# Patient Record
Sex: MALE | Race: WHITE
[De-identification: names, ages, dates, MRNs, and addresses within clinical notes are randomized per-mention and may not be internally consistent; named-entity substitution may affect disease eponyms.]

---

## 2020-05-15 ENCOUNTER — HOSPITAL ENCOUNTER (INPATIENT)
Dept: HOSPITAL 47 - 2SICU | Age: 80
LOS: 8 days | Discharge: HOME HEALTH | DRG: 242 | End: 2020-05-23
Attending: HOSPITALIST | Admitting: HOSPITALIST
Payer: MEDICARE

## 2020-05-15 VITALS — BODY MASS INDEX: 42.3 KG/M2

## 2020-05-15 DIAGNOSIS — Z88.0: ICD-10-CM

## 2020-05-15 DIAGNOSIS — I95.9: ICD-10-CM

## 2020-05-15 DIAGNOSIS — Z71.3: ICD-10-CM

## 2020-05-15 DIAGNOSIS — J18.9: ICD-10-CM

## 2020-05-15 DIAGNOSIS — N17.0: ICD-10-CM

## 2020-05-15 DIAGNOSIS — Z83.3: ICD-10-CM

## 2020-05-15 DIAGNOSIS — I27.20: ICD-10-CM

## 2020-05-15 DIAGNOSIS — E11.65: ICD-10-CM

## 2020-05-15 DIAGNOSIS — N18.3: ICD-10-CM

## 2020-05-15 DIAGNOSIS — Z80.49: ICD-10-CM

## 2020-05-15 DIAGNOSIS — I25.10: ICD-10-CM

## 2020-05-15 DIAGNOSIS — I49.5: ICD-10-CM

## 2020-05-15 DIAGNOSIS — E66.9: ICD-10-CM

## 2020-05-15 DIAGNOSIS — I13.0: ICD-10-CM

## 2020-05-15 DIAGNOSIS — E78.5: ICD-10-CM

## 2020-05-15 DIAGNOSIS — N14.1: ICD-10-CM

## 2020-05-15 DIAGNOSIS — I21.19: Primary | ICD-10-CM

## 2020-05-15 DIAGNOSIS — Z98.890: ICD-10-CM

## 2020-05-15 DIAGNOSIS — I44.2: ICD-10-CM

## 2020-05-15 DIAGNOSIS — Z87.891: ICD-10-CM

## 2020-05-15 DIAGNOSIS — Z91.018: ICD-10-CM

## 2020-05-15 DIAGNOSIS — Z79.84: ICD-10-CM

## 2020-05-15 DIAGNOSIS — Z87.19: ICD-10-CM

## 2020-05-15 DIAGNOSIS — Z79.82: ICD-10-CM

## 2020-05-15 DIAGNOSIS — G47.30: ICD-10-CM

## 2020-05-15 DIAGNOSIS — Z90.49: ICD-10-CM
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
## 2020-05-15 DIAGNOSIS — E11.22: ICD-10-CM

## 2020-05-15 DIAGNOSIS — T50.8X5A: ICD-10-CM

## 2020-05-15 DIAGNOSIS — Z79.899: ICD-10-CM

## 2020-05-15 DIAGNOSIS — M19.90: ICD-10-CM

## 2020-05-15 DIAGNOSIS — I50.22: ICD-10-CM

## 2020-05-15 DIAGNOSIS — I25.5: ICD-10-CM

## 2020-05-15 LAB
ANION GAP SERPL CALC-SCNC: 8 MMOL/L
BASOPHILS # BLD AUTO: 0.1 K/UL (ref 0–0.2)
BASOPHILS NFR BLD AUTO: 1 %
BUN SERPL-SCNC: 33 MG/DL (ref 9–20)
CALCIUM SPEC-MCNC: 10 MG/DL (ref 8.4–10.2)
CHLORIDE SERPL-SCNC: 102 MMOL/L (ref 98–107)
CO2 SERPL-SCNC: 28 MMOL/L (ref 22–30)
EOSINOPHIL # BLD AUTO: 0.1 K/UL (ref 0–0.7)
EOSINOPHIL NFR BLD AUTO: 1 %
ERYTHROCYTE [DISTWIDTH] IN BLOOD BY AUTOMATED COUNT: 4.43 M/UL (ref 4.3–5.9)
ERYTHROCYTE [DISTWIDTH] IN BLOOD: 14.5 % (ref 11.5–15.5)
GLUCOSE BLD-MCNC: 215 MG/DL (ref 75–99)
GLUCOSE BLD-MCNC: 237 MG/DL (ref 75–99)
GLUCOSE BLD-MCNC: 303 MG/DL (ref 75–99)
GLUCOSE SERPL-MCNC: 219 MG/DL (ref 74–99)
HCT VFR BLD AUTO: 42.2 % (ref 39–53)
HGB BLD-MCNC: 13.1 GM/DL (ref 13–17.5)
LYMPHOCYTES # SPEC AUTO: 2.1 K/UL (ref 1–4.8)
LYMPHOCYTES NFR SPEC AUTO: 19 %
MCH RBC QN AUTO: 29.6 PG (ref 25–35)
MCHC RBC AUTO-ENTMCNC: 31.1 G/DL (ref 31–37)
MCV RBC AUTO: 95.2 FL (ref 80–100)
MONOCYTES # BLD AUTO: 0.8 K/UL (ref 0–1)
MONOCYTES NFR BLD AUTO: 7 %
NEUTROPHILS # BLD AUTO: 7.8 K/UL (ref 1.3–7.7)
NEUTROPHILS NFR BLD AUTO: 70 %
PLATELET # BLD AUTO: 205 K/UL (ref 150–450)
POTASSIUM SERPL-SCNC: 4.8 MMOL/L (ref 3.5–5.1)
SODIUM SERPL-SCNC: 138 MMOL/L (ref 137–145)
WBC # BLD AUTO: 11.2 K/UL (ref 3.8–10.6)

## 2020-05-15 PROCEDURE — 71046 X-RAY EXAM CHEST 2 VIEWS: CPT

## 2020-05-15 PROCEDURE — 81001 URINALYSIS AUTO W/SCOPE: CPT

## 2020-05-15 PROCEDURE — 87324 CLOSTRIDIUM AG IA: CPT

## 2020-05-15 PROCEDURE — 87205 SMEAR GRAM STAIN: CPT

## 2020-05-15 PROCEDURE — 83036 HEMOGLOBIN GLYCOSYLATED A1C: CPT

## 2020-05-15 PROCEDURE — 84484 ASSAY OF TROPONIN QUANT: CPT

## 2020-05-15 PROCEDURE — 85027 COMPLETE CBC AUTOMATED: CPT

## 2020-05-15 PROCEDURE — 87635 SARS-COV-2 COVID-19 AMP PRB: CPT

## 2020-05-15 PROCEDURE — 33210 INSERT ELECTRD/PM CATH SNGL: CPT

## 2020-05-15 PROCEDURE — 84443 ASSAY THYROID STIM HORMONE: CPT

## 2020-05-15 PROCEDURE — 93306 TTE W/DOPPLER COMPLETE: CPT

## 2020-05-15 PROCEDURE — 85025 COMPLETE CBC W/AUTO DIFF WBC: CPT

## 2020-05-15 PROCEDURE — 71045 X-RAY EXAM CHEST 1 VIEW: CPT

## 2020-05-15 PROCEDURE — 33208 INSRT HEART PM ATRIAL & VENT: CPT

## 2020-05-15 PROCEDURE — 027036Z DILATION OF CORONARY ARTERY, ONE ARTERY WITH THREE DRUG-ELUTING INTRALUMINAL DEVICES, PERCUTANEOUS APPROACH: ICD-10-PCS

## 2020-05-15 PROCEDURE — 87070 CULTURE OTHR SPECIMN AEROBIC: CPT

## 2020-05-15 PROCEDURE — 93458 L HRT ARTERY/VENTRICLE ANGIO: CPT

## 2020-05-15 PROCEDURE — 3E033XZ INTRODUCTION OF VASOPRESSOR INTO PERIPHERAL VEIN, PERCUTANEOUS APPROACH: ICD-10-PCS

## 2020-05-15 PROCEDURE — 80048 BASIC METABOLIC PNL TOTAL CA: CPT

## 2020-05-15 RX ADMIN — NICARDIPINE HYDROCHLORIDE ONE MCG: 2.5 INJECTION INTRAVENOUS at 13:35

## 2020-05-15 RX ADMIN — NICARDIPINE HYDROCHLORIDE ONE MCG: 2.5 INJECTION INTRAVENOUS at 14:02

## 2020-05-15 RX ADMIN — INSULIN ASPART SCH UNIT: 100 INJECTION, SOLUTION INTRAVENOUS; SUBCUTANEOUS at 21:19

## 2020-05-15 RX ADMIN — DOPAMINE HYDROCHLORIDE IN DEXTROSE SCH MLS/HR: 3.2 INJECTION, SOLUTION INTRAVENOUS at 15:27

## 2020-05-15 RX ADMIN — CEFAZOLIN SCH MLS/HR: 330 INJECTION, POWDER, FOR SOLUTION INTRAMUSCULAR; INTRAVENOUS at 15:29

## 2020-05-15 NOTE — CONS
CONSULTATION



CHIEF COMPLAINT:

Chest pain.



Shaun is an 79-year-old gentleman with a history of non-insulin-dependent diabetes and

hypertension who presented to [facility] with chest pain.  He had some chest

discomfort yesterday and worsening chest pain this morning.  He describes it as a

pressure that radiated to his back.  His initial EKG showed acute ST-segment elevation

in the inferior leads.  He was given sublingual nitroglycerin, with which the ST-

segment elevation has improved and the chest pain has resolved.  At the time of my

evaluation, he is pain-free, hemodynamically stable and in no apparent distress.



PAST MEDICAL HISTORY:

Significant for hypertension and diabetes.



MEDICATIONS:

Medications at home include aspirin, Lasix, Actos, glipizide, losartan, metformin and

Ventolin.



ALLERGIES:

Allergies are as charted.



FAMILY HISTORY:

Negative for premature coronary artery disease.



SOCIAL HISTORY:

Negative for smoking, EtOH abuse or drug abuse.



REVIEW OF SYSTEMS:

HEENT is unremarkable.

CARDIAC: As described above.

RESPIRATORY: As described above.

GI: Negative.

GENITOURINARY: Negative.

ALLERGY: Negative.

IMMUNOLOGY: Negative.

SKIN: Negative.

MUSCULOSKELETAL: Negative.

ENDOCRINE: Negative.

DERMATOLOGY: Negative.

CONSTITUTIONAL: Negative.

ONCOLOGICAL: Negative.

CNS: Negative.



PHYSICAL EXAMINATION:

Comfortable at rest.  Vital signs are stable.  There is no jugular venous distention.

Chest exam reveals good air entry bilaterally.  Heart exam reveals first and second

heart sounds.  No gallop.  No murmur.  Abdomen is soft, nontender.  Examination of

extremities did not reveal any edema.  Peripheral pulses are palpable.



ASSESSMENT:

1. Acute anterior wall myocardial infarction.

2. Hypertension.

3. _____emia.



PLAN:

The patient will undergo emergency cardiac catheterization with a view to performing

angioplasty.





MMODL / IJN: [id number] / Job#: [id number]

## 2020-05-15 NOTE — PTCA
PERCUTANEOUSTRANS CORORONARY ANGIOGRAPHY



DATE OF SERVICE:

05/15/2020



PROCEDURE:

Percutaneous transluminal coronary angioplasty and stenting of super dominant 
right

coronary artery in the setting of an acute inferior myocardial infarction. Three
drug-

eluting stents were used.



PERFORMED BY:

Dr. BOYD Licea.



Moderate conscious sedation time was 46 minutes.  Patient was administered 
Versed.

Oxygen saturation, hemodynamics and EKG were monitored closely.



CLINICAL INFORMATION:

Mr. Shaun Cronin is an 79-year-old gentleman with history of type 2 diabetes,

hypertension, hyperlipidemia, obesity and probable sleep apnea syndrome who came
in

with acute inferior MI from [facility]. He was seen and evaluated by 
Dr. Parmar, who performed a cardiac cath which revealed 80% proximal RCA stenosis 
with a

heavy thrombus burden, and also major diagonal had significant disease.  LAD and

circumflex had noncritical disease.  He was advised intervention that was 
performed

expeditiously.



PROCEDURE NOTE:

The existing 6-[place] introducer in the right femoral artery was used to perform
the

procedure.  I used an  all-right 3.5 guide catheter to cannulate the right 
coronary

artery.  Patient was administered Angiomax bolus and drip as per protocol.  He 
also

received 180 mg of Brilinta orally.  I used a run-through wire to cross the 
lesion.

Wire was kept distally.  Without predilatation, an 18 mm long 4.0 caliber Xience
stent

was deployed, and soon after deployment there was some thrombus that went 
distally, and

the entire vessel was occluded.  The patient became transiently hypotensive with
ST

elevation.  I gave him some nicardipine and he also received 3 mcg of dopamine 
very

transiently.  Blood pressure came back and the vessel opened up.  I then noted 
that

there was another lesion proximal to where I had deployed the stent, and this 
was

addressed with another 8 mm Xience stent, and this telescoped into the 
previously

placed stent.  There was an acute marginal branch that came off at the junction 
of

proximal and middle one third, and just above it there was a question of napkin 
ring

lesion.  In multiple views I was not convinced, but since patient had vessel 
closure

with thrombus that started in that region and went distally, I decided to deploy

another stent.  I had difficulty getting past the existing stents.  I used 
another

neelam wire with a Whisper wire, and over this Whisper wire I advanced an 8 mm 
4.0

caliber Xience stent and deployed this at the acute marginal branch.  Excellent

angiographic result was achieved.  Patient was hemodynamically stable.  The 
sheath was

taken out and Angio-Seal device used to secure hemostasis.  He was sent to the 
ICU in a

stable condition.  Findings were discussed with the patient and I also 
communicated

with his friend at home by telephone.  He was sent to the ICU in a stable 
condition.





MMGUADALUPE / YOSIN: [id number] / Job#: [id number]

AILYN

## 2020-05-16 LAB
ANION GAP SERPL CALC-SCNC: 9 MMOL/L
BASOPHILS # BLD AUTO: 0.1 K/UL (ref 0–0.2)
BASOPHILS NFR BLD AUTO: 1 %
BUN SERPL-SCNC: 31 MG/DL (ref 9–20)
CALCIUM SPEC-MCNC: 9.4 MG/DL (ref 8.4–10.2)
CHLORIDE SERPL-SCNC: 105 MMOL/L (ref 98–107)
CO2 SERPL-SCNC: 25 MMOL/L (ref 22–30)
EOSINOPHIL # BLD AUTO: 0.1 K/UL (ref 0–0.7)
EOSINOPHIL NFR BLD AUTO: 1 %
ERYTHROCYTE [DISTWIDTH] IN BLOOD BY AUTOMATED COUNT: 4.3 M/UL (ref 4.3–5.9)
ERYTHROCYTE [DISTWIDTH] IN BLOOD: 14.7 % (ref 11.5–15.5)
GLUCOSE BLD-MCNC: 206 MG/DL (ref 75–99)
GLUCOSE BLD-MCNC: 207 MG/DL (ref 75–99)
GLUCOSE BLD-MCNC: 227 MG/DL (ref 75–99)
GLUCOSE BLD-MCNC: 261 MG/DL (ref 75–99)
GLUCOSE SERPL-MCNC: 230 MG/DL (ref 74–99)
GLUCOSE UR QL: (no result)
HBA1C MFR BLD: 8.9 % (ref 4–6)
HCT VFR BLD AUTO: 41 % (ref 39–53)
HGB BLD-MCNC: 12.7 GM/DL (ref 13–17.5)
LYMPHOCYTES # SPEC AUTO: 2.2 K/UL (ref 1–4.8)
LYMPHOCYTES NFR SPEC AUTO: 18 %
MCH RBC QN AUTO: 29.6 PG (ref 25–35)
MCHC RBC AUTO-ENTMCNC: 31.1 G/DL (ref 31–37)
MCV RBC AUTO: 95.5 FL (ref 80–100)
MONOCYTES # BLD AUTO: 1.1 K/UL (ref 0–1)
MONOCYTES NFR BLD AUTO: 9 %
NEUTROPHILS # BLD AUTO: 8.4 K/UL (ref 1.3–7.7)
NEUTROPHILS NFR BLD AUTO: 69 %
PH UR: 5.5 [PH] (ref 5–8)
PLATELET # BLD AUTO: 201 K/UL (ref 150–450)
POTASSIUM SERPL-SCNC: 4.5 MMOL/L (ref 3.5–5.1)
PROT UR QL: (no result)
RBC UR QL: <1 /HPF (ref 0–5)
SODIUM SERPL-SCNC: 139 MMOL/L (ref 137–145)
SP GR UR: 1.03 (ref 1–1.03)
SQUAMOUS UR QL AUTO: <1 /HPF (ref 0–4)
UROBILINOGEN UR QL STRIP: <2 MG/DL (ref ?–2)
WBC # BLD AUTO: 12.2 K/UL (ref 3.8–10.6)
WBC # UR AUTO: 2 /HPF (ref 0–5)

## 2020-05-16 RX ADMIN — INSULIN ASPART SCH UNIT: 100 INJECTION, SOLUTION INTRAVENOUS; SUBCUTANEOUS at 11:57

## 2020-05-16 RX ADMIN — LOSARTAN POTASSIUM SCH: 50 TABLET, FILM COATED ORAL at 11:58

## 2020-05-16 RX ADMIN — ASPIRIN 81 MG CHEWABLE TABLET SCH MG: 81 TABLET CHEWABLE at 08:30

## 2020-05-16 RX ADMIN — TICAGRELOR SCH MG: 90 TABLET ORAL at 08:30

## 2020-05-16 RX ADMIN — CEFAZOLIN SCH MLS/HR: 330 INJECTION, POWDER, FOR SOLUTION INTRAMUSCULAR; INTRAVENOUS at 05:37

## 2020-05-16 RX ADMIN — LOSARTAN POTASSIUM SCH MG: 50 TABLET, FILM COATED ORAL at 12:17

## 2020-05-16 RX ADMIN — ATORVASTATIN CALCIUM SCH MG: 80 TABLET, FILM COATED ORAL at 08:30

## 2020-05-16 RX ADMIN — TICAGRELOR SCH MG: 90 TABLET ORAL at 20:26

## 2020-05-16 RX ADMIN — INSULIN ASPART SCH UNIT: 100 INJECTION, SOLUTION INTRAVENOUS; SUBCUTANEOUS at 17:08

## 2020-05-16 RX ADMIN — FAMOTIDINE SCH MG: 20 TABLET, FILM COATED ORAL at 20:27

## 2020-05-16 RX ADMIN — DOPAMINE HYDROCHLORIDE IN DEXTROSE SCH MLS/HR: 3.2 INJECTION, SOLUTION INTRAVENOUS at 20:27

## 2020-05-16 RX ADMIN — INSULIN ASPART SCH UNIT: 100 INJECTION, SOLUTION INTRAVENOUS; SUBCUTANEOUS at 20:27

## 2020-05-16 RX ADMIN — INSULIN ASPART SCH UNIT: 100 INJECTION, SOLUTION INTRAVENOUS; SUBCUTANEOUS at 06:50

## 2020-05-16 NOTE — CONS
CONSULTATION



REASON FOR CONSULT:

Renal failure.



HISTORY OF PRESENT ILLNESS:

Patient is an 79-year-old male who was admitted to the hospital with complaints of

chest pain.  The pain was radiating to the back.  The patient initially presented to

[facility] and was transferred to [facility].  His EKG showed ST-segment

elevation in the inferior leads.  He was taken for cardiac catheterization yesterday

and had stent 3 coronary stents placed.  The patient denies any prior history of kidney

diseases.  His chest pain has resolved.  The patient's creatinine was 1.23 mg/dL today.

Yesterday it was 1.42 and review of previous labs shows a creatinine of about 1.3-1.2

in 2015.



Patient does have a history of hypertension and type 2 diabetes.  His blood pressure

had been on the lower side yesterday and early morning today, but it is currently much

better controlled.  The patient denies use of any nonsteroidal anti-inflammatory

agents.  He had been on angiotensin receptor blockers at home prior to admission.



Patient's heart rate has also been low.  His heart rate was as low as 25 this morning.

Currently patient is maintained on dopamine.  He is being followed by Cardiology.



PAST MEDICAL HISTORY:

Diabetes hypertension, dyslipidemia, osteoarthritis.



MEDICATIONS:

Medications prior to admission included aspirin, Lasix, Actos, glipizide, losartan,

metformin, Ventolin.



ALLERGIES:

PENICILLIN AND MUSHROOMS.



SOCIAL HISTORY:

Previous history of smoking.  No history of alcohol abuse or drug abuse.



EXAMINATION:

Patient is awake, comfortable, not in any acute distress.  Blood pressure was 127/66,

heart rate 51 per minute.  He is afebrile.  Examination of the heart S1, S2.

Examination of lungs decreased breath sounds at bases.

ABDOMEN:  Soft, nontender.

Examination of lower extremities shows chronic skin changes.  Chronic edema is noted as

well.  CNS exam grossly intact.



LAB:

Show sodium of 139, potassium 4.5, chloride 105, BUN 31, serum creatinine 1.23.

Troponin 33.  Hemoglobin 12.7.  UA is not available.



ASSESSMENT:

1. Acute kidney injury, mostly associated with hypotension hypoperfusion.  The patient

    is status post cardiac catheterization yesterday.  Continue to monitor his renal

    function.

2. Chronic kidney disease NKF stage III.  Previous creatinine about 1.3-1.2 mg/dL in

    2015 as well.  Most likely secondary to nephrosclerosis.  Check urinalysis.  Rule

    out proteinuria.

3. Type 2 diabetes, maintained on oral hypoglycemic agents including metformin.

4. Hypertension blood pressure had been low yesterday and early morning today.  We can

    resume angiotensin receptor blockers.  Consider decreasing dose if blood pressure

    remains low.

5. Bradycardia, maintained on dopamine, being followed by Cardiology.  Not on any beta

    blockers.

6. Acute ST elevation myocardial infarction status post cardiac catheterization and

    coronary stent placement to RCA.



PLAN:

Continue normal saline at 50 mL an hour.  May continue with angiotensin receptor

blockers if blood pressure drops again.  Decrease Cozaar to 25 mg per day.  Avoid any

other nephrotoxic agents.  Check urinalysis.





MMODL / IJN: [id number] / Job#: [id number]

## 2020-05-16 NOTE — PN
PROGRESS NOTE



Mr. Cronin is an 79-year-old gentleman who is a non-insulin-dependent diabetic and

hypertensive, who was admitted to the hospital with ST elevation myocardial infarction.

The patient had a cardiac catheterization by Dr. Parmar and subsequently had

percutaneous intervention by Dr. BOYD Licea.  The patient had a stent placement to the

RCA.  Xience stent was deployed.  The patient has remained relatively stable, but had

developed a source of bradycardia with heart rates dipping into 30s and having

junctional rhythm.  Most seemed to be in sinus rhythm.  He is on metoprolol, which is

being held.  He will continue with the rest of the medications.  His activity to be

increased as tolerated.  He is also on dopamine.



His blood pressure is running about 130/70, heart rate is varying between 38-85.  O2

saturation 96%.  Respirations are 12.  His urine output is fair.



LAB VALUES:

Showed [place] 247[address].  Electrolytes are within normal limits.  Creatinine is

1.23, BUN is 31.  Sugars are running between 250-300.  His troponin went up maximal 33.



Clinically lungs appear to be clear.  Heart is regular, but slow.  No JVD or peripheral

edema.  His groin is soft.



FINAL IMPRESSION:

1. Status post inferior wall myocardial infarction and stent placement.

2. Bradycardia.



PLAN:

Will discontinue beta blocker.  Continue dopamine for now.  Continue rest of the

medication.  Further recommendation will depend upon the course.  He will stay in the

ICU for 24 hours.





MMGUADALUPE / YOSIN: [id number] / Job#: [id number]

## 2020-05-16 NOTE — XR
EXAMINATION TYPE: XR chest 1V portable

 

DATE OF EXAM: 5/16/2020

 

COMPARISON: 11/25/2015

 

HISTORY: Short of breath

 

TECHNIQUE: Single view

 

FINDINGS: Heart is enlarged. There is mild pulmonary congestion. There are chest leads. There is no d
efinite pleural effusion. Bony thorax appears intact.

 

IMPRESSION: Cardiomegaly and mild pulmonary congestion that is increased compared to last exam. No ob
vious heart failure.

## 2020-05-17 LAB
ANION GAP SERPL CALC-SCNC: 7 MMOL/L
BASOPHILS # BLD AUTO: 0 K/UL (ref 0–0.2)
BASOPHILS NFR BLD AUTO: 0 %
BUN SERPL-SCNC: 31 MG/DL (ref 9–20)
CALCIUM SPEC-MCNC: 9.1 MG/DL (ref 8.4–10.2)
CHLORIDE SERPL-SCNC: 102 MMOL/L (ref 98–107)
CO2 SERPL-SCNC: 29 MMOL/L (ref 22–30)
EOSINOPHIL # BLD AUTO: 0 K/UL (ref 0–0.7)
EOSINOPHIL NFR BLD AUTO: 0 %
ERYTHROCYTE [DISTWIDTH] IN BLOOD BY AUTOMATED COUNT: 3.89 M/UL (ref 4.3–5.9)
ERYTHROCYTE [DISTWIDTH] IN BLOOD: 14.7 % (ref 11.5–15.5)
GLUCOSE BLD-MCNC: 166 MG/DL (ref 75–99)
GLUCOSE BLD-MCNC: 179 MG/DL (ref 75–99)
GLUCOSE BLD-MCNC: 230 MG/DL (ref 75–99)
GLUCOSE BLD-MCNC: 231 MG/DL (ref 75–99)
GLUCOSE SERPL-MCNC: 228 MG/DL (ref 74–99)
HCT VFR BLD AUTO: 37.4 % (ref 39–53)
HGB BLD-MCNC: 12.1 GM/DL (ref 13–17.5)
LYMPHOCYTES # SPEC AUTO: 1.5 K/UL (ref 1–4.8)
LYMPHOCYTES NFR SPEC AUTO: 11 %
MCH RBC QN AUTO: 31.1 PG (ref 25–35)
MCHC RBC AUTO-ENTMCNC: 32.4 G/DL (ref 31–37)
MCV RBC AUTO: 96 FL (ref 80–100)
MONOCYTES # BLD AUTO: 1.4 K/UL (ref 0–1)
MONOCYTES NFR BLD AUTO: 10 %
NEUTROPHILS # BLD AUTO: 10.8 K/UL (ref 1.3–7.7)
NEUTROPHILS NFR BLD AUTO: 77 %
PLATELET # BLD AUTO: 184 K/UL (ref 150–450)
POTASSIUM SERPL-SCNC: 4.5 MMOL/L (ref 3.5–5.1)
SODIUM SERPL-SCNC: 138 MMOL/L (ref 137–145)
WBC # BLD AUTO: 14 K/UL (ref 3.8–10.6)

## 2020-05-17 PROCEDURE — 5A1223Z PERFORMANCE OF CARDIAC PACING, CONTINUOUS: ICD-10-PCS

## 2020-05-17 RX ADMIN — INSULIN ASPART SCH UNIT: 100 INJECTION, SOLUTION INTRAVENOUS; SUBCUTANEOUS at 06:43

## 2020-05-17 RX ADMIN — MIDODRINE HYDROCHLORIDE SCH MG: 5 TABLET ORAL at 17:06

## 2020-05-17 RX ADMIN — INSULIN ASPART SCH UNIT: 100 INJECTION, SOLUTION INTRAVENOUS; SUBCUTANEOUS at 11:48

## 2020-05-17 RX ADMIN — LOSARTAN POTASSIUM SCH: 50 TABLET, FILM COATED ORAL at 11:45

## 2020-05-17 RX ADMIN — ATORVASTATIN CALCIUM SCH MG: 80 TABLET, FILM COATED ORAL at 08:31

## 2020-05-17 RX ADMIN — INSULIN ASPART SCH UNIT: 100 INJECTION, SOLUTION INTRAVENOUS; SUBCUTANEOUS at 20:44

## 2020-05-17 RX ADMIN — MIDODRINE HYDROCHLORIDE SCH MG: 5 TABLET ORAL at 06:42

## 2020-05-17 RX ADMIN — INSULIN ASPART SCH UNIT: 100 INJECTION, SOLUTION INTRAVENOUS; SUBCUTANEOUS at 17:06

## 2020-05-17 RX ADMIN — ASPIRIN 81 MG CHEWABLE TABLET SCH MG: 81 TABLET CHEWABLE at 08:31

## 2020-05-17 RX ADMIN — FAMOTIDINE SCH MG: 20 TABLET, FILM COATED ORAL at 08:31

## 2020-05-17 RX ADMIN — TICAGRELOR SCH MG: 90 TABLET ORAL at 08:31

## 2020-05-17 RX ADMIN — TICAGRELOR SCH MG: 90 TABLET ORAL at 20:44

## 2020-05-17 RX ADMIN — CEFAZOLIN SCH MLS/HR: 330 INJECTION, POWDER, FOR SOLUTION INTRAMUSCULAR; INTRAVENOUS at 06:46

## 2020-05-17 NOTE — XR
EXAMINATION TYPE: XR chest 1V portable

 

DATE OF EXAM: 5/17/2020

 

HISTORY: shortness of breath.

 

REFERENCE: Previous study dated 5/16/2020.

 

FINDINGS: The heart is enlarged. There is improved vascular congestion. There is no maya edema. No d
efinite pleural fluid is seen. There is some left basilar airspace disease either representing atelec
tasis or pneumonia.

 

IMPRESSION: 

 

OVERALL THERE HAS BEEN IMPROVEMENT IN THE APPEARANCE OF THE CHEST WITH DECREASING VASCULAR CONGESTION
.

## 2020-05-17 NOTE — PN
PROGRESS NOTE



This is an 79-year-old gentleman with history of hypertension, non-insulin-dependent

diabetes mellitus, who was admitted with inferior wall MI and underwent stent placement

of the RCA.  The patient has been hypotensive and bradycardic.  He did develop

significant pauses up to 10-16 seconds last night associated with dizziness.  The

patient had a temporary pacemaker implantation and has been stable since then.  Still

his blood pressure is running about  systolic.  He is still on small dose of

dopamine.  His urine output for the last 24 hours has been about 1800.  A chest x-ray

has showed improvement in congestion.



LAB WORK:

Today showed a [place] of [address], hemoglobin is normal.  His creatinine is 1.34.

Blood sugars are running about 228.



His vital signs showed normal temperature, heart rate is in the 50s, respirations are

about 20, saturations are 99%.  Clinically, patient appears to be in no acute distress.

Does complain of discomfort related to staying in bed and inability to move.  Lungs

appear to be clear.  Heart is regular.  His echocardiogram showed mild hypokinesia of

the inferior wall  areas with ejection fraction 45 to 50 percent.



IMPRESSION:

1. Status post acute inferior wall MI and stent placement.

2. Ischemic cardiomyopathy with hypokinesis, inferior wall with ejection fraction of

    45-50 percent.

3. Sick sinus syndrome with long pauses, status post temporary pacemaker implantation.

4. Hypertension history, but current presenting hypotension.



PLAN:

We will continue his current medical therapy.  Hold his Cozaar and beta blockers.

Continue to monitor his rhythm.  If the patient continues to have these long pauses

without recovery, patient may need a permanent pacemaker implantation.  Further

recommendations depend upon the clinical course.





MMODL / IJN: [id number] / Job#: [id number]

## 2020-05-17 NOTE — P.PN
Subjective








This is a pleasant 79 years old female with past medical history of diabetes 

mellitus, hypertension.  She was transferred from [facility] chest 

pain.  Patient presents with central chest pain, of daily duration of the left 

side radiating to the neck about 4/10 in severity, associated with little 

dyspnea, nausea and coughing.  No vomiting.  No change in urine or bowel habits


EKG changes showing ST elevation in inferior leads.  Patient has been evaluated 

by cardiologist and underwent emergent cardiac angiogram, and found to have 80% 

blockage of the right coronary artery, patient underwent difficult PCI with 3 

stent placed in the right coronary artery, during the procedure patient became 

transiently hypotensive and needed dopamine for short period.  Postprocedure 

patient stabilized and transferred to the ICU for further monitoring and 

treatment


Currently vitals are stable.  Labs showed leukocytosis of 11.2 K, creatinine is 

elevated 1.4, glucose high at 215-303


Currently patient is on normal saline 75 mL/h, glipizide 5 mg twice a day, 

aspirin and Brilinta, metoprolol 12.5 mg





05/17/2020


Patient remains in the ICU, his heart rate dropped to 20s overnight with low 

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 

cardiologist placed temporal pacemaker, he is awake and oriented in the ICU.  No

chest pain or dyspnea.  Nephrologist following the case closely, losartan.  And 

medial drain was admitted, currently blood pressure 127/53 with heart rate is 

50s, WBC 14.4 K, creatinine 1.3 and stable, glucose controlled less than 200 


possible patient will need permanent pacemaker and down the root








Review of systems


CONSTITUTIONAL: No fever, no malaise, no fatigue. 


HEENT: No recent visual problems or hearing problems. Denied any sore throat. 


CARDIOVASCULAR: No  orthopnea, PND, no palpitations, no syncope. 


PULMONARY: No shortness of breath, no cough, no hemoptysis. 


GASTROINTESTINAL: No diarrhea, no nausea, no vomiting, no abdominal pain. 

Normoactive bowel sounds. 


NEUROLOGICAL: No headaches, no weakness, no numbness. 


HEMATOLOGICAL: Denies any bleeding or petechiae. 


GENITOURINARY: Denies any burning micturition, frequency, or urgency. 


MUSCULOSKELETAL/RHEUMATOLOGICAL: Denies any joint pain, swelling, or any muscle 

pain. 


ENDOCRINE: Denies any polyuria or polydipsia.


                               Active Medications











Generic Name Dose Route Start Last Admin





  Trade Name Freq  PRN Reason Stop Dose Admin


 


Aspirin  81 mg  05/16/20 09:00  05/17/20 08:31





  Aspirin  PO   81 mg





  DAILY VICENTE   Administration


 


Atorvastatin Calcium  80 mg  05/16/20 09:00  05/17/20 08:31





  Lipitor  PO   80 mg





  DAILY VICENTE   Administration


 


Famotidine  20 mg  05/18/20 09:00 





  Pepcid  PO  





  DAILY VICENTE  


 


Glipizide  5 mg  05/15/20 17:30  05/17/20 17:06





  Glucotrol  PO   5 mg





  AC-BID VICENTE   Administration


 


Dopamine HCl/Dextrose 800 mg/  250 mls @ 8.114 mls/hr  05/15/20 15:00  05/16/20 

20:27





  IV Solution  IV   3 mcg/kg/min





  .Q24H VICENTE   8.114 mls/hr





    Administration





  Protocol  





  3 MCG/KG/MIN  


 


Sodium Chloride  1,000 mls @ 20 mls/hr  05/17/20 03:30  05/17/20 06:46





  Saline 0.9%  IV   20 mls/hr





  .Q24H VICENTE   Administration


 


Insulin Aspart  0 unit  05/15/20 21:00  05/17/20 17:06





  Novolog  SQ   2 unit





  ACHS VICENTE   Administration





  Protocol  


 


Losartan Potassium  50 mg  05/16/20 09:00  05/17/20 11:45





  Cozaar  PO   Not Given





  DAILY [facility]  


 


Metformin HCl  500 mg  05/16/20 19:15  05/17/20 17:06





  Glucophage  PO   500 mg





  BID-W/MEALS VICENTE   Administration


 


Midodrine  10 mg  05/17/20 07:30  05/17/20 17:06





  Proamatine  PO   10 mg





  AC-BID VICENTE   Administration


 


Ticagrelor  90 mg  05/16/20 09:00  05/17/20 08:31





  Brilinta  PO   90 mg





  BID VICENTE   Administration














Objective





- Vital Signs


Vital signs: 


                                   Vital Signs











Temp  98.8 F   05/17/20 16:00


 


Pulse  58 L  05/17/20 19:00


 


Resp  22   05/17/20 19:00


 


BP  119/51   05/17/20 19:00


 


Pulse Ox  96   05/17/20 19:00








                                 Intake & Output











 05/17/20 05/17/20 05/18/20





 06:59 18:59 06:59


 


Intake Total 860.306 380 30


 


Output Total 570 214 30


 


Balance 290.306 166 0


 


Weight 143.2 kg  


 


Intake:   


 


   280 30


 


    Sodium Chloride 0.9% 1, 575 160 20





    000 ml @ 50 mls/hr IV .   





    Q20H VICENTE Rx#:[id number]   


 


    TVP 10 mL/hour .9  120 10


 


  Intake, IV Titration 235.306 100 





  Amount   


 


    DOPamine DRIP 800 mg In 235.306  





    Dextrose/Water 1 250ml.   





    bag @ 3 MCG/KG/MIN 8.114   





    mls/hr IV .Q24H VICENTE Rx#:   





    [id number]   


 


    Sodium Chloride 0.9% 1,  100 





    000 ml @ 20 mls/hr IV .   





    Q24H VICENTE Rx#:[id number]   


 


Output:   


 


  Urine 570 214 30


 


Other:   


 


  Voiding Method Indwelling Catheter Indwelling Catheter 














- Exam





GENERAL: The patient is alert and oriented x3, not in any acute distress. Well 

developed, well nourished. 


HEENT: Pupils are round and equally reacting to light. EOMI. No scleral icterus.

No conjunctival pallor. Normocephalic, atraumatic. No pharyngeal erythema. No 

thyromegaly. 


CARDIOVASCULAR: S1 and S2 present. No murmurs, rubs, or gallops. 


PULMONARY: Chest is clear to auscultation, no wheezing or crackles. 


ABDOMEN: Soft, nontender, nondistended, normoactive bowel sounds. No palpable 

organomegaly. 


MUSCULOSKELETAL: No joint swelling or deformity. 


EXTREMITIES: No cyanosis, clubbing, or pedal edema. 


NEUROLOGICAL: Gross neurological examination did not reveal any focal deficits. 


SKIN: No rashes. no petechiae.





- Labs


CBC & Chem 7: 


                                 05/17/20 04:12





                                 05/17/20 04:12


Labs: 


                  Abnormal Lab Results - Last 24 Hours (Table)











  05/16/20 05/16/20 05/17/20 Range/Units





  20:22 22:20 04:12 


 


WBC    14.0 H  (3.8-10.6)  k/uL


 


RBC    3.89 L  (4.30-5.90)  m/uL


 


Hgb    12.1 L  (13.0-17.5)  gm/dL


 


Hct    37.4 L  (39.0-53.0)  %


 


Neutrophils #    10.8 H  (1.3-7.7)  k/uL


 


Monocytes #    1.4 H  (0-1.0)  k/uL


 


BUN     (9-20)  mg/dL


 


Creatinine     (0.66-1.25)  mg/dL


 


Glucose     (74-99)  mg/dL


 


POC Glucose (mg/dL)  206 H    (75-99)  mg/dL


 


Urine Protein   1+ H   (Negative)  


 


Urine Glucose (UA)   3+ H   (Negative)  


 


Urine Ketones   Trace H   (Negative)  


 


Urine Bacteria   Rare H   (None)  /hpf


 


Urine Mucus   Rare H   (None)  /hpf














  05/17/20 05/17/20 05/17/20 Range/Units





  04:12 05:56 11:39 


 


WBC     (3.8-10.6)  k/uL


 


RBC     (4.30-5.90)  m/uL


 


Hgb     (13.0-17.5)  gm/dL


 


Hct     (39.0-53.0)  %


 


Neutrophils #     (1.3-7.7)  k/uL


 


Monocytes #     (0-1.0)  k/uL


 


BUN  31 H    (9-20)  mg/dL


 


Creatinine  1.34 H    (0.66-1.25)  mg/dL


 


Glucose  228 H    (74-99)  mg/dL


 


POC Glucose (mg/dL)   231 H  179 H  (75-99)  mg/dL


 


Urine Protein     (Negative)  


 


Urine Glucose (UA)     (Negative)  


 


Urine Ketones     (Negative)  


 


Urine Bacteria     (None)  /hpf


 


Urine Mucus     (None)  /hpf














  05/17/20 Range/Units





  16:51 


 


WBC   (3.8-10.6)  k/uL


 


RBC   (4.30-5.90)  m/uL


 


Hgb   (13.0-17.5)  gm/dL


 


Hct   (39.0-53.0)  %


 


Neutrophils #   (1.3-7.7)  k/uL


 


Monocytes #   (0-1.0)  k/uL


 


BUN   (9-20)  mg/dL


 


Creatinine   (0.66-1.25)  mg/dL


 


Glucose   (74-99)  mg/dL


 


POC Glucose (mg/dL)  166 H  (75-99)  mg/dL


 


Urine Protein   (Negative)  


 


Urine Glucose (UA)   (Negative)  


 


Urine Ketones   (Negative)  


 


Urine Bacteria   (None)  /hpf


 


Urine Mucus   (None)  /hpf














Assessment and Plan


Assessment: 











Acute inferior STEMI, status post emergent cardiac angiogram with PCI of the RCA


Ischemic cardiomyopathy with ejection fraction 45-50%


Sick sinus syndrome status post temporal pacemaker placement


Mild leukocytosis, mostly reactive, no signs symptoms of infection


Acute kidney injury versus chronic kidney disease


Diabetes mellitus, with hyperglycemia


Hypertension























Plan: 





This is a pleasant 79 years old female who presents with acute STEMI.  Status 

post cardiac cath and stent placement.  Cardiology are following the case 

closely.  Continue with dual antiplatelet therapy.  Check hemoglobin A1c.  

Follow-up WBC.  Patient with kidney injury and on the top of that she got 

angiogram and was transected hypotensive, we'll call nephrology consult, 

continue with IV fluids


Labs and medication were reviewed..  Continue same treatment.  Continue with 

symptomatic treatment.  Resume home medication.  Monitor lytes and vitals.  DVT 

and GI prophylaxis.  Further recommendations of the clinical course of the 

patient


DVT prophylaxis: Dual antiplatelet therapy


GI Prophylaxis: Pepcid

## 2020-05-17 NOTE — PN
PROGRESS NOTE



Patient is seen for followup for acute kidney injury.  The patient continues to have

long pauses and his blood pressure was low.  Urine output had dropped to about 0

mL/hour overnight.  This morning, patient had a temporary pacemaker placed.  His heart

rate is better.  Urine output seems to have picked up to about 15 mL/hour.  The patient

is comfortable awake he is not in any acute distress.



PHYSICAL EXAMINATION:

On examination, blood pressure was 94/52, heart rate 51 per minute.  He is afebrile.

Examination shows edema 1+ bilaterally.  Abdomen is soft, nontender, obese.

Examination of lungs decreased breath sounds at bases. CNS exam grossly intact.



LABS:

Show sodium 138, potassium 4.5, chloride 102, BUN 31, creatinine 1.34, hemoglobin 12.1

g/dL.



ASSESSMENT:

1. Acute kidney injury associated with hypotension, hypoperfusion.  Serum creatinine

    is higher again as patient's blood pressure was quite low overnight.  Urine output

    seems to have picked up now after pacemaker placement.  Continue to monitor for

    now.  I will discontinue the losartan as blood pressure remains low.  I will also

    add midodrine.

2. Status post acute ST-elevation myocardial infarction status post cardiac

    catheterization with RCA stenting.

3. Bradycardia, associated with inferior wall myocardial infarction.

4. Hypotension associated with bradycardia, myocardial infarction, maintained on

    dopamine currently at 2 mics.

5. Hypertension.  Blood pressure is currently low.  Hold off on angiotensin receptor

    blockers.



PLAN:

Hold losartan for now.  Add midodrine.  Continue to avoid nephrotoxic agents.  Repeat

labs in a.m.





MMTADEOL / IJN: [id number] / Job#: [id number]

## 2020-05-17 NOTE — P.PCN
Date of Procedure: 05/17/20


Preoperative Diagnosis: 


Tachybradycardia syndrome with long pauses


Postoperative Diagnosis: 


The same


Procedure(s) Performed: 


Temporary pacemaker insertion


Description of Procedure: 


Patient was brought to the lab in a fasting state.  He was prepped and draped in

the usual fashion.  The right groin is infiltrated with lidocaine and right 

femoral vein was entered using Seldinger technique and a 6-Lao sheath was 

left in place.  A balloon-tip temporary pacemaker wire was advanced under 

fluoroscopy near the floor of the right ventricle.  Satisfactory thresholds were

obtained.  The pacemaker is set at a rate of 50 and an output of 3.  She 

tolerated the procedure well.  The duration of the procedure 13 minutes.





Final impression: #1.  Successful implantation of temporary pacemaker from the 

right groin, under fluoroscopy.

## 2020-05-18 LAB
ANION GAP SERPL CALC-SCNC: 6 MMOL/L
BASOPHILS # BLD AUTO: 0.1 K/UL (ref 0–0.2)
BASOPHILS NFR BLD AUTO: 0 %
BUN SERPL-SCNC: 44 MG/DL (ref 9–20)
CALCIUM SPEC-MCNC: 8.5 MG/DL (ref 8.4–10.2)
CHLORIDE SERPL-SCNC: 104 MMOL/L (ref 98–107)
CO2 SERPL-SCNC: 27 MMOL/L (ref 22–30)
EOSINOPHIL # BLD AUTO: 0.2 K/UL (ref 0–0.7)
EOSINOPHIL NFR BLD AUTO: 1 %
ERYTHROCYTE [DISTWIDTH] IN BLOOD BY AUTOMATED COUNT: 3.8 M/UL (ref 4.3–5.9)
ERYTHROCYTE [DISTWIDTH] IN BLOOD: 14.9 % (ref 11.5–15.5)
GLUCOSE BLD-MCNC: 159 MG/DL (ref 75–99)
GLUCOSE BLD-MCNC: 173 MG/DL (ref 75–99)
GLUCOSE BLD-MCNC: 183 MG/DL (ref 75–99)
GLUCOSE BLD-MCNC: 188 MG/DL (ref 75–99)
GLUCOSE BLD-MCNC: 200 MG/DL (ref 75–99)
GLUCOSE SERPL-MCNC: 173 MG/DL (ref 74–99)
HCT VFR BLD AUTO: 37 % (ref 39–53)
HGB BLD-MCNC: 11.7 GM/DL (ref 13–17.5)
LYMPHOCYTES # SPEC AUTO: 2.4 K/UL (ref 1–4.8)
LYMPHOCYTES NFR SPEC AUTO: 17 %
MCH RBC QN AUTO: 30.8 PG (ref 25–35)
MCHC RBC AUTO-ENTMCNC: 31.6 G/DL (ref 31–37)
MCV RBC AUTO: 97.4 FL (ref 80–100)
MONOCYTES # BLD AUTO: 1.6 K/UL (ref 0–1)
MONOCYTES NFR BLD AUTO: 12 %
NEUTROPHILS # BLD AUTO: 9.4 K/UL (ref 1.3–7.7)
NEUTROPHILS NFR BLD AUTO: 67 %
PLATELET # BLD AUTO: 185 K/UL (ref 150–450)
POTASSIUM SERPL-SCNC: 4.5 MMOL/L (ref 3.5–5.1)
SODIUM SERPL-SCNC: 137 MMOL/L (ref 137–145)
WBC # BLD AUTO: 14 K/UL (ref 3.8–10.6)

## 2020-05-18 RX ADMIN — INSULIN ASPART SCH UNIT: 100 INJECTION, SOLUTION INTRAVENOUS; SUBCUTANEOUS at 20:22

## 2020-05-18 RX ADMIN — DOPAMINE HYDROCHLORIDE IN DEXTROSE SCH MLS/HR: 3.2 INJECTION, SOLUTION INTRAVENOUS at 05:32

## 2020-05-18 RX ADMIN — ATORVASTATIN CALCIUM SCH MG: 80 TABLET, FILM COATED ORAL at 08:29

## 2020-05-18 RX ADMIN — ASPIRIN 81 MG CHEWABLE TABLET SCH MG: 81 TABLET CHEWABLE at 08:28

## 2020-05-18 RX ADMIN — INSULIN ASPART SCH UNIT: 100 INJECTION, SOLUTION INTRAVENOUS; SUBCUTANEOUS at 16:48

## 2020-05-18 RX ADMIN — MIDODRINE HYDROCHLORIDE SCH MG: 5 TABLET ORAL at 07:05

## 2020-05-18 RX ADMIN — INSULIN ASPART SCH UNIT: 100 INJECTION, SOLUTION INTRAVENOUS; SUBCUTANEOUS at 07:04

## 2020-05-18 RX ADMIN — CEFAZOLIN SCH MLS/HR: 330 INJECTION, POWDER, FOR SOLUTION INTRAMUSCULAR; INTRAVENOUS at 05:33

## 2020-05-18 RX ADMIN — DOPAMINE HYDROCHLORIDE IN DEXTROSE SCH: 3.2 INJECTION, SOLUTION INTRAVENOUS at 16:33

## 2020-05-18 RX ADMIN — TICAGRELOR SCH MG: 90 TABLET ORAL at 20:22

## 2020-05-18 RX ADMIN — TICAGRELOR SCH MG: 90 TABLET ORAL at 08:28

## 2020-05-18 RX ADMIN — FAMOTIDINE SCH MG: 20 TABLET, FILM COATED ORAL at 08:28

## 2020-05-18 RX ADMIN — LOSARTAN POTASSIUM SCH: 50 TABLET, FILM COATED ORAL at 08:29

## 2020-05-18 RX ADMIN — MIDODRINE HYDROCHLORIDE SCH: 5 TABLET ORAL at 16:47

## 2020-05-18 RX ADMIN — INSULIN DETEMIR SCH UNIT: 100 INJECTION, SOLUTION SUBCUTANEOUS at 21:45

## 2020-05-18 RX ADMIN — INSULIN ASPART SCH UNIT: 100 INJECTION, SOLUTION INTRAVENOUS; SUBCUTANEOUS at 13:28

## 2020-05-18 NOTE — PN
PROGRESS NOTE



Patient is seen for followup for acute kidney injury.  His blood pressure and heart

rate have stabilized now.  He is currently off of dopamine.  Urine output has picked up

to about 50 to 40 mL/hour.  Overall patient is stable.  He is being considered for

possible permanent pacemaker placement.



On examination this morning, blood pressure was 108/44, heart rate 50 per minute.  He

is afebrile.

EXAMINATION OF THE HEART: S1 and S2.

EXAMINATION OF LUNGS: Decreased breath sounds at bases.

ABDOMEN:  Soft, non-tender.

Examination of lower extremities shows trace edema bilaterally.

CNS exam is grossly intact.





Labs show sodium 137, potassium 4.5, chloride 104, BUN 44, creatinine 1.56, hemoglobin

11.7 g/dL.



ASSESSMENT:

1. Acute kidney injury secondary to hypotension, hypoperfusion as well as a component

    of contrast nephropathy.  Serum creatinine is higher, although urine output has

    picked up. It should stabilize over the next few days once patient is more

    hemodynamically stable.  He is currently off of dopamine, and we will continue to

    monitor blood pressure and heart rate.  Continue with the Midodrine. Angiotensin

    receptor blockers are on hold, given the significant hypotension.

2. Status post inferior wall myocardial infarction, status post right coronary artery

    stenting.

3. Bradycardia associated with inferior myocardial infarction, status post temporary

    venous pacemaker.

4. Type 2 diabetes, maintained on Glucotrol.

5. History of hypertension.  Blood pressures had been low and angiotensin receptor

    blockers currently on hold.



PLAN:

Maintain off of dopamine.  Avoid any other nephrotoxic agents.  Continue with the

midodrine.  Continue to hold off on angiotensin receptor blockers, given the low blood

pressure.  Repeat labs in a.m.





MMODL / IJN: [id number] / Job#: [id number]

## 2020-05-18 NOTE — PN
PROGRESS NOTE



Shaun is an 79-year-old gentleman who is admitted to hospital with acute inferior wall

myocardial infarction, I did an emergent heart catheterization on patient and

angioplasty of right coronary artery.  Over the weekend, he developed positive sinus

pauses and went on to have a temporary pacemaker.  This morning, he is intermittently

using the pacemaker otherwise free of cardiac symptoms.



Heart rate is 50 beats per minute.  Blood pressure is 117/58, respiratory rate is 18,

O2 saturation is 97%.  There is no jugular venous distention.  Chest exam reveals

diminished air entry at the bases.  Heart exam reveals first and second heart sounds.

No gallop.  No murmur.  Abdomen is soft.  Exam of extremities did not reveal any edema.

Peripheral pulses are felt.



LABS:

Show a hemoglobin of 11.7, potassium is 4.5, creatinine is 1.5.



ASSESSMENT:

1. Acute inferior wall myocardial infarction, status post catheterization and

    angioplasty of right coronary artery.

2. Sinus pause, status post temporary pacemaker.

3. Chronic renal insufficiency.



PLAN:

If patient continues to use the temporary pacemaker tomorrow, we will consider

permanent pacemaker implantation.  I will also obtain a 2D echo on him to evaluate his

LV function.





MMODL / IJN: [id number] / Job#: [id number]

## 2020-05-18 NOTE — P.PN
Subjective








This is a pleasant 79 years old female with past medical history of diabetes 

mellitus, hypertension.  She was transferred from [facility] chest 

pain.  Patient presents with central chest pain, of daily duration of the left 

side radiating to the neck about 4/10 in severity, associated with little 

dyspnea, nausea and coughing.  No vomiting.  No change in urine or bowel habits


EKG changes showing ST elevation in inferior leads.  Patient has been evaluated 

by cardiologist and underwent emergent cardiac angiogram, and found to have 80% 

blockage of the right coronary artery, patient underwent difficult PCI with 3 

stent placed in the right coronary artery, during the procedure patient became 

transiently hypotensive and needed dopamine for short period.  Postprocedure 

patient stabilized and transferred to the ICU for further monitoring and 

treatment


Currently vitals are stable.  Labs showed leukocytosis of 11.2 K, creatinine is 

elevated 1.4, glucose high at 215-303


Currently patient is on normal saline 75 mL/h, glipizide 5 mg twice a day, 

aspirin and Brilinta, metoprolol 12.5 mg





05/17/2020


Patient remains in the ICU, his heart rate dropped to 20s overnight with low 

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 

cardiologist placed temporal pacemaker, he is awake and oriented in the ICU.  No

chest pain or dyspnea.  Nephrologist following the case closely, losartan.  And 

medial drain was admitted, currently blood pressure 127/53 with heart rate is 

50s, WBC 14.4 K, creatinine 1.3 and stable, glucose controlled less than 200 


possible patient will need permanent pacemaker and down the root





05/18/2020


Patient remains to the ICU for close monitoring of his heart rate monitoring.  

He is fully awake and oriented, no chest pain or dyspnea.  He feels generally 

weak.


Vital signs stable, heart rate is 49-52, blood pressure 117/58, temporal 

pacemaker is in place.  Labs reviewed showed creatinine 1.5, WBC 14 K, glucose 

166-230.  Hemoglobin A1c is 8.9, he wasn't glyburide 5 mg twice a day, metformin

500 mg was added, he going to increase the dose to 850 twice a day


Cardiology team R following the patient closely and he might need permanent 

pacemaker depending on his progress and stability


We will check TSH














Review of systems


CONSTITUTIONAL: No fever, no malaise, no fatigue. 


HEENT: No recent visual problems or hearing problems. Denied any sore throat. 


CARDIOVASCULAR: No  orthopnea, PND, no palpitations, no syncope. 


PULMONARY: No shortness of breath, no cough, no hemoptysis. 


GASTROINTESTINAL: No diarrhea, no nausea, no vomiting, no abdominal pain. 

Normoactive bowel sounds. 


NEUROLOGICAL: No headaches, no weakness, no numbness. 


HEMATOLOGICAL: Denies any bleeding or petechiae. 


GENITOURINARY: Denies any burning micturition, frequency, or urgency. 


MUSCULOSKELETAL/RHEUMATOLOGICAL: Denies any joint pain, swelling, or any muscle 

pain. 


ENDOCRINE: Denies any polyuria or polydipsia.


                               Active Medications











Generic Name Dose Route Start Last Admin





  Trade Name Freq  PRN Reason Stop Dose Admin


 


Aspirin  81 mg  05/16/20 09:00  05/18/20 08:28





  Aspirin  PO   81 mg





  DAILY VICENTE   Administration


 


Atorvastatin Calcium  80 mg  05/16/20 09:00  05/18/20 08:29





  Lipitor  PO   80 mg





  DAILY VICENTE   Administration


 


Famotidine  20 mg  05/18/20 09:00  05/18/20 08:28





  Pepcid  PO   20 mg





  DAILY VICENTE   Administration


 


Glipizide  5 mg  05/15/20 17:30  05/18/20 07:05





  Glucotrol  PO   5 mg





  AC-BID VICENTE   Administration


 


Dopamine HCl/Dextrose 800 mg/  250 mls @ 8.114 mls/hr  05/15/20 15:00  05/18/20 

10:05





  IV Solution  IV   0 mcg/kg/min





  .Q24H VICENTE   0 mls/hr





    Titration





  Protocol  





  3 MCG/KG/MIN  


 


Sodium Chloride  1,000 mls @ 20 mls/hr  05/17/20 03:30  05/18/20 05:33





  Saline 0.9%  IV   20 mls/hr





  .Q24H VICENTE   Administration


 


Insulin Aspart  0 unit  05/15/20 21:00  05/18/20 07:04





  Novolog  SQ   3 unit





  ACHS VICENTE   Administration





  Protocol  


 


Losartan Potassium  50 mg  05/16/20 09:00  05/18/20 08:29





  Cozaar  PO   Not Given





  DAILY VICENTE  


 


Metformin HCl  500 mg  05/16/20 19:15  05/18/20 07:05





  Glucophage  PO   500 mg





  BID-W/MEALS VICENTE   Administration


 


Midodrine  10 mg  05/17/20 07:30  05/18/20 07:05





  Proamatine  PO   10 mg





  AC-BID VICENTE   Administration


 


Ticagrelor  90 mg  05/16/20 09:00  05/18/20 08:28





  Brilinta  PO   90 mg





  BID VICENTE   Administration














Objective





- Vital Signs


Vital signs: 


                                   Vital Signs











Temp  98.5 F   05/18/20 04:00


 


Pulse  51 L  05/18/20 10:00


 


Resp  16   05/18/20 10:00


 


BP  117/58   05/18/20 10:00


 


Pulse Ox  97   05/18/20 10:00








                                 Intake & Output











 05/17/20 05/18/20 05/18/20





 18:59 06:59 18:59


 


Intake Total 380 571.298 54.611


 


Output Total 214 295 30


 


Balance 166 276.298 24.611


 


Weight  144 kg 


 


Intake:   


 


   330 30


 


    Sodium Chloride 0.9% 1, 160 220 20





    000 ml @ 50 mls/hr IV .   





    Q20H VICENTE Rx#:[id number]   


 


    TVP 10 mL/hour .9 120 110 10


 


  Intake, IV Titration 100 241.298 24.611





  Amount   


 


    DOPamine DRIP 800 mg In  241.298 24.611





    Dextrose/Water 1 250ml.   





    bag @ 3 MCG/KG/MIN 8.114   





    mls/hr IV .Q24H VICENTE Rx#:   





    [id number]   


 


    Sodium Chloride 0.9% 1, 100  





    000 ml @ 20 mls/hr IV .   





    Q24H VICENTE Rx#:[id number]   


 


Output:   


 


  Urine 214 295 30


 


Other:   


 


  Voiding Method Indwelling Catheter Indwelling Catheter 














- Exam





GENERAL: The patient is alert and oriented x3, not in any acute distress. Well 

developed, well nourished. 


HEENT: Pupils are round and equally reacting to light. EOMI. No scleral icterus.

No conjunctival pallor. Normocephalic, atraumatic. No pharyngeal erythema. No 

thyromegaly. 


CARDIOVASCULAR: S1 and S2 present. No murmurs, rubs, or gallops. 


PULMONARY: Chest is clear to auscultation, no wheezing or crackles. 


ABDOMEN: Soft, nontender, nondistended, normoactive bowel sounds. No palpable 

organomegaly. 


MUSCULOSKELETAL: No joint swelling or deformity. 


EXTREMITIES: No cyanosis, clubbing, or pedal edema. 


NEUROLOGICAL: Gross neurological examination did not reveal any focal deficits. 


SKIN: No rashes. no petechiae.





- Labs


CBC & Chem 7: 


                                 05/18/20 04:00





                                 05/18/20 04:00


Labs: 


                  Abnormal Lab Results - Last 24 Hours (Table)











  05/17/20 05/17/20 05/17/20 Range/Units





  11:39 16:51 20:14 


 


WBC     (3.8-10.6)  k/uL


 


RBC     (4.30-5.90)  m/uL


 


Hgb     (13.0-17.5)  gm/dL


 


Hct     (39.0-53.0)  %


 


Neutrophils #     (1.3-7.7)  k/uL


 


Monocytes #     (0-1.0)  k/uL


 


BUN     (9-20)  mg/dL


 


Creatinine     (0.66-1.25)  mg/dL


 


Glucose     (74-99)  mg/dL


 


POC Glucose (mg/dL)  179 H  166 H  230 H  (75-99)  mg/dL














  05/18/20 05/18/20 05/18/20 Range/Units





  04:00 04:00 06:51 


 


WBC  14.0 H    (3.8-10.6)  k/uL


 


RBC  3.80 L    (4.30-5.90)  m/uL


 


Hgb  11.7 L    (13.0-17.5)  gm/dL


 


Hct  37.0 L    (39.0-53.0)  %


 


Neutrophils #  9.4 H    (1.3-7.7)  k/uL


 


Monocytes #  1.6 H    (0-1.0)  k/uL


 


BUN   44 H   (9-20)  mg/dL


 


Creatinine   1.56 H   (0.66-1.25)  mg/dL


 


Glucose   173 H   (74-99)  mg/dL


 


POC Glucose (mg/dL)    200 H  (75-99)  mg/dL














Assessment and Plan


Assessment: 











Acute inferior STEMI, status post emergent cardiac angiogram with PCI of the RCA


Ischemic cardiomyopathy with ejection fraction 45-50%


Sick sinus syndrome status post temporal pacemaker placement


Mild leukocytosis, mostly reactive, no signs symptoms of infection


Acute kidney injury versus chronic kidney disease


Diabetes mellitus, with hyperglycemia


Hypertension























Plan: 





This is a pleasant 79 years old female who presents with acute STEMI.  Status 

post cardiac cath and stent placement.  Cardiology are following the case close

ly.  Continue with dual antiplatelet therapy.  Check hemoglobin A1c.  Follow-up 

WBC.  Patient with kidney injury and on the top of that she got angiogram and 

was transected hypotensive, we'll call nephrology consult, continue with IV 

fluids


Labs and medication were reviewed..  Continue same treatment.  Continue with 

symptomatic treatment.  Resume home medication.  Monitor lytes and vitals.  DVT 

and GI prophylaxis.  Further recommendations of the clinical course of the 

patient


DVT prophylaxis: Dual antiplatelet therapy


GI Prophylaxis: Pepcid

## 2020-05-19 LAB
ANION GAP SERPL CALC-SCNC: 6 MMOL/L
BASOPHILS # BLD AUTO: 0.1 K/UL (ref 0–0.2)
BASOPHILS NFR BLD AUTO: 1 %
BUN SERPL-SCNC: 44 MG/DL (ref 9–20)
CALCIUM SPEC-MCNC: 8.4 MG/DL (ref 8.4–10.2)
CHLORIDE SERPL-SCNC: 107 MMOL/L (ref 98–107)
CO2 SERPL-SCNC: 26 MMOL/L (ref 22–30)
EOSINOPHIL # BLD AUTO: 0.3 K/UL (ref 0–0.7)
EOSINOPHIL NFR BLD AUTO: 3 %
ERYTHROCYTE [DISTWIDTH] IN BLOOD BY AUTOMATED COUNT: 3.71 M/UL (ref 4.3–5.9)
ERYTHROCYTE [DISTWIDTH] IN BLOOD: 14.7 % (ref 11.5–15.5)
GLUCOSE BLD-MCNC: 156 MG/DL (ref 75–99)
GLUCOSE BLD-MCNC: 165 MG/DL (ref 75–99)
GLUCOSE BLD-MCNC: 221 MG/DL (ref 75–99)
GLUCOSE BLD-MCNC: 248 MG/DL (ref 75–99)
GLUCOSE SERPL-MCNC: 157 MG/DL (ref 74–99)
HCT VFR BLD AUTO: 35.6 % (ref 39–53)
HGB BLD-MCNC: 10.9 GM/DL (ref 13–17.5)
LYMPHOCYTES # SPEC AUTO: 1.7 K/UL (ref 1–4.8)
LYMPHOCYTES NFR SPEC AUTO: 16 %
MCH RBC QN AUTO: 29.4 PG (ref 25–35)
MCHC RBC AUTO-ENTMCNC: 30.6 G/DL (ref 31–37)
MCV RBC AUTO: 96.1 FL (ref 80–100)
MONOCYTES # BLD AUTO: 1.2 K/UL (ref 0–1)
MONOCYTES NFR BLD AUTO: 12 %
NEUTROPHILS # BLD AUTO: 7.1 K/UL (ref 1.3–7.7)
NEUTROPHILS NFR BLD AUTO: 67 %
PLATELET # BLD AUTO: 173 K/UL (ref 150–450)
POTASSIUM SERPL-SCNC: 3.9 MMOL/L (ref 3.5–5.1)
SODIUM SERPL-SCNC: 139 MMOL/L (ref 137–145)
WBC # BLD AUTO: 10.6 K/UL (ref 3.8–10.6)

## 2020-05-19 RX ADMIN — MIDODRINE HYDROCHLORIDE SCH: 5 TABLET ORAL at 16:54

## 2020-05-19 RX ADMIN — ASPIRIN 81 MG CHEWABLE TABLET SCH MG: 81 TABLET CHEWABLE at 12:13

## 2020-05-19 RX ADMIN — INSULIN ASPART SCH UNIT: 100 INJECTION, SOLUTION INTRAVENOUS; SUBCUTANEOUS at 11:50

## 2020-05-19 RX ADMIN — DOPAMINE HYDROCHLORIDE IN DEXTROSE SCH: 3.2 INJECTION, SOLUTION INTRAVENOUS at 10:48

## 2020-05-19 RX ADMIN — FAMOTIDINE SCH MG: 20 TABLET, FILM COATED ORAL at 12:13

## 2020-05-19 RX ADMIN — INSULIN ASPART SCH UNIT: 100 INJECTION, SOLUTION INTRAVENOUS; SUBCUTANEOUS at 06:49

## 2020-05-19 RX ADMIN — INSULIN ASPART SCH UNIT: 100 INJECTION, SOLUTION INTRAVENOUS; SUBCUTANEOUS at 16:57

## 2020-05-19 RX ADMIN — ATORVASTATIN CALCIUM SCH MG: 80 TABLET, FILM COATED ORAL at 12:13

## 2020-05-19 RX ADMIN — INSULIN ASPART SCH UNIT: 100 INJECTION, SOLUTION INTRAVENOUS; SUBCUTANEOUS at 21:02

## 2020-05-19 RX ADMIN — INSULIN DETEMIR SCH UNIT: 100 INJECTION, SOLUTION SUBCUTANEOUS at 21:01

## 2020-05-19 RX ADMIN — TICAGRELOR SCH MG: 90 TABLET ORAL at 12:13

## 2020-05-19 RX ADMIN — TICAGRELOR SCH MG: 90 TABLET ORAL at 21:02

## 2020-05-19 RX ADMIN — LOSARTAN POTASSIUM SCH MG: 50 TABLET, FILM COATED ORAL at 12:13

## 2020-05-19 RX ADMIN — CEFAZOLIN SCH MLS/HR: 330 INJECTION, POWDER, FOR SOLUTION INTRAMUSCULAR; INTRAVENOUS at 06:32

## 2020-05-19 RX ADMIN — MIDODRINE HYDROCHLORIDE SCH: 5 TABLET ORAL at 06:34

## 2020-05-19 NOTE — P.PN
Subjective








This is a pleasant 79 years old female with past medical history of diabetes 

mellitus, hypertension.  She was transferred from [facility] chest 

pain.  Patient presents with central chest pain, of daily duration of the left 

side radiating to the neck about 4/10 in severity, associated with little 

dyspnea, nausea and coughing.  No vomiting.  No change in urine or bowel habits


EKG changes showing ST elevation in inferior leads.  Patient has been evaluated 

by cardiologist and underwent emergent cardiac angiogram, and found to have 80% 

blockage of the right coronary artery, patient underwent difficult PCI with 3 

stent placed in the right coronary artery, during the procedure patient became 

transiently hypotensive and needed dopamine for short period.  Postprocedure 

patient stabilized and transferred to the ICU for further monitoring and 

treatment


Currently vitals are stable.  Labs showed leukocytosis of 11.2 K, creatinine is 

elevated 1.4, glucose high at 215-303


Currently patient is on normal saline 75 mL/h, glipizide 5 mg twice a day, 

aspirin and Brilinta, metoprolol 12.5 mg





05/17/2020


Patient remains in the ICU, his heart rate dropped to 20s overnight with low 

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 

cardiologist placed temporal pacemaker, he is awake and oriented in the ICU.  No

chest pain or dyspnea.  Nephrologist following the case closely, losartan.  And 

medial drain was admitted, currently blood pressure 127/53 with heart rate is 

50s, WBC 14.4 K, creatinine 1.3 and stable, glucose controlled less than 200 


possible patient will need permanent pacemaker and down the root





05/18/2020


Patient remains to the ICU for close monitoring of his heart rate monitoring.  

He is fully awake and oriented, no chest pain or dyspnea.  He feels generally 

weak.


Vital signs stable, heart rate is 49-52, blood pressure 117/58, temporal 

pacemaker is in place.  Labs reviewed showed creatinine 1.5, WBC 14 K, glucose 

166-230.  Hemoglobin A1c is 8.9, he wasn't glyburide 5 mg twice a day, metformin

500 mg was added, he going to increase the dose to 850 twice a day


Cardiology team R following the patient closely and he might need permanent 

pacemaker depending on his progress and stability


We will check TSH





05/19/2020


Patient lying in bed, no symptoms reported other than generalized weakness.  No 

chest pain.  No dyspnea.  No dizziness


Cardiologist team appointment for permanent pacemaker placement tomorrow


Losartan was started.  Patient is on aspirin and Brillinta, 


Hemoglobin A1c 8.9, patient is on glyburide, metformin is on hold.  Place the 

patient on Levemir 5 units at bedtime














Review of systems


CONSTITUTIONAL: No fever, no malaise, no fatigue. 


HEENT: No recent visual problems or hearing problems. Denied any sore throat. 


CARDIOVASCULAR: No  orthopnea, PND, no palpitations, no syncope. 


PULMONARY: No shortness of breath, no cough, no hemoptysis. 


GASTROINTESTINAL: No diarrhea, no nausea, no vomiting, no abdominal pain. 

Normoactive bowel sounds. 


NEUROLOGICAL: No headaches, no weakness, no numbness. 


HEMATOLOGICAL: Denies any bleeding or petechiae. 


GENITOURINARY: Denies any burning micturition, frequency, or urgency. 


MUSCULOSKELETAL/RHEUMATOLOGICAL: Denies any joint pain, swelling, or any muscle 

pain. 


ENDOCRINE: Denies any polyuria or polydipsia.


                                        


                               Active Medications











Generic Name Dose Route Start Last Admin





  Trade Name Freq  PRN Reason Stop Dose Admin


 


Aspirin  81 mg  05/16/20 09:00  05/19/20 12:13





  Aspirin  PO   81 mg





  DAILY VICENTE   Administration


 


Atorvastatin Calcium  80 mg  05/16/20 09:00  05/19/20 12:13





  Lipitor  PO   80 mg





  DAILY VICENTE   Administration


 


Famotidine  20 mg  05/18/20 09:00  05/19/20 12:13





  Pepcid  PO   20 mg





  DAILY VICENTE   Administration


 


Glipizide  5 mg  05/15/20 17:30  05/19/20 16:57





  Glucotrol  PO   5 mg





  AC-BID VICENTE   Administration


 


Dopamine HCl/Dextrose 800 mg/  250 mls @ 8.114 mls/hr  05/15/20 15:00  05/19/20 

10:48





  IV Solution  IV   Not Given





  .Q24H VICENTE  





  Protocol  





  3 MCG/KG/MIN  


 


Sodium Chloride  1,000 mls @ 20 mls/hr  05/17/20 03:30  05/19/20 06:32





  Saline 0.9%  IV   20 mls/hr





  .Q24H VICENTE   Administration


 


Insulin Aspart  0 unit  05/15/20 21:00  05/19/20 16:57





  Novolog  SQ   5 unit





  ACHS VICENTE   Administration





  Protocol  


 


Insulin Detemir  5 unit  05/18/20 21:15  05/18/20 21:45





  Levemir  SQ   5 unit





  HS VICENTE   Administration


 


Losartan Potassium  50 mg  05/16/20 09:00  05/19/20 12:13





  Cozaar  PO   50 mg





  DAILY VICENTE   Administration


 


Midodrine  10 mg  05/17/20 07:30  05/19/20 16:54





  Proamatine  PO   Not Given





  AC-BID VICENTE  


 


Ticagrelor  90 mg  05/16/20 09:00  05/19/20 12:13





  Brilinta  PO   90 mg





  BID VICENTE   Administration














Objective





- Vital Signs


Vital signs: 


                                   Vital Signs











Temp  98.9 F   05/19/20 12:00


 


Pulse  58 L  05/19/20 13:00


 


Resp  22   05/19/20 13:00


 


BP  123/49   05/19/20 13:00


 


Pulse Ox  98   05/19/20 13:00








                                 Intake & Output











 05/18/20 05/19/20 05/19/20





 18:59 06:59 18:59


 


Intake Total 414.611 330 180


 


Output Total 645 565 385


 


Balance -230.389 -235 -205


 


Weight  142.2 kg 


 


Intake:   


 


   330 180


 


    Sodium Chloride 0.9% 1, 260 220 120





    000 ml @ 50 mls/hr IV .   





    Q20H VICENTE Rx#:[id number]   


 


    TVP 10 mL/hour .9 130 110 60


 


  Intake, IV Titration 24.611  





  Amount   


 


    DOPamine DRIP 800 mg In 24.611  





    Dextrose/Water 1 250ml.   





    bag @ 3 MCG/KG/MIN 8.114   





    mls/hr IV .Q24H VICENTE Rx#:   





    [id number]   


 


Output:   


 


  Urine 645 565 385


 


Other:   


 


  Voiding Method Indwelling Catheter Indwelling Catheter Indwelling Catheter


 


  # Bowel Movements  1 














- Exam





GENERAL: The patient is alert and oriented x3, not in any acute distress. Well 

developed, well nourished. 


HEENT: Pupils are round and equally reacting to light. EOMI. No scleral icterus.

No conjunctival pallor. Normocephalic, atraumatic. No pharyngeal erythema. No 

thyromegaly. 


CARDIOVASCULAR: S1 and S2 present. No murmurs, rubs, or gallops. 


PULMONARY: Chest is clear to auscultation, no wheezing or crackles. 


ABDOMEN: Soft, nontender, nondistended, normoactive bowel sounds. No palpable 

organomegaly. 


MUSCULOSKELETAL: No joint swelling or deformity. 


EXTREMITIES: No cyanosis, clubbing, or pedal edema. 


NEUROLOGICAL: Gross neurological examination did not reveal any focal deficits. 


SKIN: No rashes. no petechiae.





- Labs


CBC & Chem 7: 


                                 05/19/20 04:56





                                 05/19/20 04:56


Labs: 


                  Abnormal Lab Results - Last 24 Hours (Table)











  05/18/20 05/18/20 05/18/20 Range/Units





  13:27 16:30 20:15 


 


RBC     (4.30-5.90)  m/uL


 


Hgb     (13.0-17.5)  gm/dL


 


Hct     (39.0-53.0)  %


 


MCHC     (31.0-37.0)  g/dL


 


Monocytes #     (0-1.0)  k/uL


 


BUN     (9-20)  mg/dL


 


Creatinine     (0.66-1.25)  mg/dL


 


Glucose     (74-99)  mg/dL


 


POC Glucose (mg/dL)  188 H  159 H  183 H  (75-99)  mg/dL














  05/19/20 05/19/20 05/19/20 Range/Units





  04:56 04:56 06:44 


 


RBC  3.71 L    (4.30-5.90)  m/uL


 


Hgb  10.9 L    (13.0-17.5)  gm/dL


 


Hct  35.6 L    (39.0-53.0)  %


 


MCHC  30.6 L    (31.0-37.0)  g/dL


 


Monocytes #  1.2 H    (0-1.0)  k/uL


 


BUN   44 H   (9-20)  mg/dL


 


Creatinine   1.36 H   (0.66-1.25)  mg/dL


 


Glucose   157 H   (74-99)  mg/dL


 


POC Glucose (mg/dL)    156 H  (75-99)  mg/dL














  05/19/20 Range/Units





  11:45 


 


RBC   (4.30-5.90)  m/uL


 


Hgb   (13.0-17.5)  gm/dL


 


Hct   (39.0-53.0)  %


 


MCHC   (31.0-37.0)  g/dL


 


Monocytes #   (0-1.0)  k/uL


 


BUN   (9-20)  mg/dL


 


Creatinine   (0.66-1.25)  mg/dL


 


Glucose   (74-99)  mg/dL


 


POC Glucose (mg/dL)  165 H  (75-99)  mg/dL














Assessment and Plan


Assessment: 











Acute inferior STEMI, status post emergent cardiac angiogram with PCI of the RCA


Ischemic cardiomyopathy with ejection fraction 45-50%


Sick sinus syndrome status post temporal pacemaker placement


Mild leukocytosis, mostly reactive, no signs symptoms of infection


Acute kidney injury versus chronic kidney disease


Diabetes mellitus, with hyperglycemia


Hypertension























Plan: 





This is a pleasant 79 years old female who presents with acute STEMI.  Status 

post cardiac cath and stent placement.  Cardiology are following the case meeta carroll.  Continue with dual antiplatelet therapy.  


Nepnhrology consult cardiology are following The case.Continue with insulin.  

Pacemaker per cardiology team.


Labs and medication were reviewed..  Continue same treatment.  Continue with 

symptomatic treatment.  Resume home medication.  Monitor lytes and vitals.  DVT 

and GI prophylaxis.  Further recommendations of the clinical course of the 

patient


DVT prophylaxis: Dual antiplatelet therapy


GI Prophylaxis: Pepcid

## 2020-05-19 NOTE — CONS
JERRY Dunn is an 79-year-old gentleman who was admitted to hospital with acute inferior wall

myocardial infarction, underwent cardiac catheterization and angioplasty of right

coronary artery.  His ejection fraction is 40%-45%.  He developed intermittent episodes

of sinus pauses and underwent temporary pacemaker on Sunday.  He remains in sinus

rhythm but is frequently using his pacemaker and will need a permanent pacemaker at

this time.  I am going to have Dr. Carmona do this tomorrow.



PHYSICAL EXAM:

Hear rate is 50 beats per minute.  Blood pressure is 132/70, respiratory rate is 18, O2

saturation is 98%.

There is no jugular venous distention.  Carotid upstroke is normal.  There is no bruit.

Chest exam reveals diminished air entry at the bases, heart exam reveals first and

second heart sounds.  No gallop.  No murmur.  Abdomen is soft. Exam of extremities did

not reveal any edema.  Peripheral pulses are felt.



LABS:

Show that the hemoglobin is 10.9, potassium is 3.9, creatinine is 1.3.  The patient is

currently on aspirin, Lipitor, insulin, Cozaar and Brilinta.



ASSESSMENT:

1. Acute inferior wall myocardial infarction, status post catheterization and

    angioplasty.

2. Hypertension.

3. Symptomatic bradycardia.



PLAN:

Patient will undergo permanent pacemaker tomorrow.





MMODL / IJN: [id number] / Job#: [id number]

## 2020-05-19 NOTE — PN
PROGRESS NOTE



Patient is seen for followup for acute kidney injury associated with hypotension,

bradycardia, inferior MI.  Renal function has improved now that patient is

hemodynamically stable.  He had a temporary venous pacemaker placed.  Heart rate is

been staying high 40s to 50s.  Patient is being considered for permanent pacemaker

placement, awaiting Cardiology decision.  His urine output has picked up and renal

function has improved with creatinine down to 1.36 today from 1.56 yesterday.



PHYSICAL EXAMINATION:

On examination, blood pressure was 124/59, heart rate 51 per minute, patient is

afebrile.

Examination of the heart S1, S2.  Examination of the lungs, decreased breath sounds at

bases. Abdomen is soft, nontender.  Examination of the lower extremities shows chronic

skin changes, edema 1+ bilaterally,. CNS exam grossly intact.



LABS:

Show hemoglobin 10.9, sodium 139, potassium 3.9, BUN 44, creatinine 1.36, calcium 8.4.



ASSESSMENT:

1. Acute kidney injury associated with hypotension, hypoperfusion, an element of acute

    tubular necrosis from contrast nephropathy, currently improved significantly.  Now

    that the blood pressure has improved, we can hold the  midodrine and use if blood

    pressure systolic less than 100.

2. Hypotension associated with inferior MI, now improved.  Once blood pressure is

    better, patient can resume his angiotensin receptor blockers.

3. Status post inferior wall MI, status post right coronary artery stents.

4. Chronic kidney disease stage iii previous creatinine 1.3-1.2 mg/dL in 2015.

    Etiology is nephrosclerosis.



PLAN:

Hold midodrine can resume angiotensin receptor blockers if blood pressure remains

consistently above 120.  Repeat labs in a.m. avoid nephrotoxic agents.





MMODL / IJN: [id number] / Job#: [id number]

## 2020-05-20 LAB
ANION GAP SERPL CALC-SCNC: 5 MMOL/L
BUN SERPL-SCNC: 37 MG/DL (ref 9–20)
CALCIUM SPEC-MCNC: 8.7 MG/DL (ref 8.4–10.2)
CHLORIDE SERPL-SCNC: 110 MMOL/L (ref 98–107)
CO2 SERPL-SCNC: 27 MMOL/L (ref 22–30)
ERYTHROCYTE [DISTWIDTH] IN BLOOD BY AUTOMATED COUNT: 3.97 M/UL (ref 4.3–5.9)
ERYTHROCYTE [DISTWIDTH] IN BLOOD: 14.6 % (ref 11.5–15.5)
GLUCOSE BLD-MCNC: 172 MG/DL (ref 75–99)
GLUCOSE BLD-MCNC: 219 MG/DL (ref 75–99)
GLUCOSE BLD-MCNC: 226 MG/DL (ref 75–99)
GLUCOSE BLD-MCNC: 248 MG/DL (ref 75–99)
GLUCOSE SERPL-MCNC: 168 MG/DL (ref 74–99)
HCT VFR BLD AUTO: 38.4 % (ref 39–53)
HGB BLD-MCNC: 11.8 GM/DL (ref 13–17.5)
MCH RBC QN AUTO: 29.7 PG (ref 25–35)
MCHC RBC AUTO-ENTMCNC: 30.7 G/DL (ref 31–37)
MCV RBC AUTO: 96.7 FL (ref 80–100)
PLATELET # BLD AUTO: 220 K/UL (ref 150–450)
POTASSIUM SERPL-SCNC: 4.1 MMOL/L (ref 3.5–5.1)
SODIUM SERPL-SCNC: 142 MMOL/L (ref 137–145)
WBC # BLD AUTO: 9.3 K/UL (ref 3.8–10.6)

## 2020-05-20 PROCEDURE — 02H63JZ INSERTION OF PACEMAKER LEAD INTO RIGHT ATRIUM, PERCUTANEOUS APPROACH: ICD-10-PCS

## 2020-05-20 PROCEDURE — 0JH606Z INSERTION OF PACEMAKER, DUAL CHAMBER INTO CHEST SUBCUTANEOUS TISSUE AND FASCIA, OPEN APPROACH: ICD-10-PCS

## 2020-05-20 PROCEDURE — 02HK3JZ INSERTION OF PACEMAKER LEAD INTO RIGHT VENTRICLE, PERCUTANEOUS APPROACH: ICD-10-PCS

## 2020-05-20 RX ADMIN — ATORVASTATIN CALCIUM SCH MG: 80 TABLET, FILM COATED ORAL at 10:11

## 2020-05-20 RX ADMIN — TICAGRELOR SCH MG: 90 TABLET ORAL at 10:11

## 2020-05-20 RX ADMIN — INSULIN ASPART SCH UNIT: 100 INJECTION, SOLUTION INTRAVENOUS; SUBCUTANEOUS at 11:52

## 2020-05-20 RX ADMIN — CEFAZOLIN SCH MLS/HR: 330 INJECTION, POWDER, FOR SOLUTION INTRAMUSCULAR; INTRAVENOUS at 06:16

## 2020-05-20 RX ADMIN — INSULIN ASPART SCH UNIT: 100 INJECTION, SOLUTION INTRAVENOUS; SUBCUTANEOUS at 16:56

## 2020-05-20 RX ADMIN — ASPIRIN 81 MG CHEWABLE TABLET SCH MG: 81 TABLET CHEWABLE at 10:12

## 2020-05-20 RX ADMIN — INSULIN ASPART SCH: 100 INJECTION, SOLUTION INTRAVENOUS; SUBCUTANEOUS at 07:28

## 2020-05-20 RX ADMIN — CALCIUM CARBONATE (ANTACID) CHEW TAB 500 MG PRN MG: 500 CHEW TAB at 00:27

## 2020-05-20 RX ADMIN — CEFAZOLIN SCH MLS/HR: 330 INJECTION, POWDER, FOR SOLUTION INTRAMUSCULAR; INTRAVENOUS at 07:02

## 2020-05-20 RX ADMIN — SODIUM CHLORIDE, PRESERVATIVE FREE SCH ML: 5 INJECTION INTRAVENOUS at 20:22

## 2020-05-20 RX ADMIN — DOPAMINE HYDROCHLORIDE IN DEXTROSE SCH: 3.2 INJECTION, SOLUTION INTRAVENOUS at 16:01

## 2020-05-20 RX ADMIN — DEXTROSE SCH MLS/HR: 50 INJECTION, SOLUTION INTRAVENOUS at 13:08

## 2020-05-20 RX ADMIN — INSULIN ASPART SCH UNIT: 100 INJECTION, SOLUTION INTRAVENOUS; SUBCUTANEOUS at 20:28

## 2020-05-20 RX ADMIN — MIDODRINE HYDROCHLORIDE SCH: 5 TABLET ORAL at 17:46

## 2020-05-20 RX ADMIN — FAMOTIDINE SCH MG: 20 TABLET, FILM COATED ORAL at 20:22

## 2020-05-20 RX ADMIN — MIDODRINE HYDROCHLORIDE SCH: 5 TABLET ORAL at 06:17

## 2020-05-20 RX ADMIN — FAMOTIDINE SCH MG: 20 TABLET, FILM COATED ORAL at 10:11

## 2020-05-20 RX ADMIN — TICAGRELOR SCH MG: 90 TABLET ORAL at 20:22

## 2020-05-20 RX ADMIN — LOSARTAN POTASSIUM SCH MG: 50 TABLET, FILM COATED ORAL at 10:12

## 2020-05-20 RX ADMIN — DEXTROSE SCH MLS/HR: 50 INJECTION, SOLUTION INTRAVENOUS at 20:21

## 2020-05-20 NOTE — P.PN
Subjective








This is a pleasant 79 years old female with past medical history of diabetes 

mellitus, hypertension.  She was transferred from [facility] chest 

pain.  Patient presents with central chest pain, of daily duration of the left 

side radiating to the neck about 4/10 in severity, associated with little 

dyspnea, nausea and coughing.  No vomiting.  No change in urine or bowel habits


EKG changes showing ST elevation in inferior leads.  Patient has been evaluated 

by cardiologist and underwent emergent cardiac angiogram, and found to have 80% 

blockage of the right coronary artery, patient underwent difficult PCI with 3 

stent placed in the right coronary artery, during the procedure patient became 

transiently hypotensive and needed dopamine for short period.  Postprocedure 

patient stabilized and transferred to the ICU for further monitoring and 

treatment


Currently vitals are stable.  Labs showed leukocytosis of 11.2 K, creatinine is 

elevated 1.4, glucose high at 215-303


Currently patient is on normal saline 75 mL/h, glipizide 5 mg twice a day, 

aspirin and Brilinta, metoprolol 12.5 mg





05/17/2020


Patient remains in the ICU, his heart rate dropped to 20s overnight with low 

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 

cardiologist placed temporal pacemaker, he is awake and oriented in the ICU.  No

chest pain or dyspnea.  Nephrologist following the case closely, losartan.  And 

medial drain was admitted, currently blood pressure 127/53 with heart rate is 

50s, WBC 14.4 K, creatinine 1.3 and stable, glucose controlled less than 200 


possible patient will need permanent pacemaker and down the root





05/18/2020


Patient remains to the ICU for close monitoring of his heart rate monitoring.  

He is fully awake and oriented, no chest pain or dyspnea.  He feels generally 

weak.


Vital signs stable, heart rate is 49-52, blood pressure 117/58, temporal 

pacemaker is in place.  Labs reviewed showed creatinine 1.5, WBC 14 K, glucose 

166-230.  Hemoglobin A1c is 8.9, he wasn't glyburide 5 mg twice a day, metformin

500 mg was added, he going to increase the dose to 850 twice a day


Cardiology team R following the patient closely and he might need permanent 

pacemaker depending on his progress and stability


We will check TSH





05/19/2020


Patient lying in bed, no symptoms reported other than generalized weakness.  No 

chest pain.  No dyspnea.  No dizziness


Cardiologist team appointment for permanent pacemaker placement tomorrow


Losartan was started.  Patient is on aspirin and Brillinta, 


Hemoglobin A1c 8.9, patient is on glyburide, metformin is on hold.  Place the 

patient on Levemir 5 units at bedtime





05/20/2020


Patient lives in bed sleepy after the procedure today, his status post permanent

pacemaker today.  Vitas looks stable.  Patient could not provide information now


Heart rate 60, rest of vitals are stable.


Sugar slightly elevated above 200, he is already on Levemir 5 units will 

increase it to 7 units and we'll keep monitoring.


Labs a stable.


He remains on aspirin and Brilinta.





Review of systems n/a, patient could not provide information


                                        


                                        





Objective





- Vital Signs


Vital signs: 


                                   Vital Signs











Temp  98.2 F   05/20/20 12:00


 


Pulse  60   05/20/20 12:00


 


Resp  15   05/20/20 12:00


 


BP  140/60   05/20/20 12:00


 


Pulse Ox  93 L  05/20/20 12:00








                                 Intake & Output











 05/19/20 05/20/20 05/20/20





 18:59 06:59 18:59


 


Intake Total 720 360 190


 


Output Total 665 685 305


 


Balance 55 -325 -115


 


Weight  142.8 kg 


 


Intake:   


 


   360 190


 


    0.9   10


 


    Sodium Chloride 0.9% 1, 240 240 20





    000 ml @ 50 mls/hr IV .   





    Q20H [facility] Rx#:[id number]   


 


    TVP 10 mL/hour .9 120 120 10


 


  Oral 360  


 


Output:   


 


  Urine 665 685 305


 


Other:   


 


  Voiding Method Indwelling Catheter Indwelling Catheter 


 


  # Bowel Movements 1  














- Exam





GENERAL: The patient is alert and oriented x3, not in any acute distress. Well 

developed, well nourished. 


HEENT: Pupils are round and equally reacting to light. EOMI. No scleral icterus.

No conjunctival pallor. Normocephalic, atraumatic. No pharyngeal erythema. No 

thyromegaly. 


CARDIOVASCULAR: S1 and S2 present. No murmurs, rubs, or gallops. 


PULMONARY: Chest is clear to auscultation, no wheezing or crackles. 


ABDOMEN: Soft, nontender, nondistended, normoactive bowel sounds. No palpable 

organomegaly. 


MUSCULOSKELETAL: No joint swelling or deformity. 


EXTREMITIES: No cyanosis, clubbing, or pedal edema. 


NEUROLOGICAL: Gross neurological examination did not reveal any focal deficits. 


SKIN: No rashes. no petechiae.





- Labs


CBC & Chem 7: 


                                 05/20/20 04:49





                                 05/20/20 04:49


Labs: 


                  Abnormal Lab Results - Last 24 Hours (Table)











  05/19/20 05/19/20 05/20/20 Range/Units





  16:45 20:50 04:49 


 


RBC     (4.30-5.90)  m/uL


 


Hgb     (13.0-17.5)  gm/dL


 


Hct     (39.0-53.0)  %


 


MCHC     (31.0-37.0)  g/dL


 


Chloride    110 H  ()  mmol/L


 


BUN    37 H  (9-20)  mg/dL


 


Glucose    168 H  (74-99)  mg/dL


 


POC Glucose (mg/dL)  248 H  221 H   (75-99)  mg/dL














  05/20/20 05/20/20 05/20/20 Range/Units





  04:49 06:13 11:47 


 


RBC  3.97 L    (4.30-5.90)  m/uL


 


Hgb  11.8 L    (13.0-17.5)  gm/dL


 


Hct  38.4 L    (39.0-53.0)  %


 


MCHC  30.7 L    (31.0-37.0)  g/dL


 


Chloride     ()  mmol/L


 


BUN     (9-20)  mg/dL


 


Glucose     (74-99)  mg/dL


 


POC Glucose (mg/dL)   172 H  219 H  (75-99)  mg/dL














Assessment and Plan


Assessment: 











Acute inferior STEMI, status post emergent cardiac angiogram with PCI of the RCA


Ischemic cardiomyopathy with ejection fraction 45-50%


Sick sinus syndrome status post permanent pacemaker placement


Mild leukocytosis, mostly reactive, no signs symptoms of infection.  Thresholds


Acute kidney injury versus chronic kidney disease, resolved


Diabetes mellitus, with hyperglycemia


Hypertension























Plan: 





This is a pleasant 79 years old female who presents with acute STEMI.  Status 

post cardiac cath and stent placement.  Cardiology are following the case 

closely.  Continue with dual antiplatelet therapy.  


Nepnhrology consult cardiology are following The case.Continue with insulin.  

Pacemaker per cardiology team.


Labs and medication were reviewed..  Continue same treatment.  Continue with 

symptomatic treatment.  Resume home medication.  Monitor lytes and vitals.  DVT 

and GI prophylaxis.  Further recommendations of the clinical course of the 

patient


DVT prophylaxis: Dual antiplatelet therapy


GI Prophylaxis: Pepcid

## 2020-05-20 NOTE — PN
PROGRESS NOTE



Patient is seen for followup for acute kidney injury.  He is currently doing much

better.  Patient is status post pacemaker placement, this morning.  He is resting

comfortably.  No chest pains.  No other complaints.  Urine output is maintained at 60-

80 mL/hour.  Serum creatinine is down to 1.2 mg/dL.



PHYSICAL EXAMINATION:

Today, patient is comfortable, awake, alert, oriented x3, not in any acute distress.

Blood pressure was 119/55, heart rate of 60 per minute, he is afebrile.  Examination of

the heart S1, S2.  Examination of the lungs, bilateral breath sounds are heard.

Abdomen is soft, nontender.  Examination of the lower extremities shows chronic skin

changes. Trace edema noted.  CNS exam grossly intact.



LABS:

Show sodium 142, potassium 4.1, chloride 110, CO2 is 27, BUN 37, creatinine 1.21,

hemoglobin 11.8 g/dL.



ASSESSMENT:

1. Acute kidney injury, associated with hypotension, hypoperfusion, an element of

    acute tubular necrosis, currently significantly improved.

2. Bradycardia, associated with inferior wall myocardial infarction, status post

    pacemaker placement.

3. Hypotension associated with inferior wall myocardial infarction, currently

    resolved.

4. Status post ST elevation myocardial infarction, inferior wall, status post RCA

    stenting.

5. Chronic kidney disease stage III.  Baseline creatinine 1.3-1.2 mg/dL in 2015,

    etiology nephrosclerosis.

6. Hypertension, blood pressure had been low.  Therefore, antihypertensive medications

    including Cozaar was on hold.  Currently blood pressure is much improved.  The

    patient is back on Cozaar which is appropriate.



PLAN:

Continue current medications.  Avoid hypotension.  Repeat labs in a.m.





MMODL / IJN: [id number] / Job#: [id number]

## 2020-05-20 NOTE — P.PCN
Date of Procedure: 05/20/20


Preoperative Diagnosis: 


Sick sinus syndrome with pauses up to 16 seconds and severe bradycardia


Postoperative Diagnosis: 


The same


Procedure(s) Performed: 


Axillary venography, dual-chamber pacemaker insertion


Description of Procedure: 


HISTORY: This is a 79-year-old gentleman who was admitted to the hospital with 

inferior wall myocardial infarction and had stent placement of the RCA.  Patient

developed significant bradycardia.  His of asystole lasting about 16 seconds.  

Patient was treated with dopamine and had a temporary pacemaker for 3 days 

without improvement.  Patient is advised to have permanent pacemaker insertion.








CONSENT:I have discussed the risks, benefits and alternative therapies for the 

above-mentioned procedure and for both sedation/analgesia as well as necessary 

blood product administration, if indicated, as they pertain to this patient.  

The patient has indicated understanding and acceptance of the risks and 

procedures discussed.








PROCEDURE: Patient was brought to the lab in a fasting state.  Patient was 

prepped and draped in the usual fashion.  Patient was given IV sedation with 

fentanyl and Versed.  The skin below the left clavicle was infiltrated with 

lidocaine.  An incision was made parallel to deltopectoral groove was deepened 

until the pectoral fascia was exposed.  A pocket was created by blunt dissection

and cautery.  Axillary venography was performed to delineate the course of the a

xillary vein.  2 sticks were performed into extrathoracic portion of the 

axillary vein and 2 sheaths were advanced over the guidewires and left in 

subclavian vein. 








Conscious Sedation: Versed 1mg


Fentanyl 50 g


Duration 61minutes    


LEADS: 


                       ATRIAL: This is manufactured by [employer].  Model number 

is 5076-52.  Serial number is [id number]


                       VENTRICULAR: This is manufactured by [employer].  Model 

number is 5076-58 .  Serial number is [id number].


The device: This is manufactured by [employer].  Model number is [id number] and the 

serial number is [id number]


               


                         The ventricular lead is maneuvered l with help of a 

straight and curved stylets into the left ventricle apical region.  Satisfactory

position was obtained and threshold measurements were made.  The atrial lead was

then maneuvered into the right atrial appendage.  And thresholds were obtained.


              THRESHOLDS: 


                        ATRIUM: The minimum patient threshold was about 1.6 at 

pulse width of 0.5 with impedance of 711.  P-wave: 2.9


                        VENTRICLE:.  The minimum patient threshold is 0.8 at a 

pulse width of 0.5 with impedance of 949 R-wave: 14.2


                The leads and pulse generator remained in the pocket after it 

was washed with antibiotics.  Pocket was closed in the usual fashion.  The 

fascia was closed with 2-0 Prolene ,the subcutaneous tissue was closed with 3-0 

Prolene and the skin was closed with 4-0 Prolene.





                  PROGRAMMING:                            MODE: AAIR with mode 

switch to DDDR RATE: 60 to 130 OUTPUT:  Atrium: 3.5 Ventricle: 3.5








FINAL IMPRESSION: Successful implantation of dual-chamber pacemaker.  Axillary 

venography COMPLICATIONS: None


PLAN: Continue prophylactic antibiotics.  Chest x-ray in the morning.  Increase 

activity from tomorrow

## 2020-05-20 NOTE — ECHOF
Referral Reason:Chest pain and cardiomyopathy



MEASUREMENTS

--------

HEIGHT: 182.9 cm

WEIGHT: 142.9 kg

BP: 94/76

RVIDd:   3.3 cm     (< 3.3)

IVSd:   1.6 cm     (0.6 - 1.1)

LVIDd:   5.5 cm     (3.9 - 5.3)

LVPWd:   1.6 cm     (0.6 - 1.1)

IVSs:   1.9 cm

LVIDs:   5.2 cm

LVPWs:   2.2 cm

LA Diam:   3.9 cm     (2.7 - 3.8)

LAESV Index (A-L):   28.76 ml/m

Ao Diam:   3.8 cm     (2.0 - 3.7)

AV Cusp:   2.1 cm     (1.5 - 2.6)

MV EXCURSION:   10.542 mm     (> 18.000)

MV EF SLOPE:   36 mm/s     (70 - 150)

EPSS:   1.2 cm

RAP:   5.00 mmHg

RVSP:   55.58 mmHg







FINDINGS

--------

Resting bradycardia (HR<60bpm).

This was a technically difficult study with suboptimal views.

The left ventricular size is normal.   There is moderate concentric left ventricular hypertrophy.   O
verall left ventricular systolic function is mildly impaired with, an EF between 45 - 50 %.   Basal p
osterior LV wall motion is hypokinetic.    Basal inferior LV wall motion is hypokinetic.    Basal inf
eroseptal LV wall motion is hypokinetic.    Mid posterior LV wall motion is hypokinetic.    Mid infer
ior LV wall motion is hypokinetic.

The right ventricle is mildly enlarged.

LA is midly dilated 29-33ml/m2.

The right atrium is normal in size.

5.0mg of Lumason was utilized for enhancement of images

Interatrial and interventricular septum intact.

There is mild aortic valve sclerosis.

There is trace to mild mitral regurgitation.

Mild tricuspid regurgitation present.   There is moderate to severe pulmonary hypertension.   The rig
ht ventricular systolic pressure, as measured by Doppler, is 55.58mmHg.

Trace/mild (physiologic)  pulmonic regurgitation.

The aortic root is dilated measuring 3.8cm.

Normal inferior vena cava with normal inspiratory collapse consistent with estimated right atrial pre
ssure of  5 mmHg.   The inferior vena cava is mildly dilated.

There is no pericardial effusion.



CONCLUSIONS

--------

1. Resting bradycardia (HR<60bpm).

2. This was a technically difficult study with suboptimal views.

3. The left ventricular size is normal.

4. There is moderate concentric left ventricular hypertrophy.

5. Overall left ventricular systolic function is mildly impaired with, an EF between 45 - 50 %.

6. Basal posterior LV wall motion is hypokinetic.

7. Basal inferior LV wall motion is hypokinetic.

8. Basal inferoseptal LV wall motion is hypokinetic.

9. Mid posterior LV wall motion is hypokinetic.

10. Mid inferior LV wall motion is hypokinetic.

11. The right ventricle is mildly enlarged.

12. LA is midly dilated 29-33ml/m2.

13. The right atrium is normal in size.

14. 5.0mg of Lumason was utilized for enhancement of images

15. Interatrial and interventricular septum intact.

16. There is mild aortic valve sclerosis.

17. There is trace to mild mitral regurgitation.

18. Mild tricuspid regurgitation present.

19. There is moderate to severe pulmonary hypertension.

20. The right ventricular systolic pressure, as measured by Doppler, is 55.58mmHg.

21. Trace/mild (physiologic)  pulmonic regurgitation.

22. The aortic root is dilated measuring 3.8cm.

23. Normal inferior vena cava with normal inspiratory collapse consistent with estimated right atrial
 pressure of  5 mmHg.

24. The inferior vena cava is mildly dilated.

25. There is no pericardial effusion.





SONOGRAPHER: Pura Dunn RDCS

## 2020-05-21 LAB
ANION GAP SERPL CALC-SCNC: 6 MMOL/L
BUN SERPL-SCNC: 30 MG/DL (ref 9–20)
CALCIUM SPEC-MCNC: 8.5 MG/DL (ref 8.4–10.2)
CHLORIDE SERPL-SCNC: 111 MMOL/L (ref 98–107)
CO2 SERPL-SCNC: 25 MMOL/L (ref 22–30)
GLUCOSE BLD-MCNC: 171 MG/DL (ref 75–99)
GLUCOSE BLD-MCNC: 206 MG/DL (ref 75–99)
GLUCOSE BLD-MCNC: 228 MG/DL (ref 75–99)
GLUCOSE BLD-MCNC: 267 MG/DL (ref 75–99)
GLUCOSE SERPL-MCNC: 159 MG/DL (ref 74–99)
POTASSIUM SERPL-SCNC: 4.4 MMOL/L (ref 3.5–5.1)
SODIUM SERPL-SCNC: 142 MMOL/L (ref 137–145)

## 2020-05-21 RX ADMIN — ATORVASTATIN CALCIUM SCH MG: 80 TABLET, FILM COATED ORAL at 09:31

## 2020-05-21 RX ADMIN — MIDODRINE HYDROCHLORIDE SCH: 5 TABLET ORAL at 07:10

## 2020-05-21 RX ADMIN — DEXTROSE SCH MLS/HR: 50 INJECTION, SOLUTION INTRAVENOUS at 16:41

## 2020-05-21 RX ADMIN — DEXTROSE SCH MLS/HR: 50 INJECTION, SOLUTION INTRAVENOUS at 09:29

## 2020-05-21 RX ADMIN — ASPIRIN 81 MG CHEWABLE TABLET SCH MG: 81 TABLET CHEWABLE at 09:31

## 2020-05-21 RX ADMIN — INSULIN ASPART SCH UNIT: 100 INJECTION, SOLUTION INTRAVENOUS; SUBCUTANEOUS at 07:13

## 2020-05-21 RX ADMIN — FAMOTIDINE SCH MG: 20 TABLET, FILM COATED ORAL at 20:49

## 2020-05-21 RX ADMIN — INSULIN ASPART SCH UNIT: 100 INJECTION, SOLUTION INTRAVENOUS; SUBCUTANEOUS at 16:41

## 2020-05-21 RX ADMIN — LOSARTAN POTASSIUM SCH MG: 50 TABLET, FILM COATED ORAL at 09:31

## 2020-05-21 RX ADMIN — CEFAZOLIN SCH MLS/HR: 330 INJECTION, POWDER, FOR SOLUTION INTRAMUSCULAR; INTRAVENOUS at 02:27

## 2020-05-21 RX ADMIN — MIDODRINE HYDROCHLORIDE SCH: 5 TABLET ORAL at 16:36

## 2020-05-21 RX ADMIN — TICAGRELOR SCH MG: 90 TABLET ORAL at 20:49

## 2020-05-21 RX ADMIN — SODIUM CHLORIDE, PRESERVATIVE FREE SCH ML: 5 INJECTION INTRAVENOUS at 20:49

## 2020-05-21 RX ADMIN — INSULIN ASPART SCH UNIT: 100 INJECTION, SOLUTION INTRAVENOUS; SUBCUTANEOUS at 20:49

## 2020-05-21 RX ADMIN — SODIUM CHLORIDE, PRESERVATIVE FREE SCH ML: 5 INJECTION INTRAVENOUS at 09:31

## 2020-05-21 RX ADMIN — INSULIN ASPART SCH UNIT: 100 INJECTION, SOLUTION INTRAVENOUS; SUBCUTANEOUS at 11:38

## 2020-05-21 RX ADMIN — DEXTROSE SCH MLS/HR: 50 INJECTION, SOLUTION INTRAVENOUS at 02:25

## 2020-05-21 RX ADMIN — FUROSEMIDE SCH MG: 20 TABLET ORAL at 11:38

## 2020-05-21 RX ADMIN — TICAGRELOR SCH MG: 90 TABLET ORAL at 09:31

## 2020-05-21 RX ADMIN — FAMOTIDINE SCH MG: 20 TABLET, FILM COATED ORAL at 09:31

## 2020-05-21 RX ADMIN — DEXTROSE SCH MLS/HR: 50 INJECTION, SOLUTION INTRAVENOUS at 23:45

## 2020-05-21 NOTE — PN
PROGRESS NOTE



Shaun is an 79-year-old gentleman who is admitted to hospital with acute inferior wall

myocardial infarction underwent cardiac catheterization that revealed an 80% proximal

right coronary artery stenosis and had significant disease in the diagonal branch.  He

underwent angioplasty of the same with stent placement.  In his post MI recovery phase,

he developed sinus pauses, went on to have a permanent pacemaker. An echocardiogram

showed an ejection fraction of 45% with hypokinesis involving inferior wall.  The

patient underwent permanent pacemaker yesterday.  He is doing well.  The chest x-ray

was unremarkable.  Pacemaker is functioning normally.



EXAM:

Rate is 60 beats per minute blood pressure is 138/58, respiratory rate 18.  Chest exam

reveals diminished air entry at the bases.  Heart exam reveals first and second heart

sounds and a systolic murmur at the left lower sternal border.

ABDOMEN:  Soft.

Exam of extremities did not reveal any edema.  Peripheral pulses are felt.



LABS:

Show a potassium of 4.4, creatinine is 1.1, hemoglobin is 11.8, platelet count is 220.



CURRENT MEDICATIONS:

Include aspirin, Lipitor, Lasix, Glucotrol, Brilinta, Cozaar, Cozaar, and I am adding

Toprol today.



ASSESSMENT:

1. Acute inferior wall myocardial infarction status post catheterization and

    angioplasty of right coronary artery.

2. High-grade AV block status post permanent pacemaker.



PLAN:

Patient is doing well.  He will be discharged home tomorrow.





MMODL / IJN: [id number] / Job#: [id number]

## 2020-05-21 NOTE — XR
EXAMINATION TYPE: XR chest 2V

 

DATE OF EXAM: 5/21/2020

 

COMPARISON: 5/17/2020

 

INDICATION: Lead placement check

 

TECHNIQUE:  Frontal and lateral views of the chest are obtained.

 

FINDINGS:  

The heart size is moderately prominent.  

The pulmonary vasculature is normal.

There is some mild left lower lobe retrocardiac infiltrate. Atelectasis and pneumonia should be consi
dered. Pacemaker overlies left chest.

 

IMPRESSION:  

1. Mild developing left lower lobe infiltrate

## 2020-05-21 NOTE — PN
PROGRESS NOTE



Patient is seen for followup for acute kidney injury.  Renal function has improved

significantly.  Patient is status post pacemaker placement for bradycardia following

inferior wall MI.  He has good urine output, currently with an indwelling Lozano

catheter. Patient denies any chest pains or shortness of breath.



PHYSICAL EXAMINATION:

On examination today, blood pressure was 129/45, heart rate 60 per minute, he is

afebrile.

Examination of the heart. S1, S2.  Examination of lungs decreased breath sounds at

bases.  Abdomen is soft, nontender.

Examination of the lower extremities shows chronic skin changes, edema. 1+ bilaterally.

CNS exam is grossly intact.



LAB:

1. Shows sodium 142, potassium 4.4, chloride 111, CO2 is 25, BUN 30, creatinine 1.08.



ASSESSMENT:

1. Acute kidney injury associated with hypotension hypoperfusion and an element of

    contrast nephropathy, currently improved.

2. Hypotension. now resolved.

3. Hypertension, back on the angiotensin receptor blockers at a lower dose, tolerating

    well.

4. Status post ST elevation MI, which was inferior wall MI status post cardiac cath

    and right coronary artery stenting.

5. Bradycardia status post pacemaker placement.

6. Chronic kidney disease NKF stage III previous creatinine 1.2 in 2015.  Etiology is

    nephrosclerosis and element of diabetic kidney disease as patient does have 1+

    proteinuria.



PLAN:

Discontinue IV fluids, encourage increased oral intake and we may need to resume low-

dose loop diuretics upon discharge.  Patient was on 20 mg of Lasix p.o. daily at home.

We can start that from tomorrow.





MMODL / IJN: [id number] / Job#: [id number]

## 2020-05-22 VITALS — TEMPERATURE: 98.5 F

## 2020-05-22 VITALS — RESPIRATION RATE: 16 BRPM

## 2020-05-22 LAB
BASOPHILS # BLD AUTO: 0.1 K/UL (ref 0–0.2)
BASOPHILS NFR BLD AUTO: 1 %
EOSINOPHIL # BLD AUTO: 0.3 K/UL (ref 0–0.7)
EOSINOPHIL NFR BLD AUTO: 4 %
ERYTHROCYTE [DISTWIDTH] IN BLOOD BY AUTOMATED COUNT: 3.76 M/UL (ref 4.3–5.9)
ERYTHROCYTE [DISTWIDTH] IN BLOOD: 14.4 % (ref 11.5–15.5)
GLUCOSE BLD-MCNC: 193 MG/DL (ref 75–99)
GLUCOSE BLD-MCNC: 205 MG/DL (ref 75–99)
GLUCOSE BLD-MCNC: 211 MG/DL (ref 75–99)
GLUCOSE BLD-MCNC: 265 MG/DL (ref 75–99)
HCT VFR BLD AUTO: 36.6 % (ref 39–53)
HGB BLD-MCNC: 11.4 GM/DL (ref 13–17.5)
LYMPHOCYTES # SPEC AUTO: 1.7 K/UL (ref 1–4.8)
LYMPHOCYTES NFR SPEC AUTO: 20 %
MCH RBC QN AUTO: 30.4 PG (ref 25–35)
MCHC RBC AUTO-ENTMCNC: 31.3 G/DL (ref 31–37)
MCV RBC AUTO: 97.2 FL (ref 80–100)
MONOCYTES # BLD AUTO: 0.9 K/UL (ref 0–1)
MONOCYTES NFR BLD AUTO: 11 %
NEUTROPHILS # BLD AUTO: 5.2 K/UL (ref 1.3–7.7)
NEUTROPHILS NFR BLD AUTO: 62 %
PLATELET # BLD AUTO: 251 K/UL (ref 150–450)
WBC # BLD AUTO: 8.3 K/UL (ref 3.8–10.6)

## 2020-05-22 RX ADMIN — FUROSEMIDE SCH MG: 20 TABLET ORAL at 09:38

## 2020-05-22 RX ADMIN — MIDODRINE HYDROCHLORIDE SCH MG: 5 TABLET ORAL at 06:23

## 2020-05-22 RX ADMIN — FAMOTIDINE SCH MG: 20 TABLET, FILM COATED ORAL at 09:38

## 2020-05-22 RX ADMIN — METOPROLOL SUCCINATE SCH MG: 25 TABLET, EXTENDED RELEASE ORAL at 09:38

## 2020-05-22 RX ADMIN — CLINDAMYCIN HYDROCHLORIDE SCH MG: 150 CAPSULE ORAL at 22:46

## 2020-05-22 RX ADMIN — SODIUM CHLORIDE, PRESERVATIVE FREE SCH ML: 5 INJECTION INTRAVENOUS at 20:54

## 2020-05-22 RX ADMIN — ASPIRIN 81 MG CHEWABLE TABLET SCH MG: 81 TABLET CHEWABLE at 09:38

## 2020-05-22 RX ADMIN — ATORVASTATIN CALCIUM SCH MG: 80 TABLET, FILM COATED ORAL at 09:38

## 2020-05-22 RX ADMIN — INSULIN ASPART SCH UNIT: 100 INJECTION, SOLUTION INTRAVENOUS; SUBCUTANEOUS at 17:32

## 2020-05-22 RX ADMIN — SODIUM CHLORIDE, PRESERVATIVE FREE SCH ML: 5 INJECTION INTRAVENOUS at 09:39

## 2020-05-22 RX ADMIN — CLINDAMYCIN HYDROCHLORIDE SCH MG: 150 CAPSULE ORAL at 17:32

## 2020-05-22 RX ADMIN — CALCIUM CARBONATE (ANTACID) CHEW TAB 500 MG PRN MG: 500 CHEW TAB at 00:02

## 2020-05-22 RX ADMIN — TICAGRELOR SCH MG: 90 TABLET ORAL at 20:54

## 2020-05-22 RX ADMIN — INSULIN ASPART SCH UNIT: 100 INJECTION, SOLUTION INTRAVENOUS; SUBCUTANEOUS at 12:19

## 2020-05-22 RX ADMIN — INSULIN ASPART SCH UNIT: 100 INJECTION, SOLUTION INTRAVENOUS; SUBCUTANEOUS at 06:24

## 2020-05-22 RX ADMIN — TICAGRELOR SCH MG: 90 TABLET ORAL at 09:39

## 2020-05-22 RX ADMIN — DEXTROSE SCH MLS/HR: 50 INJECTION, SOLUTION INTRAVENOUS at 09:54

## 2020-05-22 RX ADMIN — INSULIN ASPART SCH UNIT: 100 INJECTION, SOLUTION INTRAVENOUS; SUBCUTANEOUS at 20:54

## 2020-05-22 RX ADMIN — LOSARTAN POTASSIUM SCH MG: 50 TABLET, FILM COATED ORAL at 09:38

## 2020-05-22 RX ADMIN — MIDODRINE HYDROCHLORIDE SCH: 5 TABLET ORAL at 17:22

## 2020-05-22 RX ADMIN — FAMOTIDINE SCH MG: 20 TABLET, FILM COATED ORAL at 20:54

## 2020-05-22 NOTE — XR
EXAMINATION TYPE: XR chest 1V

 

DATE OF EXAM: 5/22/2020

 

COMPARISON: 5/21/2020

 

HISTORY: Lead placement check, follow-up exam

 

TECHNIQUE: Single frontal view of the chest is obtained.

 

FINDINGS:  Dual lead left-sided cardiac device is seen with enlarged cardiac mediastinal silhouette. 
There is improved visualization of the right medial lower lobe with prominent epicardial fat pad. Min
imal pulmonary vascular congestion. Lung apices are partially obstructed by the patient's chin. Moder
ate degenerative change of the spine. No acute osseous process.

 

IMPRESSION:  Minimal pulmonary vascular congestion and improving right basilar opacity.

## 2020-05-22 NOTE — PN
PROGRESS NOTE



CARDIOLOGY FOLLOW-UP NOTE:

This patient is an 79-year-old gentleman who was admitted to hospital with acute

inferior wall myocardial infarction. He underwent cardiac catheterization and

angioplasty of right coronary artery.  He developed significant pauses and underwent

temporary pacemaker and subsequently underwent permanent pacemaker.  This morning he is

doing well and is free of symptoms.  Currently on aspirin, Lipitor, Pepcid, Lasix,

Glucotrol, Cozaar, Toprol and Brilinta.



PHYSICAL EXAMINATION:

Comfortable at rest.  Vital signs are stable.  There is no jugular venous distention.

Carotid upstroke is normal.  There is no bruit. Chest exam reveals good air entry

bilaterally. Heart exam reveals first and second heart sounds.  No gallop.  No murmur.

Abdomen is soft, nontender.  Examination of extremities revealed mild edema.

Peripheral pulses are felt.



LABS:

No recent labs.



ASSESSMENT:

1. Acute inferior wall myocardial infarction, status post catheterization and

    angioplasty.

2. Symptomatic sinus pauses, status post permanent pacemaker.



PLAN:

Patient is doing well.  He is stable for discharge and will follow up with me in the

office in a week's time to check the pacemaker.





MMODL / IJN: [id number] / Job#: [id number]

## 2020-05-22 NOTE — P.DS
Providers


Date of admission: 


05/15/20 12:37





Attending physician: 


Surya Paz





Consults: 





                                        





05/15/20 14:55


Consult Physician Urgent 


   Consulting Provider: Carolyne Licea


   Consult Reason/Comments: stemi


   Do you want consulting provider notified?: Already Contacted





05/15/20 22:41


Consult Physician Routine 


   Consulting Provider: Naresh Tineo


   Consult Reason/Comments: Elevated creatinine


   Do you want consulting provider notified?: Yes











Primary care physician: 


Stated None





Hospital Course: 





Diagnoses:


Acute inferior STEMI, status post emergent cardiac angiogram with PCI of the RCA


Ischemic cardiomyopathy with ejection fraction 45-50%


Sick sinus syndrome status post permanent pacemaker placement


Left lower lobe pneumonia


Acute kidney injury versus chronic kidney disease, resolved


Diabetes mellitus, with hyperglycemia


Hypertension








Hospital course:





This is a pleasant 79 years old female with past medical history of diabetes 

mellitus, hypertension.  She was transferred from [facility] chest 

pain.  Patient presents with central chest pain, EKG changes showing ST 

elevation in inferior leads.  Patient has been evaluated by cardiologist and 

underwent emergent cardiac angiogram, and found to have 80% blockage of the 

right coronary artery, patient underwent difficult PCI with 3 stent placed in 

the right coronary artery, during the procedure patient became transiently 

hypotensive and needed dopamine for short period.  Postprocedure patient 

stabilized and transferred to the ICU for further monitoring and treatment.  

However heart rate was dropping down to 20s And patient was placed on temporal 

pacemaker, eventually patient need permanent pacemaker placed by cardiologist 

team.


Chest x-ray was showing mild developing left lower lobe infiltrate, patient was 

already on clindamycin.  No leukocytosis or fever


Elevated creatinine on admission came down with, [place] from 1.4 down to 1.08, 

and patient was restarted on his metformin as well as home dose of glyburide


Physical therapy evaluated patient and recommended home with home health care.


On the day of discharge patient denies chest pain or dyspnea, no abdominal pain,

no nausea vomiting, no fever.  No coughing.  Vitas looks stable.  Bedside 

swallow evaluation is fine.  He feels generally weak, his pacemaker site is 

clean and dry, and he is paced on the monitor, Repeat chest x-ray this morning 

showing improving right basilar opacity 


Patient was cleared for discharge by cardiology and nephrology teams.  Patient 

will be discharged on aspirin and Brilinta and short course of gentamicin with 

close outpatient follow-up


Problems and management plan were discussed with the patient and he verbalized 

understanding and acceptance


Patient was found stable and can be discharged home however he needs follow-up 

as an outpatient. Patient was instructed to follow up with PCP Dr. Juárez within 

one week and patient agrees.  Patient was instructed also to follow up with 

cardiologist darien Herbert in one week and he agrees. pt told me he can call

and make his own appointment , also he will call and make appointment with his 

new pcp  and that he has his contact information at home





Gen: patient is a AAOx3, no distress.  Generally


CVS: S1-S2, RRR, no murmur


Lungs: B/L CTA, no wheezing


Abdomen: soft, no distention, no tenderness, positive bowel sounds


Extremity: no leg edema or induration





Time spent more than 35 minutes





N.B pt states he will try to get a ride to leave tonight , and if not he will 

has to wait till tomorrow morning , as he lives about two hours driving from the

hospital 








Plan - Discharge Summary


Discharge Rx Participant: Yes


New Discharge Prescriptions: 


New


   Ticagrelor [Brilinta] 90 mg PO BID #30 tab


   Clindamycin [Cleocin] 150 mg PO Q8HR 5 Days #15 cap


   Losartan [Cozaar] 50 mg PO DAILY #30 tab


   Atorvastatin [Lipitor] 80 mg PO DAILY #30 tab


   Famotidine [Pepcid] 20 mg PO BID #60 tab


   Midodrine [ProAmatine] 10 mg PO AC-BID #120 tab


   Metoprolol Succinate (ER) [Toprol XL] 25 mg PO DAILY #30 tab.er.24h





Continue


   glipiZIDE [Glipizide] 10 mg PO BID


   metFORMIN HCL 1,000 mg PO BID


   Pioglitazone [Actos] 30 mg PO DAILY


   Albuterol Sulfate [Ventolin HFA] 2 puff INHALATION RT-Q6H PRN


     PRN Reason: Shortness Of Breath


   Aspirin [Adult Low Dose Aspirin EC] 81 mg PO DAILY #30 tab


   Furosemide [Lasix] 20 mg PO DAILY #30 tab





Discontinued


   Losartan [Cozaar] 25 mg PO DAILY


Discharge Medication List





glipiZIDE [Glipizide] 10 mg PO BID 11/25/15 [History]


metFORMIN HCL 1,000 mg PO BID 11/25/15 [History]


Albuterol Sulfate [Ventolin HFA] 2 puff INHALATION RT-Q6H PRN 05/15/20 [History]


Pioglitazone [Actos] 30 mg PO DAILY 05/15/20 [History]


Aspirin [Adult Low Dose Aspirin EC] 81 mg PO DAILY #30 tab 05/22/20 [Rx]


Atorvastatin [Lipitor] 80 mg PO DAILY #30 tab 05/22/20 [Rx]


Clindamycin [Cleocin] 150 mg PO Q8HR 5 Days #15 cap 05/22/20 [Rx]


Famotidine [Pepcid] 20 mg PO BID #60 tab 05/22/20 [Rx]


Furosemide [Lasix] 20 mg PO DAILY #30 tab 05/22/20 [Rx]


Losartan [Cozaar] 50 mg PO DAILY #30 tab 05/22/20 [Rx]


Metoprolol Succinate (ER) [Toprol XL] 25 mg PO DAILY #30 tab.er.24h 05/22/20 

[Rx]


Midodrine [ProAmatine] 10 mg PO AC-BID #120 tab 05/22/20 [Rx]


Ticagrelor [Brilinta] 90 mg PO BID #30 tab 05/22/20 [Rx]








Follow up Appointment(s)/Referral(s): 


Naresh Tineo DO [STAFF PHYSICIAN] - 2 Weeks


Altaf Self [NON-STAFF] -  (Supplied cane)


Darien Parmar MD [STAFF PHYSICIAN] - 1 Week


Activity/Diet/Wound Care/Special Instructions: 


cardiac diet 


activity is limited till you see your doctor


Discharge Disposition: HOME WITH HOME HEALTH SERVICES

## 2020-05-22 NOTE — PN
PROGRESS NOTE



Patient is seen for followup for acute kidney injury, associated with hypotension and

acute MI with an element of contrast nephropathy as well.  Renal function has improved

significantly.  No labs noted today.  Serum creatinine was 1.08 yesterday.  Patient is

currently comfortable, awake he is not in any acute distress.  Blood pressure was

139/100, heart rate 58 per minute. He is afebrile.  Examination shows chronic lower

extremity changes with 1+ edema which is stable.  Abdomen is soft, obese, nontender.

Patient's arm remains in a sling from his recent pacemaker placement.  CNS exam is

grossly intact.



LABS:

From 05/21/2020 shows sodium 142, potassium 4.4, BUN 30, serum creatinine 1.08.



ASSESSMENT:

1. Acute kidney injury, ischemic, ATN with a component of possible contrast

    nephropathy, currently significantly improved.  Patient is maintained on low-dose

    angiotensin receptor blockers and tolerating it well.

2. Chronic kidney disease stage III previous creatinine 1.2 in 2015, secondary to

    nephrosclerosis and diabetic kidney disease. 1+ proteinuria noted on UA.

3. Bradycardia from inferior wall myocardial infarction, status post pacemaker

    placement.

4. Status post ST-elevation MI, which was inferior wall MI, status post cardiac cath

    and right coronary artery stenting.



PLAN:

Continue with the Cozaar.  Okay to use Glucophage and continue with oral Lasix at 20 mg

daily.





MMODL / IJN: [id number] / Job#: [id number]

## 2020-05-23 VITALS — DIASTOLIC BLOOD PRESSURE: 74 MMHG | SYSTOLIC BLOOD PRESSURE: 164 MMHG

## 2020-05-23 VITALS — HEART RATE: 60 BPM

## 2020-05-23 LAB
GLUCOSE BLD-MCNC: 162 MG/DL (ref 75–99)
GLUCOSE BLD-MCNC: 258 MG/DL (ref 75–99)

## 2020-05-23 RX ADMIN — MIDODRINE HYDROCHLORIDE SCH: 5 TABLET ORAL at 06:06

## 2020-05-23 RX ADMIN — SODIUM CHLORIDE, PRESERVATIVE FREE SCH ML: 5 INJECTION INTRAVENOUS at 09:52

## 2020-05-23 RX ADMIN — FAMOTIDINE SCH MG: 20 TABLET, FILM COATED ORAL at 09:52

## 2020-05-23 RX ADMIN — TICAGRELOR SCH MG: 90 TABLET ORAL at 09:52

## 2020-05-23 RX ADMIN — LOSARTAN POTASSIUM SCH MG: 50 TABLET, FILM COATED ORAL at 09:52

## 2020-05-23 RX ADMIN — INSULIN ASPART SCH: 100 INJECTION, SOLUTION INTRAVENOUS; SUBCUTANEOUS at 06:19

## 2020-05-23 RX ADMIN — FUROSEMIDE SCH MG: 20 TABLET ORAL at 09:52

## 2020-05-23 RX ADMIN — INSULIN ASPART SCH UNIT: 100 INJECTION, SOLUTION INTRAVENOUS; SUBCUTANEOUS at 12:41

## 2020-05-23 RX ADMIN — ASPIRIN 81 MG CHEWABLE TABLET SCH MG: 81 TABLET CHEWABLE at 09:52

## 2020-05-23 RX ADMIN — CLINDAMYCIN HYDROCHLORIDE SCH MG: 150 CAPSULE ORAL at 09:52

## 2020-05-23 RX ADMIN — ATORVASTATIN CALCIUM SCH MG: 80 TABLET, FILM COATED ORAL at 09:52

## 2020-05-23 RX ADMIN — METOPROLOL SUCCINATE SCH MG: 25 TABLET, EXTENDED RELEASE ORAL at 09:52

## 2020-05-23 NOTE — PN
PROGRESS NOTE



DATE OF SERVICE:

05/23/2020



This 79-year-old gentleman admitted with acute inferior myocardial infarction had

emergent cardiac cath/PCI of the RCA.  The patient was seen by Cardiology with ejection

fraction 45 to 50%.  The patient also had sick sinus syndrome, pacemaker implant.

Patient being closely monitored at this time.



PAST MEDICAL HISTORY:

Reviewed.



REVIEW OF SYSTEMS:

Cardiovascular system: S1, S2.

Respiratory: As mentioned earlier.

GI as mentioned earlier.

CENTRAL NERVOUS SYSTEM: No numbness or weakness.



CURRENT MEDICATIONS:

Reviewed and include:

1. Tylenol p.r.n.

2. Norco.

3. Aspirin.

4. Lipitor.

5. Tums.

6. Pepcid.

7. Lasix.

8. Glucotrol.

9. NovoLog.

10.Cozaar.

11.Glucophage.

12.Toprol-XL.

13.ProAmatine.

14.Brilinta.



PHYSICAL EXAMINATION:

Alert and oriented times three.  Pulse 60.  Blood pressure 136/61, respirations 16,

temperature 98.2, pulse ox 94% on room air.  HEENT: Conjunctivae normal. Oral mucosa

moist.

NECK is no jugular venous distention.  No carotid bruit. No lymph node enlargement.

CARDIOVASCULAR: S1, S2 muffled.

RESPIRATORY: Breath sounds diminished in the bases.  Scattered rhonchi.

ABDOMEN:  Soft, nontender.

LEGS no edema.  No swelling.

CENTRAL NERVOUS SYSTEM: No focal deficits.



LABS:

At this time shows Accu-Cheks 162, 258.



ASSESSMENT:

1. Acute inferior wall [id number]-hunlvcrsu myocardial infarction status post cardiac

    catheterization, PCI to RCA.

2. Ischemic cardiomyopathy, ejection fraction 45-50 percent.

3. Sick sinus syndrome status post permanent pacemaker implantation.

4. Left lower pneumonia.

5. Acute kidney injury with possible chronic kidney stage III baseline.

6. Diabetes mellitus type 2 with hyperglycemia.

7. Hypertension.

8. History of cholecystectomy.

9. Remote history of nicotine dependence.

10.Obesity with body mass of 42.

11.FULL CODE.



RECOMMENDATIONS AND DISCUSSION:

This 79-year-old gentleman presented with multiple complex medical issues, we will

monitor the patient closely, continue the current medications, management and

symptomatic treatment. I recommend the patient to be discharged home and follow up with

Dr. Franck Lange in the outpatient setting as well as follow up with Nephrology and as

well as Cardiology and the new medications are Brilinta, short course of Cleocin,

Lipitor, Pepcid, and metoprolol and as well as Cozaar.



Please refer to the medication reconciliation sheet for a list of discharge

medications.





MMODL / IJN: [id number] / Job#: [id number]

## 2020-05-23 NOTE — P.PN
Subjective





Patient is seen in follow-up for acute kidney injury.  Creatinine 1.08 as of May

21.  Denies chest pain or shortness of breath.  Good urine output.





Vital signs are stable.


General: The patient appeared well nourished and normally developed. 


HEENT: Head exam is unremarkable. Neck is without jugular venous distension.


LUNGS: Lungs are clear to auscultation and percussion. Breath sounds decreased.


HEART: Rate and Rhythm are regular. First and second heart sounds normal. No 

murmurs, rubs or gallops. 


ABDOMEN: Abdominal exam reveals normal bowel sounds. Non-tender and non-

distended. 


EXTREMITITES: No clubbing, cyanosis, or edema.





Objective





- Vital Signs


Vital signs: 


                                   Vital Signs











Temp  98.5 F   05/23/20 09:45


 


Pulse  60   05/23/20 09:45


 


Resp  16   05/23/20 09:45


 


BP  137/61   05/23/20 09:45


 


Pulse Ox  94 L  05/23/20 09:45








                                 Intake & Output











 05/22/20 05/23/20 05/23/20





 18:59 06:59 18:59


 


Intake Total 480 100 


 


Balance 480 100 


 


Weight  140.6 kg 


 


Intake:   


 


  Oral 480 100 


 


Other:   


 


  # Voids 1 1 


 


  # Bowel Movements 1 1 














- Labs


CBC & Chem 7: 


                                 05/22/20 10:38





                                 05/21/20 04:39


Labs: 


                  Abnormal Lab Results - Last 24 Hours (Table)











  05/22/20 05/22/20 05/22/20 Range/Units





  10:38 11:46 16:39 


 


RBC  3.76 L    (4.30-5.90)  m/uL


 


Hgb  11.4 L    (13.0-17.5)  gm/dL


 


Hct  36.6 L    (39.0-53.0)  %


 


POC Glucose (mg/dL)   205 H  193 H  (75-99)  mg/dL














  05/22/20 05/23/20 Range/Units





  20:29 06:04 


 


RBC    (4.30-5.90)  m/uL


 


Hgb    (13.0-17.5)  gm/dL


 


Hct    (39.0-53.0)  %


 


POC Glucose (mg/dL)  265 H  162 H  (75-99)  mg/dL








                      Microbiology - Last 24 Hours (Table)











 05/21/20 14:30 Gram Stain - Final





 Sputum Sputum Culture - Final














Assessment and Plan


Plan: 





Assessment:


1.  Acute kidney injury secondary to ATN secondary to acute MI with component of

contrast-induced acute kidney injury.  Improved.


2.  Coronary artery disease status post stenting to the RCA and pacemaker 

placement.


3.  Diabetes mellitus. 


4.  Chronic systolic CHF with ejection fraction of 40-45% with moderate to 

severe pulmonary hypertension.





Plan:


Maintain Lasix 20 mg orally once daily.


Avoid nephrotoxins.


Anticipate discharge soon.  Follow up outpatient in 2 weeks.

## 2020-05-23 NOTE — P.PN
Subjective


Principal diagnosis: 





79-year-old male patient with an inferior wall MI


Symptomatic sinus pauses status post permanent pacemaker implantation


Left ventricular ejection fraction mildly impaired 450% with inferior wall 

hypokinesis


Elevated RVSP


He is on Midrin 10 mg twice daily along with losartan and metoprolol


He is on aspirin and Brilinta





Labs reviewed hemoglobin 11.4 sodium 142 potassium 4.4 creatinine 1.08


C. diff negative


TSH 4.1





On examination blood pressure 142/73, pulse rate in the 60s afebrile 98.5F 

respirations nonlabored


Breath sounds are reduced bilaterally


No cardiac murmurs audible


Mild bilateral lower extremity edema


Patient looks comfortable





Impression acute inferior wall MI status post stenting line sinus pauses status 

post permanent pacemaker implantation


1 blood pressure reading that was low on the 17th.  Subsequently the blood 

pressure readings have been fine


He has ischemic cardio myopathy


I would hold off giving Midrin and observe him without his trunk


If needed losartan dose can be decreased





I will discontinue midodrine


Watch blood pressure


Ambulate





Objective





- Vital Signs


Vital signs: 


                                   Vital Signs











Temp  98.5 F   05/22/20 20:00


 


Pulse  63   05/23/20 04:00


 


Resp  16   05/23/20 04:00


 


BP  148/65   05/23/20 04:00


 


Pulse Ox  95   05/23/20 04:00








                                 Intake & Output











 05/22/20 05/23/20 05/23/20





 18:59 06:59 18:59


 


Intake Total 480 100 


 


Balance 480 100 


 


Weight  140.6 kg 


 


Intake:   


 


  Oral 480 100 


 


Other:   


 


  # Voids 1 1 


 


  # Bowel Movements 1 1 














- Labs


CBC & Chem 7: 


                                 05/22/20 10:38





                                 05/21/20 04:39


Labs: 


                  Abnormal Lab Results - Last 24 Hours (Table)











  05/22/20 05/22/20 05/22/20 Range/Units





  10:38 11:46 16:39 


 


RBC  3.76 L    (4.30-5.90)  m/uL


 


Hgb  11.4 L    (13.0-17.5)  gm/dL


 


Hct  36.6 L    (39.0-53.0)  %


 


POC Glucose (mg/dL)   205 H  193 H  (75-99)  mg/dL














  05/22/20 05/23/20 Range/Units





  20:29 06:04 


 


RBC    (4.30-5.90)  m/uL


 


Hgb    (13.0-17.5)  gm/dL


 


Hct    (39.0-53.0)  %


 


POC Glucose (mg/dL)  265 H  162 H  (75-99)  mg/dL








                      Microbiology - Last 24 Hours (Table)











 05/21/20 14:30 Gram Stain - Preliminary





 Sputum Sputum Culture - Preliminary

## 2021-03-16 ENCOUNTER — HOSPITAL ENCOUNTER (INPATIENT)
Dept: HOSPITAL 47 - EC | Age: 81
LOS: 23 days | Discharge: NURSING, REHAB OR LONG-TERM CARE | DRG: 177 | End: 2021-04-08
Attending: INTERNAL MEDICINE | Admitting: INTERNAL MEDICINE
Payer: MEDICARE

## 2021-03-16 VITALS — BODY MASS INDEX: 39.5 KG/M2

## 2021-03-16 DIAGNOSIS — Z71.3: ICD-10-CM

## 2021-03-16 DIAGNOSIS — E87.5: ICD-10-CM

## 2021-03-16 DIAGNOSIS — Z79.899: ICD-10-CM

## 2021-03-16 DIAGNOSIS — Z83.3: ICD-10-CM

## 2021-03-16 DIAGNOSIS — Z98.890: ICD-10-CM

## 2021-03-16 DIAGNOSIS — K64.9: ICD-10-CM

## 2021-03-16 DIAGNOSIS — N17.9: ICD-10-CM

## 2021-03-16 DIAGNOSIS — Z79.02: ICD-10-CM

## 2021-03-16 DIAGNOSIS — E66.9: ICD-10-CM

## 2021-03-16 DIAGNOSIS — Z87.09: ICD-10-CM

## 2021-03-16 DIAGNOSIS — Z87.19: ICD-10-CM

## 2021-03-16 DIAGNOSIS — E86.1: ICD-10-CM

## 2021-03-16 DIAGNOSIS — I10: ICD-10-CM

## 2021-03-16 DIAGNOSIS — Z88.0: ICD-10-CM

## 2021-03-16 DIAGNOSIS — Z79.82: ICD-10-CM

## 2021-03-16 DIAGNOSIS — Z80.49: ICD-10-CM

## 2021-03-16 DIAGNOSIS — E87.1: ICD-10-CM

## 2021-03-16 DIAGNOSIS — J12.82: ICD-10-CM

## 2021-03-16 DIAGNOSIS — E11.65: ICD-10-CM

## 2021-03-16 DIAGNOSIS — Z90.49: ICD-10-CM

## 2021-03-16 DIAGNOSIS — R19.7: ICD-10-CM

## 2021-03-16 DIAGNOSIS — U07.1: Primary | ICD-10-CM

## 2021-03-16 DIAGNOSIS — Z95.0: ICD-10-CM

## 2021-03-16 DIAGNOSIS — E78.5: ICD-10-CM

## 2021-03-16 DIAGNOSIS — Z91.018: ICD-10-CM

## 2021-03-16 DIAGNOSIS — K92.1: ICD-10-CM

## 2021-03-16 DIAGNOSIS — T38.0X5A: ICD-10-CM

## 2021-03-16 DIAGNOSIS — J96.01: ICD-10-CM

## 2021-03-16 DIAGNOSIS — Z87.891: ICD-10-CM

## 2021-03-16 DIAGNOSIS — D62: ICD-10-CM

## 2021-03-16 DIAGNOSIS — K57.90: ICD-10-CM

## 2021-03-16 DIAGNOSIS — Z79.84: ICD-10-CM

## 2021-03-16 LAB
ALBUMIN SERPL-MCNC: 3.4 G/DL (ref 3.5–5)
ALP SERPL-CCNC: 53 U/L (ref 38–126)
ALT SERPL-CCNC: 22 U/L (ref 4–49)
ANION GAP SERPL CALC-SCNC: 8 MMOL/L
APTT BLD: 24.7 SEC (ref 22–30)
AST SERPL-CCNC: 44 U/L (ref 17–59)
BASOPHILS # BLD AUTO: 0.1 K/UL (ref 0–0.2)
BASOPHILS NFR BLD AUTO: 1 %
BUN SERPL-SCNC: 47 MG/DL (ref 9–20)
CALCIUM SPEC-MCNC: 8.3 MG/DL (ref 8.4–10.2)
CHLORIDE SERPL-SCNC: 102 MMOL/L (ref 98–107)
CO2 SERPL-SCNC: 26 MMOL/L (ref 22–30)
D DIMER PPP FEU-MCNC: 0.77 MG/L FEU (ref ?–0.6)
EOSINOPHIL # BLD AUTO: 0 K/UL (ref 0–0.7)
EOSINOPHIL NFR BLD AUTO: 1 %
ERYTHROCYTE [DISTWIDTH] IN BLOOD BY AUTOMATED COUNT: 3.92 M/UL (ref 4.3–5.9)
ERYTHROCYTE [DISTWIDTH] IN BLOOD: 13.2 % (ref 11.5–15.5)
GLUCOSE SERPL-MCNC: 203 MG/DL (ref 74–99)
HCT VFR BLD AUTO: 37.1 % (ref 39–53)
HGB BLD-MCNC: 12.5 GM/DL (ref 13–17.5)
INR PPP: 0.9 (ref ?–1.2)
LDH SPEC-CCNC: 818 U/L (ref 313–618)
LYMPHOCYTES # SPEC AUTO: 1.5 K/UL (ref 1–4.8)
LYMPHOCYTES NFR SPEC AUTO: 32 %
MAGNESIUM SPEC-SCNC: 1.8 MG/DL (ref 1.6–2.3)
MCH RBC QN AUTO: 31.8 PG (ref 25–35)
MCHC RBC AUTO-ENTMCNC: 33.6 G/DL (ref 31–37)
MCV RBC AUTO: 94.7 FL (ref 80–100)
MONOCYTES # BLD AUTO: 0.5 K/UL (ref 0–1)
MONOCYTES NFR BLD AUTO: 11 %
NEUTROPHILS # BLD AUTO: 2.4 K/UL (ref 1.3–7.7)
NEUTROPHILS NFR BLD AUTO: 53 %
PLATELET # BLD AUTO: 153 K/UL (ref 150–450)
POTASSIUM SERPL-SCNC: 4.5 MMOL/L (ref 3.5–5.1)
PROT SERPL-MCNC: 6.7 G/DL (ref 6.3–8.2)
PT BLD: 10.1 SEC (ref 9–12)
SODIUM SERPL-SCNC: 136 MMOL/L (ref 137–145)
WBC # BLD AUTO: 4.6 K/UL (ref 3.8–10.6)

## 2021-03-16 PROCEDURE — 80048 BASIC METABOLIC PNL TOTAL CA: CPT

## 2021-03-16 PROCEDURE — 36415 COLL VENOUS BLD VENIPUNCTURE: CPT

## 2021-03-16 PROCEDURE — 87040 BLOOD CULTURE FOR BACTERIA: CPT

## 2021-03-16 PROCEDURE — 85025 COMPLETE CBC W/AUTO DIFF WBC: CPT

## 2021-03-16 PROCEDURE — 84145 PROCALCITONIN (PCT): CPT

## 2021-03-16 PROCEDURE — 87635 SARS-COV-2 COVID-19 AMP PRB: CPT

## 2021-03-16 PROCEDURE — 82728 ASSAY OF FERRITIN: CPT

## 2021-03-16 PROCEDURE — 83605 ASSAY OF LACTIC ACID: CPT

## 2021-03-16 PROCEDURE — 83735 ASSAY OF MAGNESIUM: CPT

## 2021-03-16 PROCEDURE — 85379 FIBRIN DEGRADATION QUANT: CPT

## 2021-03-16 PROCEDURE — 93005 ELECTROCARDIOGRAM TRACING: CPT

## 2021-03-16 PROCEDURE — 83540 ASSAY OF IRON: CPT

## 2021-03-16 PROCEDURE — 99285 EMERGENCY DEPT VISIT HI MDM: CPT

## 2021-03-16 PROCEDURE — 85730 THROMBOPLASTIN TIME PARTIAL: CPT

## 2021-03-16 PROCEDURE — 86140 C-REACTIVE PROTEIN: CPT

## 2021-03-16 PROCEDURE — 82746 ASSAY OF FOLIC ACID SERUM: CPT

## 2021-03-16 PROCEDURE — 85027 COMPLETE CBC AUTOMATED: CPT

## 2021-03-16 PROCEDURE — 85610 PROTHROMBIN TIME: CPT

## 2021-03-16 PROCEDURE — 94760 N-INVAS EAR/PLS OXIMETRY 1: CPT

## 2021-03-16 PROCEDURE — 71045 X-RAY EXAM CHEST 1 VIEW: CPT

## 2021-03-16 PROCEDURE — 83550 IRON BINDING TEST: CPT

## 2021-03-16 PROCEDURE — 80053 COMPREHEN METABOLIC PANEL: CPT

## 2021-03-16 PROCEDURE — 82607 VITAMIN B-12: CPT

## 2021-03-16 PROCEDURE — 83615 LACTATE (LD) (LDH) ENZYME: CPT

## 2021-03-16 RX ADMIN — ENOXAPARIN SODIUM SCH MG: 30 INJECTION SUBCUTANEOUS at 23:45

## 2021-03-16 NOTE — ED
General Adult HPI





- General


Chief complaint: Shortness of Breath


Stated complaint: ANTONETTE


Time Seen by Provider: 03/16/21 18:40


Source: patient, EMS, RN notes reviewed, old records reviewed


Mode of arrival: EMS


Limitations: no limitations





- History of Present Illness


Initial comments: 





This is an 80-year-old male who presents emergency Department stating that he 

has had shortness of breath and feeling fatigued for at least 2 weeks.  Patient 

states he has had exposure to COVID .  Patient states he has had a fever.  

Patient states she's also states and smile.  Patient also states she's had 

diarrhea.  Patient denies any chest pain or palpitations.  Patient denies any 

abdominal pain patient denies nausea vomiting diarrhea.  According to EMS with 

the patient was coming in he was satting in the low 80s.  Patient is currently 

on a few liters of oxygen and sat in mid 90s.  Patient is a diabetic and has 

high blood pressure.  Patient also has a pacemaker.





- Related Data


                                Home Medications











 Medication  Instructions  Recorded  Confirmed


 


glipiZIDE [Glipizide] 20 mg PO BID 11/25/15 03/16/21


 


metFORMIN HCL 1,000 mg PO BID 11/25/15 03/16/21


 


Pioglitazone [Actos] 30 mg PO DAILY 05/15/20 03/16/21


 


Clopidogrel Bisulfate [Plavix] 75 mg PO DAILY 03/16/21 03/16/21


 


Losartan [Cozaar] 50 mg PO DAILY 03/16/21 03/16/21


 


Metoprolol Succinate (ER) [Toprol 50 mg PO DAILY 03/16/21 03/16/21





Xl]   








                                  Previous Rx's











 Medication  Instructions  Recorded


 


Aspirin [Adult Low Dose Aspirin EC] 81 mg PO DAILY #30 tab 05/22/20


 


Atorvastatin [Lipitor] 80 mg PO DAILY #30 tab 05/22/20


 


Furosemide [Lasix] 20 mg PO DAILY #30 tab 05/22/20











                                    Allergies











Allergy/AdvReac Type Severity Reaction Status Date / Time


 


Mushroom Allergy  Vomiting Verified 03/16/21 20:46


 


Penicillins Allergy  Unknown Verified 03/16/21 20:46














Review of Systems


ROS Statement: 


Those systems with pertinent positive or pertinent negative responses have been 

documented in the HPI.





ROS Other: All systems not noted in ROS Statement are negative.





Past Medical History


Past Medical History: Diabetes Mellitus, Hypertension


Additional Past Medical History / Comment(s): sinusitis, diverticulits


History of Any Multi-Drug Resistant Organisms: None Reported


Past Surgical History: Bowel Resection, Cholecystectomy


Additional Past Surgical History / Comment(s): bowel resection d/t 

diverticulitis, colonoscopy


Past Anesthesia/Blood Transfusion Reactions: No Reported Reaction


Past Psychological History: No Psychological Hx Reported


Smoking Status: Never smoker


Past Alcohol Use History: Occasional


Past Drug Use History: None Reported





- Past Family History


  ** Mother


Additional Family Medical History / Comment(s): reproductive cancer





  ** Father


Family Medical History: Diabetes Mellitus





General Exam





- General Exam Comments


Initial Comments: 





GENERAL:


Patient is well-developed and well-nourished.  Patient is nontoxic and well-hydr

ated and is in moderate distress.





ENT:


Neck is soft and supple.  No significant lymphadenopathy is noted.  Oropharynx 

is clear.  Moist mucous membranes.  Neck has full range of motion without 

eliciting any pain.





EYES:


The sclera were anicteric and conjunctiva were pink and moist.  Extraocular move

ments were intact and pupils were equal round and reactive to light.  Eyelids 

were unremarkable.





PULMONARY:


Unlabored respirations.  Good breath sounds bilaterally.  Crackles bilateral 

bases.





CARDIOVASCULAR:


There is a regular rate and rhythm without any murmurs gallops or rubs.  





ABDOMEN:


Soft and nontender with normal bowel sounds. 





SKIN:


Skin is clear with no lesions or rashes and otherwise unremarkable.





NEUROLOGIC:


Patient is alert and oriented x3.  Cranial nerves II through XII are grossly 

intact.  Motor and sensory are also intact.  Normal speech, volume and content. 

 Symmetrical smile. 





MUSCULOSKELETAL:


Normal extremities with adequate strength and full range of motion.  No lower 

extremity swelling or edema.  No calf tenderness.





LYMPHATICS:


No significant lymphadenopathy is noted





PSYCHIATRIC:


Normal psychiatric evaluation.  


Limitations: no limitations





Course


                                   Vital Signs











  03/16/21 03/16/21





  18:41 18:45


 


Temperature 101.5 F H 


 


Pulse Rate 62 67


 


Respiratory 26 H 





Rate  


 


Blood Pressure 119/62 


 


O2 Sat by Pulse 84 L 97





Oximetry  














Medical Decision Making





- Medical Decision Making





EKG shows a paced rhythm at 65 bpm NE interval is on a 72 QRS is 186 QT interval

 46 QTC is 505.





Chest x-ray shows pulmonary infiltrates consistent with COVID.





Patient was 85% on room air.  I placed the patient on oxygen and he was satting 

95-96%.  Patient was much more comfortable.  I started the patient on Decadron 

and ordered Decadron daily.





I spoke with Dr. Diaz agreed to admit the patient admitted the patient wrote 

admitting orders I consult pulmonary.





- Lab Data


Result diagrams: 


                                 03/16/21 19:34





                                 03/16/21 19:34


                                   Lab Results











  03/16/21 03/16/21 03/16/21 Range/Units





  19:34 19:34 19:34 


 


WBC   4.6   (3.8-10.6)  k/uL


 


RBC   3.92 L   (4.30-5.90)  m/uL


 


Hgb   12.5 L   (13.0-17.5)  gm/dL


 


Hct   37.1 L   (39.0-53.0)  %


 


MCV   94.7   (80.0-100.0)  fL


 


MCH   31.8   (25.0-35.0)  pg


 


MCHC   33.6   (31.0-37.0)  g/dL


 


RDW   13.2   (11.5-15.5)  %


 


Plt Count   153   (150-450)  k/uL


 


MPV   9.2   


 


Neutrophils %   53   %


 


Lymphocytes %   32   %


 


Monocytes %   11   %


 


Eosinophils %   1   %


 


Basophils %   1   %


 


Neutrophils #   2.4   (1.3-7.7)  k/uL


 


Lymphocytes #   1.5   (1.0-4.8)  k/uL


 


Monocytes #   0.5   (0-1.0)  k/uL


 


Eosinophils #   0.0   (0-0.7)  k/uL


 


Basophils #   0.1   (0-0.2)  k/uL


 


PT    10.1  (9.0-12.0)  sec


 


INR    0.9  (<1.2)  


 


APTT    24.7  (22.0-30.0)  sec


 


D-Dimer    0.77 H  (<0.60)  mg/L FEU


 


Sodium     (137-145)  mmol/L


 


Potassium     (3.5-5.1)  mmol/L


 


Chloride     ()  mmol/L


 


Carbon Dioxide     (22-30)  mmol/L


 


Anion Gap     mmol/L


 


BUN     (9-20)  mg/dL


 


Creatinine     (0.66-1.25)  mg/dL


 


Est GFR (CKD-EPI)AfAm     (>60 ml/min/1.73 sqM)  


 


Est GFR (CKD-EPI)NonAf     (>60 ml/min/1.73 sqM)  


 


Glucose     (74-99)  mg/dL


 


Plasma Lactic Acid Fox     (0.7-2.0)  mmol/L


 


Calcium     (8.4-10.2)  mg/dL


 


Magnesium     (1.6-2.3)  mg/dL


 


Total Bilirubin     (0.2-1.3)  mg/dL


 


AST     (17-59)  U/L


 


ALT     (4-49)  U/L


 


Alkaline Phosphatase     ()  U/L


 


Lactate Dehydrogenase     (313-618)  U/L


 


C-Reactive Protein     (<10.0)  mg/L


 


Total Protein     (6.3-8.2)  g/dL


 


Albumin     (3.5-5.0)  g/dL


 


Coronavirus (PCR)  Detected A    (Not Detectd)  














  03/16/21 03/16/21 Range/Units





  19:34 19:34 


 


WBC    (3.8-10.6)  k/uL


 


RBC    (4.30-5.90)  m/uL


 


Hgb    (13.0-17.5)  gm/dL


 


Hct    (39.0-53.0)  %


 


MCV    (80.0-100.0)  fL


 


MCH    (25.0-35.0)  pg


 


MCHC    (31.0-37.0)  g/dL


 


RDW    (11.5-15.5)  %


 


Plt Count    (150-450)  k/uL


 


MPV    


 


Neutrophils %    %


 


Lymphocytes %    %


 


Monocytes %    %


 


Eosinophils %    %


 


Basophils %    %


 


Neutrophils #    (1.3-7.7)  k/uL


 


Lymphocytes #    (1.0-4.8)  k/uL


 


Monocytes #    (0-1.0)  k/uL


 


Eosinophils #    (0-0.7)  k/uL


 


Basophils #    (0-0.2)  k/uL


 


PT    (9.0-12.0)  sec


 


INR    (<1.2)  


 


APTT    (22.0-30.0)  sec


 


D-Dimer    (<0.60)  mg/L FEU


 


Sodium  136 L   (137-145)  mmol/L


 


Potassium  4.5   (3.5-5.1)  mmol/L


 


Chloride  102   ()  mmol/L


 


Carbon Dioxide  26   (22-30)  mmol/L


 


Anion Gap  8   mmol/L


 


BUN  47 H   (9-20)  mg/dL


 


Creatinine  1.63 H   (0.66-1.25)  mg/dL


 


Est GFR (CKD-EPI)AfAm  45   (>60 ml/min/1.73 sqM)  


 


Est GFR (CKD-EPI)NonAf  39   (>60 ml/min/1.73 sqM)  


 


Glucose  203 H   (74-99)  mg/dL


 


Plasma Lactic Acid Fox   1.6  (0.7-2.0)  mmol/L


 


Calcium  8.3 L   (8.4-10.2)  mg/dL


 


Magnesium  1.8   (1.6-2.3)  mg/dL


 


Total Bilirubin  0.5   (0.2-1.3)  mg/dL


 


AST  44   (17-59)  U/L


 


ALT  22   (4-49)  U/L


 


Alkaline Phosphatase  53   ()  U/L


 


Lactate Dehydrogenase  818 H   (313-618)  U/L


 


C-Reactive Protein  57.7 H   (<10.0)  mg/L


 


Total Protein  6.7   (6.3-8.2)  g/dL


 


Albumin  3.4 L   (3.5-5.0)  g/dL


 


Coronavirus (PCR)    (Not Detectd)  














Disposition


Clinical Impression: 


 Pneumonia due to COVID-19 virus





Disposition: ADMITTED AS IP TO THIS [facility]


Referrals: 


Nonstaff,Physician [Primary Care Provider] - 1-2 days


Time of Disposition: 21:06

## 2021-03-16 NOTE — XR
EXAMINATION TYPE: XR chest 1V portable

 

DATE OF EXAM: 3/16/2021

 

COMPARISON: 5/22/2020

 

HISTORY: Pneumonia. Short of breath.

 

TECHNIQUE: Single view

 

FINDINGS: There is some coarse interstitial density in the mid and lower lung fields. Heart is slight
ly enlarged. There is no obvious heart failure. There is left axillary pacemaker.

 

IMPRESSION: Mild interstitial pulmonary infiltrates improved compared to old exam. No obvious heart f
ailure. Stable cardiomegaly.

## 2021-03-17 LAB
FERRITIN SERPL-MCNC: 984.6 NG/ML (ref 22–322)
GLUCOSE BLD-MCNC: 328 MG/DL (ref 75–99)
GLUCOSE BLD-MCNC: 395 MG/DL (ref 75–99)
GLUCOSE BLD-MCNC: 437 MG/DL (ref 75–99)
GLUCOSE BLD-MCNC: 450 MG/DL (ref 75–99)

## 2021-03-17 RX ADMIN — PIOGLITAZONE SCH MG: 30 TABLET ORAL at 07:25

## 2021-03-17 RX ADMIN — ASPIRIN 81 MG CHEWABLE TABLET SCH MG: 81 TABLET CHEWABLE at 07:25

## 2021-03-17 RX ADMIN — INSULIN ASPART SCH UNIT: 100 INJECTION, SOLUTION INTRAVENOUS; SUBCUTANEOUS at 17:42

## 2021-03-17 RX ADMIN — OXYCODONE HYDROCHLORIDE AND ACETAMINOPHEN SCH MG: 500 TABLET ORAL at 07:26

## 2021-03-17 RX ADMIN — ATORVASTATIN CALCIUM SCH MG: 80 TABLET, FILM COATED ORAL at 07:27

## 2021-03-17 RX ADMIN — Medication SCH MG: at 07:27

## 2021-03-17 RX ADMIN — INSULIN ASPART SCH UNIT: 100 INJECTION, SOLUTION INTRAVENOUS; SUBCUTANEOUS at 21:00

## 2021-03-17 RX ADMIN — CLOPIDOGREL BISULFATE SCH MG: 75 TABLET ORAL at 07:26

## 2021-03-17 RX ADMIN — INSULIN ASPART SCH UNIT: 100 INJECTION, SOLUTION INTRAVENOUS; SUBCUTANEOUS at 07:24

## 2021-03-17 RX ADMIN — INSULIN ASPART SCH UNIT: 100 INJECTION, SOLUTION INTRAVENOUS; SUBCUTANEOUS at 12:28

## 2021-03-17 RX ADMIN — ENOXAPARIN SODIUM SCH MG: 30 INJECTION SUBCUTANEOUS at 07:24

## 2021-03-17 RX ADMIN — METOPROLOL SUCCINATE SCH MG: 50 TABLET, EXTENDED RELEASE ORAL at 07:26

## 2021-03-17 NOTE — P.HPIM
History of Present Illness





80-year-old male who presents emergency Department stating that he has had 

shortness of breath and feeling fatigued for at least 2 weeks.  Patient states 

he has had exposure to COVID .  Patient states he has had a fever.  Patient st

ates she's also states and smile.  Patient also states she's had diarrhea.  

Patient denies any chest pain or palpitations.  Patient denies any abdominal 

pain patient denies nausea vomiting diarrhea.  According to EMS with the patient

was coming in he was satting in the low 80s.  Patient is currently on a few 

liters of oxygen and sat in mid 90s.  Patient is a diabetic and has high blood 

pressure.  Patient also has a pacemaker.  Patient is presently on 2 L of oxygen 

was requiring 4 L yesterday.  Patient was started on IV fluids patient 

creatinine went up to 1.6 baseline is around 1.  Patient's blood sugars are 

highly elevated.  Patient tests positive for Covid.





Review of Systems








REVIEW OF SYSTEMS: 


CONSTITUTIONAL: As mentioned in HPI


HEENT: No recent visual problems or hearing problems. Denied any sore throat. 


CARDIOVASCULAR: No chest pain, orthopnea, PND, no palpitations, no syncope. 


PULMONARY: no hemoptysis. 


GASTROINTESTINAL: No diarrhea, no nausea, no vomiting, no abdominal pain. 


NEUROLOGICAL: No headaches, no weakness, no numbness. 


HEMATOLOGICAL: Denies any bleeding or petechiae. 


GENITOURINARY: Denies any burning micturition, frequency, or urgency. 


MUSCULOSKELETAL/RHEUMATOLOGICAL: Denies any joint pain, swelling, or any muscle 

pain. 


ENDOCRINE: Denies any polyuria or polydipsia. 





The rest of the 14-point review of systems is negative.











Past Medical History


Past Medical History: Diabetes Mellitus, Hypertension


Additional Past Medical History / Comment(s): sinusitis, diverticulits


History of Any Multi-Drug Resistant Organisms: None Reported


Past Surgical History: Bowel Resection, Cholecystectomy


Additional Past Surgical History / Comment(s): bowel resection d/t 

diverticulitis, colonoscopy


Past Anesthesia/Blood Transfusion Reactions: No Reported Reaction


Past Psychological History: No Psychological Hx Reported


Additional Psychological History / Comment(s): pt lives with his girlfriend 

antonio, is independant, no assistive devices.no outside services. worked in 

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


Smoking Status: Former smoker


Past Alcohol Use History: Occasional


Past Drug Use History: None Reported





- Past Family History


  ** Mother


Additional Family Medical History / Comment(s): reproductive cancer





  ** Father


Family Medical History: Diabetes Mellitus





Medications and Allergies


                                Home Medications











 Medication  Instructions  Recorded  Confirmed  Type


 


glipiZIDE [Glipizide] 20 mg PO BID 11/25/15 03/16/21 History


 


metFORMIN HCL 1,000 mg PO BID 11/25/15 03/16/21 History


 


Pioglitazone [Actos] 30 mg PO DAILY 05/15/20 03/16/21 History


 


Aspirin [Adult Low Dose Aspirin EC] 81 mg PO DAILY #30 tab 05/22/20 03/16/21 Rx


 


Atorvastatin [Lipitor] 80 mg PO DAILY #30 tab 05/22/20 03/16/21 Rx


 


Furosemide [Lasix] 20 mg PO DAILY #30 tab 05/22/20 03/16/21 Rx


 


Clopidogrel Bisulfate [Plavix] 75 mg PO DAILY 03/16/21 03/16/21 History


 


Losartan [Cozaar] 50 mg PO DAILY 03/16/21 03/16/21 History


 


Metoprolol Succinate (ER) [Toprol 50 mg PO DAILY 03/16/21 03/16/21 History





Xl]    








                                    Allergies











Allergy/AdvReac Type Severity Reaction Status Date / Time


 


Mushroom Allergy  Vomiting Verified 03/16/21 20:46


 


Penicillins Allergy  Unknown Verified 03/16/21 20:46














Physical Exam


Vitals: 


                                   Vital Signs











  Temp Pulse Pulse Resp BP BP Pulse Ox


 


 03/17/21 10:36  97.8 F   65  16   104/53  95


 


 03/17/21 08:58        98


 


 03/17/21 05:28  97.6 F   64    124/62  98


 


 03/17/21 00:10  98.3 F   63  19   104/60  93 L


 


 03/16/21 23:30  98.9 F      


 


 03/16/21 18:45   67      97


 


 03/16/21 18:41  101.5 F H  62   26 H  119/62   84 L








                                Intake and Output











 03/17/21 03/17/21 03/17/21





 06:59 14:59 22:59


 


Other:   


 


  Voiding Method Toilet Toilet 


 


  # Voids 1  


 


  # Bowel Movements 0  


 


  Weight 136.078 kg  

















PHYSICAL EXAMINATION: 





GENERAL: The patient is alert and oriented x3, not in any acute distress.  Obese


HEENT: Pupils are round and equally reacting to light. EOMI. No scleral icterus.

No conjunctival pallor. Normocephalic, atraumatic. No pharyngeal erythema. No 

thyromegaly. 


CARDIOVASCULAR: S1 and S2 present. No murmurs, rubs, or gallops. 


PULMONARY: Chest is clear to auscultation, no wheezing or crackles. 


ABDOMEN: Soft, nontender, nondistended, normoactive bowel sounds. No palpable 

organomegaly. 


MUSCULOSKELETAL: No joint swelling or deformity.


EXTREMITIES: No cyanosis, clubbing, or pedal edema. 


NEUROLOGICAL: Gross neurological examination did not reveal any focal deficits. 


SKIN: No rashes. 





Note: Because of COVID 19 isolation, some of the history and physical exam 

findings are indirect and obtained from nursing staff, and other physician 

examinations to avoid unnecessary contact with the patient.











Results


CBC & Chem 7: 


                                 03/16/21 19:34





                                 03/16/21 19:34


Labs: 


                  Abnormal Lab Results - Last 24 Hours (Table)











  03/16/21 03/16/21 03/16/21 Range/Units





  19:34 19:34 19:34 


 


RBC   3.92 L   (4.30-5.90)  m/uL


 


Hgb   12.5 L   (13.0-17.5)  gm/dL


 


Hct   37.1 L   (39.0-53.0)  %


 


D-Dimer    0.77 H  (<0.60)  mg/L FEU


 


Sodium     (137-145)  mmol/L


 


BUN     (9-20)  mg/dL


 


Creatinine     (0.66-1.25)  mg/dL


 


Glucose     (74-99)  mg/dL


 


POC Glucose (mg/dL)     (75-99)  mg/dL


 


Calcium     (8.4-10.2)  mg/dL


 


Ferritin     (22.0-322.0)  ng/mL


 


Lactate Dehydrogenase     (313-618)  U/L


 


C-Reactive Protein     (<10.0)  mg/L


 


Albumin     (3.5-5.0)  g/dL


 


Procalcitonin     (0.02-0.09)  ng/mL


 


Coronavirus (PCR)  Detected A    (Not Detectd)  














  03/16/21 03/16/21 03/17/21 Range/Units





  19:34 19:34 06:58 


 


RBC     (4.30-5.90)  m/uL


 


Hgb     (13.0-17.5)  gm/dL


 


Hct     (39.0-53.0)  %


 


D-Dimer     (<0.60)  mg/L FEU


 


Sodium  136 L    (137-145)  mmol/L


 


BUN  47 H    (9-20)  mg/dL


 


Creatinine  1.63 H    (0.66-1.25)  mg/dL


 


Glucose  203 H    (74-99)  mg/dL


 


POC Glucose (mg/dL)    328 H  (75-99)  mg/dL


 


Calcium  8.3 L    (8.4-10.2)  mg/dL


 


Ferritin  984.6 H    (22.0-322.0)  ng/mL


 


Lactate Dehydrogenase  818 H    (313-618)  U/L


 


C-Reactive Protein  57.7 H    (<10.0)  mg/L


 


Albumin  3.4 L    (3.5-5.0)  g/dL


 


Procalcitonin   0.12 H   (0.02-0.09)  ng/mL


 


Coronavirus (PCR)     (Not Detectd)  














  03/17/21 Range/Units





  12:21 


 


RBC   (4.30-5.90)  m/uL


 


Hgb   (13.0-17.5)  gm/dL


 


Hct   (39.0-53.0)  %


 


D-Dimer   (<0.60)  mg/L FEU


 


Sodium   (137-145)  mmol/L


 


BUN   (9-20)  mg/dL


 


Creatinine   (0.66-1.25)  mg/dL


 


Glucose   (74-99)  mg/dL


 


POC Glucose (mg/dL)  395 H  (75-99)  mg/dL


 


Calcium   (8.4-10.2)  mg/dL


 


Ferritin   (22.0-322.0)  ng/mL


 


Lactate Dehydrogenase   (313-618)  U/L


 


C-Reactive Protein   (<10.0)  mg/L


 


Albumin   (3.5-5.0)  g/dL


 


Procalcitonin   (0.02-0.09)  ng/mL


 


Coronavirus (PCR)   (Not Detectd)  














Thrombosis Risk Factor Assmnt





- Choose All That Apply


Any of the Below Risk Factors Present?: Yes


Each Factor Represents 1 point: Obesity (BMI >25)


Other Risk Factors: Yes


Each Risk Factor Represents 3 Points: Age 75 years or older


Other congenital or acquired thrombophilia - If yes, enter type in comment: No


Thrombosis Risk Factor Assessment Total Risk Factor Score: 4


Thrombosis Risk Factor Assessment Level: Moderate Risk





Assessment and Plan


Plan: 


-acute hypoxic respiratory failure: Secondary to covid 19 pneumonia patient will

be can you done Decadron.  Will be can you done Covid vitamins.  Monitor 

inflammatory markers.


-Type 2 diabetes mellitus uncontrolled elevated blood sugars secondary to 

systemic steroids metformin is held because of his the elevated creatinine.  

Patient was started on insulin.  We'll also await hemoglobin A1c most probably 

will require insulin even if he is not on systemic steroids as per the patient 

his blood sugars are well controlled at home


-Hypotonic hyponatremia continue with IV fluids


-Acute renal failure secondary to Covid 19: Patient will be started on IV fluids


-Hypertension 


-DVT prophylaxis-Lovenox

## 2021-03-18 LAB
ANION GAP SERPL CALC-SCNC: 7.7 MMOL/L (ref 4–12)
BUN SERPL-SCNC: 65 MG/DL (ref 9–27)
BUN/CREAT SERPL: 36.11 RATIO (ref 12–20)
CALCIUM SPEC-MCNC: 8.8 MG/DL (ref 8.7–10.3)
CHLORIDE SERPL-SCNC: 106 MMOL/L (ref 96–109)
CO2 SERPL-SCNC: 24.3 MMOL/L (ref 21.6–31.8)
GLUCOSE BLD-MCNC: 266 MG/DL (ref 75–99)
GLUCOSE BLD-MCNC: 300 MG/DL (ref 75–99)
GLUCOSE BLD-MCNC: 321 MG/DL (ref 75–99)
GLUCOSE BLD-MCNC: 353 MG/DL (ref 75–99)
GLUCOSE BLD-MCNC: 368 MG/DL (ref 75–99)
GLUCOSE BLD-MCNC: >600 MG/DL (ref 75–99)
GLUCOSE SERPL-MCNC: 320 MG/DL (ref 70–110)
POTASSIUM SERPL-SCNC: 5.1 MMOL/L (ref 3.5–5.5)
SODIUM SERPL-SCNC: 138 MMOL/L (ref 135–145)

## 2021-03-18 RX ADMIN — ATORVASTATIN CALCIUM SCH MG: 80 TABLET, FILM COATED ORAL at 07:40

## 2021-03-18 RX ADMIN — OXYCODONE HYDROCHLORIDE AND ACETAMINOPHEN SCH MG: 500 TABLET ORAL at 07:40

## 2021-03-18 RX ADMIN — CLOPIDOGREL BISULFATE SCH MG: 75 TABLET ORAL at 07:40

## 2021-03-18 RX ADMIN — Medication SCH MG: at 07:40

## 2021-03-18 RX ADMIN — FAMOTIDINE SCH MG: 20 TABLET, FILM COATED ORAL at 07:40

## 2021-03-18 RX ADMIN — INSULIN ASPART SCH UNIT: 100 INJECTION, SOLUTION INTRAVENOUS; SUBCUTANEOUS at 07:41

## 2021-03-18 RX ADMIN — ASPIRIN 81 MG CHEWABLE TABLET SCH MG: 81 TABLET CHEWABLE at 07:39

## 2021-03-18 RX ADMIN — INSULIN DETEMIR SCH UNIT: 100 INJECTION, SOLUTION SUBCUTANEOUS at 21:38

## 2021-03-18 RX ADMIN — CEFAZOLIN SCH: 330 INJECTION, POWDER, FOR SOLUTION INTRAMUSCULAR; INTRAVENOUS at 23:32

## 2021-03-18 RX ADMIN — PIOGLITAZONE SCH MG: 30 TABLET ORAL at 07:39

## 2021-03-18 RX ADMIN — CEFAZOLIN SCH MLS/HR: 330 INJECTION, POWDER, FOR SOLUTION INTRAMUSCULAR; INTRAVENOUS at 17:48

## 2021-03-18 RX ADMIN — METOPROLOL SUCCINATE SCH MG: 50 TABLET, EXTENDED RELEASE ORAL at 07:40

## 2021-03-18 RX ADMIN — HEPARIN SODIUM SCH UNIT: 5000 INJECTION, SOLUTION INTRAVENOUS; SUBCUTANEOUS at 17:48

## 2021-03-18 RX ADMIN — INSULIN ASPART SCH UNIT: 100 INJECTION, SOLUTION INTRAVENOUS; SUBCUTANEOUS at 20:21

## 2021-03-18 RX ADMIN — INSULIN ASPART SCH UNIT: 100 INJECTION, SOLUTION INTRAVENOUS; SUBCUTANEOUS at 12:35

## 2021-03-18 RX ADMIN — INSULIN ASPART SCH UNIT: 100 INJECTION, SOLUTION INTRAVENOUS; SUBCUTANEOUS at 17:48

## 2021-03-18 NOTE — P.PN
Subjective


Progress Note Date: 03/18/21


Principal diagnosis: 


 COVID 19





80-year-old male, who was brought to the emergency department by EMS.  The 

patient apparently has had 2 weeks' worth of increasing shortness of breath, and

significant fatigue.  The patient also had chest congestion.  He had a fever.  

The patient states that he is just not been feeling well and getting 

progressively worse.  He also apparently had an episode or 2 of diarrhea.  The 

patient denied any chest pain or chest pressure or palpitations.  The patient 

also denied nausea, vomiting, and abdominal pain.  Apparently when EMS arrived, 

the patient's saturations were in the low 80s on room air.  For that reason, he 

was transported in, evaluated, and admitted with a diagnosis of COVID 19 19 

pneumonia.  The patient does have a history of diabetes, hypertension, and a 

previous pacemaker insertion.  White count was 4.6, hemoglobin 12.5, hematocrit 

37.1, and platelet count 153,000.  PT, INR, and PTT were all normal.  D-dimer 

was 0.77.  Sodium 136, potassium 4.5, chlorides 102, CO2 26, anion gap 8, BUN 

47, and creatinine 1.63.  Ferritin was 985, , C-reactive protein 58, and 

pro-calcitonin level was 0.12.  Chest x-ray did show some interstitial 

infiltrates.





On 03/18/2021 patient seen in follow-up on medical floor.  he is on 2 L of 

oxygen his pulse ox is 90-94%, breathing comfortably, no fever or chills, blood 

pressure has been stable.  Denies any worsening dyspnea or hypoxemia, he has a 

mild cough, no chest discomfort.  He continues on oral Decadron 6 mg daily, IV 

hydration, vitamins, d-dimer was low at 0.77, patient is on subcu heparin for 

DVT prophylaxis, pro-calcitonin level was low, inflammatory markers were not 

significantly elevated on admission.  Clinically symptoms have not progressed, 

and patient will be considered for discharge in next 24 hours.








Objective





- Vital Signs


Vital signs: 


                                   Vital Signs











Temp  97.8 F   03/18/21 15:38


 


Pulse  64   03/18/21 15:38


 


Resp  16   03/18/21 15:38


 


BP  135/71   03/18/21 15:38


 


Pulse Ox  92 L  03/18/21 15:38








                                 Intake & Output











 03/17/21 03/18/21 03/18/21





 18:59 06:59 18:59


 


Other:   


 


  Voiding Method Toilet Toilet Toilet


 


  # Voids  1 


 


  # Bowel Movements  1 1














- Exam


 GENERAL EXAM: Alert, pleasant, 80-year-old white male is of oxygen with a pulse

ox of 90-94% comfortable in no apparent distress.


HEAD: Normocephalic/atraumatic.


EYES: Normal reaction of pupils, equal size.  Conjunctiva pink, sclera white.


NOSE: Clear with pink turbinates.


THROAT: No erythema or exudates.


NECK: No masses, no JVD, no thyroid enlargement, no adenopathy.


CHEST: No chest wall deformity.  Symmetrical expansion. 


LUNGS: Equal air entry with no crackles, wheeze, rhonchi or dullness.


CVS: Regular rate and rhythm, normal S1 and S2, no gallops, no murmurs, no rubs


ABDOMEN: Soft, nontender.  No hepatosplenomegaly, normal bowel sounds, no 

guarding or rigidity.


EXTREMITIES: No clubbing, no edema, no cyanosis, 2+ pulses and upper and lower 

extremities.


MUSCULOSKELETAL: Muscle strength and tone normal.


SPINE: No scoliosis or deformity


SKIN: No rashes


CENTRAL NERVOUS SYSTEM: Alert and oriented -3.  No focal deficits, tone is 

normal in all 4 extremities.


PSYCHIATRIC: Alert and oriented -3.  Appropriate affect.  Intact judgment and 

insight.











- Labs


CBC & Chem 7: 


                                 03/16/21 19:34





                                 03/18/21 06:26


Labs: 


                  Abnormal Lab Results - Last 24 Hours (Table)











  03/17/21 03/17/21 03/18/21 Range/Units





  17:17 20:31 06:26 


 


BUN    65.0 H  (9.0-27.0)  mg/dL


 


Creatinine    1.8 H  (0.6-1.5)  mg/dL


 


Est GFR (CKD-EPI)AfAm    40.0 L  (60.0-200.0)   


 


Est GFR (CKD-EPI)NonAf    34.5 L  (60.0-200.0)   


 


BUN/Creatinine Ratio    36.11 H  (12.00-20.00)  Ratio


 


Glucose    320 H  ()  mg/dL


 


POC Glucose (mg/dL)  450 H  437 H   (75-99)  mg/dL














  03/18/21 03/18/21 03/18/21 Range/Units





  07:40 07:42 07:49 


 


BUN     (9.0-27.0)  mg/dL


 


Creatinine     (0.6-1.5)  mg/dL


 


Est GFR (CKD-EPI)AfAm     (60.0-200.0)   


 


Est GFR (CKD-EPI)NonAf     (60.0-200.0)   


 


BUN/Creatinine Ratio     (12.00-20.00)  Ratio


 


Glucose     ()  mg/dL


 


POC Glucose (mg/dL)  >600 H  321 H  300 H  (75-99)  mg/dL














  03/18/21 Range/Units





  12:06 


 


BUN   (9.0-27.0)  mg/dL


 


Creatinine   (0.6-1.5)  mg/dL


 


Est GFR (CKD-EPI)AfAm   (60.0-200.0)   


 


Est GFR (CKD-EPI)NonAf   (60.0-200.0)   


 


BUN/Creatinine Ratio   (12.00-20.00)  Ratio


 


Glucose   ()  mg/dL


 


POC Glucose (mg/dL)  266 H  (75-99)  mg/dL








                      Microbiology - Last 24 Hours (Table)











 03/16/21 19:15 Blood Culture - Preliminary





 Blood    No Growth after 24 hours


 


 03/16/21 19:34 Blood Culture - Preliminary





 Blood    No Growth after 24 hours














Assessment and Plan


Plan: 


 Assessment:





#1.  Acute hypoxic respiratory failure secondary to quit drinking pneumonia, she

was not a candidate for Remdesivir, Tocilizumab or convalescent plasma





#2.  History of diabetes mellitus type 2





#3.  History of hypertension





#4.  History of chronic sinus disease





#5.  History of diverticulitis, status post bowel resection





Plan:





Continue current medical treatment, continue vitamin cocktail, continue Decadron

IV hydration, if there is no worsening dyspnea or hypoxia, patient may be 

considered for discharge home in the next 24 hours.  We'll obtain follow-up 

inflammatory markers in follow-up d-dimer. 





I performed a history & physical examination of the patient and discussed their 

management with my nurse practitioner, Teresa Naik.  I reviewed the nurse 

practitioner's note and agree with the documented findings and plan of care.  

Lung sounds are positive for dim breath sounds throughout the lung fields.  The 

findings and the impression was discussed with the patient.  I attest to the d

ocumentation by the nurse practitioner. 





Time with Patient: Less than 30

## 2021-03-18 NOTE — P.PN
Subjective








80-year-old male who presents emergency Department stating that he has had 

shortness of breath and feeling fatigued for at least 2 weeks.  Patient states 

he has had exposure to COVID .  Patient states he has had a fever.  Patient 

states she's also states and smile.  Patient also states she's had diarrhea.  

Patient denies any chest pain or palpitations.  Patient denies any abdominal 

pain patient denies nausea vomiting diarrhea.  According to EMS with the patient

was coming in he was satting in the low 80s.  Patient is currently on a few 

liters of oxygen and sat in mid 90s.  Patient is a diabetic and has high blood 

pressure.  Patient also has a pacemaker.  Patient is presently on 2 L of oxygen 

was requiring 4 L yesterday.  Patient was started on IV fluids patient 

creatinine went up to 1.6 baseline is around 1.  Patient's blood sugars are 

highly elevated.  Patient tests positive for Covid.





03/18/2021


Patient is presently on 2 L of oxygen appears to be doing better possibility of 

discharge tomorrow.  Creatinine went up a bit to 1.6 baseline is around the 1.  

Patient was started on IV fluids will recheck the basic metabolic profile t

omorrow.





Constitutional: Denied any fatigue denied any fever.


Cardio vascular: denied any chest pain, palpitations


Gastrointestinal denied any nausea vomiting


Pulmonary: Denied any shortness of breath cough


Neurologic denied any new focal deficits





All inpatient medications were reviewed and appropriate changes in these 

medications as dictated in the interval history and assessment and plan.





Objective





- Vital Signs


Vital signs: 


                                   Vital Signs











Temp  98.2 F   03/18/21 11:38


 


Pulse  65   03/18/21 11:38


 


Resp  16   03/18/21 11:38


 


BP  131/63   03/18/21 11:38


 


Pulse Ox  90 L  03/18/21 11:38








                                 Intake & Output











 03/17/21 03/18/21 03/18/21





 18:59 06:59 18:59


 


Other:   


 


  Voiding Method Toilet Toilet Toilet


 


  # Voids  1 


 


  # Bowel Movements  1 1














- Exam








PHYSICAL EXAMINATION: 





GENERAL: The patient is alert and oriented x3, not in any acute distress.  Obese


HEENT: Pupils are round and equally reacting to light. EOMI. No scleral icterus.

No conjunctival pallor. Normocephalic, atraumatic. No pharyngeal erythema. No 

thyromegaly. 


CARDIOVASCULAR: S1 and S2 present. No murmurs, rubs, or gallops. 


PULMONARY: Chest is clear to auscultation, no wheezing or crackles. 


ABDOMEN: Soft, nontender, nondistended, normoactive bowel sounds. No palpable 

organomegaly. 


MUSCULOSKELETAL: No joint swelling or deformity.


EXTREMITIES: No cyanosis, clubbing, or pedal edema. 


NEUROLOGICAL: Gross neurological examination did not reveal any focal deficits. 


SKIN: No rashes. 





Note: Because of COVID 19 isolation, some of the history and physical exam 

findings are indirect and obtained from nursing staff, and other physician 

examinations to avoid unnecessary contact with the patient.














- Labs


CBC & Chem 7: 


                                 03/16/21 19:34





                                 03/18/21 06:26


Labs: 


                  Abnormal Lab Results - Last 24 Hours (Table)











  03/17/21 03/17/21 03/18/21 Range/Units





  17:17 20:31 06:26 


 


BUN    65.0 H  (9.0-27.0)  mg/dL


 


Creatinine    1.8 H  (0.6-1.5)  mg/dL


 


Est GFR (CKD-EPI)AfAm    40.0 L  (60.0-200.0)   


 


Est GFR (CKD-EPI)NonAf    34.5 L  (60.0-200.0)   


 


BUN/Creatinine Ratio    36.11 H  (12.00-20.00)  Ratio


 


Glucose    320 H  ()  mg/dL


 


POC Glucose (mg/dL)  450 H  437 H   (75-99)  mg/dL














  03/18/21 03/18/21 03/18/21 Range/Units





  07:40 07:42 07:49 


 


BUN     (9.0-27.0)  mg/dL


 


Creatinine     (0.6-1.5)  mg/dL


 


Est GFR (CKD-EPI)AfAm     (60.0-200.0)   


 


Est GFR (CKD-EPI)NonAf     (60.0-200.0)   


 


BUN/Creatinine Ratio     (12.00-20.00)  Ratio


 


Glucose     ()  mg/dL


 


POC Glucose (mg/dL)  >600 H  321 H  300 H  (75-99)  mg/dL














  03/18/21 Range/Units





  12:06 


 


BUN   (9.0-27.0)  mg/dL


 


Creatinine   (0.6-1.5)  mg/dL


 


Est GFR (CKD-EPI)AfAm   (60.0-200.0)   


 


Est GFR (CKD-EPI)NonAf   (60.0-200.0)   


 


BUN/Creatinine Ratio   (12.00-20.00)  Ratio


 


Glucose   ()  mg/dL


 


POC Glucose (mg/dL)  266 H  (75-99)  mg/dL








                      Microbiology - Last 24 Hours (Table)











 03/16/21 19:15 Blood Culture - Preliminary





 Blood    No Growth after 24 hours


 


 03/16/21 19:34 Blood Culture - Preliminary





 Blood    No Growth after 24 hours














Assessment and Plan


Plan: 


-acute hypoxic respiratory failure: Secondary to covid 19 pneumonia patient will

be can you done Decadron.  Will be can you done Covid vitamins.  Monitor 

inflammatory markers.  It appears to have improvement competitors to possibility

of discharge tomorrow


-Type 2 diabetes mellitus uncontrolled elevated blood sugars secondary to 

systemic steroids metformin is held because of his the elevated creatinine.  

Patient was started on insulin.  We'll also await hemoglobin A1c most probably 

will require insulin even if he is not on systemic steroids as per the patient 

his blood sugars are well controlled at home


-Hypotonic hyponatremia continue with IV fluids


-Acute renal failure secondary to Covid 19: Patient will be continued on IV 

fluids


-Hypertension 


-DVT subcutaneous heparin

## 2021-03-19 LAB
ANION GAP SERPL CALC-SCNC: 4 MMOL/L
BUN SERPL-SCNC: 47 MG/DL (ref 9–20)
CALCIUM SPEC-MCNC: 9 MG/DL (ref 8.4–10.2)
CHLORIDE SERPL-SCNC: 110 MMOL/L (ref 98–107)
CO2 SERPL-SCNC: 29 MMOL/L (ref 22–30)
GLUCOSE BLD-MCNC: 202 MG/DL (ref 75–99)
GLUCOSE BLD-MCNC: 255 MG/DL (ref 75–99)
GLUCOSE BLD-MCNC: 309 MG/DL (ref 75–99)
GLUCOSE BLD-MCNC: 361 MG/DL (ref 75–99)
GLUCOSE SERPL-MCNC: 225 MG/DL (ref 74–99)
POTASSIUM SERPL-SCNC: 5.5 MMOL/L (ref 3.5–5.1)
SODIUM SERPL-SCNC: 143 MMOL/L (ref 137–145)

## 2021-03-19 RX ADMIN — OXYCODONE HYDROCHLORIDE AND ACETAMINOPHEN SCH MG: 500 TABLET ORAL at 07:39

## 2021-03-19 RX ADMIN — PIOGLITAZONE SCH MG: 30 TABLET ORAL at 07:40

## 2021-03-19 RX ADMIN — FAMOTIDINE SCH MG: 20 TABLET, FILM COATED ORAL at 07:38

## 2021-03-19 RX ADMIN — METOPROLOL SUCCINATE SCH MG: 50 TABLET, EXTENDED RELEASE ORAL at 07:39

## 2021-03-19 RX ADMIN — HEPARIN SODIUM SCH UNIT: 5000 INJECTION, SOLUTION INTRAVENOUS; SUBCUTANEOUS at 07:38

## 2021-03-19 RX ADMIN — ASPIRIN 81 MG CHEWABLE TABLET SCH MG: 81 TABLET CHEWABLE at 07:38

## 2021-03-19 RX ADMIN — Medication SCH MG: at 07:38

## 2021-03-19 RX ADMIN — INSULIN ASPART SCH UNIT: 100 INJECTION, SOLUTION INTRAVENOUS; SUBCUTANEOUS at 12:15

## 2021-03-19 RX ADMIN — HEPARIN SODIUM SCH UNIT: 5000 INJECTION, SOLUTION INTRAVENOUS; SUBCUTANEOUS at 00:14

## 2021-03-19 RX ADMIN — CLOPIDOGREL BISULFATE SCH MG: 75 TABLET ORAL at 07:38

## 2021-03-19 RX ADMIN — HEPARIN SODIUM SCH UNIT: 5000 INJECTION, SOLUTION INTRAVENOUS; SUBCUTANEOUS at 17:15

## 2021-03-19 RX ADMIN — INSULIN DETEMIR SCH UNIT: 100 INJECTION, SOLUTION SUBCUTANEOUS at 20:38

## 2021-03-19 RX ADMIN — INSULIN ASPART SCH UNIT: 100 INJECTION, SOLUTION INTRAVENOUS; SUBCUTANEOUS at 17:16

## 2021-03-19 RX ADMIN — INSULIN ASPART SCH UNIT: 100 INJECTION, SOLUTION INTRAVENOUS; SUBCUTANEOUS at 07:39

## 2021-03-19 RX ADMIN — ATORVASTATIN CALCIUM SCH MG: 80 TABLET, FILM COATED ORAL at 07:39

## 2021-03-19 NOTE — P.PN
Subjective


Progress Note Date: 03/19/21





80-year-old male who presents emergency Department stating that he has had 

shortness of breath and feeling fatigued for at least 2 weeks.  Patient states 

he has had exposure to COVID .  Patient states he has had a fever.  Patient 

states she's also states and smile.  Patient also states she's had diarrhea.  

Patient denies any chest pain or palpitations.  Patient denies any abdominal 

pain patient denies nausea vomiting diarrhea.  According to EMS with the patient

was coming in he was satting in the low 80s.  Patient is currently on a few 

liters of oxygen and sat in mid 90s.  Patient is a diabetic and has high blood 

pressure.  Patient also has a pacemaker.  Patient is presently on 2 L of oxygen 

was requiring 4 L yesterday.  Patient was started on IV fluids patient 

creatinine went up to 1.6 baseline is around 1.  Patient's blood sugars are 

highly elevated.  Patient tests positive for Covid.





03/18/2021


Patient is presently on 2 L of oxygen appears to be doing better possibility of 

discharge tomorrow.  Creatinine went up a bit to 1.6 baseline is around the 1.  

Patient was started on IV fluids will recheck the basic metabolic profile 

tomorrow.





03/19/2021


Patient seen and evaluated in follow-up continues to be on 2 L of oxygen and 

becomes dyspneic with exertion.  Patient states he does not know wear oxygen at 

home.  Pulmonary is following.  Patient's blood sugars are elevated and will 

continue sliding scale at this time as patient is on steroids.  Potassium was 

found to be slightly elevated at 5.5 and was given a dose of Kayexalate.  Will 

repeat a.m. labs.  Instructed and encouraged the patient to get up and increase 

activity as tolerated.





Constitutional: Denied any fatigue denied any fever.


Cardio vascular: denied any chest pain, palpitations


Gastrointestinal denied any nausea vomiting


Pulmonary: Denied any shortness of breath cough


Neurologic denied any new focal deficits





All inpatient medications were reviewed and appropriate changes in these 

medications as dictated in the interval history and assessment and plan.











Objective





- Vital Signs


Vital signs: 


                                   Vital Signs











Temp  98.2 F   03/19/21 10:00


 


Pulse  74   03/19/21 10:00


 


Resp  18   03/19/21 10:00


 


BP  143/70   03/19/21 10:00


 


Pulse Ox  92 L  03/19/21 10:00








                                 Intake & Output











 03/18/21 03/19/21 03/19/21





 18:59 06:59 18:59


 


Output Total  375 


 


Balance  -375 


 


Output:   


 


  Urine  375 


 


Other:   


 


  Voiding Method Toilet Toilet Toilet


 


  # Voids 3 3 


 


  # Bowel Movements 1 1 














- Exam





GENERAL: The patient is alert and oriented x3, not in any acute distress.  Obese


HEENT: Pupils are round and equally reacting to light. EOMI. No scleral icterus.

No conjunctival pallor. Normocephalic, atraumatic. No pharyngeal erythema. No 

thyromegaly. 


CARDIOVASCULAR: S1 and S2 present. No murmurs, rubs, or gallops. 


PULMONARY: Chest is clear to auscultation, no wheezing or crackles. 


ABDOMEN: Soft, nontender, nondistended, normoactive bowel sounds. No palpable 

organomegaly. 


MUSCULOSKELETAL: No joint swelling or deformity.


EXTREMITIES: No cyanosis, clubbing, or pedal edema. 


NEUROLOGICAL: Gross neurological examination did not reveal any focal deficits. 


SKIN: No rashes. 








- Labs


CBC & Chem 7: 


                                 03/16/21 19:34





                                 03/19/21 06:05


Labs: 


                  Abnormal Lab Results - Last 24 Hours (Table)











  03/18/21 03/18/21 03/19/21 Range/Units





  17:24 19:53 06:05 


 


Potassium    5.5 H  (3.5-5.1)  mmol/L


 


Chloride    110 H  ()  mmol/L


 


BUN    47 H  (9-20)  mg/dL


 


Glucose    225 H  (74-99)  mg/dL


 


POC Glucose (mg/dL)  353 H  368 H   (75-99)  mg/dL














  03/19/21 03/19/21 Range/Units





  06:51 11:40 


 


Potassium    (3.5-5.1)  mmol/L


 


Chloride    ()  mmol/L


 


BUN    (9-20)  mg/dL


 


Glucose    (74-99)  mg/dL


 


POC Glucose (mg/dL)  202 H  255 H  (75-99)  mg/dL








                      Microbiology - Last 24 Hours (Table)











 03/16/21 19:34 Blood Culture - Preliminary





 Blood    No Growth after 48 hours


 


 03/16/21 19:15 Blood Culture - Preliminary





 Blood    No Growth after 48 hours














Assessment and Plan


Assessment: 





-acute hypoxic respiratory failure: Secondary to covid 19 pneumonia patient 

maintained on Decadron and vitamin and zinc supplements. Monitor inflammatory 

markers.  Patient continues on 2 L of oxygen and discussed with nursing staff 

prior to his tolerated.


-Type 2 diabetes mellitus uncontrolled elevated blood sugars secondary to 

systemic steroids metformin is held because of his the elevated creatinine.  

Patient was started on insulin.  We'll also await hemoglobin A1c most probably 

will require insulin even if he is not on systemic steroids as per the patient 

his blood sugars are not well controlled at home


-Hyperkalemia, potassium was 5.5 and given a dose of Kayexalate and will repeat 

a.m. labs


-Hypotonic hyponatremia, improved


-Acute renal failure secondary to Covid 19: Improved, current creatinine is 1.19


-Hypertension 


-DVT subcutaneous heparin





Plan:


Continue with current medications.  IV fluids have been discontinued.  Will 

continue with dexamethasone along with vitamin and zinc supplements.  Repeat 

inflammatory markers in the morning along with d-dimer.  Patient continues to 

require 2 L of oxygen and discussed with nursing staff about weaning FiO2 as 

tolerated.  Continue with sliding scale as blood sugars have been elevated and 

patient is on steroids.  Will repeat a.m. labs as well is potassium was elevated

today and given a dose of Kayexalate.  Will add incentive spirometer and 

instructed the patient to increase activity as tolerated.  Possible discharge in

24-48 hours.

## 2021-03-19 NOTE — P.PN
Subjective


Progress Note Date: 03/19/21


Principal diagnosis: 





CoVID 19 pneumonia





80-year-old male, who was brought to the emergency department by EMS.  The 

patient apparently has had 2 weeks' worth of increasing shortness of breath, and

significant fatigue.  The patient also had chest congestion.  He had a fever.  

The patient states that he is just not been feeling well and getting 

progressively worse.  He also apparently had an episode or 2 of diarrhea.  The 

patient denied any chest pain or chest pressure or palpitations.  The patient 

also denied nausea, vomiting, and abdominal pain.  Apparently when EMS arrived, 

the patient's saturations were in the low 80s on room air.  For that reason, he 

was transported in, evaluated, and admitted with a diagnosis of COVID 19 19 

pneumonia.  The patient does have a history of diabetes, hypertension, and a 

previous pacemaker insertion.  White count was 4.6, hemoglobin 12.5, hematocrit 

37.1, and platelet count 153,000.  PT, INR, and PTT were all normal.  D-dimer 

was 0.77.  Sodium 136, potassium 4.5, chlorides 102, CO2 26, anion gap 8, BUN 

47, and creatinine 1.63.  Ferritin was 985, , C-reactive protein 58, and 

pro-calcitonin level was 0.12.  Chest x-ray did show some interstitial 

infiltrates.





On 03/18/2021 patient seen in follow-up on medical floor.  he is on 2 L of 

oxygen his pulse ox is 90-94%, breathing comfortably, no fever or chills, blood 

pressure has been stable.  Denies any worsening dyspnea or hypoxemia, he has a 

mild cough, no chest discomfort.  He continues on oral Decadron 6 mg daily, IV 

hydration, vitamins, d-dimer was low at 0.77, patient is on subcu heparin for 

DVT prophylaxis, pro-calcitonin level was low, inflammatory markers were not 

significantly elevated on admission.  Clinically symptoms have not progressed, 

and patient will be considered for discharge in next 24 hours.





The patient is seen today 03/19/2021 in follow-up on the regular medical floor. 

He is currently sitting up in chair at the bedside.  Awake and alert in no acute

distress.  Continue O2 saturations in the low 90s on 2 L/m per nasal cannula.  

Afebrile.  Hemodynamically stable.  Blood cultures reveal no growth.  Blood 

glucose 202.  Sodium 143.  Potassium 5.5.  Creatinine 1.19.  He remains on 

dexamethasone, so continues heparin, vitamin supplements.  Chest x-ray continues

to show bilateral multifocal opacities consistent with CoVID infection.  No maria

ge.








Objective





- Vital Signs


Vital signs: 


                                   Vital Signs











Temp  98.0 F   03/19/21 14:00


 


Pulse  63   03/19/21 14:00


 


Resp  18   03/19/21 14:00


 


BP  127/63   03/19/21 14:00


 


Pulse Ox  90 L  03/19/21 14:00








                                 Intake & Output











 03/18/21 03/19/21 03/19/21





 18:59 06:59 18:59


 


Output Total  375 


 


Balance  -375 


 


Output:   


 


  Urine  375 


 


Other:   


 


  Voiding Method Toilet Toilet Toilet


 


  # Voids 3 3 


 


  # Bowel Movements 1 1 














- Exam





GENERAL EXAM: Alert, pleasant, 80-year-old male patient, on 2 L of oxygen is of 

oxygen with a pulse ox of 90% comfortable in no apparent distress.


HEAD: Normocephalic/atraumatic.


EYES: Normal reaction of pupils, equal size.  Conjunctiva pink, sclera white.


NOSE: Clear with pink turbinates.


THROAT: No erythema or exudates.


NECK: No masses, no JVD, no thyroid enlargement, no adenopathy.


CHEST: No chest wall deformity.  Symmetrical expansion. 


LUNGS: Equal air entry with bibasilar coarse crackles


CVS: Regular rate and rhythm, normal S1 and S2, no gallops, no murmurs, no rubs


ABDOMEN: Soft, nontender.  No hepatosplenomegaly, normal bowel sounds, no gu

arding or rigidity.


EXTREMITIES: No clubbing, no edema, no cyanosis, 2+ pulses and upper and lower e

xtremities.


MUSCULOSKELETAL: Muscle strength and tone normal.


SPINE: No scoliosis or deformity


SKIN: No rashes


CENTRAL NERVOUS SYSTEM: No focal deficits, tone is normal in all 4 extremities.


PSYCHIATRIC: Alert and oriented -3.  Appropriate affect.  Intact judgment and 

insight.





- Labs


CBC & Chem 7: 


                                 03/16/21 19:34





                                 03/19/21 06:05


Labs: 


                  Abnormal Lab Results - Last 24 Hours (Table)











  03/18/21 03/18/21 03/19/21 Range/Units





  17:24 19:53 06:05 


 


Potassium    5.5 H  (3.5-5.1)  mmol/L


 


Chloride    110 H  ()  mmol/L


 


BUN    47 H  (9-20)  mg/dL


 


Glucose    225 H  (74-99)  mg/dL


 


POC Glucose (mg/dL)  353 H  368 H   (75-99)  mg/dL














  03/19/21 03/19/21 03/19/21 Range/Units





  06:51 11:40 16:42 


 


Potassium     (3.5-5.1)  mmol/L


 


Chloride     ()  mmol/L


 


BUN     (9-20)  mg/dL


 


Glucose     (74-99)  mg/dL


 


POC Glucose (mg/dL)  202 H  255 H  309 H  (75-99)  mg/dL








                      Microbiology - Last 24 Hours (Table)











 03/16/21 19:34 Blood Culture - Preliminary





 Blood    No Growth after 48 hours


 


 03/16/21 19:15 Blood Culture - Preliminary





 Blood    No Growth after 48 hours














Assessment and Plan


Assessment: 





1  Acute hypoxic respiratory failure secondary to quit drinking pneumonia, she 

was not a candidate for Remdesivir, Tocilizumab or convalescent plasma





2  History of diabetes mellitus type 2





3  History of hypertension





4  History of chronic sinus disease





5  History of diverticulitis, status post bowel resection





Plan:





The patient was seen and evaluated by Dr. Alfred


Chest x-ray and labs reviewed


Continue the current treatment plan


Increase his activity as tolerated


We'll continue to follow





I, the cosigning physician, performed a history & physical examination of the 

patient. Lungs sounds crackles in the bilateral posterior bases.  Maintaining 

good O2 saturations in the 90s on 2 L/m per nasal cannula.  I discussed the 

assessment and plan of care with my nurse practitioner, Abigail Thomas. I attest to 

the above note as dictated by her.

## 2021-03-19 NOTE — XR
EXAMINATION TYPE: XR chest 1V

 

DATE OF EXAM: 3/19/2021

 

CLINICAL HISTORY: Difficulty breathing and  covid pneumonia progress study.  

 

TECHNIQUE: Single AP portable upright view of the chest is obtained.

 

COMPARISON: Chest x-ray from 3 days earlier

 

FINDINGS: Chronic parenchymal change with multifocal opacities is redemonstrated. Opacities greatest 
in the lower lungs lung periphery left mid to lower lung and right upper lungs. Stable mild cardiomeg
jess with dual lead pacemaker and atherosclerotic change thoracic aorta. Multilevel spurring thoracic 
spine.

 

IMPRESSION: Bilateral multifocal opacities consistent with known covid infection, no significant maria
ge from most recent x-ray.

## 2021-03-20 LAB
ANION GAP SERPL CALC-SCNC: 5.9 MMOL/L (ref 4–12)
BUN SERPL-SCNC: 42 MG/DL (ref 9–27)
BUN/CREAT SERPL: 32.31 RATIO (ref 12–20)
CALCIUM SPEC-MCNC: 9.1 MG/DL (ref 8.7–10.3)
CHLORIDE SERPL-SCNC: 110 MMOL/L (ref 96–109)
CO2 SERPL-SCNC: 29.1 MMOL/L (ref 21.6–31.8)
GLUCOSE BLD-MCNC: 202 MG/DL (ref 75–99)
GLUCOSE BLD-MCNC: 227 MG/DL (ref 75–99)
GLUCOSE BLD-MCNC: 326 MG/DL (ref 75–99)
GLUCOSE BLD-MCNC: 409 MG/DL (ref 75–99)
GLUCOSE SERPL-MCNC: 221 MG/DL (ref 70–110)
LDH SPEC-CCNC: 485 U/L (ref 120–246)
POTASSIUM SERPL-SCNC: 5.1 MMOL/L (ref 3.5–5.5)
SODIUM SERPL-SCNC: 145 MMOL/L (ref 135–145)

## 2021-03-20 RX ADMIN — HEPARIN SODIUM SCH UNIT: 5000 INJECTION, SOLUTION INTRAVENOUS; SUBCUTANEOUS at 17:10

## 2021-03-20 RX ADMIN — HEPARIN SODIUM SCH UNIT: 5000 INJECTION, SOLUTION INTRAVENOUS; SUBCUTANEOUS at 00:36

## 2021-03-20 RX ADMIN — INSULIN ASPART SCH UNIT: 100 INJECTION, SOLUTION INTRAVENOUS; SUBCUTANEOUS at 17:10

## 2021-03-20 RX ADMIN — FAMOTIDINE SCH MG: 20 TABLET, FILM COATED ORAL at 07:51

## 2021-03-20 RX ADMIN — HEPARIN SODIUM SCH UNIT: 5000 INJECTION, SOLUTION INTRAVENOUS; SUBCUTANEOUS at 07:52

## 2021-03-20 RX ADMIN — CLOPIDOGREL BISULFATE SCH MG: 75 TABLET ORAL at 07:51

## 2021-03-20 RX ADMIN — Medication SCH MG: at 07:52

## 2021-03-20 RX ADMIN — ASPIRIN 81 MG CHEWABLE TABLET SCH MG: 81 TABLET CHEWABLE at 07:51

## 2021-03-20 RX ADMIN — ATORVASTATIN CALCIUM SCH MG: 80 TABLET, FILM COATED ORAL at 07:52

## 2021-03-20 RX ADMIN — INSULIN ASPART SCH UNIT: 100 INJECTION, SOLUTION INTRAVENOUS; SUBCUTANEOUS at 07:52

## 2021-03-20 RX ADMIN — INSULIN ASPART SCH: 100 INJECTION, SOLUTION INTRAVENOUS; SUBCUTANEOUS at 00:11

## 2021-03-20 RX ADMIN — METOPROLOL SUCCINATE SCH MG: 50 TABLET, EXTENDED RELEASE ORAL at 07:52

## 2021-03-20 RX ADMIN — INSULIN ASPART SCH UNIT: 100 INJECTION, SOLUTION INTRAVENOUS; SUBCUTANEOUS at 12:00

## 2021-03-20 RX ADMIN — OXYCODONE HYDROCHLORIDE AND ACETAMINOPHEN SCH MG: 500 TABLET ORAL at 07:51

## 2021-03-20 RX ADMIN — INSULIN ASPART SCH UNIT: 100 INJECTION, SOLUTION INTRAVENOUS; SUBCUTANEOUS at 20:52

## 2021-03-20 RX ADMIN — INSULIN DETEMIR SCH UNIT: 100 INJECTION, SOLUTION SUBCUTANEOUS at 20:52

## 2021-03-20 RX ADMIN — PIOGLITAZONE SCH MG: 30 TABLET ORAL at 07:52

## 2021-03-20 NOTE — P.PN
Subjective


Progress Note Date: 03/20/21


Principal diagnosis: 





CoVID 19 pneumonia





80-year-old male, who was brought to the emergency department by EMS.  The 

patient apparently has had 2 weeks' worth of increasing shortness of breath, and

significant fatigue.  The patient also had chest congestion.  He had a fever.  

The patient states that he is just not been feeling well and getting 

progressively worse.  He also apparently had an episode or 2 of diarrhea.  The 

patient denied any chest pain or chest pressure or palpitations.  The patient 

also denied nausea, vomiting, and abdominal pain.  Apparently when EMS arrived, 

the patient's saturations were in the low 80s on room air.  For that reason, he 

was transported in, evaluated, and admitted with a diagnosis of COVID 19 19 

pneumonia.  The patient does have a history of diabetes, hypertension, and a 

previous pacemaker insertion.  White count was 4.6, hemoglobin 12.5, hematocrit 

37.1, and platelet count 153,000.  PT, INR, and PTT were all normal.  D-dimer 

was 0.77.  Sodium 136, potassium 4.5, chlorides 102, CO2 26, anion gap 8, BUN 

47, and creatinine 1.63.  Ferritin was 985, , C-reactive protein 58, and 

pro-calcitonin level was 0.12.  Chest x-ray did show some interstitial 

infiltrates.





On 03/18/2021 patient seen in follow-up on medical floor.  he is on 2 L of 

oxygen his pulse ox is 90-94%, breathing comfortably, no fever or chills, blood 

pressure has been stable.  Denies any worsening dyspnea or hypoxemia, he has a 

mild cough, no chest discomfort.  He continues on oral Decadron 6 mg daily, IV 

hydration, vitamins, d-dimer was low at 0.77, patient is on subcu heparin for 

DVT prophylaxis, pro-calcitonin level was low, inflammatory markers were not 

significantly elevated on admission.  Clinically symptoms have not progressed, 

and patient will be considered for discharge in next 24 hours.





The patient is seen today 03/19/2021 in follow-up on the regular medical floor. 

He is currently sitting up in chair at the bedside.  Awake and alert in no acute

distress.  Continue O2 saturations in the low 90s on 2 L/m per nasal cannula.  

Afebrile.  Hemodynamically stable.  Blood cultures reveal no growth.  Blood 

glucose 202.  Sodium 143.  Potassium 5.5.  Creatinine 1.19.  He remains on 

dexamethasone, so continues heparin, vitamin supplements.  Chest x-ray continues

to show bilateral multifocal opacities consistent with CoVID infection.  No maria

ge.





The patient is seen today 03/20/2021 in follow-up on the regular medical floor. 

He is currently resting comfortably in bed.  Awake and alert in no acute 

distress.  He is maintaining O2 saturations in the 90s on 2 L/m per nasal 

cannula.  He's been on dexamethasone, vitamin supplements, subcutaneous heparin.

 No worsening shortness of breath, cough or congestion.  D-dimer 1.02.  Sodium 

145.  Potassium 5.1.  Creatinine 1.3.  .  C-reactive protein 4.4.





Objective





- Vital Signs


Vital signs: 


                                   Vital Signs











Temp  97.6 F   03/20/21 09:32


 


Pulse  59 L  03/20/21 09:32


 


Resp  18   03/20/21 09:32


 


BP  143/58   03/20/21 09:32


 


Pulse Ox  91 L  03/20/21 11:46








                                 Intake & Output











 03/19/21 03/20/21 03/20/21





 18:59 06:59 18:59


 


Output Total  300 


 


Balance  -300 


 


Output:   


 


  Urine  300 


 


Other:   


 


  Voiding Method Toilet Toilet Toilet


 


  # Voids 3  


 


  # Bowel Movements 2  














- Exam





GENERAL EXAM: Alert, pleasant, 80-year-old male patient, on 2 L of oxygen is of 

oxygen with a pulse ox of 91% comfortable in no apparent distress.


HEAD: Normocephalic/atraumatic.


EYES: Normal reaction of pupils, equal size.  Conjunctiva pink, sclera white.


NOSE: Clear with pink turbinates.


THROAT: No erythema or exudates.


NECK: No masses, no JVD, no thyroid enlargement, no adenopathy.


CHEST: No chest wall deformity.  Symmetrical expansion. 


LUNGS: Equal air entry with bibasilar coarse crackles


CVS: Regular rate and rhythm, normal S1 and S2, no gallops, no murmurs, no rubs


ABDOMEN: Soft, nontender.  No hepatosplenomegaly, normal bowel sounds, no 

guarding or rigidity.


EXTREMITIES: No clubbing, no edema, no cyanosis, 2+ pulses and upper and lower 

extremities.


MUSCULOSKELETAL: Muscle strength and tone normal.


SPINE: No scoliosis or deformity


SKIN: No rashes


CENTRAL NERVOUS SYSTEM: No focal deficits, tone is normal in all 4 extremities.


PSYCHIATRIC: Alert and oriented -3.  Appropriate affect.  Intact judgment and 

insight.





- Labs


CBC & Chem 7: 


                                 03/16/21 19:34





                                 03/20/21 07:08


Labs: 


                  Abnormal Lab Results - Last 24 Hours (Table)











  03/19/21 03/19/21 03/20/21 Range/Units





  16:42 19:58 07:04 


 


D-Dimer     (<0.60)  mg/L FEU


 


Chloride     ()  mmol/L


 


BUN     (9.0-27.0)  mg/dL


 


Est GFR (CKD-EPI)AfAm     (60.0-200.0)   


 


Est GFR (CKD-EPI)NonAf     (60.0-200.0)   


 


BUN/Creatinine Ratio     (12.00-20.00)  Ratio


 


Glucose     ()  mg/dL


 


POC Glucose (mg/dL)  309 H  361 H  227 H  (75-99)  mg/dL


 


Lactate Dehydrogenase     (120-246)  U/L


 


C-Reactive Protein     (0.0-0.8)  mg/dL














  03/20/21 03/20/21 03/20/21 Range/Units





  07:08 07:08 11:39 


 


D-Dimer   1.02 H   (<0.60)  mg/L FEU


 


Chloride  110 H    ()  mmol/L


 


BUN  42.0 H    (9.0-27.0)  mg/dL


 


Est GFR (CKD-EPI)AfAm  59.3 L    (60.0-200.0)   


 


Est GFR (CKD-EPI)NonAf  51.2 L    (60.0-200.0)   


 


BUN/Creatinine Ratio  32.31 H    (12.00-20.00)  Ratio


 


Glucose  221 H    ()  mg/dL


 


POC Glucose (mg/dL)    202 H  (75-99)  mg/dL


 


Lactate Dehydrogenase  485 H    (120-246)  U/L


 


C-Reactive Protein  4.4 H    (0.0-0.8)  mg/dL








                      Microbiology - Last 24 Hours (Table)











 03/16/21 19:34 Blood Culture - Preliminary





 Blood    No Growth after 72 hours


 


 03/16/21 19:15 Blood Culture - Preliminary





 Blood    No Growth after 72 hours














Assessment and Plan


Assessment: 





1  Acute hypoxic respiratory failure secondary to quit drinking pneumonia, he 

was not a candidate for Remdesivir, Tocilizumab or convalescent plasma





2  History of diabetes mellitus type 2





3  History of hypertension





4  History of chronic sinus disease





5  History of diverticulitis, status post bowel resection





Plan:





The patient was seen and evaluated by Dr. Alfred


The patient could be discharged home today


Evaluated for possible home oxygen


We will see as needed





I, the cosigning physician, performed a history & physical examination of the 

patient. Lungs sounds crackles in the bilateral posterior bases.  Maintaining 

good O2 saturations in the 90s on 2 L/m per nasal cannula.  I discussed the 

assessment and plan of care with my nurse practitioner, Abigail Thomas. I attest to 

the above note as dictated by her.

## 2021-03-20 NOTE — P.PN
Subjective





80-year-old male who presents emergency Department stating that he has had 

shortness of breath and feeling fatigued for at least 2 weeks.  Patient states 

he has had exposure to COVID .  Patient states he has had a fever.  Patient 

states she's also states and smile.  Patient also states she's had diarrhea.  

Patient denies any chest pain or palpitations.  Patient denies any abdominal 

pain patient denies nausea vomiting diarrhea.  According to EMS with the patient

was coming in he was satting in the low 80s.  Patient is currently on a few 

liters of oxygen and sat in mid 90s.  Patient is a diabetic and has high blood 

pressure.  Patient also has a pacemaker.  Patient is presently on 2 L of oxygen 

was requiring 4 L yesterday.  Patient was started on IV fluids patient 

creatinine went up to 1.6 baseline is around 1.  Patient's blood sugars are 

highly elevated.  Patient tests positive for Covid.





03/18/2021


Patient is presently on 2 L of oxygen appears to be doing better possibility of 

discharge tomorrow.  Creatinine went up a bit to 1.6 baseline is around the 1.  

Patient was started on IV fluids will recheck the basic metabolic profile to

chantel.





03/19/2021


Patient seen and evaluated in follow-up continues to be on 2 L of oxygen and bec

omes dyspneic with exertion.  Patient states he does not know wear oxygen at 

home.  Pulmonary is following.  Patient's blood sugars are elevated and will 

continue sliding scale at this time as patient is on steroids.  Potassium was 

found to be slightly elevated at 5.5 and was given a dose of Kayexalate.  Will 

repeat a.m. labs.  Instructed and encouraged the patient to get up and increase 

activity as tolerated.





03/20/2021


Patient is presently in 2 does of hours and doing clinically well.  Patient is 

on dexamethasone, vitamin supplements, subcutaneous heparin.  No worsening 

shortness of breath, cough or congestion.  D-dimer 1.02.  Sodium 145.  Potassium

5.1.  Creatinine 1.3.  .  C-reactive protein 4.4.  His creatinine 

actually went up a little bit from 1.19-1.3








Constitutional: Denied any fatigue denied any fever.


Cardio vascular: denied any chest pain, palpitations


Gastrointestinal denied any nausea vomiting


Pulmonary: Denied any shortness of breath cough


Neurologic denied any new focal deficits





All inpatient medications were reviewed and appropriate changes in these medicat

ions as dictated in the interval history and assessment and plan.





Objective





- Vital Signs


Vital signs: 


                                   Vital Signs











Temp  97.6 F   03/20/21 09:32


 


Pulse  59 L  03/20/21 09:32


 


Resp  18   03/20/21 09:32


 


BP  143/58   03/20/21 09:32


 


Pulse Ox  91 L  03/20/21 11:46








                                 Intake & Output











 03/19/21 03/20/21 03/20/21





 18:59 06:59 18:59


 


Output Total  300 


 


Balance  -300 


 


Output:   


 


  Urine  300 


 


Other:   


 


  Voiding Method Toilet Toilet Toilet


 


  # Voids 3  


 


  # Bowel Movements 2  














- Exam








PHYSICAL EXAMINATION: 





GENERAL: The patient is alert and oriented x3, not in any acute distress.  Obese


HEENT: Pupils are round and equally reacting to light. EOMI. No scleral icterus.

No conjunctival pallor. Normocephalic, atraumatic. No pharyngeal erythema. No 

thyromegaly. 


CARDIOVASCULAR: S1 and S2 present. No murmurs, rubs, or gallops. 


PULMONARY: Chest is clear to auscultation, no wheezing or crackles. 


ABDOMEN: Soft, nontender, nondistended, normoactive bowel sounds. No palpable 

organomegaly. 


MUSCULOSKELETAL: No joint swelling or deformity.


EXTREMITIES: No cyanosis, clubbing, or pedal edema. 


NEUROLOGICAL: Gross neurological examination did not reveal any focal deficits. 


SKIN: No rashes. 





Note: Because of COVID 19 isolation, some of the history and physical exam 

findings are indirect and obtained from nursing staff, and other physician 

examinations to avoid unnecessary contact with the patient.














- Labs


CBC & Chem 7: 


                                 03/16/21 19:34





                                 03/20/21 07:08


Labs: 


                  Abnormal Lab Results - Last 24 Hours (Table)











  03/19/21 03/20/21 03/20/21 Range/Units





  19:58 07:04 07:08 


 


D-Dimer     (<0.60)  mg/L FEU


 


Chloride    110 H  ()  mmol/L


 


BUN    42.0 H  (9.0-27.0)  mg/dL


 


Est GFR (CKD-EPI)AfAm    59.3 L  (60.0-200.0)   


 


Est GFR (CKD-EPI)NonAf    51.2 L  (60.0-200.0)   


 


BUN/Creatinine Ratio    32.31 H  (12.00-20.00)  Ratio


 


Glucose    221 H  ()  mg/dL


 


POC Glucose (mg/dL)  361 H  227 H   (75-99)  mg/dL


 


Lactate Dehydrogenase    485 H  (120-246)  U/L


 


C-Reactive Protein    4.4 H  (0.0-0.8)  mg/dL














  03/20/21 03/20/21 03/20/21 Range/Units





  07:08 11:39 16:45 


 


D-Dimer  1.02 H    (<0.60)  mg/L FEU


 


Chloride     ()  mmol/L


 


BUN     (9.0-27.0)  mg/dL


 


Est GFR (CKD-EPI)AfAm     (60.0-200.0)   


 


Est GFR (CKD-EPI)NonAf     (60.0-200.0)   


 


BUN/Creatinine Ratio     (12.00-20.00)  Ratio


 


Glucose     ()  mg/dL


 


POC Glucose (mg/dL)   202 H  326 H  (75-99)  mg/dL


 


Lactate Dehydrogenase     (120-246)  U/L


 


C-Reactive Protein     (0.0-0.8)  mg/dL








                      Microbiology - Last 24 Hours (Table)











 03/16/21 19:34 Blood Culture - Preliminary





 Blood    No Growth after 72 hours


 


 03/16/21 19:15 Blood Culture - Preliminary





 Blood    No Growth after 72 hours














Assessment and Plan


Plan: 


-acute hypoxic respiratory failure: Secondary to covid 19 pneumonia patient 

maintained on Decadron and vitamin and zinc supplements. Monitor inflammatory 

markers.  Patient continues on 2 L of oxygen.


-Type 2 diabetes mellitus uncontrolled elevated blood sugars 


-Hyperkalemia, potassium was 5.5 and given a dose of Kayexalate and will repeat 

a.m. labs


-Hypovolemic hyponatremia, improved


-Acute renal failure secondary to Covid 19: Improved, current creatinine is 1.3


-Hypertension 


-DVT subcutaneous heparin

## 2021-03-21 LAB
ANION GAP SERPL CALC-SCNC: 5.1 MMOL/L (ref 4–12)
BUN SERPL-SCNC: 37 MG/DL (ref 9–27)
BUN/CREAT SERPL: 30.83 RATIO (ref 12–20)
CALCIUM SPEC-MCNC: 9 MG/DL (ref 8.7–10.3)
CHLORIDE SERPL-SCNC: 110 MMOL/L (ref 96–109)
CO2 SERPL-SCNC: 28.9 MMOL/L (ref 21.6–31.8)
GLUCOSE BLD-MCNC: 195 MG/DL (ref 75–99)
GLUCOSE BLD-MCNC: 200 MG/DL (ref 75–99)
GLUCOSE BLD-MCNC: 215 MG/DL (ref 75–99)
GLUCOSE BLD-MCNC: 329 MG/DL (ref 75–99)
GLUCOSE SERPL-MCNC: 173 MG/DL (ref 70–110)
POTASSIUM SERPL-SCNC: 4.7 MMOL/L (ref 3.5–5.5)
SODIUM SERPL-SCNC: 144 MMOL/L (ref 135–145)

## 2021-03-21 RX ADMIN — PIOGLITAZONE SCH MG: 30 TABLET ORAL at 08:15

## 2021-03-21 RX ADMIN — INSULIN DETEMIR SCH UNIT: 100 INJECTION, SOLUTION SUBCUTANEOUS at 20:49

## 2021-03-21 RX ADMIN — FAMOTIDINE SCH MG: 20 TABLET, FILM COATED ORAL at 08:15

## 2021-03-21 RX ADMIN — HEPARIN SODIUM SCH UNIT: 5000 INJECTION, SOLUTION INTRAVENOUS; SUBCUTANEOUS at 23:27

## 2021-03-21 RX ADMIN — ATORVASTATIN CALCIUM SCH MG: 80 TABLET, FILM COATED ORAL at 08:15

## 2021-03-21 RX ADMIN — ASPIRIN 81 MG CHEWABLE TABLET SCH MG: 81 TABLET CHEWABLE at 08:15

## 2021-03-21 RX ADMIN — HEPARIN SODIUM SCH UNIT: 5000 INJECTION, SOLUTION INTRAVENOUS; SUBCUTANEOUS at 00:11

## 2021-03-21 RX ADMIN — INSULIN ASPART SCH UNIT: 100 INJECTION, SOLUTION INTRAVENOUS; SUBCUTANEOUS at 08:15

## 2021-03-21 RX ADMIN — Medication SCH MG: at 08:16

## 2021-03-21 RX ADMIN — INSULIN ASPART SCH UNIT: 100 INJECTION, SOLUTION INTRAVENOUS; SUBCUTANEOUS at 12:19

## 2021-03-21 RX ADMIN — INSULIN ASPART SCH UNIT: 100 INJECTION, SOLUTION INTRAVENOUS; SUBCUTANEOUS at 17:20

## 2021-03-21 RX ADMIN — HEPARIN SODIUM SCH UNIT: 5000 INJECTION, SOLUTION INTRAVENOUS; SUBCUTANEOUS at 17:19

## 2021-03-21 RX ADMIN — METOPROLOL SUCCINATE SCH MG: 50 TABLET, EXTENDED RELEASE ORAL at 08:16

## 2021-03-21 RX ADMIN — HEPARIN SODIUM SCH UNIT: 5000 INJECTION, SOLUTION INTRAVENOUS; SUBCUTANEOUS at 08:14

## 2021-03-21 RX ADMIN — INSULIN ASPART SCH UNIT: 100 INJECTION, SOLUTION INTRAVENOUS; SUBCUTANEOUS at 17:19

## 2021-03-21 RX ADMIN — CLOPIDOGREL BISULFATE SCH MG: 75 TABLET ORAL at 08:15

## 2021-03-21 RX ADMIN — INSULIN ASPART SCH UNIT: 100 INJECTION, SOLUTION INTRAVENOUS; SUBCUTANEOUS at 20:49

## 2021-03-21 RX ADMIN — OXYCODONE HYDROCHLORIDE AND ACETAMINOPHEN SCH MG: 500 TABLET ORAL at 08:15

## 2021-03-21 NOTE — P.PN
Subjective


Progress Note Date: 03/21/21


Principal diagnosis: 





CoVID 19 pneumonia





80-year-old male, who was brought to the emergency department by EMS.  The 

patient apparently has had 2 weeks' worth of increasing shortness of breath, and

significant fatigue.  The patient also had chest congestion.  He had a fever.  

The patient states that he is just not been feeling well and getting 

progressively worse.  He also apparently had an episode or 2 of diarrhea.  The 

patient denied any chest pain or chest pressure or palpitations.  The patient 

also denied nausea, vomiting, and abdominal pain.  Apparently when EMS arrived, 

the patient's saturations were in the low 80s on room air.  For that reason, he 

was transported in, evaluated, and admitted with a diagnosis of COVID 19 19 

pneumonia.  The patient does have a history of diabetes, hypertension, and a 

previous pacemaker insertion.  White count was 4.6, hemoglobin 12.5, hematocrit 

37.1, and platelet count 153,000.  PT, INR, and PTT were all normal.  D-dimer 

was 0.77.  Sodium 136, potassium 4.5, chlorides 102, CO2 26, anion gap 8, BUN 

47, and creatinine 1.63.  Ferritin was 985, , C-reactive protein 58, and 

pro-calcitonin level was 0.12.  Chest x-ray did show some interstitial 

infiltrates.





On 03/18/2021 patient seen in follow-up on medical floor.  he is on 2 L of 

oxygen his pulse ox is 90-94%, breathing comfortably, no fever or chills, blood 

pressure has been stable.  Denies any worsening dyspnea or hypoxemia, he has a 

mild cough, no chest discomfort.  He continues on oral Decadron 6 mg daily, IV 

hydration, vitamins, d-dimer was low at 0.77, patient is on subcu heparin for 

DVT prophylaxis, pro-calcitonin level was low, inflammatory markers were not 

significantly elevated on admission.  Clinically symptoms have not progressed, 

and patient will be considered for discharge in next 24 hours.





The patient is seen today 03/19/2021 in follow-up on the regular medical floor. 

He is currently sitting up in chair at the bedside.  Awake and alert in no acute

distress.  Continue O2 saturations in the low 90s on 2 L/m per nasal cannula.  

Afebrile.  Hemodynamically stable.  Blood cultures reveal no growth.  Blood 

glucose 202.  Sodium 143.  Potassium 5.5.  Creatinine 1.19.  He remains on 

dexamethasone, so continues heparin, vitamin supplements.  Chest x-ray continues

to show bilateral multifocal opacities consistent with CoVID infection.  No maria

ge.





The patient is seen today 03/20/2021 in follow-up on the regular medical floor. 

He is currently resting comfortably in bed.  Awake and alert in no acute 

distress.  He is maintaining O2 saturations in the 90s on 2 L/m per nasal 

cannula.  He's been on dexamethasone, vitamin supplements, subcutaneous heparin.

 No worsening shortness of breath, cough or congestion.  D-dimer 1.02.  Sodium 

145.  Potassium 5.1.  Creatinine 1.3.  .  C-reactive protein 4.4.





The patient is seen today 03/21/2021 in follow-up on the regular medical floor. 

He is currently resting quite comfortably in bed.  Maintaining O2 saturations in

the low 90s on 2 L/m per nasal cannula.  Sodium 144.  Potassium 4.7.  Creatinine

1.2.  Glucose 173.  He remains on dexamethasone, heparin, vitamin supplements.





Objective





- Vital Signs


Vital signs: 


                                   Vital Signs











Temp  98.5 F   03/21/21 14:04


 


Pulse  64   03/21/21 14:04


 


Resp  18   03/21/21 14:04


 


BP  126/63   03/21/21 14:04


 


Pulse Ox  92 L  03/21/21 14:04








                                 Intake & Output











 03/20/21 03/21/21 03/21/21





 18:59 06:59 18:59


 


Intake Total 1020  


 


Balance 1020  


 


Intake:   


 


  Oral 1020  


 


Other:   


 


  Voiding Method Toilet Toilet Toilet


 


  # Voids  2 


 


  # Bowel Movements  0 














- Exam





GENERAL EXAM: Alert, pleasant, 80-year-old male patient, on 2 L of oxygen is of 

oxygen with a pulse ox of 90% comfortable in no apparent distress.


HEAD: Normocephalic/atraumatic.


EYES: Normal reaction of pupils, equal size.  Conjunctiva pink, sclera white.


NOSE: Clear with pink turbinates.


THROAT: No erythema or exudates.


NECK: No masses, no JVD, no thyroid enlargement, no adenopathy.


CHEST: No chest wall deformity.  Symmetrical expansion. 


LUNGS: Equal air entry with bibasilar coarse crackles


CVS: Regular rate and rhythm, normal S1 and S2, no gallops, no murmurs, no rubs


ABDOMEN: Soft, nontender.  No hepatosplenomegaly, normal bowel sounds, no 

guarding or rigidity.


EXTREMITIES: No clubbing, no edema, no cyanosis, 2+ pulses and upper and lower 

extremities.


MUSCULOSKELETAL: Muscle strength and tone normal.


SPINE: No scoliosis or deformity


SKIN: No rashes


CENTRAL NERVOUS SYSTEM: No focal deficits, tone is normal in all 4 extremities.


PSYCHIATRIC: Alert and oriented -3.  Appropriate affect.  Intact judgment and 

insight.





- Labs


CBC & Chem 7: 


                                 03/16/21 19:34





                                 03/21/21 06:25


Labs: 


                  Abnormal Lab Results - Last 24 Hours (Table)











  03/20/21 03/20/21 03/21/21 Range/Units





  16:45 20:50 06:25 


 


Chloride    110 H  ()  mmol/L


 


BUN    37.0 H  (9.0-27.0)  mg/dL


 


Est GFR (CKD-EPI)NonAf    56.4 L  (60.0-200.0)   


 


BUN/Creatinine Ratio    30.83 H  (12.00-20.00)  Ratio


 


Glucose    173 H  ()  mg/dL


 


POC Glucose (mg/dL)  326 H  409 H   (75-99)  mg/dL














  03/21/21 03/21/21 Range/Units





  07:10 11:41 


 


Chloride    ()  mmol/L


 


BUN    (9.0-27.0)  mg/dL


 


Est GFR (CKD-EPI)NonAf    (60.0-200.0)   


 


BUN/Creatinine Ratio    (12.00-20.00)  Ratio


 


Glucose    ()  mg/dL


 


POC Glucose (mg/dL)  200 H  195 H  (75-99)  mg/dL








                      Microbiology - Last 24 Hours (Table)











 03/16/21 19:15 Blood Culture - Preliminary





 Blood    No Growth after 96 hours


 


 03/16/21 19:34 Blood Culture - Preliminary





 Blood    No Growth after 96 hours














Assessment and Plan


Assessment: 





1  Acute hypoxic respiratory failure secondary to CoVID 19 pneumonia, he was not

a candidate for Remdesivir, Tocilizumab or convalescent plasma





2  History of diabetes mellitus type 2





3  History of hypertension





4  History of chronic sinus disease





5  History of diverticulitis, status post bowel resection





Plan:





The patient was seen and evaluated by Dr. Alfred


The patient may need subacute rehabilitation


Repeat chest x-ray for a markers in the a.m.


Titrate the FiO2 as tolerated


We will continue to follow





I, the cosigning physician, performed a history & physical examination of the 

patient. Lungs sounds crackles in the bilateral posterior bases.  Maintaining 

good O2 saturations in the 90s on 2 L/m per nasal cannula.  I discussed the 

assessment and plan of care with my nurse practitioner, Abigail Thomas. I attest to 

the above note as dictated by her.

## 2021-03-21 NOTE — P.PN
Subjective





80-year-old male who presents emergency Department stating that he has had 

shortness of breath and feeling fatigued for at least 2 weeks.  Patient states 

he has had exposure to COVID .  Patient states he has had a fever.  Patient 

states she's also states and smile.  Patient also states she's had diarrhea.  

Patient denies any chest pain or palpitations.  Patient denies any abdominal 

pain patient denies nausea vomiting diarrhea.  According to EMS with the patient

was coming in he was satting in the low 80s.  Patient is currently on a few 

liters of oxygen and sat in mid 90s.  Patient is a diabetic and has high blood 

pressure.  Patient also has a pacemaker.  Patient is presently on 2 L of oxygen 

was requiring 4 L yesterday.  Patient was started on IV fluids patient 

creatinine went up to 1.6 baseline is around 1.  Patient's blood sugars are 

highly elevated.  Patient tests positive for Covid.





03/18/2021


Patient is presently on 2 L of oxygen appears to be doing better possibility of 

discharge tomorrow.  Creatinine went up a bit to 1.6 baseline is around the 1.  

Patient was started on IV fluids will recheck the basic metabolic profile to

chantel.





03/19/2021


Patient seen and evaluated in follow-up continues to be on 2 L of oxygen and bec

omes dyspneic with exertion.  Patient states he does not know wear oxygen at 

home.  Pulmonary is following.  Patient's blood sugars are elevated and will 

continue sliding scale at this time as patient is on steroids.  Potassium was 

found to be slightly elevated at 5.5 and was given a dose of Kayexalate.  Will 

repeat a.m. labs.  Instructed and encouraged the patient to get up and increase 

activity as tolerated.





03/20/2021


Patient is presently in 2 does of hours and doing clinically well.  Patient is 

on dexamethasone, vitamin supplements, subcutaneous heparin.  No worsening 

shortness of breath, cough or congestion.  D-dimer 1.02.  Sodium 145.  Potassium

5.1.  Creatinine 1.3.  .  C-reactive protein 4.4.  His creatinine 

actually went up a little bit from 1.19-1.3





03/21/2021


Patient is fairly stable no significant improvement or worsening.  Patient is 

still on 2 L of oxygen.  We'll monitor him 1 more night.  Continues to require 

oxygen patient probably can be discharged on 2 L tomorrow this can be tapered or

discontinued as an outpatient.  Patient d-dimer is bit worse compared to 

admission.  Although other inflammatory markers are better








Constitutional: Denied any fatigue denied any fever.


Cardio vascular: denied any chest pain, palpitations


Gastrointestinal denied any nausea vomiting


Pulmonary: Denied any shortness of breath cough


Neurologic denied any new focal deficits





All inpatient medications were reviewed and appropriate changes in these 

medications as dictated in the interval history and assessment and plan.





Objective





- Vital Signs


Vital signs: 


                                   Vital Signs











Temp  99.6 F   03/21/21 09:25


 


Pulse  63   03/21/21 09:25


 


Resp  18   03/21/21 09:25


 


BP  124/55   03/21/21 09:25


 


Pulse Ox  90 L  03/21/21 09:25








                                 Intake & Output











 03/20/21 03/21/21 03/21/21





 18:59 06:59 18:59


 


Intake Total 1020  


 


Balance 1020  


 


Intake:   


 


  Oral 1020  


 


Other:   


 


  Voiding Method Toilet Toilet 


 


  # Voids  2 


 


  # Bowel Movements  0 














- Exam








PHYSICAL EXAMINATION: 





GENERAL: The patient is alert and oriented x3, not in any acute distress.  Obese


HEENT: Pupils are round and equally reacting to light. EOMI. No scleral icterus.

No conjunctival pallor. Normocephalic, atraumatic. No pharyngeal erythema. No 

thyromegaly. 


CARDIOVASCULAR: S1 and S2 present. No murmurs, rubs, or gallops. 


PULMONARY: Chest is clear to auscultation, no wheezing or crackles. 


ABDOMEN: Soft, nontender, nondistended, normoactive bowel sounds. No palpable 

organomegaly. 


MUSCULOSKELETAL: No joint swelling or deformity.


EXTREMITIES: No cyanosis, clubbing, or pedal edema. 


NEUROLOGICAL: Gross neurological examination did not reveal any focal deficits. 


SKIN: No rashes. 





Note: Because of COVID 19 isolation, some of the history and physical exam 

findings are indirect and obtained from nursing staff, and other physician 

examinations to avoid unnecessary contact with the patient.














- Labs


CBC & Chem 7: 


                                 03/16/21 19:34





                                 03/21/21 06:25


Labs: 


                  Abnormal Lab Results - Last 24 Hours (Table)











  03/20/21 03/20/21 03/20/21 Range/Units





  07:08 11:39 16:45 


 


Chloride  110 H    ()  mmol/L


 


BUN  42.0 H    (9.0-27.0)  mg/dL


 


Est GFR (CKD-EPI)AfAm  59.3 L    (60.0-200.0)   


 


Est GFR (CKD-EPI)NonAf  51.2 L    (60.0-200.0)   


 


BUN/Creatinine Ratio  32.31 H    (12.00-20.00)  Ratio


 


Glucose  221 H    ()  mg/dL


 


POC Glucose (mg/dL)   202 H  326 H  (75-99)  mg/dL


 


Lactate Dehydrogenase  485 H    (120-246)  U/L


 


C-Reactive Protein  4.4 H    (0.0-0.8)  mg/dL














  03/20/21 03/21/21 03/21/21 Range/Units





  20:50 06:25 07:10 


 


Chloride   110 H   ()  mmol/L


 


BUN   37.0 H   (9.0-27.0)  mg/dL


 


Est GFR (CKD-EPI)AfAm     (60.0-200.0)   


 


Est GFR (CKD-EPI)NonAf   56.4 L   (60.0-200.0)   


 


BUN/Creatinine Ratio   30.83 H   (12.00-20.00)  Ratio


 


Glucose   173 H   ()  mg/dL


 


POC Glucose (mg/dL)  409 H   200 H  (75-99)  mg/dL


 


Lactate Dehydrogenase     (120-246)  U/L


 


C-Reactive Protein     (0.0-0.8)  mg/dL








                      Microbiology - Last 24 Hours (Table)











 03/16/21 19:15 Blood Culture - Preliminary





 Blood    No Growth after 96 hours


 


 03/16/21 19:34 Blood Culture - Preliminary





 Blood    No Growth after 96 hours














Assessment and Plan


Plan: 


-acute hypoxic respiratory failure: Secondary to covid 19 pneumonia patient 

maintained on Decadron and vitamin and zinc supplements. Monitor inflammatory 

markers.  Patient continues on 2 L of oxygen.


-Type 2 diabetes mellitus uncontrolled elevated blood sugars, bit better 

controlled now , will add pre-meal insulin


-Hyperkalemia, potassium was 5.5 and given a dose of Kayexalate and will repeat 

a.m. labs


-Hypovolemic hyponatremia, improved


-Acute renal failure secondary to Covid 19: Improved, current creatinine is 1.3


-Hypertension 


-DVT prophylaxis subcutaneous heparin

## 2021-03-22 LAB
GLUCOSE BLD-MCNC: 207 MG/DL (ref 75–99)
GLUCOSE BLD-MCNC: 285 MG/DL (ref 75–99)
GLUCOSE BLD-MCNC: 373 MG/DL (ref 75–99)
GLUCOSE BLD-MCNC: 385 MG/DL (ref 75–99)
LDH SPEC-CCNC: 491 U/L (ref 120–246)

## 2021-03-22 PROCEDURE — 5A0955A ASSISTANCE WITH RESPIRATORY VENTILATION, GREATER THAN 96 CONSECUTIVE HOURS, HIGH NASAL FLOW/VELOCITY: ICD-10-PCS

## 2021-03-22 PROCEDURE — XW033H5 INTRODUCTION OF TOCILIZUMAB INTO PERIPHERAL VEIN, PERCUTANEOUS APPROACH, NEW TECHNOLOGY GROUP 5: ICD-10-PCS

## 2021-03-22 RX ADMIN — ASPIRIN 81 MG CHEWABLE TABLET SCH MG: 81 TABLET CHEWABLE at 07:48

## 2021-03-22 RX ADMIN — INSULIN ASPART SCH UNIT: 100 INJECTION, SOLUTION INTRAVENOUS; SUBCUTANEOUS at 16:47

## 2021-03-22 RX ADMIN — CLOPIDOGREL BISULFATE SCH MG: 75 TABLET ORAL at 07:48

## 2021-03-22 RX ADMIN — INSULIN ASPART SCH UNIT: 100 INJECTION, SOLUTION INTRAVENOUS; SUBCUTANEOUS at 07:49

## 2021-03-22 RX ADMIN — INSULIN ASPART SCH UNIT: 100 INJECTION, SOLUTION INTRAVENOUS; SUBCUTANEOUS at 20:23

## 2021-03-22 RX ADMIN — ATORVASTATIN CALCIUM SCH MG: 80 TABLET, FILM COATED ORAL at 07:48

## 2021-03-22 RX ADMIN — FAMOTIDINE SCH MG: 20 TABLET, FILM COATED ORAL at 07:48

## 2021-03-22 RX ADMIN — METOPROLOL SUCCINATE SCH MG: 50 TABLET, EXTENDED RELEASE ORAL at 07:49

## 2021-03-22 RX ADMIN — PIOGLITAZONE SCH MG: 30 TABLET ORAL at 07:48

## 2021-03-22 RX ADMIN — OXYCODONE HYDROCHLORIDE AND ACETAMINOPHEN SCH MG: 500 TABLET ORAL at 07:48

## 2021-03-22 RX ADMIN — INSULIN ASPART SCH UNIT: 100 INJECTION, SOLUTION INTRAVENOUS; SUBCUTANEOUS at 12:27

## 2021-03-22 RX ADMIN — Medication SCH MG: at 07:48

## 2021-03-22 RX ADMIN — ENOXAPARIN SODIUM SCH MG: 40 INJECTION SUBCUTANEOUS at 12:27

## 2021-03-22 RX ADMIN — HEPARIN SODIUM SCH UNIT: 5000 INJECTION, SOLUTION INTRAVENOUS; SUBCUTANEOUS at 07:47

## 2021-03-22 RX ADMIN — INSULIN DETEMIR SCH UNIT: 100 INJECTION, SOLUTION SUBCUTANEOUS at 20:23

## 2021-03-22 NOTE — P.PN
Subjective


Progress Note Date: 03/22/21





80-year-old male who presents emergency Department stating that he has had 

shortness of breath and feeling fatigued for at least 2 weeks.  Patient states 

he has had exposure to COVID .  Patient states he has had a fever.  Patient 

states she's also states and smile.  Patient also states she's had diarrhea.  

Patient denies any chest pain or palpitations.  Patient denies any abdominal 

pain patient denies nausea vomiting diarrhea.  According to EMS with the patient

was coming in he was satting in the low 80s.  Patient is currently on a few 

liters of oxygen and sat in mid 90s.  Patient is a diabetic and has high blood 

pressure.  Patient also has a pacemaker.  Patient is presently on 2 L of oxygen 

was requiring 4 L yesterday.  Patient was started on IV fluids patient 

creatinine went up to 1.6 baseline is around 1.  Patient's blood sugars are 

highly elevated.  Patient tests positive for Covid.





03/18/2021


Patient is presently on 2 L of oxygen appears to be doing better possibility of 

discharge tomorrow.  Creatinine went up a bit to 1.6 baseline is around the 1.  

Patient was started on IV fluids will recheck the basic metabolic profile 

tomorrow.





03/19/2021


Patient seen and evaluated in follow-up continues to be on 2 L of oxygen and 

becomes dyspneic with exertion.  Patient states he does not know wear oxygen at 

home.  Pulmonary is following.  Patient's blood sugars are elevated and will 

continue sliding scale at this time as patient is on steroids.  Potassium was 

found to be slightly elevated at 5.5 and was given a dose of Kayexalate.  Will 

repeat a.m. labs.  Instructed and encouraged the patient to get up and increase 

activity as tolerated.





03/20/2021


Patient is presently in 2 does of hours and doing clinically well.  Patient is 

on dexamethasone, vitamin supplements, subcutaneous heparin.  No worsening 

shortness of breath, cough or congestion.  D-dimer 1.02.  Sodium 145.  Potassium

5.1.  Creatinine 1.3.  .  C-reactive protein 4.4.  His creatinine 

actually went up a little bit from 1.19-1.3





03/21/2021


Patient is fairly stable no significant improvement or worsening.  Patient is 

still on 2 L of oxygen.  We'll monitor him 1 more night.  Continues to require 

oxygen patient probably can be discharged on 2 L tomorrow this can be tapered or

discontinued as an outpatient.  Patient d-dimer is bit worse compared to 

admission.  Although other inflammatory markers are better








03/22/2021


Patient is seen this morning currently on nonrebreather and airvo as his oxygen 

was found to be 89% on 6 L of oxygen.  Pulmonary is following.  D-dimer has gone

up at 1.70 along with lactate dehydrogenase at 491 and CRP is 9.4.  Blood sugars

continue to be elevated and patient is maintained on sliding scale along with 

long-acting and pre-meal insulin and will continue at this time.  Patient 

continues on vitamin and zinc supplements along with Lovenox and dexamethasone 

daily.  Patient is scheduled to receive Tocilizumab today.  Will continue to 

monitor closely based on the clinical course of the patient.  Patient continues 

to be weak and was seen and evaluated by physical therapy recommending subacute 

rehab and patient is now agreeable and a social work consult was placed. 





Constitutional: Denied any fatigue denied any fever.


Cardio vascular: denied any chest pain, palpitations


Gastrointestinal denied any nausea vomiting


Pulmonary: Denied any shortness of breath cough


Neurologic denied any new focal deficits





All inpatient medications were reviewed and appropriate changes in these 

medications as dictated in the interval history and assessment and plan.











Objective





- Vital Signs


Vital signs: 


                                   Vital Signs











Temp  98.3 F   03/22/21 09:58


 


Pulse  67   03/22/21 09:58


 


Resp  18   03/22/21 09:58


 


BP  105/64   03/22/21 09:58


 


Pulse Ox  89 L  03/22/21 09:58








                                 Intake & Output











 03/21/21 03/22/21 03/22/21





 18:59 06:59 18:59


 


Intake Total 350  


 


Balance 350  


 


Intake:   


 


  Oral 350  


 


Other:   


 


  Voiding Method Toilet Toilet Toilet


 


  # Voids 3 2 














- Exam





GENERAL: The patient is alert and oriented x3, not in any acute distress.  

Obese, currently on a nonrebreather


HEENT: Pupils are round and equally reacting to light. EOMI. No scleral icterus.

No conjunctival pallor. Normocephalic, atraumatic. No pharyngeal erythema. No 

thyromegaly. 


CARDIOVASCULAR: S1 and S2 present. No murmurs, rubs, or gallops. 


PULMONARY: Chest is clear to auscultation, no wheezing or crackles. 


ABDOMEN: Soft, nontender, nondistended, normoactive bowel sounds. No palpable 

organomegaly. 


MUSCULOSKELETAL: No joint swelling or deformity.


EXTREMITIES: No cyanosis, clubbing, or pedal edema. 


NEUROLOGICAL: Gross neurological examination did not reveal any focal deficits. 


SKIN: No rashes. 








- Labs


CBC & Chem 7: 


                                 03/16/21 19:34





                                 03/21/21 06:25


Labs: 


                  Abnormal Lab Results - Last 24 Hours (Table)











  03/21/21 03/21/21 03/22/21 Range/Units





  16:45 20:33 05:25 


 


D-Dimer    1.70 H  (<0.60)  mg/L FEU


 


POC Glucose (mg/dL)  215 H  329 H   (75-99)  mg/dL














  03/22/21 03/22/21 Range/Units





  06:42 11:42 


 


D-Dimer    (<0.60)  mg/L FEU


 


POC Glucose (mg/dL)  207 H  285 H  (75-99)  mg/dL








                      Microbiology - Last 24 Hours (Table)











 03/16/21 19:34 Blood Culture - Preliminary





 Blood    No Growth after 120 hours


 


 03/16/21 19:15 Blood Culture - Preliminary





 Blood    No Growth after 120 hours














Assessment and Plan


Assessment: 





-acute hypoxic respiratory failure: Secondary to covid 19 pneumonia patient 

maintained on Decadron and vitamin and zinc supplements. Monitor inflammatory 

markers.  Patient continues on 2 L of oxygen and was found to be in the low 80s 

to 89% on 6 L and currently on a nonrebreather.  Pulmonary following


-Type 2 diabetes mellitus uncontrolled elevated blood sugars secondary to 

systemic steroids, continue with pre-meal, sliding scale, and long-acting


-Hyperkalemia, improved


-Hypotonic hyponatremia, improved


-Acute renal failure secondary to Covid 19: Improved, current creatinine is 1.2


-Hypertension 


-DVT subcutaneous Lovenox





Plan:


Continue with current medications.  IV fluids have been discontinued.  Will 

continue with dexamethasone along with vitamin and zinc supplements.  Lovenox 

has been added and patient is scheduled to receive Tocilizumab.  Pulmonary 

following . Repeat labs in the morning along with d-dimer.  Patient currently on

nonrebreather along with airvo requiring more oxygen.  PT/OT evaluating the 

patient and patient continues to be weak requiring subacute rehab and social 

work consult was placed to discuss possible ECF upon discharge once stabilized. 

Will continue to monitor closely and make further recommendations based on the 

clinical course of the patient.  Prognosis is guarded.

## 2021-03-22 NOTE — XR
EXAMINATION TYPE: XR chest 1V portable

 

DATE OF EXAM: 3/22/2021

 

HISTORY: Shortness of breath.

 

COMPARISON: 3/19/2021

 

TECHNIQUE: Single view of the chest is submitted.

 

FINDINGS:

Demonstrated are scattered senescent parenchymal change.  

 

Patchy perihilar and basilar infiltrates noted. No significant change appreciated.

 

The heart is stable.

 

Hilar and mediastinal structures are within normal limits.  

 

Degenerative changes are seen of the dorsal spine. 

 

 IMPRESSION: 

 

1.  Patchy perihilar and basilar infiltrates noted. No significant change appreciated.

## 2021-03-22 NOTE — P.PN
Subjective


Progress Note Date: 03/22/21











80-year-old male, who was brought to the emergency department by EMS.  The 

patient apparently has had 2 weeks' worth of increasing shortness of breath, and

significant fatigue.  The patient also had chest congestion.  He had a fever.  

The patient states that he is just not been feeling well and getting 

progressively worse.  He also apparently had an episode or 2 of diarrhea.  The 

patient denied any chest pain or chest pressure or palpitations.  The patient 

also denied nausea, vomiting, and abdominal pain.  Apparently when EMS arrived, 

the patient's saturations were in the low 80s on room air.  For that reason, he 

was transported in, evaluated, and admitted with a diagnosis of COVID 19 19 

pneumonia.  The patient does have a history of diabetes, hypertension, and a 

previous pacemaker insertion.  White count was 4.6, hemoglobin 12.5, hematocrit 

37.1, and platelet count 153,000.  PT, INR, and PTT were all normal.  D-dimer 

was 0.77.  Sodium 136, potassium 4.5, chlorides 102, CO2 26, anion gap 8, BUN 

47, and creatinine 1.63.  Ferritin was 985, , C-reactive protein 58, and 

pro-calcitonin level was 0.12.  Chest x-ray did show some interstitial 

infiltrates.





On 03/18/2021 patient seen in follow-up on medical floor.  he is on 2 L of 

oxygen his pulse ox is 90-94%, breathing comfortably, no fever or chills, blood 

pressure has been stable.  Denies any worsening dyspnea or hypoxemia, he has a 

mild cough, no chest discomfort.  He continues on oral Decadron 6 mg daily, IV 

hydration, vitamins, d-dimer was low at 0.77, patient is on subcu heparin for 

DVT prophylaxis, pro-calcitonin level was low, inflammatory markers were not 

significantly elevated on admission.  Clinically symptoms have not progressed, 

and patient will be considered for discharge in next 24 hours.





The patient is seen today 03/19/2021 in follow-up on the regular medical floor. 

He is currently sitting up in chair at the bedside.  Awake and alert in no acute

distress.  Continue O2 saturations in the low 90s on 2 L/m per nasal cannula.  

Afebrile.  Hemodynamically stable.  Blood cultures reveal no growth.  Blood g

lucose 202.  Sodium 143.  Potassium 5.5.  Creatinine 1.19.  He remains on 

dexamethasone, so continues heparin, vitamin supplements.  Chest x-ray continues

to show bilateral multifocal opacities consistent with CoVID infection.  No 

change.





The patient is seen today 03/20/2021 in follow-up on the regular medical floor. 

He is currently resting comfortably in bed.  Awake and alert in no acute distre

ss.  He is maintaining O2 saturations in the 90s on 2 L/m per nasal cannula.  

He's been on dexamethasone, vitamin supplements, subcutaneous heparin.  No 

worsening shortness of breath, cough or congestion.  D-dimer 1.02.  Sodium 145. 

Potassium 5.1.  Creatinine 1.3.  .  C-reactive protein 4.4.





The patient is seen today 03/21/2021 in follow-up on the regular medical floor. 

He is currently resting quite comfortably in bed.  Maintaining O2 saturations in

the low 90s on 2 L/m per nasal cannula.  Sodium 144.  Potassium 4.7.  Creatinine

1.2.  Glucose 173.  He remains on dexamethasone, heparin, vitamin supplements.








03/22/2021 on seeing the patient for a follow-up regarding the patient's acute 

hypoxic respiratory failure due to Covid 19 related pneumonia.  The patient was 

not a candidate for Remdesivir, Tocilizumab or convalescent plasma, and the 

patient is currently on Decadron 6 mg on a daily basis and addition to Levemir 

insulin 50 units daily at bedtime and NovoLog 6 units 3 times a day with meals 

and a sliding scale coverage.  The patient was on 2 L about 2 by nasal cannula 

with pulse ox of 93%.  Earlier this morning, the patient's oxidation 

progressively got worse in the patient is currently on 100% nonrebreather 

facemask maintaining a saturation above 90% at around 94%.  Overall, the patient

is doing well.  Creatinine is at 1.2.  The patient had a CRP level of 4.4 with 

an LDH level of 485 from 03/20/2021.  Inflammatory markers are being monitored. 

Last d-dimer from 03/20/2021 was 1.02.  Blood cultures were negative repeat 

chest x-ray was done today and the findings are essentially stable and the 

patient has patchy bilateral perihilar pulmonary infiltrates without any 

significant interval change.  D-dimer from today is at 1.7 and the patient 

remains on Lovenox.








 





Objective





- Vital Signs


Vital signs: 


                                   Vital Signs











Temp  98.3 F   03/22/21 09:58


 


Pulse  67   03/22/21 09:58


 


Resp  18   03/22/21 09:58


 


BP  105/64   03/22/21 09:58


 


Pulse Ox  89 L  03/22/21 09:58








                                 Intake & Output











 03/21/21 03/22/21 03/22/21





 18:59 06:59 18:59


 


Intake Total 350  


 


Balance 350  


 


Intake:   


 


  Oral 350  


 


Other:   


 


  Voiding Method Toilet Toilet Toilet


 


  # Voids 3 2 














- Exam











GENERAL EXAM: Alert, pleasant, 80-year-old male patient, on 100% nonrebreather 

oxygen mask , comfortable in no apparent distress.


HEAD: Normocephalic/atraumatic.


EYES: Normal reaction of pupils, equal size.  Conjunctiva pink, sclera white.


NOSE: Clear with pink turbinates.


THROAT: No erythema or exudates.


NECK: No masses, no JVD, no thyroid enlargement, no adenopathy.


CHEST: No chest wall deformity.  Symmetrical expansion. 


LUNGS: Equal air entry with bibasilar coarse crackles


CVS: Regular rate and rhythm, normal S1 and S2, no gallops, no murmurs, no rubs


ABDOMEN: Soft, nontender.  No hepatosplenomegaly, normal bowel so milligrams 

unds, no guarding or rigidity.


EXTREMITIES: No clubbing, no edema, no cyanosis, 2+ pulses and upper and lower 

extremities.


MUSCULOSKELETAL: Muscle strength and tone normal.


SPINE: No scoliosis or deformity


SKIN: No rashes


CENTRAL NERVOUS SYSTEM: No focal deficits, tone is normal in all 4 extremities.


PSYCHIATRIC: Alert and oriented -3.  Appropriate affect.  Intact judgment and 

insight.





- Labs


CBC & Chem 7: 


                                 03/16/21 19:34





                                 03/21/21 06:25


Labs: 


                  Abnormal Lab Results - Last 24 Hours (Table)











  03/21/21 03/21/21 03/22/21 Range/Units





  16:45 20:33 05:25 


 


D-Dimer    1.70 H  (<0.60)  mg/L FEU


 


POC Glucose (mg/dL)  215 H  329 H   (75-99)  mg/dL














  03/22/21 03/22/21 Range/Units





  06:42 11:42 


 


D-Dimer    (<0.60)  mg/L FEU


 


POC Glucose (mg/dL)  207 H  285 H  (75-99)  mg/dL








                      Microbiology - Last 24 Hours (Table)











 03/16/21 19:34 Blood Culture - Preliminary





 Blood    No Growth after 120 hours


 


 03/16/21 19:15 Blood Culture - Preliminary





 Blood    No Growth after 120 hours














Assessment and Plan


Plan: 








1  Acute hypoxic respiratory failure secondary to CoVID 19 pneumonia, he was not

a candidate for Remdesivir, Tocilizumab or convalescent plasma, currently on D

ecadron the patient is on 100% nonrebreather facemask.  The patient was infected

approximately 2 weeks ago when he came into the hospital with few liters of 

oxygen by nasal cannula, specifically at around 2 L and patient condition 

decompensated earlier this morning and is currently on 100% nonrebreather 

facemask.  Chest x-ray findings are stable.  D-dimer slightly elevated.





2  History of diabetes mellitus type 2, currently on Levemir insulin for blood 

sugar control





3  History of hypertension





4  History of chronic sinus disease





5  History of diverticulitis, status post bowel resection





Plan:


 


Lovenox 40 mg subcu for DVT prophylaxis


In view of worsening in oxygenation and decompensation of the respirator status,

the patient would be a candidate for Tocilizumab 


Repeat chest x-ray for a markers in the a.m.


The patient 100% nonrebreather facemask will consider switching this patient to 

high flow oxygen by nasal cannula which would also offered him some CPAP


Titrate the FiO2 as tolerated


We will continue to follow

## 2021-03-23 LAB
ALBUMIN SERPL-MCNC: 3.2 G/DL (ref 3.8–4.9)
ALBUMIN/GLOB SERPL: 1.19 G/DL (ref 1.6–3.17)
ALP SERPL-CCNC: 75 U/L (ref 41–126)
ALT SERPL-CCNC: 40 U/L (ref 10–49)
ANION GAP SERPL CALC-SCNC: 11.4 MMOL/L (ref 4–12)
AST SERPL-CCNC: 31 U/L (ref 14–35)
BASOPHILS # BLD AUTO: 0.01 X 10*3/UL (ref 0–0.1)
BASOPHILS NFR BLD AUTO: 0.1 %
BUN SERPL-SCNC: 42 MG/DL (ref 9–27)
BUN/CREAT SERPL: 35 RATIO (ref 12–20)
CALCIUM SPEC-MCNC: 8.8 MG/DL (ref 8.7–10.3)
CHLORIDE SERPL-SCNC: 104 MMOL/L (ref 96–109)
CO2 SERPL-SCNC: 24.6 MMOL/L (ref 21.6–31.8)
EOSINOPHIL # BLD AUTO: 0.01 X 10*3/UL (ref 0.04–0.35)
EOSINOPHIL NFR BLD AUTO: 0.1 %
ERYTHROCYTE [DISTWIDTH] IN BLOOD BY AUTOMATED COUNT: 4.12 X 10*6/UL (ref 4.4–5.6)
ERYTHROCYTE [DISTWIDTH] IN BLOOD: 13.6 % (ref 11.5–14.5)
GLOBULIN SER CALC-MCNC: 2.7 G/DL (ref 1.6–3.3)
GLUCOSE BLD-MCNC: 171 MG/DL (ref 75–99)
GLUCOSE BLD-MCNC: 189 MG/DL (ref 75–99)
GLUCOSE BLD-MCNC: 280 MG/DL (ref 75–99)
GLUCOSE BLD-MCNC: 328 MG/DL (ref 75–99)
GLUCOSE SERPL-MCNC: 195 MG/DL (ref 70–110)
HCT VFR BLD AUTO: 39.5 % (ref 39.6–50)
HGB BLD-MCNC: 12.6 G/DL (ref 13–17)
LYMPHOCYTES # SPEC AUTO: 1.36 X 10*3/UL (ref 0.9–5)
LYMPHOCYTES NFR SPEC AUTO: 12.6 %
MCH RBC QN AUTO: 30.6 PG (ref 27–32)
MCHC RBC AUTO-ENTMCNC: 31.9 G/DL (ref 32–37)
MCV RBC AUTO: 95.9 FL (ref 80–97)
MONOCYTES # BLD AUTO: 0.82 X 10*3/UL (ref 0.2–1)
MONOCYTES NFR BLD AUTO: 7.6 %
NEUTROPHILS # BLD AUTO: 8.4 X 10*3/UL (ref 1.8–7.7)
NEUTROPHILS NFR BLD AUTO: 77.7 %
PLATELET # BLD AUTO: 281 X 10*3/UL (ref 140–440)
POTASSIUM SERPL-SCNC: 5.2 MMOL/L (ref 3.5–5.5)
PROT SERPL-MCNC: 5.9 G/DL (ref 6.2–8.2)
SODIUM SERPL-SCNC: 140 MMOL/L (ref 135–145)
WBC # BLD AUTO: 10.8 X 10*3/UL (ref 4.5–10)

## 2021-03-23 RX ADMIN — INSULIN ASPART SCH UNIT: 100 INJECTION, SOLUTION INTRAVENOUS; SUBCUTANEOUS at 15:02

## 2021-03-23 RX ADMIN — ENOXAPARIN SODIUM SCH MG: 40 INJECTION SUBCUTANEOUS at 07:34

## 2021-03-23 RX ADMIN — INSULIN ASPART SCH UNIT: 100 INJECTION, SOLUTION INTRAVENOUS; SUBCUTANEOUS at 07:36

## 2021-03-23 RX ADMIN — FAMOTIDINE SCH MG: 20 TABLET, FILM COATED ORAL at 07:35

## 2021-03-23 RX ADMIN — METOPROLOL SUCCINATE SCH MG: 50 TABLET, EXTENDED RELEASE ORAL at 07:35

## 2021-03-23 RX ADMIN — INSULIN ASPART SCH UNIT: 100 INJECTION, SOLUTION INTRAVENOUS; SUBCUTANEOUS at 16:52

## 2021-03-23 RX ADMIN — ATORVASTATIN CALCIUM SCH MG: 80 TABLET, FILM COATED ORAL at 07:35

## 2021-03-23 RX ADMIN — PIOGLITAZONE SCH MG: 30 TABLET ORAL at 07:34

## 2021-03-23 RX ADMIN — OXYCODONE HYDROCHLORIDE AND ACETAMINOPHEN SCH MG: 500 TABLET ORAL at 07:35

## 2021-03-23 RX ADMIN — INSULIN ASPART SCH UNIT: 100 INJECTION, SOLUTION INTRAVENOUS; SUBCUTANEOUS at 12:30

## 2021-03-23 RX ADMIN — ASPIRIN 81 MG CHEWABLE TABLET SCH MG: 81 TABLET CHEWABLE at 07:35

## 2021-03-23 RX ADMIN — INSULIN DETEMIR SCH UNIT: 100 INJECTION, SOLUTION SUBCUTANEOUS at 20:40

## 2021-03-23 RX ADMIN — INSULIN ASPART SCH UNIT: 100 INJECTION, SOLUTION INTRAVENOUS; SUBCUTANEOUS at 20:40

## 2021-03-23 RX ADMIN — Medication SCH MG: at 07:34

## 2021-03-23 RX ADMIN — CLOPIDOGREL BISULFATE SCH MG: 75 TABLET ORAL at 07:35

## 2021-03-23 NOTE — P.PN
Subjective


Progress Note Date: 03/23/21











80-year-old male, who was brought to the emergency department by EMS.  The 

patient apparently has had 2 weeks' worth of increasing shortness of breath, and

significant fatigue.  The patient also had chest congestion.  He had a fever.  

The patient states that he is just not been feeling well and getting 

progressively worse.  He also apparently had an episode or 2 of diarrhea.  The 

patient denied any chest pain or chest pressure or palpitations.  The patient 

also denied nausea, vomiting, and abdominal pain.  Apparently when EMS arrived, 

the patient's saturations were in the low 80s on room air.  For that reason, he 

was transported in, evaluated, and admitted with a diagnosis of COVID 19 19 

pneumonia.  The patient does have a history of diabetes, hypertension, and a 

previous pacemaker insertion.  White count was 4.6, hemoglobin 12.5, hematocrit 

37.1, and platelet count 153,000.  PT, INR, and PTT were all normal.  D-dimer 

was 0.77.  Sodium 136, potassium 4.5, chlorides 102, CO2 26, anion gap 8, BUN 

47, and creatinine 1.63.  Ferritin was 985, , C-reactive protein 58, and 

pro-calcitonin level was 0.12.  Chest x-ray did show some interstitial 

infiltrates.





On 03/18/2021 patient seen in follow-up on medical floor.  he is on 2 L of 

oxygen his pulse ox is 90-94%, breathing comfortably, no fever or chills, blood 

pressure has been stable.  Denies any worsening dyspnea or hypoxemia, he has a 

mild cough, no chest discomfort.  He continues on oral Decadron 6 mg daily, IV 

hydration, vitamins, d-dimer was low at 0.77, patient is on subcu heparin for 

DVT prophylaxis, pro-calcitonin level was low, inflammatory markers were not 

significantly elevated on admission.  Clinically symptoms have not progressed, 

and patient will be considered for discharge in next 24 hours.





The patient is seen today 03/19/2021 in follow-up on the regular medical floor. 

He is currently sitting up in chair at the bedside.  Awake and alert in no acute

distress.  Continue O2 saturations in the low 90s on 2 L/m per nasal cannula.  

Afebrile.  Hemodynamically stable.  Blood cultures reveal no growth.  Blood g

lucose 202.  Sodium 143.  Potassium 5.5.  Creatinine 1.19.  He remains on 

dexamethasone, so continues heparin, vitamin supplements.  Chest x-ray continues

to show bilateral multifocal opacities consistent with CoVID infection.  No 

change.





The patient is seen today 03/20/2021 in follow-up on the regular medical floor. 

He is currently resting comfortably in bed.  Awake and alert in no acute distre

ss.  He is maintaining O2 saturations in the 90s on 2 L/m per nasal cannula.  

He's been on dexamethasone, vitamin supplements, subcutaneous heparin.  No 

worsening shortness of breath, cough or congestion.  D-dimer 1.02.  Sodium 145. 

Potassium 5.1.  Creatinine 1.3.  .  C-reactive protein 4.4.





The patient is seen today 03/21/2021 in follow-up on the regular medical floor. 

He is currently resting quite comfortably in bed.  Maintaining O2 saturations in

the low 90s on 2 L/m per nasal cannula.  Sodium 144.  Potassium 4.7.  Creatinine

1.2.  Glucose 173.  He remains on dexamethasone, heparin, vitamin supplements.








03/22/2021 on seeing the patient for a follow-up regarding the patient's acute 

hypoxic respiratory failure due to Covid 19 related pneumonia.  The patient was 

not a candidate for Remdesivir, Tocilizumab or convalescent plasma, and the 

patient is currently on Decadron 6 mg on a daily basis and addition to Levemir 

insulin 50 units daily at bedtime and NovoLog 6 units 3 times a day with meals 

and a sliding scale coverage.  The patient was on 2 L about 2 by nasal cannula 

with pulse ox of 93%.  Earlier this morning, the patient's oxidation 

progressively got worse in the patient is currently on 100% nonrebreather 

facemask maintaining a saturation above 90% at around 94%.  Overall, the patient

is doing well.  Creatinine is at 1.2.  The patient had a CRP level of 4.4 with 

an LDH level of 485 from 03/20/2021.  Inflammatory markers are being monitored. 

Last d-dimer from 03/20/2021 was 1.02.  Blood cultures were negative repeat 

chest x-ray was done today and the findings are essentially stable and the 

patient has patchy bilateral perihilar pulmonary infiltrates without any 

significant interval change.  D-dimer from today is at 1.7 and the patient 

remains on Lovenox.





03/23/2021 the patient is being seen for a follow-up.  The patient is currently 

on high flow oxygen at 6 L.  Note that the patient was on 100% nonrebreather 

facemask and was switched him to a high flow oxygen yesterday at 60 L.  As part 

of his treatment, the patient received steroids and currently is receiving 

dexamethasone 6 mg by mouth daily.  He is also Toci 2 yesterday and today.  He 

completed treatment without any major side effects.  Note that the patient was 

on low-flow oxygen and he progressively got worse requiring 100% nonrebreather 

and subsequent high flow oxygen.  On today's evaluation, his white cell count is

at 10.  His d-dimer is at 1.6.  Rest of the inflammatory markers have not been 

checked and this will be repeated tomorrow.  LDH from yesterday was 491 with a 

CRP of 9.4.  Otherwise, his renal function shows a stable creatinine of 1.2.  

The patient has been on FiO2 of 60% with a high flow oxygen of 60 L and the 

patient has been essentially stable since yesterday.  No other significant 

events.  Remains on Lovenox 40 mg subcu on a daily basis. 





 





Objective





- Vital Signs


Vital signs: 


                                   Vital Signs











Temp  97.5 F L  03/23/21 10:00


 


Pulse  57 L  03/23/21 10:00


 


Resp  21   03/23/21 10:00


 


BP  100/60   03/23/21 10:00


 


Pulse Ox  90 L  03/23/21 11:35








                                 Intake & Output











 03/22/21 03/23/21 03/23/21





 18:59 06:59 18:59


 


Intake Total 580  


 


Balance 580  


 


Weight 136.078 kg  


 


Intake:   


 


  Intake, IV Titration 100  





  Amount   


 


    Tocilizumab 400 mg In 100  





    Sodium Chloride 0.9% 80   





    ml @ 100 mls/hr IV ONCE   





    ONE Rx#:[id number]   


 


  Oral 480  


 


Other:   


 


  Voiding Method Toilet Toilet 


 


  # Voids 3 2 


 


  # Bowel Movements 1  














- Exam











GENERAL EXAM: Alert, pleasant, 80-year-old male patient, on 60l with an FiO2 of 

65


HEAD: Normocephalic/atraumatic.


EYES: Normal reaction of pupils, equal size.  Conjunctiva pink, sclera white.


NOSE: Clear with pink turbinates.


THROAT: No erythema or exudates.


NECK: No masses, no JVD, no thyroid enlargement, no adenopathy.


CHEST: No chest wall deformity.  Symmetrical expansion. 


LUNGS: Equal air entry with bibasilar coarse crackles


CVS: Regular rate and rhythm, normal S1 and S2, no gallops, no murmurs, no rubs


ABDOMEN: Soft, nontender.  No hepatosplenomegaly, normal bowel so milligrams 

unds, no guarding or rigidity.


EXTREMITIES: No clubbing, no edema, no cyanosis, 2+ pulses and upper and lower 

extremities.


MUSCULOSKELETAL: Muscle strength and tone normal.


SPINE: No scoliosis or deformity


SKIN: No rashes


CENTRAL NERVOUS SYSTEM: No focal deficits, tone is normal in all 4 extremities.


PSYCHIATRIC: Alert and oriented -3.  Appropriate affect.  Intact judgment and 

insight.





- Labs


CBC & Chem 7: 


                                 03/23/21 06:34





                                 03/21/21 06:25


Labs: 


                  Abnormal Lab Results - Last 24 Hours (Table)











  03/22/21 03/22/21 03/22/21 Range/Units





  05:25 16:37 19:59 


 


WBC     (4.50-10.00)  X 10*3/uL


 


RBC     (4.40-5.60)  X 10*6/uL


 


Hgb     (13.0-17.0)  g/dL


 


Hct     (39.6-50.0)  %


 


MCHC     (32.0-37.0)  g/dL


 


Immature Gran #     (0.00-0.04)  X 10*3/uL


 


Neutrophils #     (1.80-7.70)  X 10*3/uL


 


Eosinophils #     (0.04-0.35)  X 10*3/uL


 


D-Dimer     (<0.60)  mg/L FEU


 


POC Glucose (mg/dL)   385 H  373 H  (75-99)  mg/dL


 


Lactate Dehydrogenase  491 H    (120-246)  U/L


 


C-Reactive Protein  9.4 H    (0.0-0.8)  mg/dL














  03/23/21 03/23/21 03/23/21 Range/Units





  06:34 06:34 06:50 


 


WBC   10.80 H   (4.50-10.00)  X 10*3/uL


 


RBC   4.12 L   (4.40-5.60)  X 10*6/uL


 


Hgb   12.6 L   (13.0-17.0)  g/dL


 


Hct   39.5 L   (39.6-50.0)  %


 


MCHC   31.9 L   (32.0-37.0)  g/dL


 


Immature Gran #   0.20 H   (0.00-0.04)  X 10*3/uL


 


Neutrophils #   8.40 H   (1.80-7.70)  X 10*3/uL


 


Eosinophils #   0.01 L   (0.04-0.35)  X 10*3/uL


 


D-Dimer  1.60 H    (<0.60)  mg/L FEU


 


POC Glucose (mg/dL)    171 H  (75-99)  mg/dL


 


Lactate Dehydrogenase     (120-246)  U/L


 


C-Reactive Protein     (0.0-0.8)  mg/dL














  03/23/21 Range/Units





  11:37 


 


WBC   (4.50-10.00)  X 10*3/uL


 


RBC   (4.40-5.60)  X 10*6/uL


 


Hgb   (13.0-17.0)  g/dL


 


Hct   (39.6-50.0)  %


 


MCHC   (32.0-37.0)  g/dL


 


Immature Gran #   (0.00-0.04)  X 10*3/uL


 


Neutrophils #   (1.80-7.70)  X 10*3/uL


 


Eosinophils #   (0.04-0.35)  X 10*3/uL


 


D-Dimer   (<0.60)  mg/L FEU


 


POC Glucose (mg/dL)  189 H  (75-99)  mg/dL


 


Lactate Dehydrogenase   (120-246)  U/L


 


C-Reactive Protein   (0.0-0.8)  mg/dL








                      Microbiology - Last 24 Hours (Table)











 03/16/21 19:34 Blood Culture - Final





 Blood    No Growth after 144 hours


 


 03/16/21 19:15 Blood Culture - Final





 Blood    No Growth after 144 hours














Assessment and Plan


Plan: 








1  Acute hypoxic respiratory failure secondary to CoVID 19 pneumonia, currently 

on Decadron 6 mg and this received Tocilizumab 2 doses, the cardia the patient 

on high flow oxygen 6 L with an FiO2 of 65%..   





2  History of diabetes mellitus type 2, currently on Levemir insulin for blood 

sugar control





3  History of hypertension





4  History of chronic sinus disease





5  History of diverticulitis, status post bowel resection





Plan:


 


Lovenox 40 mg subcu for DVT prophylaxis


Completed Tocilizumab 2 and the patient is currently on Decadron


Repeat chest x-ray for a markers in the a.m.


Keep the high flow oxygen 60 L with an FiO2 of 65% 


Titrate the FiO2 as tolerated


We will continue to follow

## 2021-03-23 NOTE — P.PN
Subjective


Progress Note Date: 03/23/21





80-year-old male who presents emergency Department stating that he has had 

shortness of breath and feeling fatigued for at least 2 weeks.  Patient states 

he has had exposure to COVID .  Patient states he has had a fever.  Patient 

states she's also states and smile.  Patient also states she's had diarrhea.  

Patient denies any chest pain or palpitations.  Patient denies any abdominal 

pain patient denies nausea vomiting diarrhea.  According to EMS with the patient

was coming in he was satting in the low 80s.  Patient is currently on a few 

liters of oxygen and sat in mid 90s.  Patient is a diabetic and has high blood 

pressure.  Patient also has a pacemaker.  Patient is presently on 2 L of oxygen 

was requiring 4 L yesterday.  Patient was started on IV fluids patient 

creatinine went up to 1.6 baseline is around 1.  Patient's blood sugars are 

highly elevated.  Patient tests positive for Covid.





03/18/2021


Patient is presently on 2 L of oxygen appears to be doing better possibility of 

discharge tomorrow.  Creatinine went up a bit to 1.6 baseline is around the 1.  

Patient was started on IV fluids will recheck the basic metabolic profile 

tomorrow.





03/19/2021


Patient seen and evaluated in follow-up continues to be on 2 L of oxygen and 

becomes dyspneic with exertion.  Patient states he does not know wear oxygen at 

home.  Pulmonary is following.  Patient's blood sugars are elevated and will 

continue sliding scale at this time as patient is on steroids.  Potassium was 

found to be slightly elevated at 5.5 and was given a dose of Kayexalate.  Will 

repeat a.m. labs.  Instructed and encouraged the patient to get up and increase 

activity as tolerated.





03/20/2021


Patient is presently in 2 does of hours and doing clinically well.  Patient is 

on dexamethasone, vitamin supplements, subcutaneous heparin.  No worsening 

shortness of breath, cough or congestion.  D-dimer 1.02.  Sodium 145.  Potassium

5.1.  Creatinine 1.3.  .  C-reactive protein 4.4.  His creatinine 

actually went up a little bit from 1.19-1.3





03/21/2021


Patient is fairly stable no significant improvement or worsening.  Patient is 

still on 2 L of oxygen.  We'll monitor him 1 more night.  Continues to require 

oxygen patient probably can be discharged on 2 L tomorrow this can be tapered or

discontinued as an outpatient.  Patient d-dimer is bit worse compared to 

admission.  Although other inflammatory markers are better








03/22/2021


Patient is seen this morning currently on nonrebreather and airvo as his oxygen 

was found to be 89% on 6 L of oxygen.  Pulmonary is following.  D-dimer has gone

up at 1.70 along with lactate dehydrogenase at 491 and CRP is 9.4.  Blood sugars

continue to be elevated and patient is maintained on sliding scale along with 

long-acting and pre-meal insulin and will continue at this time.  Patient 

continues on vitamin and zinc supplements along with Lovenox and dexamethasone 

daily.  Patient is scheduled to receive Tocilizumab today.  Will continue to 

monitor closely based on the clinical course of the patient.  Patient continues 

to be weak and was seen and evaluated by physical therapy recommending subacute 

rehab and patient is now agreeable and a social work consult was placed. 





03/23/2021


Patient is seen and evaluated this morning with no improvement in respiratory 

status.  Patient remains on Airvo with supplemental nonrebreather.  Patient is 

day 2 of receiving Tocilizumab.  Will repeat a.m. chest x-ray along with 

inflammatory markers.  D-dimer today was 1.60.  White blood count has gone up in

is 10.8, hemoglobin is 12.6.  Patient is afebrile.  To continue with dexametha

sone, Lovenox, vitamin C and zinc supplements.  Pulmonary is following.  Had a 

discussion with the patient about CODE STATUS along with his son Mamadou and 

currently wish to remain a full code until talking with family member more 

extensively about the situation.  Will continue to monitor closely.  Prognosis 

remains guarded.





Constitutional: Reports fatigue, denied any fever.


Cardio vascular: denied any chest pain, palpitations


Gastrointestinal denied any nausea vomiting


Pulmonary: Reports worsening shortness of breath 


Neurologic reports generalized weakness 





All inpatient medications were reviewed and appropriate changes in these 

medications as dictated in the interval history and assessment and plan.











Objective





- Vital Signs


Vital signs: 


                                   Vital Signs











Temp  97.5 F L  03/23/21 10:00


 


Pulse  57 L  03/23/21 10:00


 


Resp  21   03/23/21 10:00


 


BP  100/60   03/23/21 10:00


 


Pulse Ox  90 L  03/23/21 11:35








                                 Intake & Output











 03/22/21 03/23/21 03/23/21





 18:59 06:59 18:59


 


Intake Total 580  


 


Balance 580  


 


Weight 136.078 kg  


 


Intake:   


 


  Intake, IV Titration 100  





  Amount   


 


    Tocilizumab 400 mg In 100  





    Sodium Chloride 0.9% 80   





    ml @ 100 mls/hr IV ONCE   





    ONE Rx#:[id number]   


 


  Oral 480  


 


Other:   


 


  Voiding Method Toilet Toilet 


 


  # Voids 3 2 


 


  # Bowel Movements 1  














- Exam





GENERAL: The patient is alert and oriented x3, not in any acute distress.  

Obese, currently on a nonrebreather and airvo at a flow rate of 60 with an FiO2 

of 65


HEENT: Pupils are round and equally reacting to light. EOMI. No scleral icterus.

No conjunctival pallor. Normocephalic, atraumatic. No pharyngeal erythema. No 

thyromegaly. 


CARDIOVASCULAR: S1 and S2 present. No murmurs, rubs, or gallops. 


PULMONARY: Diminished breath sounds with diffuse rhonchi noted


ABDOMEN: Soft, nontender, nondistended, normoactive bowel sounds. No palpable 

organomegaly. 


MUSCULOSKELETAL: No joint swelling or deformity.


EXTREMITIES: No cyanosis, clubbing, or pedal edema. 


NEUROLOGICAL: Gross neurological examination did not reveal any focal deficits. 


SKIN: No rashes. 








- Labs


CBC & Chem 7: 


                                 03/23/21 06:34





                                 03/21/21 06:25


Labs: 


                  Abnormal Lab Results - Last 24 Hours (Table)











  03/22/21 03/22/21 03/22/21 Range/Units





  05:25 16:37 19:59 


 


WBC     (4.50-10.00)  X 10*3/uL


 


RBC     (4.40-5.60)  X 10*6/uL


 


Hgb     (13.0-17.0)  g/dL


 


Hct     (39.6-50.0)  %


 


MCHC     (32.0-37.0)  g/dL


 


Immature Gran #     (0.00-0.04)  X 10*3/uL


 


Neutrophils #     (1.80-7.70)  X 10*3/uL


 


Eosinophils #     (0.04-0.35)  X 10*3/uL


 


D-Dimer     (<0.60)  mg/L FEU


 


POC Glucose (mg/dL)   385 H  373 H  (75-99)  mg/dL


 


Lactate Dehydrogenase  491 H    (120-246)  U/L


 


C-Reactive Protein  9.4 H    (0.0-0.8)  mg/dL














  03/23/21 03/23/21 03/23/21 Range/Units





  06:34 06:34 06:50 


 


WBC   10.80 H   (4.50-10.00)  X 10*3/uL


 


RBC   4.12 L   (4.40-5.60)  X 10*6/uL


 


Hgb   12.6 L   (13.0-17.0)  g/dL


 


Hct   39.5 L   (39.6-50.0)  %


 


MCHC   31.9 L   (32.0-37.0)  g/dL


 


Immature Gran #   0.20 H   (0.00-0.04)  X 10*3/uL


 


Neutrophils #   8.40 H   (1.80-7.70)  X 10*3/uL


 


Eosinophils #   0.01 L   (0.04-0.35)  X 10*3/uL


 


D-Dimer  1.60 H    (<0.60)  mg/L FEU


 


POC Glucose (mg/dL)    171 H  (75-99)  mg/dL


 


Lactate Dehydrogenase     (120-246)  U/L


 


C-Reactive Protein     (0.0-0.8)  mg/dL














  03/23/21 Range/Units





  11:37 


 


WBC   (4.50-10.00)  X 10*3/uL


 


RBC   (4.40-5.60)  X 10*6/uL


 


Hgb   (13.0-17.0)  g/dL


 


Hct   (39.6-50.0)  %


 


MCHC   (32.0-37.0)  g/dL


 


Immature Gran #   (0.00-0.04)  X 10*3/uL


 


Neutrophils #   (1.80-7.70)  X 10*3/uL


 


Eosinophils #   (0.04-0.35)  X 10*3/uL


 


D-Dimer   (<0.60)  mg/L FEU


 


POC Glucose (mg/dL)  189 H  (75-99)  mg/dL


 


Lactate Dehydrogenase   (120-246)  U/L


 


C-Reactive Protein   (0.0-0.8)  mg/dL








                      Microbiology - Last 24 Hours (Table)











 03/16/21 19:34 Blood Culture - Final





 Blood    No Growth after 144 hours


 


 03/16/21 19:15 Blood Culture - Final





 Blood    No Growth after 144 hours














Assessment and Plan


Assessment: 





-acute hypoxic respiratory failure: Secondary to covid 19 pneumonia patient ma

intained on Decadron and vitamin and zinc supplements.  Patient has received 2 

doses of Tocilizumab and is continued on Lovenox.  Monitor inflammatory markers.

 Patient currently on Airvo with intermittent nonrebreather.  Pulmonary 

following


-Type 2 diabetes mellitus uncontrolled elevated blood sugars secondary to 

systemic steroids, continue with pre-meal, sliding scale, and long-acting


-Hyperkalemia, improved


-Hypotonic hyponatremia, improved


-Acute renal failure secondary to Covid 19: Improved, current creatinine is 1.2


-Hypertension 


-DVT subcutaneous Lovenox


-Full code





Plan:


Continue with current medications.  Will continue with dexamethasone along with 

vitamin and zinc supplements.  Lovenox has been added and patient has received 

Tocilizumab x2.  Pulmonary following . Repeat labs in the morning along with 

inflammatory markers.  CMP ordered this morning currently pending.  Patient c

urrently on nonrebreather along with airvo requiring more oxygen.  PT/OT 

evaluating the patient and patient continues to be weak requiring subacute rehab

and social work consult was placed to discuss possible ECF upon discharge once 

stabilized.  Will continue to monitor closely and make further recommendations 

based on the clinical course of the patient.  Prognosis remains guarded.

## 2021-03-24 LAB
ALBUMIN SERPL-MCNC: 3.3 G/DL (ref 3.8–4.9)
ALBUMIN/GLOB SERPL: 1.18 G/DL (ref 1.6–3.17)
ALP SERPL-CCNC: 73 U/L (ref 41–126)
ALT SERPL-CCNC: 47 U/L (ref 10–49)
ANION GAP SERPL CALC-SCNC: 6.8 MMOL/L (ref 4–12)
AST SERPL-CCNC: 32 U/L (ref 14–35)
BUN SERPL-SCNC: 45 MG/DL (ref 9–27)
BUN/CREAT SERPL: 37.5 RATIO (ref 12–20)
CALCIUM SPEC-MCNC: 8.9 MG/DL (ref 8.7–10.3)
CHLORIDE SERPL-SCNC: 105 MMOL/L (ref 96–109)
CO2 SERPL-SCNC: 28.2 MMOL/L (ref 21.6–31.8)
GLOBULIN SER CALC-MCNC: 2.8 G/DL (ref 1.6–3.3)
GLUCOSE BLD-MCNC: 104 MG/DL (ref 75–99)
GLUCOSE BLD-MCNC: 121 MG/DL (ref 75–99)
GLUCOSE BLD-MCNC: 229 MG/DL (ref 75–99)
GLUCOSE BLD-MCNC: 301 MG/DL (ref 75–99)
GLUCOSE SERPL-MCNC: 117 MG/DL (ref 70–110)
LDH SPEC-CCNC: 414 U/L (ref 120–246)
POTASSIUM SERPL-SCNC: 5.1 MMOL/L (ref 3.5–5.5)
PROT SERPL-MCNC: 6.1 G/DL (ref 6.2–8.2)
SODIUM SERPL-SCNC: 140 MMOL/L (ref 135–145)

## 2021-03-24 RX ADMIN — INSULIN ASPART SCH UNIT: 100 INJECTION, SOLUTION INTRAVENOUS; SUBCUTANEOUS at 17:28

## 2021-03-24 RX ADMIN — INSULIN ASPART SCH UNIT: 100 INJECTION, SOLUTION INTRAVENOUS; SUBCUTANEOUS at 17:29

## 2021-03-24 RX ADMIN — INSULIN ASPART SCH UNIT: 100 INJECTION, SOLUTION INTRAVENOUS; SUBCUTANEOUS at 07:35

## 2021-03-24 RX ADMIN — ASPIRIN 81 MG CHEWABLE TABLET SCH MG: 81 TABLET CHEWABLE at 07:35

## 2021-03-24 RX ADMIN — INSULIN ASPART SCH UNIT: 100 INJECTION, SOLUTION INTRAVENOUS; SUBCUTANEOUS at 20:39

## 2021-03-24 RX ADMIN — OXYCODONE HYDROCHLORIDE AND ACETAMINOPHEN SCH MG: 500 TABLET ORAL at 07:33

## 2021-03-24 RX ADMIN — PIOGLITAZONE SCH MG: 30 TABLET ORAL at 07:36

## 2021-03-24 RX ADMIN — INSULIN DETEMIR SCH UNIT: 100 INJECTION, SOLUTION SUBCUTANEOUS at 20:39

## 2021-03-24 RX ADMIN — INSULIN ASPART SCH: 100 INJECTION, SOLUTION INTRAVENOUS; SUBCUTANEOUS at 12:05

## 2021-03-24 RX ADMIN — ENOXAPARIN SODIUM SCH MG: 40 INJECTION SUBCUTANEOUS at 07:36

## 2021-03-24 RX ADMIN — Medication SCH MG: at 07:33

## 2021-03-24 RX ADMIN — FAMOTIDINE SCH MG: 20 TABLET, FILM COATED ORAL at 07:34

## 2021-03-24 RX ADMIN — INSULIN ASPART SCH: 100 INJECTION, SOLUTION INTRAVENOUS; SUBCUTANEOUS at 07:22

## 2021-03-24 RX ADMIN — METOPROLOL SUCCINATE SCH MG: 50 TABLET, EXTENDED RELEASE ORAL at 07:33

## 2021-03-24 RX ADMIN — CLOPIDOGREL BISULFATE SCH MG: 75 TABLET ORAL at 07:35

## 2021-03-24 RX ADMIN — ATORVASTATIN CALCIUM SCH MG: 80 TABLET, FILM COATED ORAL at 07:33

## 2021-03-24 RX ADMIN — INSULIN ASPART SCH UNIT: 100 INJECTION, SOLUTION INTRAVENOUS; SUBCUTANEOUS at 12:04

## 2021-03-24 NOTE — P.PN
Subjective


Progress Note Date: 03/24/21











80-year-old male, who was brought to the emergency department by EMS.  The 

patient apparently has had 2 weeks' worth of increasing shortness of breath, and

significant fatigue.  The patient also had chest congestion.  He had a fever.  

The patient states that he is just not been feeling well and getting 

progressively worse.  He also apparently had an episode or 2 of diarrhea.  The 

patient denied any chest pain or chest pressure or palpitations.  The patient 

also denied nausea, vomiting, and abdominal pain.  Apparently when EMS arrived, 

the patient's saturations were in the low 80s on room air.  For that reason, he 

was transported in, evaluated, and admitted with a diagnosis of COVID 19 19 

pneumonia.  The patient does have a history of diabetes, hypertension, and a 

previous pacemaker insertion.  White count was 4.6, hemoglobin 12.5, hematocrit 

37.1, and platelet count 153,000.  PT, INR, and PTT were all normal.  D-dimer 

was 0.77.  Sodium 136, potassium 4.5, chlorides 102, CO2 26, anion gap 8, BUN 

47, and creatinine 1.63.  Ferritin was 985, , C-reactive protein 58, and 

pro-calcitonin level was 0.12.  Chest x-ray did show some interstitial 

infiltrates.





On 03/18/2021 patient seen in follow-up on medical floor.  he is on 2 L of 

oxygen his pulse ox is 90-94%, breathing comfortably, no fever or chills, blood 

pressure has been stable.  Denies any worsening dyspnea or hypoxemia, he has a 

mild cough, no chest discomfort.  He continues on oral Decadron 6 mg daily, IV 

hydration, vitamins, d-dimer was low at 0.77, patient is on subcu heparin for 

DVT prophylaxis, pro-calcitonin level was low, inflammatory markers were not 

significantly elevated on admission.  Clinically symptoms have not progressed, 

and patient will be considered for discharge in next 24 hours.





The patient is seen today 03/19/2021 in follow-up on the regular medical floor. 

He is currently sitting up in chair at the bedside.  Awake and alert in no acute

distress.  Continue O2 saturations in the low 90s on 2 L/m per nasal cannula.  

Afebrile.  Hemodynamically stable.  Blood cultures reveal no growth.  Blood g

lucose 202.  Sodium 143.  Potassium 5.5.  Creatinine 1.19.  He remains on 

dexamethasone, so continues heparin, vitamin supplements.  Chest x-ray continues

to show bilateral multifocal opacities consistent with CoVID infection.  No 

change.





The patient is seen today 03/20/2021 in follow-up on the regular medical floor. 

He is currently resting comfortably in bed.  Awake and alert in no acute distre

ss.  He is maintaining O2 saturations in the 90s on 2 L/m per nasal cannula.  

He's been on dexamethasone, vitamin supplements, subcutaneous heparin.  No 

worsening shortness of breath, cough or congestion.  D-dimer 1.02.  Sodium 145. 

Potassium 5.1.  Creatinine 1.3.  .  C-reactive protein 4.4.





The patient is seen today 03/21/2021 in follow-up on the regular medical floor. 

He is currently resting quite comfortably in bed.  Maintaining O2 saturations in

the low 90s on 2 L/m per nasal cannula.  Sodium 144.  Potassium 4.7.  Creatinine

1.2.  Glucose 173.  He remains on dexamethasone, heparin, vitamin supplements.








03/22/2021 on seeing the patient for a follow-up regarding the patient's acute 

hypoxic respiratory failure due to Covid 19 related pneumonia.  The patient was 

not a candidate for Remdesivir, Tocilizumab or convalescent plasma, and the 

patient is currently on Decadron 6 mg on a daily basis and addition to Levemir 

insulin 50 units daily at bedtime and NovoLog 6 units 3 times a day with meals 

and a sliding scale coverage.  The patient was on 2 L about 2 by nasal cannula 

with pulse ox of 93%.  Earlier this morning, the patient's oxidation 

progressively got worse in the patient is currently on 100% nonrebreather 

facemask maintaining a saturation above 90% at around 94%.  Overall, the patient

is doing well.  Creatinine is at 1.2.  The patient had a CRP level of 4.4 with 

an LDH level of 485 from 03/20/2021.  Inflammatory markers are being monitored. 

Last d-dimer from 03/20/2021 was 1.02.  Blood cultures were negative repeat 

chest x-ray was done today and the findings are essentially stable and the 

patient has patchy bilateral perihilar pulmonary infiltrates without any 

significant interval change.  D-dimer from today is at 1.7 and the patient 

remains on Lovenox.





03/23/2021 the patient is being seen for a follow-up.  The patient is currently 

on high flow oxygen at 6 L.  Note that the patient was on 100% nonrebreather 

facemask and was switched him to a high flow oxygen yesterday at 60 L.  As part 

of his treatment, the patient received steroids and currently is receiving 

dexamethasone 6 mg by mouth daily.  He is also Toci 2 yesterday and today.  He 

completed treatment without any major side effects.  Note that the patient was 

on low-flow oxygen and he progressively got worse requiring 100% nonrebreather 

and subsequent high flow oxygen.  On today's evaluation, his white cell count is

at 10.  His d-dimer is at 1.6.  Rest of the inflammatory markers have not been 

checked and this will be repeated tomorrow.  LDH from yesterday was 491 with a 

CRP of 9.4.  Otherwise, his renal function shows a stable creatinine of 1.2.  

The patient has been on FiO2 of 60% with a high flow oxygen of 60 L and the 

patient has been essentially stable since yesterday.  No other significant 

events.  Remains on Lovenox 40 mg subcu on a daily basis. 





03/24/2021, the patient is still on high flow oxygen and 60 L.  He is not 

utilizing the 100% nonrebreather facemask this morning.  Note that the patient 

has received steroids, and he remains on Decadron 6 mg by mouth daily. He also 

completed Tocilizumab.  On today's evaluation, chest x-ray findings are stable 

without any interval change in the bilateral pulmonary infiltrates.  The patient

is resting comfortably in bed.  He is also able to move on a recliner.  Labs 

today shows an LDH of 414 and a CRP of 2.9 and the patient is d-dimer is at 

2.56.  The patient remains on Lovenox 40 mg subcu on a daily basis.  He is on 

Levemir insulin 50 units daily along with NovoLog 6 units with meals plus a 

sliding coverage.  I will say his condition essentially the same as unchanged 

compared to yesterday.  He is not doing much in progress for now. 





 





Objective





- Vital Signs


Vital signs: 


                                   Vital Signs











Temp  97.8 F   03/24/21 10:00


 


Pulse  62   03/24/21 10:00


 


Resp  22   03/24/21 10:00


 


BP  111/63   03/24/21 10:00


 


Pulse Ox  91 L  03/24/21 11:43








                                 Intake & Output











 03/23/21 03/24/21 03/24/21





 18:59 06:59 18:59


 


Intake Total 600  


 


Output Total  375 


 


Balance 600 -375 


 


Intake:   


 


  Oral 600  


 


Output:   


 


  Urine  375 


 


Other:   


 


  Voiding Method  Toilet Toilet


 


  # Voids 4  














- Exam











GENERAL EXAM: Alert, pleasant, 80-year-old male patient, on 60l with an FiO2 of 

65


HEAD: Normocephalic/atraumatic.


EYES: Normal reaction of pupils, equal size.  Conjunctiva pink, sclera white.


NOSE: Clear with pink turbinates.


THROAT: No erythema or exudates.


NECK: No masses, no JVD, no thyroid enlargement, no adenopathy.


CHEST: No chest wall deformity.  Symmetrical expansion. 


LUNGS: Equal air entry with bibasilar coarse crackles


CVS: Regular rate and rhythm, normal S1 and S2, no gallops, no murmurs, no rubs


ABDOMEN: Soft, nontender.  No hepatosplenomegaly, normal bowel so milligrams 

unds, no guarding or rigidity.


EXTREMITIES: No clubbing, no edema, no cyanosis, 2+ pulses and upper and lower 

extremities.


MUSCULOSKELETAL: Muscle strength and tone normal.


SPINE: No scoliosis or deformity


SKIN: No rashes


CENTRAL NERVOUS SYSTEM: No focal deficits, tone is normal in all 4 extremities.


PSYCHIATRIC: Alert and oriented -3.  Appropriate affect.  Intact judgment and 

insight.





- Labs


CBC & Chem 7: 


                                 03/23/21 06:34





                                 03/24/21 06:25


Labs: 


                  Abnormal Lab Results - Last 24 Hours (Table)











  03/23/21 03/23/21 03/24/21 Range/Units





  16:31 20:18 06:25 


 


D-Dimer    2.56 H  (<0.60)  mg/L FEU


 


BUN     (9.0-27.0)  mg/dL


 


Est GFR (CKD-EPI)NonAf     (60.0-200.0)   


 


BUN/Creatinine Ratio     (12.00-20.00)  Ratio


 


Glucose     ()  mg/dL


 


POC Glucose (mg/dL)  328 H  280 H   (75-99)  mg/dL


 


Lactate Dehydrogenase     (120-246)  U/L


 


C-Reactive Protein     (0.0-0.8)  mg/dL


 


Total Protein     (6.2-8.2)  g/dL


 


Albumin     (3.80-4.90)  g/dL


 


Albumin/Globulin Ratio     (1.60-3.17)  g/dL














  03/24/21 03/24/21 03/24/21 Range/Units





  06:25 07:05 11:35 


 


D-Dimer     (<0.60)  mg/L FEU


 


BUN  45.0 H    (9.0-27.0)  mg/dL


 


Est GFR (CKD-EPI)NonAf  56.4 L    (60.0-200.0)   


 


BUN/Creatinine Ratio  37.50 H    (12.00-20.00)  Ratio


 


Glucose  117 H    ()  mg/dL


 


POC Glucose (mg/dL)   121 H  104 H  (75-99)  mg/dL


 


Lactate Dehydrogenase  414 H    (120-246)  U/L


 


C-Reactive Protein  2.9 H    (0.0-0.8)  mg/dL


 


Total Protein  6.1 L    (6.2-8.2)  g/dL


 


Albumin  3.30 L    (3.80-4.90)  g/dL


 


Albumin/Globulin Ratio  1.18 L    (1.60-3.17)  g/dL














Assessment and Plan


Plan: 








1  Acute hypoxic respiratory failure secondary to CoVID 19 pneumonia, currently 

on Decadron 6 mg and this received Tocilizumab 2 doses,   the patient on high 

flow oxygen 60 L with an FiO2 of 65%..   The chest x-ray findings are stable 

without any interval improvement or change.  Inflammatory markers are down with 

LDH is down to 414 and the CRP level is down to 2.9.  D-dimer is at 156 and the 

patient remains on Lovenox.





2  History of diabetes mellitus type 2, currently on Levemir insulin for blood 

sugar control





3  History of hypertension





4  History of chronic sinus disease





5  History of diverticulitis, status post bowel resection





Plan:


 


Continue Decadron


Continue Lovenox 40 mg subcu for DVT prophylaxis


Completed Tocilizumab 2  n


Repeat chest x-ray for a markers of this morning was noted and no major changes.

 Chest x-ray findings are stable.  The markers of lower.


Keep the high flow oxygen 60 L with an FiO2 of 65% 


Titrate the FiO2 as tolerated


We will continue to follow

## 2021-03-24 NOTE — XR
EXAMINATION TYPE: XR chest 1V portable

 

DATE OF EXAM: 3/24/2021

 

COMPARISON: 3/22/2021

 

INDICATION: Covid, short of breath

 

TECHNIQUE: Single frontal view of the chest is obtained.

 

FINDINGS:  

The heart size is normal.  

The pulmonary vasculature is upper limits for normal.  

Scattered subsegmental infiltrates are present through the lower lung fields bilaterally. Findings ar
e worsening over the interval.  

 

 

IMPRESSION:  

1. Bibasilar infiltrates which are nonspecific but worsening. Correlate for atypical pneumonia.

## 2021-03-24 NOTE — P.PN
Subjective


Progress Note Date: 03/24/21





80-year-old male who presents emergency Department stating that he has had 

shortness of breath and feeling fatigued for at least 2 weeks.  Patient states 

he has had exposure to COVID .  Patient states he has had a fever.  Patient 

states she's also states and smile.  Patient also states she's had diarrhea.  

Patient denies any chest pain or palpitations.  Patient denies any abdominal 

pain patient denies nausea vomiting diarrhea.  According to EMS with the patient

was coming in he was satting in the low 80s.  Patient is currently on a few 

liters of oxygen and sat in mid 90s.  Patient is a diabetic and has high blood 

pressure.  Patient also has a pacemaker.  Patient is presently on 2 L of oxygen 

was requiring 4 L yesterday.  Patient was started on IV fluids patient 

creatinine went up to 1.6 baseline is around 1.  Patient's blood sugars are 

highly elevated.  Patient tests positive for Covid.





03/18/2021


Patient is presently on 2 L of oxygen appears to be doing better possibility of 

discharge tomorrow.  Creatinine went up a bit to 1.6 baseline is around the 1.  

Patient was started on IV fluids will recheck the basic metabolic profile 

tomorrow.





03/19/2021


Patient seen and evaluated in follow-up continues to be on 2 L of oxygen and 

becomes dyspneic with exertion.  Patient states he does not know wear oxygen at 

home.  Pulmonary is following.  Patient's blood sugars are elevated and will 

continue sliding scale at this time as patient is on steroids.  Potassium was 

found to be slightly elevated at 5.5 and was given a dose of Kayexalate.  Will 

repeat a.m. labs.  Instructed and encouraged the patient to get up and increase 

activity as tolerated.





03/20/2021


Patient is presently in 2 does of hours and doing clinically well.  Patient is 

on dexamethasone, vitamin supplements, subcutaneous heparin.  No worsening 

shortness of breath, cough or congestion.  D-dimer 1.02.  Sodium 145.  Potassium

5.1.  Creatinine 1.3.  .  C-reactive protein 4.4.  His creatinine 

actually went up a little bit from 1.19-1.3





03/21/2021


Patient is fairly stable no significant improvement or worsening.  Patient is 

still on 2 L of oxygen.  We'll monitor him 1 more night.  Continues to require 

oxygen patient probably can be discharged on 2 L tomorrow this can be tapered or

discontinued as an outpatient.  Patient d-dimer is bit worse compared to 

admission.  Although other inflammatory markers are better








03/22/2021


Patient is seen this morning currently on nonrebreather and airvo as his oxygen 

was found to be 89% on 6 L of oxygen.  Pulmonary is following.  D-dimer has gone

up at 1.70 along with lactate dehydrogenase at 491 and CRP is 9.4.  Blood sugars

continue to be elevated and patient is maintained on sliding scale along with 

long-acting and pre-meal insulin and will continue at this time.  Patient 

continues on vitamin and zinc supplements along with Lovenox and dexamethasone 

daily.  Patient is scheduled to receive Tocilizumab today.  Will continue to 

monitor closely based on the clinical course of the patient.  Patient continues 

to be weak and was seen and evaluated by physical therapy recommending subacute 

rehab and patient is now agreeable and a social work consult was placed. 





03/23/2021


Patient is seen and evaluated this morning with no improvement in respiratory 

status.  Patient remains on Airvo with supplemental nonrebreather.  Patient is 

day 2 of receiving Tocilizumab.  Will repeat a.m. chest x-ray along with 

inflammatory markers.  D-dimer today was 1.60.  White blood count has gone up in

is 10.8, hemoglobin is 12.6.  Patient is afebrile.  To continue with dexametha

sone, Lovenox, vitamin C and zinc supplements.  Pulmonary is following.  Had a 

discussion with the patient about CODE STATUS along with his son Mamadou and 

currently wish to remain a full code until talking with family member more 

extensively about the situation.  Will continue to monitor closely.  Prognosis 

remains guarded.





03/24/2021


Patient is seen this morning status post getting up to the bathroom and 

continues to be extremely dyspneic with any exertion and was found to be low 80s

to 83% oxygenation.  Patient continues on the airvo with a flow rate of 60 and 

FiO2 of 64 and is currently 91%.  Patient denies any chest pain or palpitations.

 Patient is afebrile.  Patient reports to tolerating diet with no nausea or 

vomiting noted.  D-dimer continues to be elevated at 2.56, sodium today is 140 

with a potassium of 5.1 and creatinine is 1.2.  Blood sugars on the lower side 

this morning and will continue to monitor closely and treat accordingly with 

sliding scale.  Inflammatory markers trending down.  Pulmonary following 

closely.  Chest x-ray today shows continued bibasilar infiltrates which are 

nonspecific but worsening.





Constitutional: Reports fatigue, denied any fever.


Cardio vascular: denied any chest pain, palpitations


Gastrointestinal denied any nausea vomiting


Pulmonary: Reports worsening shortness of breath 


Neurologic reports generalized weakness 





All inpatient medications were reviewed and appropriate changes in these 

medications as dictated in the interval history and assessment and plan.











Objective





- Vital Signs


Vital signs: 


                                   Vital Signs











Temp  98.5 F   03/24/21 05:48


 


Pulse  67   03/24/21 05:48


 


Resp  19   03/24/21 05:48


 


BP  126/63   03/24/21 05:48


 


Pulse Ox  86 L  03/24/21 08:47








                                 Intake & Output











 03/23/21 03/24/21 03/24/21





 18:59 06:59 18:59


 


Intake Total 600  


 


Output Total  375 


 


Balance 600 -375 


 


Intake:   


 


  Oral 600  


 


Output:   


 


  Urine  375 


 


Other:   


 


  Voiding Method  Toilet 


 


  # Voids 4  














- Exam





GENERAL: The patient is alert and oriented x3, not in any acute distress.  

Obese, currently on a nonrebreather and airvo at a flow rate of 60 with an FiO2 

of 64


HEENT: Pupils are round and equally reacting to light. EOMI. No scleral icterus.

No conjunctival pallor. Normocephalic, atraumatic. No pharyngeal erythema. No 

thyromegaly. 


CARDIOVASCULAR: S1 and S2 present. No murmurs, rubs, or gallops. 


PULMONARY: Diminished breath sounds with diffuse rhonchi noted


ABDOMEN: Soft, nontender, nondistended, normoactive bowel sounds. No palpable 

organomegaly. 


MUSCULOSKELETAL: No joint swelling or deformity.


EXTREMITIES: No cyanosis, clubbing, or pedal edema. 


NEUROLOGICAL: Gross neurological examination did not reveal any focal deficits. 


SKIN: No rashes. 








- Labs


CBC & Chem 7: 


                                 03/23/21 06:34





                                 03/24/21 06:25


Labs: 


                  Abnormal Lab Results - Last 24 Hours (Table)











  03/23/21 03/23/21 03/23/21 Range/Units





  06:34 06:34 11:37 


 


WBC  10.80 H    (4.50-10.00)  X 10*3/uL


 


RBC  4.12 L    (4.40-5.60)  X 10*6/uL


 


Hgb  12.6 L    (13.0-17.0)  g/dL


 


Hct  39.5 L    (39.6-50.0)  %


 


MCHC  31.9 L    (32.0-37.0)  g/dL


 


Immature Gran #  0.20 H    (0.00-0.04)  X 10*3/uL


 


Neutrophils #  8.40 H    (1.80-7.70)  X 10*3/uL


 


Eosinophils #  0.01 L    (0.04-0.35)  X 10*3/uL


 


D-Dimer     (<0.60)  mg/L FEU


 


BUN   42.0 H   (9.0-27.0)  mg/dL


 


Est GFR (CKD-EPI)NonAf   56.4 L   (60.0-200.0)   


 


BUN/Creatinine Ratio   35.00 H   (12.00-20.00)  Ratio


 


Glucose   195 H   ()  mg/dL


 


POC Glucose (mg/dL)    189 H  (75-99)  mg/dL


 


Total Protein   5.9 L   (6.2-8.2)  g/dL


 


Albumin   3.20 L   (3.80-4.90)  g/dL


 


Albumin/Globulin Ratio   1.19 L   (1.60-3.17)  g/dL














  03/23/21 03/23/21 03/24/21 Range/Units





  16:31 20:18 06:25 


 


WBC     (4.50-10.00)  X 10*3/uL


 


RBC     (4.40-5.60)  X 10*6/uL


 


Hgb     (13.0-17.0)  g/dL


 


Hct     (39.6-50.0)  %


 


MCHC     (32.0-37.0)  g/dL


 


Immature Gran #     (0.00-0.04)  X 10*3/uL


 


Neutrophils #     (1.80-7.70)  X 10*3/uL


 


Eosinophils #     (0.04-0.35)  X 10*3/uL


 


D-Dimer    2.56 H  (<0.60)  mg/L FEU


 


BUN     (9.0-27.0)  mg/dL


 


Est GFR (CKD-EPI)NonAf     (60.0-200.0)   


 


BUN/Creatinine Ratio     (12.00-20.00)  Ratio


 


Glucose     ()  mg/dL


 


POC Glucose (mg/dL)  328 H  280 H   (75-99)  mg/dL


 


Total Protein     (6.2-8.2)  g/dL


 


Albumin     (3.80-4.90)  g/dL


 


Albumin/Globulin Ratio     (1.60-3.17)  g/dL














  03/24/21 Range/Units





  07:05 


 


WBC   (4.50-10.00)  X 10*3/uL


 


RBC   (4.40-5.60)  X 10*6/uL


 


Hgb   (13.0-17.0)  g/dL


 


Hct   (39.6-50.0)  %


 


MCHC   (32.0-37.0)  g/dL


 


Immature Gran #   (0.00-0.04)  X 10*3/uL


 


Neutrophils #   (1.80-7.70)  X 10*3/uL


 


Eosinophils #   (0.04-0.35)  X 10*3/uL


 


D-Dimer   (<0.60)  mg/L FEU


 


BUN   (9.0-27.0)  mg/dL


 


Est GFR (CKD-EPI)NonAf   (60.0-200.0)   


 


BUN/Creatinine Ratio   (12.00-20.00)  Ratio


 


Glucose   ()  mg/dL


 


POC Glucose (mg/dL)  121 H  (75-99)  mg/dL


 


Total Protein   (6.2-8.2)  g/dL


 


Albumin   (3.80-4.90)  g/dL


 


Albumin/Globulin Ratio   (1.60-3.17)  g/dL














Assessment and Plan


Assessment: 





-acute hypoxic respiratory failure: Secondary to covid 19 pneumonia patient 

maintained on Decadron and vitamin and zinc supplements.  Patient has received 2

doses of Tocilizumab and is continued on Lovenox. inflammatory markers trending 

down.  Patient currently on Airvo with intermittent nonrebreather.  Pulmonary 

following


-Type 2 diabetes mellitus uncontrolled elevated blood sugars secondary to 

systemic steroids, continue with pre-meal, sliding scale, and long-acting


-Hyperkalemia, improved


-Hypotonic hyponatremia, improved


-Acute renal failure secondary to Covid 19: Improved, current creatinine is 1.2


-Hypertension 


-DVT subcutaneous Lovenox


-Full code





Plan:


Continue with current medications.  Will continue with dexamethasone along with 

vitamin and zinc supplements.  Lovenox has been added and patient has received 

Tocilizumab x2.  Pulmonary following .  Patient currently on nonrebreather along

with airvo requiring more oxygen.  PT/OT evaluating the patient and patient co

ntinues to be weak requiring subacute rehab and social work consult was placed 

to discuss possible ECF upon discharge once stabilized.  Will continue to 

monitor closely and make further recommendations based on the clinical course of

the patient.  Prognosis remains guarded.

## 2021-03-25 LAB
ANION GAP SERPL CALC-SCNC: 5 MMOL/L
BASOPHILS # BLD AUTO: 0 K/UL (ref 0–0.2)
BASOPHILS NFR BLD AUTO: 0 %
BUN SERPL-SCNC: 51 MG/DL (ref 9–20)
CALCIUM SPEC-MCNC: 8.8 MG/DL (ref 8.4–10.2)
CHLORIDE SERPL-SCNC: 103 MMOL/L (ref 98–107)
CO2 SERPL-SCNC: 29 MMOL/L (ref 22–30)
EOSINOPHIL # BLD AUTO: 0.2 K/UL (ref 0–0.7)
EOSINOPHIL NFR BLD AUTO: 2 %
ERYTHROCYTE [DISTWIDTH] IN BLOOD BY AUTOMATED COUNT: 4.32 M/UL (ref 4.3–5.9)
ERYTHROCYTE [DISTWIDTH] IN BLOOD: 13 % (ref 11.5–15.5)
GLUCOSE BLD-MCNC: 155 MG/DL (ref 75–99)
GLUCOSE BLD-MCNC: 288 MG/DL (ref 75–99)
GLUCOSE BLD-MCNC: 405 MG/DL (ref 75–99)
GLUCOSE BLD-MCNC: 77 MG/DL (ref 75–99)
GLUCOSE SERPL-MCNC: 181 MG/DL (ref 74–99)
HCT VFR BLD AUTO: 40.3 % (ref 39–53)
HGB BLD-MCNC: 13.2 GM/DL (ref 13–17.5)
LDH SPEC-CCNC: 1221 U/L (ref 313–618)
LYMPHOCYTES # SPEC AUTO: 0.8 K/UL (ref 1–4.8)
LYMPHOCYTES NFR SPEC AUTO: 6 %
MCH RBC QN AUTO: 30.5 PG (ref 25–35)
MCHC RBC AUTO-ENTMCNC: 32.7 G/DL (ref 31–37)
MCV RBC AUTO: 93.2 FL (ref 80–100)
MONOCYTES # BLD AUTO: 0.4 K/UL (ref 0–1)
MONOCYTES NFR BLD AUTO: 3 %
NEUTROPHILS # BLD AUTO: 12.3 K/UL (ref 1.3–7.7)
NEUTROPHILS NFR BLD AUTO: 89 %
PLATELET # BLD AUTO: 277 K/UL (ref 150–450)
POTASSIUM SERPL-SCNC: 5.1 MMOL/L (ref 3.5–5.1)
SODIUM SERPL-SCNC: 137 MMOL/L (ref 137–145)
WBC # BLD AUTO: 13.9 K/UL (ref 3.8–10.6)

## 2021-03-25 RX ADMIN — PIOGLITAZONE SCH MG: 30 TABLET ORAL at 08:04

## 2021-03-25 RX ADMIN — METOPROLOL SUCCINATE SCH MG: 50 TABLET, EXTENDED RELEASE ORAL at 08:04

## 2021-03-25 RX ADMIN — INSULIN ASPART SCH: 100 INJECTION, SOLUTION INTRAVENOUS; SUBCUTANEOUS at 07:58

## 2021-03-25 RX ADMIN — INSULIN ASPART SCH: 100 INJECTION, SOLUTION INTRAVENOUS; SUBCUTANEOUS at 14:59

## 2021-03-25 RX ADMIN — FAMOTIDINE SCH MG: 20 TABLET, FILM COATED ORAL at 08:04

## 2021-03-25 RX ADMIN — INSULIN ASPART SCH UNIT: 100 INJECTION, SOLUTION INTRAVENOUS; SUBCUTANEOUS at 17:09

## 2021-03-25 RX ADMIN — Medication SCH MG: at 08:04

## 2021-03-25 RX ADMIN — METHYLPREDNISOLONE SODIUM SUCCINATE SCH: 125 INJECTION, POWDER, FOR SOLUTION INTRAMUSCULAR; INTRAVENOUS at 17:05

## 2021-03-25 RX ADMIN — CLOPIDOGREL BISULFATE SCH MG: 75 TABLET ORAL at 08:04

## 2021-03-25 RX ADMIN — INSULIN DETEMIR SCH UNIT: 100 INJECTION, SOLUTION SUBCUTANEOUS at 20:40

## 2021-03-25 RX ADMIN — OXYCODONE HYDROCHLORIDE AND ACETAMINOPHEN SCH MG: 500 TABLET ORAL at 08:04

## 2021-03-25 RX ADMIN — INSULIN ASPART SCH UNIT: 100 INJECTION, SOLUTION INTRAVENOUS; SUBCUTANEOUS at 20:39

## 2021-03-25 RX ADMIN — INSULIN ASPART SCH UNIT: 100 INJECTION, SOLUTION INTRAVENOUS; SUBCUTANEOUS at 12:04

## 2021-03-25 RX ADMIN — ATORVASTATIN CALCIUM SCH MG: 80 TABLET, FILM COATED ORAL at 08:04

## 2021-03-25 RX ADMIN — ASPIRIN 81 MG CHEWABLE TABLET SCH MG: 81 TABLET CHEWABLE at 08:04

## 2021-03-25 RX ADMIN — INSULIN ASPART SCH UNIT: 100 INJECTION, SOLUTION INTRAVENOUS; SUBCUTANEOUS at 17:08

## 2021-03-25 RX ADMIN — METHYLPREDNISOLONE SODIUM SUCCINATE SCH MG: 125 INJECTION, POWDER, FOR SOLUTION INTRAMUSCULAR; INTRAVENOUS at 21:35

## 2021-03-25 RX ADMIN — METHYLPREDNISOLONE SODIUM SUCCINATE SCH MG: 125 INJECTION, POWDER, FOR SOLUTION INTRAMUSCULAR; INTRAVENOUS at 15:02

## 2021-03-25 RX ADMIN — ENOXAPARIN SODIUM SCH MG: 40 INJECTION SUBCUTANEOUS at 08:04

## 2021-03-25 NOTE — P.PN
Subjective


Progress Note Date: 03/25/21











80-year-old male, who was brought to the emergency department by EMS.  The 

patient apparently has had 2 weeks' worth of increasing shortness of breath, and

significant fatigue.  The patient also had chest congestion.  He had a fever.  

The patient states that he is just not been feeling well and getting 

progressively worse.  He also apparently had an episode or 2 of diarrhea.  The 

patient denied any chest pain or chest pressure or palpitations.  The patient 

also denied nausea, vomiting, and abdominal pain.  Apparently when EMS arrived, 

the patient's saturations were in the low 80s on room air.  For that reason, he 

was transported in, evaluated, and admitted with a diagnosis of COVID 19 19 

pneumonia.  The patient does have a history of diabetes, hypertension, and a 

previous pacemaker insertion.  White count was 4.6, hemoglobin 12.5, hematocrit 

37.1, and platelet count 153,000.  PT, INR, and PTT were all normal.  D-dimer 

was 0.77.  Sodium 136, potassium 4.5, chlorides 102, CO2 26, anion gap 8, BUN 

47, and creatinine 1.63.  Ferritin was 985, , C-reactive protein 58, and 

pro-calcitonin level was 0.12.  Chest x-ray did show some interstitial 

infiltrates.





On 03/18/2021 patient seen in follow-up on medical floor.  he is on 2 L of 

oxygen his pulse ox is 90-94%, breathing comfortably, no fever or chills, blood 

pressure has been stable.  Denies any worsening dyspnea or hypoxemia, he has a 

mild cough, no chest discomfort.  He continues on oral Decadron 6 mg daily, IV 

hydration, vitamins, d-dimer was low at 0.77, patient is on subcu heparin for 

DVT prophylaxis, pro-calcitonin level was low, inflammatory markers were not 

significantly elevated on admission.  Clinically symptoms have not progressed, 

and patient will be considered for discharge in next 24 hours.





The patient is seen today 03/19/2021 in follow-up on the regular medical floor. 

He is currently sitting up in chair at the bedside.  Awake and alert in no acute

distress.  Continue O2 saturations in the low 90s on 2 L/m per nasal cannula.  

Afebrile.  Hemodynamically stable.  Blood cultures reveal no growth.  Blood g

lucose 202.  Sodium 143.  Potassium 5.5.  Creatinine 1.19.  He remains on 

dexamethasone, so continues heparin, vitamin supplements.  Chest x-ray continues

to show bilateral multifocal opacities consistent with CoVID infection.  No 

change.





The patient is seen today 03/20/2021 in follow-up on the regular medical floor. 

He is currently resting comfortably in bed.  Awake and alert in no acute distre

ss.  He is maintaining O2 saturations in the 90s on 2 L/m per nasal cannula.  

He's been on dexamethasone, vitamin supplements, subcutaneous heparin.  No 

worsening shortness of breath, cough or congestion.  D-dimer 1.02.  Sodium 145. 

Potassium 5.1.  Creatinine 1.3.  .  C-reactive protein 4.4.





The patient is seen today 03/21/2021 in follow-up on the regular medical floor. 

He is currently resting quite comfortably in bed.  Maintaining O2 saturations in

the low 90s on 2 L/m per nasal cannula.  Sodium 144.  Potassium 4.7.  Creatinine

1.2.  Glucose 173.  He remains on dexamethasone, heparin, vitamin supplements.








03/22/2021 on seeing the patient for a follow-up regarding the patient's acute 

hypoxic respiratory failure due to Covid 19 related pneumonia.  The patient was 

not a candidate for Remdesivir, Tocilizumab or convalescent plasma, and the 

patient is currently on Decadron 6 mg on a daily basis and addition to Levemir 

insulin 50 units daily at bedtime and NovoLog 6 units 3 times a day with meals 

and a sliding scale coverage.  The patient was on 2 L about 2 by nasal cannula 

with pulse ox of 93%.  Earlier this morning, the patient's oxidation 

progressively got worse in the patient is currently on 100% nonrebreather 

facemask maintaining a saturation above 90% at around 94%.  Overall, the patient

is doing well.  Creatinine is at 1.2.  The patient had a CRP level of 4.4 with 

an LDH level of 485 from 03/20/2021.  Inflammatory markers are being monitored. 

Last d-dimer from 03/20/2021 was 1.02.  Blood cultures were negative repeat 

chest x-ray was done today and the findings are essentially stable and the 

patient has patchy bilateral perihilar pulmonary infiltrates without any 

significant interval change.  D-dimer from today is at 1.7 and the patient 

remains on Lovenox.





03/23/2021 the patient is being seen for a follow-up.  The patient is currently 

on high flow oxygen at 6 L.  Note that the patient was on 100% nonrebreather 

facemask and was switched him to a high flow oxygen yesterday at 60 L.  As part 

of his treatment, the patient received steroids and currently is receiving 

dexamethasone 6 mg by mouth daily.  He is also Toci 2 yesterday and today.  He 

completed treatment without any major side effects.  Note that the patient was 

on low-flow oxygen and he progressively got worse requiring 100% nonrebreather 

and subsequent high flow oxygen.  On today's evaluation, his white cell count is

at 10.  His d-dimer is at 1.6.  Rest of the inflammatory markers have not been 

checked and this will be repeated tomorrow.  LDH from yesterday was 491 with a 

CRP of 9.4.  Otherwise, his renal function shows a stable creatinine of 1.2.  

The patient has been on FiO2 of 60% with a high flow oxygen of 60 L and the 

patient has been essentially stable since yesterday.  No other significant 

events.  Remains on Lovenox 40 mg subcu on a daily basis. 





03/24/2021, the patient is still on high flow oxygen and 60 L.  He is not 

utilizing the 100% nonrebreather facemask this morning.  Note that the patient 

has received steroids, and he remains on Decadron 6 mg by mouth daily. He also 

completed Tocilizumab.  On today's evaluation, chest x-ray findings are stable 

without any interval change in the bilateral pulmonary infiltrates.  The patient

is resting comfortably in bed.  He is also able to move on a recliner.  Labs 

today shows an LDH of 414 and a CRP of 2.9 and the patient is d-dimer is at 

2.56.  The patient remains on Lovenox 40 mg subcu on a daily basis.  He is on 

Levemir insulin 50 units daily along with NovoLog 6 units with meals plus a 

sliding coverage.  I will say his condition essentially the same as unchanged 

compared to yesterday.  He is not doing much in progress for now. 





On today's evaluation of 03/25/2021 the patient is being seen for a follow-up.  

The patient remains on high flow oxygen with a flow of 6 L an FiO2 of 60%.  His 

current pulse ox is around 88%.  He is afebrile.  He is doing well.  Sitting up 

on a chair.  No signs of any respiratory distress.  His breathing is nonlabored 

and he doesn't have any significant cough unless he takes a deep inspiration.  

The patient  had follow-up blood work today.  His LDL level is up 1221 and a CRP

level is up to 12.6 and his d-dimer currently is at 2.95.  I cannot explain the 

rise in these inflammatory markers.  His creatinine is at 1.2.  Was a causative 

13.7 with a hemoglobin of 13.2.  His chest x-ray from yesterday was showing 

right basilar infiltrates which are nonspecific and it was mentioned that he was

getting worse.  The patient remains on Decadron 6 mg by mouth daily.  He remains

on 50 units of Levemir insulin along with 6 units of NovoLog around-the-clock.  

He remains on aspirin.  He remains on Lovenox for DVT prophylaxis 40 mg subcu.


 





Objective





- Vital Signs


Vital signs: 


                                   Vital Signs











Temp  97.6 F   03/25/21 10:00


 


Pulse  59 L  03/25/21 10:00


 


Resp  20   03/25/21 10:00


 


BP  112/63   03/25/21 10:00


 


Pulse Ox  88 L  03/25/21 10:00








                                 Intake & Output











 03/24/21 03/25/21 03/25/21





 18:59 06:59 18:59


 


Intake Total   120


 


Output Total 1000 750 


 


Balance -1000 -750 120


 


Intake:   


 


  Oral   120


 


Output:   


 


  Urine 1000 750 


 


Other:   


 


  Voiding Method Toilet Toilet Toilet














- Exam











GENERAL EXAM: Alert, pleasant, 80-year-old male patient, on 60l with an FiO2 of 

65


HEAD: Normocephalic/atraumatic.


EYES: Normal reaction of pupils, equal size.  Conjunctiva pink, sclera white.


NOSE: Clear with pink turbinates.


THROAT: No erythema or exudates.


NECK: No masses, no JVD, no thyroid enlargement, no adenopathy.


CHEST: No chest wall deformity.  Symmetrical expansion. 


LUNGS: Equal air entry with bibasilar coarse crackles


CVS: Regular rate and rhythm, normal S1 and S2, no gallops, no murmurs, no rubs


ABDOMEN: Soft, nontender.  No hepatosplenomegaly, normal bowel so milligrams 

unds, no guarding or rigidity.


EXTREMITIES: No clubbing, no edema, no cyanosis, 2+ pulses and upper and lower 

extremities.


MUSCULOSKELETAL: Muscle strength and tone normal.


SPINE: No scoliosis or deformity


SKIN: No rashes


CENTRAL NERVOUS SYSTEM: No focal deficits, tone is normal in all 4 extremities.


PSYCHIATRIC: Alert and oriented -3.  Appropriate affect.  Intact judgment and 

insight.





- Labs


CBC & Chem 7: 


                                 03/25/21 11:57





                                 03/25/21 11:57


Labs: 


                  Abnormal Lab Results - Last 24 Hours (Table)











  03/24/21 03/24/21 03/25/21 Range/Units





  17:11 20:35 11:55 


 


WBC     (3.8-10.6)  k/uL


 


Neutrophils #     (1.3-7.7)  k/uL


 


Lymphocytes #     (1.0-4.8)  k/uL


 


D-Dimer     (<0.60)  mg/L FEU


 


BUN     (9-20)  mg/dL


 


Glucose     (74-99)  mg/dL


 


POC Glucose (mg/dL)  229 H  301 H  155 H  (75-99)  mg/dL


 


Lactate Dehydrogenase     (313-618)  U/L


 


C-Reactive Protein     (<10.0)  mg/L














  03/25/21 03/25/21 03/25/21 Range/Units





  11:57 11:57 11:57 


 


WBC  13.9 H    (3.8-10.6)  k/uL


 


Neutrophils #  12.3 H    (1.3-7.7)  k/uL


 


Lymphocytes #  0.8 L    (1.0-4.8)  k/uL


 


D-Dimer   2.95 H   (<0.60)  mg/L FEU


 


BUN    51 H  (9-20)  mg/dL


 


Glucose    181 H  (74-99)  mg/dL


 


POC Glucose (mg/dL)     (75-99)  mg/dL


 


Lactate Dehydrogenase    1221 H  (313-618)  U/L


 


C-Reactive Protein    12.6 H  (<10.0)  mg/L














Assessment and Plan


Plan: 








1  Acute hypoxic respiratory failure secondary to CoVID 19 pneumonia, currently 

on Decadron 6 mg and this received Tocilizumab 2 doses,   the patient on high 

flow oxygen 60 L with an FiO2 of 65%..   The chest x-ray findings are stable 

without any interval improvement or change.  Inflammatory markers are down with 

LDH is down to 414 and the CRP level is down to 2.9.  Subsequently, the LDH and 

the CRP went up and the d-dimer is slightly also elevated.  Despite this rise in

inflammatory markers, the patient is clinically unchanged.





2  History of diabetes mellitus type 2, currently on Levemir insulin for blood 

sugar control





3  History of hypertension





4  History of chronic sinus disease





5  History of diverticulitis, status post bowel resection





Plan:


 


Continue steroids and stop the Decadron is with patient Solu-Medrol.


Continue Lovenox 40 mg subcu for DVT prophylaxis


Completed Tocilizumab 2  


Repeat chest x-ray for a markers of this morning was noted and no major changes.

 Chest x-ray findings are stable.  The markers are fluctuating.  After the 

decline, the LDH and the d-dimer and the CRP are back elevated.


Keep the high flow oxygen 60 L with an FiO2 of 65% 


Titrate the FiO2 as tolerated


We will continue to follow

## 2021-03-25 NOTE — P.PN
Subjective


Progress Note Date: 03/25/21





80-year-old male who presents emergency Department stating that he has had 

shortness of breath and feeling fatigued for at least 2 weeks.  Patient states 

he has had exposure to COVID .  Patient states he has had a fever.  Patient 

states she's also states and smile.  Patient also states she's had diarrhea.  

Patient denies any chest pain or palpitations.  Patient denies any abdominal 

pain patient denies nausea vomiting diarrhea.  According to EMS with the patient

was coming in he was satting in the low 80s.  Patient is currently on a few 

liters of oxygen and sat in mid 90s.  Patient is a diabetic and has high blood 

pressure.  Patient also has a pacemaker.  Patient is presently on 2 L of oxygen 

was requiring 4 L yesterday.  Patient was started on IV fluids patient 

creatinine went up to 1.6 baseline is around 1.  Patient's blood sugars are 

highly elevated.  Patient tests positive for Covid.





03/18/2021


Patient is presently on 2 L of oxygen appears to be doing better possibility of 

discharge tomorrow.  Creatinine went up a bit to 1.6 baseline is around the 1.  

Patient was started on IV fluids will recheck the basic metabolic profile 

tomorrow.





03/19/2021


Patient seen and evaluated in follow-up continues to be on 2 L of oxygen and 

becomes dyspneic with exertion.  Patient states he does not know wear oxygen at 

home.  Pulmonary is following.  Patient's blood sugars are elevated and will 

continue sliding scale at this time as patient is on steroids.  Potassium was 

found to be slightly elevated at 5.5 and was given a dose of Kayexalate.  Will 

repeat a.m. labs.  Instructed and encouraged the patient to get up and increase 

activity as tolerated.





03/20/2021


Patient is presently in 2 does of hours and doing clinically well.  Patient is 

on dexamethasone, vitamin supplements, subcutaneous heparin.  No worsening 

shortness of breath, cough or congestion.  D-dimer 1.02.  Sodium 145.  Potassium

5.1.  Creatinine 1.3.  .  C-reactive protein 4.4.  His creatinine 

actually went up a little bit from 1.19-1.3





03/21/2021


Patient is fairly stable no significant improvement or worsening.  Patient is 

still on 2 L of oxygen.  We'll monitor him 1 more night.  Continues to require 

oxygen patient probably can be discharged on 2 L tomorrow this can be tapered or

discontinued as an outpatient.  Patient d-dimer is bit worse compared to 

admission.  Although other inflammatory markers are better








03/22/2021


Patient is seen this morning currently on nonrebreather and airvo as his oxygen 

was found to be 89% on 6 L of oxygen.  Pulmonary is following.  D-dimer has gone

up at 1.70 along with lactate dehydrogenase at 491 and CRP is 9.4.  Blood sugars

continue to be elevated and patient is maintained on sliding scale along with 

long-acting and pre-meal insulin and will continue at this time.  Patient 

continues on vitamin and zinc supplements along with Lovenox and dexamethasone 

daily.  Patient is scheduled to receive Tocilizumab today.  Will continue to 

monitor closely based on the clinical course of the patient.  Patient continues 

to be weak and was seen and evaluated by physical therapy recommending subacute 

rehab and patient is now agreeable and a social work consult was placed. 





03/23/2021


Patient is seen and evaluated this morning with no improvement in respiratory 

status.  Patient remains on Airvo with supplemental nonrebreather.  Patient is 

day 2 of receiving Tocilizumab.  Will repeat a.m. chest x-ray along with 

inflammatory markers.  D-dimer today was 1.60.  White blood count has gone up in

is 10.8, hemoglobin is 12.6.  Patient is afebrile.  To continue with dexametha

sone, Lovenox, vitamin C and zinc supplements.  Pulmonary is following.  Had a 

discussion with the patient about CODE STATUS along with his son Mamadou and 

currently wish to remain a full code until talking with family member more 

extensively about the situation.  Will continue to monitor closely.  Prognosis 

remains guarded.





03/24/2021


Patient is seen this morning status post getting up to the bathroom and 

continues to be extremely dyspneic with any exertion and was found to be low 80s

to 83% oxygenation.  Patient continues on the airvo with a flow rate of 60 and 

FiO2 of 64 and is currently 91%.  Patient denies any chest pain or palpitations.

 Patient is afebrile.  Patient reports to tolerating diet with no nausea or 

vomiting noted.  D-dimer continues to be elevated at 2.56, sodium today is 140 

with a potassium of 5.1 and creatinine is 1.2.  Blood sugars on the lower side 

this morning and will continue to monitor closely and treat accordingly with 

sliding scale.  Inflammatory markers trending down.  Pulmonary following 

closely.  Chest x-ray today shows continued bibasilar infiltrates which are 

nonspecific but worsening.





03/25/2021





Patient is seen in follow-up this morning and continues to be on Airvo no 

significant change.  Patient was on dexamethasone all being transitioned to IV 

Solu-Medrol and will continue with Accu-Cheks and close glycemic control with 

pre-meal, long-acting, and sliding scale.  White blood count is 13.9, hemoglobin

is stable at 13.2, d-dimer is 2.95, sodium is 137, potassium is 5.1, creatinine 

slightly elevated at 1.24.  Inflammatory markers have gone up.  Patient is 

sitting up in the chair and denies any chest pain or worsening shortness of 

breath.  Patient states he feels he is about the same.  Patient also states he 

has been sleeping a lot and tolerating diet with no reports of nausea or 

vomiting.  Discussed with patient about increasing activity as tolerated 

although patient is extremely dyspneic with minimal exertion.





Constitutional: Reports fatigue, denied any fever.


Cardio vascular: denied any chest pain, palpitations


Gastrointestinal denied any nausea vomiting


Pulmonary: Reports continued shortness of breath 


Neurologic reports generalized weakness 





All inpatient medications were reviewed and appropriate changes in these 

medications as dictated in the interval history and assessment and plan.











Objective





- Vital Signs


Vital signs: 


                                   Vital Signs











Temp  97.7 F   03/25/21 05:46


 


Pulse  60   03/25/21 05:46


 


Resp  20   03/25/21 05:46


 


BP  118/73   03/25/21 05:46


 


Pulse Ox  90 L  03/25/21 05:46








                                 Intake & Output











 03/24/21 03/25/21 03/25/21





 18:59 06:59 18:59


 


Output Total 1000 750 


 


Balance -1000 -750 


 


Output:   


 


  Urine 1000 750 


 


Other:   


 


  Voiding Method Toilet Toilet 














- Exam





GENERAL: The patient is alert and oriented x3, not in any acute distress.  

Obese, currently on a nonrebreather and airvo at a flow rate of 60 with an FiO2 

of 62, slowly weaning as tolerated


HEENT: Pupils are round and equally reacting to light. EOMI. No scleral icterus.

No conjunctival pallor. Normocephalic, atraumatic. No pharyngeal erythema. No 

thyromegaly. 


CARDIOVASCULAR: S1 and S2 present. No murmurs, rubs, or gallops. 


PULMONARY: Diminished breath sounds with diffuse rhonchi noted


ABDOMEN: Soft, nontender, nondistended, normoactive bowel sounds. No palpable 

organomegaly. 


MUSCULOSKELETAL: No joint swelling or deformity.


EXTREMITIES: No cyanosis, clubbing, or pedal edema. 


NEUROLOGICAL: Gross neurological examination did not reveal any focal deficits. 


SKIN: No rashes. 








- Labs


CBC & Chem 7: 


                                 03/25/21 11:57





                                 03/25/21 11:57


Labs: 


                  Abnormal Lab Results - Last 24 Hours (Table)











  03/24/21 03/24/21 03/24/21 Range/Units





  06:25 11:35 17:11 


 


BUN  45.0 H    (9.0-27.0)  mg/dL


 


Est GFR (CKD-EPI)NonAf  56.4 L    (60.0-200.0)   


 


BUN/Creatinine Ratio  37.50 H    (12.00-20.00)  Ratio


 


Glucose  117 H    ()  mg/dL


 


POC Glucose (mg/dL)   104 H  229 H  (75-99)  mg/dL


 


Lactate Dehydrogenase  414 H    (120-246)  U/L


 


C-Reactive Protein  2.9 H    (0.0-0.8)  mg/dL


 


Total Protein  6.1 L    (6.2-8.2)  g/dL


 


Albumin  3.30 L    (3.80-4.90)  g/dL


 


Albumin/Globulin Ratio  1.18 L    (1.60-3.17)  g/dL














  03/24/21 Range/Units





  20:35 


 


BUN   (9.0-27.0)  mg/dL


 


Est GFR (CKD-EPI)NonAf   (60.0-200.0)   


 


BUN/Creatinine Ratio   (12.00-20.00)  Ratio


 


Glucose   ()  mg/dL


 


POC Glucose (mg/dL)  301 H  (75-99)  mg/dL


 


Lactate Dehydrogenase   (120-246)  U/L


 


C-Reactive Protein   (0.0-0.8)  mg/dL


 


Total Protein   (6.2-8.2)  g/dL


 


Albumin   (3.80-4.90)  g/dL


 


Albumin/Globulin Ratio   (1.60-3.17)  g/dL














Assessment and Plan


Assessment: 





-acute hypoxic respiratory failure: Secondary to covid 19 pneumonia patient 

maintained on vitamin and zinc supplements.  Patient was on Decadron and being 

transitioned IV solu-Medrol.  has received 2 doses of Tocilizumab and is 

continued on Lovenox. inflammatory markers ,Patient currently on Airvo with 

intermittent nonrebreather.  Pulmonary following


-Type 2 diabetes mellitus uncontrolled elevated blood sugars secondary to 

systemic steroids, continue with pre-meal, sliding scale, and long-acting


-Hyperkalemia, improved


-Hypotonic hyponatremia, improved


-Acute renal failure secondary to Covid 19: Improved, current creatinine is 1.24


-Hypertension 


-DVT subcutaneous Lovenox


-Full code





Plan:


Continue with current medications.  Will continue IV Solu-Medrolith vitamin and 

zinc supplements.  Lovenox has been added and patient has received Tocilizumab 

x2.  Pulmonary following .  Patient currently on  intermittent nonrebreather 

along with airvo slowly weaning as tolerated.  Inflammatory markers trending up 

and will repeat and monitor closely tomorrow.  Repeat chest x-ray in the 

morning.  PT/OT following and patient may likely require ECF for continued PT/OT

therapy due to weakness once stabilized and discharged.  Will continue to monito

r closely and make further recommendations based on the clinical course of the 

patient.  Prognosis remains guarded.

## 2021-03-26 LAB
ANION GAP SERPL CALC-SCNC: 6.4 MMOL/L (ref 4–12)
BUN SERPL-SCNC: 45 MG/DL (ref 9–27)
BUN/CREAT SERPL: 37.5 RATIO (ref 12–20)
CALCIUM SPEC-MCNC: 8.6 MG/DL (ref 8.7–10.3)
CHLORIDE SERPL-SCNC: 105 MMOL/L (ref 96–109)
CO2 SERPL-SCNC: 24.6 MMOL/L (ref 21.6–31.8)
GLUCOSE BLD-MCNC: 288 MG/DL (ref 75–99)
GLUCOSE BLD-MCNC: 291 MG/DL (ref 75–99)
GLUCOSE BLD-MCNC: 311 MG/DL (ref 75–99)
GLUCOSE BLD-MCNC: 407 MG/DL (ref 75–99)
GLUCOSE SERPL-MCNC: 325 MG/DL (ref 70–110)
LDH SPEC-CCNC: 419 U/L (ref 120–246)
POTASSIUM SERPL-SCNC: 5.5 MMOL/L (ref 3.5–5.5)
SODIUM SERPL-SCNC: 136 MMOL/L (ref 135–145)

## 2021-03-26 RX ADMIN — INSULIN ASPART SCH UNIT: 100 INJECTION, SOLUTION INTRAVENOUS; SUBCUTANEOUS at 17:27

## 2021-03-26 RX ADMIN — INSULIN ASPART SCH UNIT: 100 INJECTION, SOLUTION INTRAVENOUS; SUBCUTANEOUS at 07:39

## 2021-03-26 RX ADMIN — ENOXAPARIN SODIUM SCH MG: 40 INJECTION SUBCUTANEOUS at 07:38

## 2021-03-26 RX ADMIN — PIOGLITAZONE SCH MG: 30 TABLET ORAL at 07:37

## 2021-03-26 RX ADMIN — METHYLPREDNISOLONE SODIUM SUCCINATE SCH MG: 125 INJECTION, POWDER, FOR SOLUTION INTRAMUSCULAR; INTRAVENOUS at 23:21

## 2021-03-26 RX ADMIN — INSULIN DETEMIR SCH UNIT: 100 INJECTION, SOLUTION SUBCUTANEOUS at 20:55

## 2021-03-26 RX ADMIN — INSULIN ASPART SCH UNIT: 100 INJECTION, SOLUTION INTRAVENOUS; SUBCUTANEOUS at 11:50

## 2021-03-26 RX ADMIN — METHYLPREDNISOLONE SODIUM SUCCINATE SCH MG: 125 INJECTION, POWDER, FOR SOLUTION INTRAMUSCULAR; INTRAVENOUS at 11:50

## 2021-03-26 RX ADMIN — INSULIN ASPART SCH UNIT: 100 INJECTION, SOLUTION INTRAVENOUS; SUBCUTANEOUS at 20:55

## 2021-03-26 RX ADMIN — ATORVASTATIN CALCIUM SCH MG: 80 TABLET, FILM COATED ORAL at 07:38

## 2021-03-26 RX ADMIN — OXYCODONE HYDROCHLORIDE AND ACETAMINOPHEN SCH MG: 500 TABLET ORAL at 07:38

## 2021-03-26 RX ADMIN — ASPIRIN 81 MG CHEWABLE TABLET SCH MG: 81 TABLET CHEWABLE at 07:38

## 2021-03-26 RX ADMIN — METOPROLOL SUCCINATE SCH MG: 50 TABLET, EXTENDED RELEASE ORAL at 07:38

## 2021-03-26 RX ADMIN — Medication SCH MG: at 07:38

## 2021-03-26 RX ADMIN — METHYLPREDNISOLONE SODIUM SUCCINATE SCH MG: 125 INJECTION, POWDER, FOR SOLUTION INTRAMUSCULAR; INTRAVENOUS at 17:37

## 2021-03-26 RX ADMIN — FAMOTIDINE SCH MG: 20 TABLET, FILM COATED ORAL at 07:37

## 2021-03-26 RX ADMIN — CLOPIDOGREL BISULFATE SCH MG: 75 TABLET ORAL at 07:38

## 2021-03-26 RX ADMIN — METHYLPREDNISOLONE SODIUM SUCCINATE SCH MG: 125 INJECTION, POWDER, FOR SOLUTION INTRAMUSCULAR; INTRAVENOUS at 05:58

## 2021-03-26 NOTE — P.PN
Subjective


Progress Note Date: 03/26/21











80-year-old male, who was brought to the emergency department by EMS.  The 

patient apparently has had 2 weeks' worth of increasing shortness of breath, and

significant fatigue.  The patient also had chest congestion.  He had a fever.  

The patient states that he is just not been feeling well and getting 

progressively worse.  He also apparently had an episode or 2 of diarrhea.  The 

patient denied any chest pain or chest pressure or palpitations.  The patient 

also denied nausea, vomiting, and abdominal pain.  Apparently when EMS arrived, 

the patient's saturations were in the low 80s on room air.  For that reason, he 

was transported in, evaluated, and admitted with a diagnosis of COVID 19 19 

pneumonia.  The patient does have a history of diabetes, hypertension, and a 

previous pacemaker insertion.  White count was 4.6, hemoglobin 12.5, hematocrit 

37.1, and platelet count 153,000.  PT, INR, and PTT were all normal.  D-dimer 

was 0.77.  Sodium 136, potassium 4.5, chlorides 102, CO2 26, anion gap 8, BUN 

47, and creatinine 1.63.  Ferritin was 985, , C-reactive protein 58, and 

pro-calcitonin level was 0.12.  Chest x-ray did show some interstitial 

infiltrates.





On 03/18/2021 patient seen in follow-up on medical floor.  he is on 2 L of 

oxygen his pulse ox is 90-94%, breathing comfortably, no fever or chills, blood 

pressure has been stable.  Denies any worsening dyspnea or hypoxemia, he has a 

mild cough, no chest discomfort.  He continues on oral Decadron 6 mg daily, IV 

hydration, vitamins, d-dimer was low at 0.77, patient is on subcu heparin for 

DVT prophylaxis, pro-calcitonin level was low, inflammatory markers were not 

significantly elevated on admission.  Clinically symptoms have not progressed, 

and patient will be considered for discharge in next 24 hours.





The patient is seen today 03/19/2021 in follow-up on the regular medical floor. 

He is currently sitting up in chair at the bedside.  Awake and alert in no acute

distress.  Continue O2 saturations in the low 90s on 2 L/m per nasal cannula.  

Afebrile.  Hemodynamically stable.  Blood cultures reveal no growth.  Blood g

lucose 202.  Sodium 143.  Potassium 5.5.  Creatinine 1.19.  He remains on 

dexamethasone, so continues heparin, vitamin supplements.  Chest x-ray continues

to show bilateral multifocal opacities consistent with CoVID infection.  No 

change.





The patient is seen today 03/20/2021 in follow-up on the regular medical floor. 

He is currently resting comfortably in bed.  Awake and alert in no acute distre

ss.  He is maintaining O2 saturations in the 90s on 2 L/m per nasal cannula.  

He's been on dexamethasone, vitamin supplements, subcutaneous heparin.  No 

worsening shortness of breath, cough or congestion.  D-dimer 1.02.  Sodium 145. 

Potassium 5.1.  Creatinine 1.3.  .  C-reactive protein 4.4.





The patient is seen today 03/21/2021 in follow-up on the regular medical floor. 

He is currently resting quite comfortably in bed.  Maintaining O2 saturations in

the low 90s on 2 L/m per nasal cannula.  Sodium 144.  Potassium 4.7.  Creatinine

1.2.  Glucose 173.  He remains on dexamethasone, heparin, vitamin supplements.








03/22/2021 on seeing the patient for a follow-up regarding the patient's acute 

hypoxic respiratory failure due to Covid 19 related pneumonia.  The patient was 

not a candidate for Remdesivir, Tocilizumab or convalescent plasma, and the 

patient is currently on Decadron 6 mg on a daily basis and addition to Levemir 

insulin 50 units daily at bedtime and NovoLog 6 units 3 times a day with meals 

and a sliding scale coverage.  The patient was on 2 L about 2 by nasal cannula 

with pulse ox of 93%.  Earlier this morning, the patient's oxidation 

progressively got worse in the patient is currently on 100% nonrebreather 

facemask maintaining a saturation above 90% at around 94%.  Overall, the patient

is doing well.  Creatinine is at 1.2.  The patient had a CRP level of 4.4 with 

an LDH level of 485 from 03/20/2021.  Inflammatory markers are being monitored. 

Last d-dimer from 03/20/2021 was 1.02.  Blood cultures were negative repeat 

chest x-ray was done today and the findings are essentially stable and the 

patient has patchy bilateral perihilar pulmonary infiltrates without any 

significant interval change.  D-dimer from today is at 1.7 and the patient 

remains on Lovenox.





03/23/2021 the patient is being seen for a follow-up.  The patient is currently 

on high flow oxygen at 6 L.  Note that the patient was on 100% nonrebreather 

facemask and was switched him to a high flow oxygen yesterday at 60 L.  As part 

of his treatment, the patient received steroids and currently is receiving 

dexamethasone 6 mg by mouth daily.  He is also Toci 2 yesterday and today.  He 

completed treatment without any major side effects.  Note that the patient was 

on low-flow oxygen and he progressively got worse requiring 100% nonrebreather 

and subsequent high flow oxygen.  On today's evaluation, his white cell count is

at 10.  His d-dimer is at 1.6.  Rest of the inflammatory markers have not been 

checked and this will be repeated tomorrow.  LDH from yesterday was 491 with a 

CRP of 9.4.  Otherwise, his renal function shows a stable creatinine of 1.2.  

The patient has been on FiO2 of 60% with a high flow oxygen of 60 L and the 

patient has been essentially stable since yesterday.  No other significant 

events.  Remains on Lovenox 40 mg subcu on a daily basis. 





03/24/2021, the patient is still on high flow oxygen and 60 L.  He is not 

utilizing the 100% nonrebreather facemask this morning.  Note that the patient 

has received steroids, and he remains on Decadron 6 mg by mouth daily. He also 

completed Tocilizumab.  On today's evaluation, chest x-ray findings are stable 

without any interval change in the bilateral pulmonary infiltrates.  The patient

is resting comfortably in bed.  He is also able to move on a recliner.  Labs 

today shows an LDH of 414 and a CRP of 2.9 and the patient is d-dimer is at 

2.56.  The patient remains on Lovenox 40 mg subcu on a daily basis.  He is on 

Levemir insulin 50 units daily along with NovoLog 6 units with meals plus a 

sliding coverage.  I will say his condition essentially the same as unchanged 

compared to yesterday.  He is not doing much in progress for now. 





On today's evaluation of 03/25/2021 the patient is being seen for a follow-up.  

The patient remains on high flow oxygen with a flow of 6 L an FiO2 of 60%.  His 

current pulse ox is around 88%.  He is afebrile.  He is doing well.  Sitting up 

on a chair.  No signs of any respiratory distress.  His breathing is nonlabored 

and he doesn't have any significant cough unless he takes a deep inspiration.  

The patient  had follow-up blood work today.  His LDL level is up 1221 and a CRP

level is up to 12.6 and his d-dimer currently is at 2.95.  I cannot explain the 

rise in these inflammatory markers.  His creatinine is at 1.2.  Was a causative 

13.7 with a hemoglobin of 13.2.  His chest x-ray from yesterday was showing 

right basilar infiltrates which are nonspecific and it was mentioned that he was

getting worse.  The patient remains on Decadron 6 mg by mouth daily.  He remains

on 50 units of Levemir insulin along with 6 units of NovoLog around-the-clock.  

He remains on aspirin.  He remains on Lovenox for DVT prophylaxis 40 mg subcu.








03/26/2021 the patient is on high flow oxygen at 60 L with an FiO2 of 70%.  His 

pulse ox was around 93%.  He feels well.  No altered mentation.  Resting 

comfortably.  Sitting up and he has no significant shortness of breath at rest. 

His chest x-ray still showing diffuse bilateral pulmonary infiltrates, 

peripheral distribution, probably slightly worse compared to yesterday.  

However, the radiologist reports is stating that the findings are essentially 

stable.  Clinically he is also stable.  The labs from today shows a sugar of 407

is quite high and a d-dimer is down to 1.89 and the patient's LDH level is down 

to 419 with a CRP level of 0.7.  The patient remains on Levemir insulin and is 

currently taking 50 units along with 6 units with meals of NovoLog.


 





Objective





- Vital Signs


Vital signs: 


                                   Vital Signs











Temp  97.7 F   03/26/21 10:00


 


Pulse  60   03/26/21 10:00


 


Resp  20   03/26/21 10:00


 


BP  110/57   03/26/21 10:00


 


Pulse Ox  91 L  03/26/21 10:00








                                 Intake & Output











 03/25/21 03/26/21 03/26/21





 18:59 06:59 18:59


 


Intake Total 320  


 


Output Total  400 


 


Balance 320 -400 


 


Intake:   


 


  Oral 320  


 


Output:   


 


  Urine  400 


 


Other:   


 


  Voiding Method Toilet Bedside Commode Bedside Commode





  Urinal Urinal


 


  # Voids 2  














- Exam











GENERAL EXAM: Alert, pleasant, 80-year-old male patient, on 60l with an FiO2 of 

70%


HEAD: Normocephalic/atraumatic.


EYES: Normal reaction of pupils, equal size.  Conjunctiva pink, sclera white.


NOSE: Clear with pink turbinates.


THROAT: No erythema or exudates.


NECK: No masses, no JVD, no thyroid enlargement, no adenopathy.


CHEST: No chest wall deformity.  Symmetrical expansion. 


LUNGS: Equal air entry with bibasilar coarse crackles


CVS: Regular rate and rhythm, normal S1 and S2, no gallops, no murmurs, no rubs


ABDOMEN: Soft, nontender.  No hepatosplenomegaly, normal bowel so milligrams 

unds, no guarding or rigidity.


EXTREMITIES: No clubbing, no edema, no cyanosis, 2+ pulses and upper and lower 

extremities.


MUSCULOSKELETAL: Muscle strength and tone normal.


SPINE: No scoliosis or deformity


SKIN: No rashes


CENTRAL NERVOUS SYSTEM: No focal deficits, tone is normal in all 4 extremities.


PSYCHIATRIC: Alert and oriented -3.  Appropriate affect.  Intact judgment and 

insight.





- Labs


CBC & Chem 7: 


                                 03/25/21 11:57





                                 03/26/21 05:40


Labs: 


                  Abnormal Lab Results - Last 24 Hours (Table)











  03/25/21 03/25/21 03/25/21 Range/Units





  11:55 11:57 11:57 


 


WBC   13.9 H   (3.8-10.6)  k/uL


 


Neutrophils #   12.3 H   (1.3-7.7)  k/uL


 


Lymphocytes #   0.8 L   (1.0-4.8)  k/uL


 


D-Dimer    2.95 H  (<0.60)  mg/L FEU


 


BUN     (9-20)  mg/dL


 


Est GFR (CKD-EPI)NonAf     (60.0-200.0)   


 


BUN/Creatinine Ratio     (12.00-20.00)  Ratio


 


Glucose     (74-99)  mg/dL


 


POC Glucose (mg/dL)  155 H    (75-99)  mg/dL


 


Calcium     (8.7-10.3)  mg/dL


 


Lactate Dehydrogenase     (313-618)  U/L


 


C-Reactive Protein     (<10.0)  mg/L














  03/25/21 03/25/21 03/25/21 Range/Units





  11:57 16:36 20:26 


 


WBC     (3.8-10.6)  k/uL


 


Neutrophils #     (1.3-7.7)  k/uL


 


Lymphocytes #     (1.0-4.8)  k/uL


 


D-Dimer     (<0.60)  mg/L FEU


 


BUN  51 H    (9-20)  mg/dL


 


Est GFR (CKD-EPI)NonAf     (60.0-200.0)   


 


BUN/Creatinine Ratio     (12.00-20.00)  Ratio


 


Glucose  181 H    (74-99)  mg/dL


 


POC Glucose (mg/dL)   405 H  288 H  (75-99)  mg/dL


 


Calcium     (8.7-10.3)  mg/dL


 


Lactate Dehydrogenase  1221 H    (313-618)  U/L


 


C-Reactive Protein  12.6 H    (<10.0)  mg/L














  03/26/21 03/26/21 03/26/21 Range/Units





  05:40 05:40 06:51 


 


WBC     (3.8-10.6)  k/uL


 


Neutrophils #     (1.3-7.7)  k/uL


 


Lymphocytes #     (1.0-4.8)  k/uL


 


D-Dimer  1.89 H    (<0.60)  mg/L FEU


 


BUN   45.0 H   (9-20)  mg/dL


 


Est GFR (CKD-EPI)NonAf   56.4 L   (60.0-200.0)   


 


BUN/Creatinine Ratio   37.50 H   (12.00-20.00)  Ratio


 


Glucose   325 H   (74-99)  mg/dL


 


POC Glucose (mg/dL)    288 H  (75-99)  mg/dL


 


Calcium   8.6 L   (8.7-10.3)  mg/dL


 


Lactate Dehydrogenase   419 H   (313-618)  U/L


 


C-Reactive Protein     (<10.0)  mg/L














  03/26/21 Range/Units





  11:08 


 


WBC   (3.8-10.6)  k/uL


 


Neutrophils #   (1.3-7.7)  k/uL


 


Lymphocytes #   (1.0-4.8)  k/uL


 


D-Dimer   (<0.60)  mg/L FEU


 


BUN   (9-20)  mg/dL


 


Est GFR (CKD-EPI)NonAf   (60.0-200.0)   


 


BUN/Creatinine Ratio   (12.00-20.00)  Ratio


 


Glucose   (74-99)  mg/dL


 


POC Glucose (mg/dL)  407 H  (75-99)  mg/dL


 


Calcium   (8.7-10.3)  mg/dL


 


Lactate Dehydrogenase   (313-618)  U/L


 


C-Reactive Protein   (<10.0)  mg/L














Assessment and Plan


Plan: 








1  Acute hypoxic respiratory failure secondary to CoVID 19 pneumonia, currently 

on IV Solu-Medrol and this received Tocilizumab 2 doses,   the patient on high 

flow oxygen 60 L with an FiO2 of 70%..   The chest x-ray findings are stable 

without any interval improvement or change.  Inflammatory markers are down with 

LDH is down to 414 and the CRP level is down to 2.9.  Subsequently, the LDH 

levels have improved and the LDH level is down and the CRP is down went up and 

the d-dimer is  low.  Chest x-ray findings are stable.  Oxygenation is also 

stable with FiO2 is ranging between 60 and 70%.





2  History of diabetes mellitus type 2, currently on Levemir insulin for blood 

sugar control





3  History of hypertension





4  History of chronic sinus disease





5  History of diverticulitis, status post bowel resection





Plan:


 


Continue  Solu-Medrol.


Continue Lovenox 40 mg subcu for DVT prophylaxis


Completed Tocilizumab 2  


Chest x-ray findings are stable.  


 LDH and the d-dimer and the CRP are down trending


 flow oxygen 60 L with an FiO2 of 70% 


Titrate the FiO2 as tolerated


Please the Levemir up to 60 units along with 10 units of NovoLog with meals and 

monitor the blood sugars 


We will continue to follow

## 2021-03-26 NOTE — P.PN
Subjective


Progress Note Date: 03/26/21





80-year-old male who presents emergency Department stating that he has had 

shortness of breath and feeling fatigued for at least 2 weeks.  Patient states 

he has had exposure to COVID .  Patient states he has had a fever.  Patient 

states she's also states and smile.  Patient also states she's had diarrhea.  

Patient denies any chest pain or palpitations.  Patient denies any abdominal 

pain patient denies nausea vomiting diarrhea.  According to EMS with the patient

was coming in he was satting in the low 80s.  Patient is currently on a few 

liters of oxygen and sat in mid 90s.  Patient is a diabetic and has high blood 

pressure.  Patient also has a pacemaker.  Patient is presently on 2 L of oxygen 

was requiring 4 L yesterday.  Patient was started on IV fluids patient 

creatinine went up to 1.6 baseline is around 1.  Patient's blood sugars are 

highly elevated.  Patient tests positive for Covid.





03/18/2021


Patient is presently on 2 L of oxygen appears to be doing better possibility of 

discharge tomorrow.  Creatinine went up a bit to 1.6 baseline is around the 1.  

Patient was started on IV fluids will recheck the basic metabolic profile 

tomorrow.





03/19/2021


Patient seen and evaluated in follow-up continues to be on 2 L of oxygen and 

becomes dyspneic with exertion.  Patient states he does not know wear oxygen at 

home.  Pulmonary is following.  Patient's blood sugars are elevated and will 

continue sliding scale at this time as patient is on steroids.  Potassium was 

found to be slightly elevated at 5.5 and was given a dose of Kayexalate.  Will 

repeat a.m. labs.  Instructed and encouraged the patient to get up and increase 

activity as tolerated.





03/20/2021


Patient is presently in 2 does of hours and doing clinically well.  Patient is 

on dexamethasone, vitamin supplements, subcutaneous heparin.  No worsening 

shortness of breath, cough or congestion.  D-dimer 1.02.  Sodium 145.  Potassium

5.1.  Creatinine 1.3.  .  C-reactive protein 4.4.  His creatinine 

actually went up a little bit from 1.19-1.3





03/21/2021


Patient is fairly stable no significant improvement or worsening.  Patient is 

still on 2 L of oxygen.  We'll monitor him 1 more night.  Continues to require 

oxygen patient probably can be discharged on 2 L tomorrow this can be tapered or

discontinued as an outpatient.  Patient d-dimer is bit worse compared to 

admission.  Although other inflammatory markers are better








03/22/2021


Patient is seen this morning currently on nonrebreather and airvo as his oxygen 

was found to be 89% on 6 L of oxygen.  Pulmonary is following.  D-dimer has gone

up at 1.70 along with lactate dehydrogenase at 491 and CRP is 9.4.  Blood sugars

continue to be elevated and patient is maintained on sliding scale along with 

long-acting and pre-meal insulin and will continue at this time.  Patient 

continues on vitamin and zinc supplements along with Lovenox and dexamethasone 

daily.  Patient is scheduled to receive Tocilizumab today.  Will continue to 

monitor closely based on the clinical course of the patient.  Patient continues 

to be weak and was seen and evaluated by physical therapy recommending subacute 

rehab and patient is now agreeable and a social work consult was placed. 





03/23/2021


Patient is seen and evaluated this morning with no improvement in respiratory 

status.  Patient remains on Airvo with supplemental nonrebreather.  Patient is 

day 2 of receiving Tocilizumab.  Will repeat a.m. chest x-ray along with 

inflammatory markers.  D-dimer today was 1.60.  White blood count has gone up in

is 10.8, hemoglobin is 12.6.  Patient is afebrile.  To continue with dexametha

sone, Lovenox, vitamin C and zinc supplements.  Pulmonary is following.  Had a 

discussion with the patient about CODE STATUS along with his son Mamadou and 

currently wish to remain a full code until talking with family member more 

extensively about the situation.  Will continue to monitor closely.  Prognosis 

remains guarded.





03/24/2021


Patient is seen this morning status post getting up to the bathroom and 

continues to be extremely dyspneic with any exertion and was found to be low 80s

to 83% oxygenation.  Patient continues on the airvo with a flow rate of 60 and 

FiO2 of 64 and is currently 91%.  Patient denies any chest pain or palpitations.

 Patient is afebrile.  Patient reports to tolerating diet with no nausea or 

vomiting noted.  D-dimer continues to be elevated at 2.56, sodium today is 140 

with a potassium of 5.1 and creatinine is 1.2.  Blood sugars on the lower side 

this morning and will continue to monitor closely and treat accordingly with 

sliding scale.  Inflammatory markers trending down.  Pulmonary following 

closely.  Chest x-ray today shows continued bibasilar infiltrates which are 

nonspecific but worsening.





03/25/2021





Patient is seen in follow-up this morning and continues to be on Airvo no 

significant change.  Patient was on dexamethasone all being transitioned to IV 

Solu-Medrol and will continue with Accu-Cheks and close glycemic control with 

pre-meal, long-acting, and sliding scale.  White blood count is 13.9, hemoglobin

is stable at 13.2, d-dimer is 2.95, sodium is 137, potassium is 5.1, creatinine 

slightly elevated at 1.24.  Inflammatory markers have gone up.  Patient is 

sitting up in the chair and denies any chest pain or worsening shortness of 

breath.  Patient states he feels he is about the same.  Patient also states he 

has been sleeping a lot and tolerating diet with no reports of nausea or 

vomiting.  Discussed with patient about increasing activity as tolerated 

although patient is extremely dyspneic with minimal exertion.





03/26/2021





Patient is seen and evaluated this morning and follow-up with no acute overnight

issues. Patient continues to be on Airvo with a flow rate of 60 and FiO2 of 70 

and is being closely monitored.  Transitioned to IV Solu-Medrol and will 

continue at this time.  Blood sugars elevated and increasing long-acting and 

pre-meal insulins and will continue sliding scale along with oral antidiabetic 

agents.  Need tighter glycemic control.  Patient has been eating 100% of all 

meals with no reports of nausea or vomiting noted.  Patient clinically looking 

better and chest x-ray displays stable infiltrates.  D-dimer slightly trending 

down along with inflammatory markers.  Creatinine is stable at 1.2 and potassium

is 5.5.  Will continue to monitor vital signs and labs closely.





Constitutional: Reports fatigue although slightly more awake today, denied any 

fever.


Cardio vascular: denied any chest pain, palpitations


Gastrointestinal denied any nausea vomiting


Pulmonary: Reports continued shortness of breath but feels has improved slightly




Neurologic reports generalized weakness 





All inpatient medications were reviewed and appropriate changes in these 

medications as dictated in the interval history and assessment and plan.





Active Medications





Acetaminophen (Acetaminophen Tab 325 Mg Tab)  650 mg PO Q6HR PRN


   PRN Reason: Fever and/ or Pain


   Last Admin: 03/24/21 11:12 Dose:  650 mg


   Documented by: 


Ascorbic Acid (Ascorbic Acid 500 Mg Tab)  1,000 mg PO DAILY [facility]


   Last Admin: 03/26/21 07:38 Dose:  1,000 mg


   Documented by: 


Aspirin (Aspirin 81 Mg)  81 mg PO DAILY [facility]


   Last Admin: 03/26/21 07:38 Dose:  81 mg


   Documented by: 


Atorvastatin Calcium (Atorvastatin 80 Mg Tab)  80 mg PO DAILY [facility]


   Last Admin: 03/26/21 07:38 Dose:  80 mg


   Documented by: 


Clopidogrel Bisulfate (Clopidogrel 75 Mg Tab)  75 mg PO DAILY [facility]


   Last Admin: 03/26/21 07:38 Dose:  75 mg


   Documented by: 


Enoxaparin Sodium (Enoxaparin 40 Mg/0.4 Ml Syringe)  40 mg SQ DAILY [facility]


   Last Admin: 03/26/21 07:38 Dose:  40 mg


   Documented by: 


Famotidine (Famotidine 20 Mg Tab)  40 mg PO DAILY [facility]


   Last Admin: 03/26/21 07:37 Dose:  40 mg


   Documented by: 


Glipizide (Glipizide 10 Mg Tab)  20 mg PO BID [facility]


   Last Admin: 03/26/21 07:38 Dose:  20 mg


   Documented by: 


Insulin Aspart (Insulin Aspart (Novolog) 100 Unit/Ml Vial)  0 unit SQ ACHS [facility]; 

Protocol


   Last Admin: 03/26/21 11:50 Dose:  12 unit


   Documented by: 


Insulin Aspart (Insulin Aspart (Novolog) 100 Unit/Ml Vial)  10 unit SQ AC-TID 

[facility]


   Last Admin: 03/26/21 11:50 Dose:  10 unit


   Documented by: 


Insulin Detemir (Insulin Detemir (Levemir) 100 Unit/Ml Syr)  60 unit SQ HS [facility]


Methylprednisolone Sodium Succinate (Methylprednisolone Sod Succi 125 Mg/2 Ml 

Vial)  60 mg IV Q6HR [facility]


   Last Admin: 03/26/21 11:50 Dose:  60 mg


   Documented by: 


Metoprolol Succinate (Metoprolol Succinate (Er) 50 Mg Tab.Er.24h)  50 mg PO 

DAILY [facility]


   Last Admin: 03/26/21 07:38 Dose:  50 mg


   Documented by: 


Pioglitazone HCl (Pioglitazone 30 Mg Tab)  30 mg PO DAILY [facility]


   Last Admin: 03/26/21 07:37 Dose:  30 mg


   Documented by: 


Zinc Sulfate (Zinc Sulfate 220 Mg Cap)  220 mg PO DAILY [facility]


   Last Admin: 03/26/21 07:38 Dose:  220 mg


   Documented by: 

















Objective





- Vital Signs


Vital signs: 


                                   Vital Signs











Temp  97.7 F   03/26/21 10:00


 


Pulse  60   03/26/21 10:00


 


Resp  20   03/26/21 10:00


 


BP  110/57   03/26/21 10:00


 


Pulse Ox  91 L  03/26/21 10:00








                                 Intake & Output











 03/25/21 03/26/21 03/26/21





 18:59 06:59 18:59


 


Intake Total 320  


 


Output Total  400 


 


Balance 320 -400 


 


Intake:   


 


  Oral 320  


 


Output:   


 


  Urine  400 


 


Other:   


 


  Voiding Method Toilet Bedside Commode Bedside Commode





  Urinal Urinal


 


  # Voids 2  














- Exam





GENERAL: The patient is alert and oriented x3, not in any acute distress.  

Obese, currently on  airvo at a flow rate of 60 with an FiO2 of 70, continue to 

wean as tolerated


HEENT: Pupils are round and equally reacting to light. EOMI. No scleral icterus.

No conjunctival pallor. Normocephalic, atraumatic. No pharyngeal erythema. No 

thyromegaly. 


CARDIOVASCULAR: S1 and S2 present. No murmurs, rubs, or gallops. 


PULMONARY: Diminished breath sounds with diffuse rhonchi noted


ABDOMEN: Soft, nontender, nondistended, normoactive bowel sounds. No palpable 

organomegaly. 


MUSCULOSKELETAL: No joint swelling or deformity.


EXTREMITIES: No cyanosis, clubbing, or pedal edema. 


NEUROLOGICAL: Gross neurological examination did not reveal any focal deficits. 


SKIN: No rashes. 








- Labs


CBC & Chem 7: 


                                 03/25/21 11:57





                                 03/26/21 05:40


Labs: 


                  Abnormal Lab Results - Last 24 Hours (Table)











  03/25/21 03/25/21 03/26/21 Range/Units





  16:36 20:26 05:40 


 


D-Dimer    1.89 H  (<0.60)  mg/L FEU


 


BUN     (9.0-27.0)  mg/dL


 


Est GFR (CKD-EPI)NonAf     (60.0-200.0)   


 


BUN/Creatinine Ratio     (12.00-20.00)  Ratio


 


Glucose     ()  mg/dL


 


POC Glucose (mg/dL)  405 H  288 H   (75-99)  mg/dL


 


Calcium     (8.7-10.3)  mg/dL


 


Lactate Dehydrogenase     (120-246)  U/L














  03/26/21 03/26/21 03/26/21 Range/Units





  05:40 06:51 11:08 


 


D-Dimer     (<0.60)  mg/L FEU


 


BUN  45.0 H    (9.0-27.0)  mg/dL


 


Est GFR (CKD-EPI)NonAf  56.4 L    (60.0-200.0)   


 


BUN/Creatinine Ratio  37.50 H    (12.00-20.00)  Ratio


 


Glucose  325 H    ()  mg/dL


 


POC Glucose (mg/dL)   288 H  407 H  (75-99)  mg/dL


 


Calcium  8.6 L    (8.7-10.3)  mg/dL


 


Lactate Dehydrogenase  419 H    (120-246)  U/L














Assessment and Plan


Assessment: 





-acute hypoxic respiratory failure: Secondary to covid 19 pneumonia patient 

maintained on vitamin and zinc supplements.  Patient currently on IV solu-

Medrol.  has received 2 doses of Tocilizumab and is continued on Lovenox. 

inflammatory markers ,Patient currently on Airvo with intermittent 

nonrebreather.  Pulmonary following


-Type 2 diabetes mellitus uncontrolled elevated blood sugars secondary to 

systemic steroids, continue with pre-meal, sliding scale, and long-acting, with 

adjustments made and will increase pre-male and long-acting doses


-Hyperkalemia, improved


-Hypotonic hyponatremia, improved


-Acute renal failure secondary to Covid 19: Improved, current creatinine is 1.2


-Hypertension 


-DVT subcutaneous Lovenox


-Full code





Plan:


Continue with current medications.  Will continue IV Solu-Medrol with vitamin 

and zinc supplements, and lovenox . patient has received Tocilizumab x2.  

Pulmonary following .  Patient currently on  intermittent nonrebreather along 

with airvo slowly weaning as tolerated.  Inflammatory markers trending down 

today and will repeat and monitor closely tomorrow.  Repeat chest x-ray shows 

stable infiltrates.  PT/OT following and patient may likely require ECF for 

continued PT/OT therapy due to weakness once stabilized and discharged.  Will 

continue to monitor closely and make further recommendations based on the 

clinical course of the patient.  Prognosis remains guarded.

## 2021-03-26 NOTE — XR
EXAMINATION TYPE: XR chest 1V portable

 

DATE OF EXAM: 3/26/2021

 

COMPARISON: 3/24/2021

 

HISTORY: Shortness of breath

 

TECHNIQUE: Single frontal view of the chest is obtained.

 

FINDINGS:  There are bilateral patchy diffuse infiltrates bilaterally which are stable given differen
eren in technique. Heart enlarged. Cardiac device seen with no pneumothorax. No sizable pleural effusi
on.

 

IMPRESSION:  Bilateral diffuse infiltrates are stable.

## 2021-03-27 LAB
ANION GAP SERPL CALC-SCNC: 6 MMOL/L
BASOPHILS # BLD AUTO: 0 K/UL (ref 0–0.2)
BASOPHILS NFR BLD AUTO: 0 %
BUN SERPL-SCNC: 55 MG/DL (ref 9–20)
CALCIUM SPEC-MCNC: 8.8 MG/DL (ref 8.4–10.2)
CHLORIDE SERPL-SCNC: 104 MMOL/L (ref 98–107)
CO2 SERPL-SCNC: 26 MMOL/L (ref 22–30)
EOSINOPHIL # BLD AUTO: 0 K/UL (ref 0–0.7)
EOSINOPHIL NFR BLD AUTO: 0 %
ERYTHROCYTE [DISTWIDTH] IN BLOOD BY AUTOMATED COUNT: 4.2 M/UL (ref 4.3–5.9)
ERYTHROCYTE [DISTWIDTH] IN BLOOD: 13.1 % (ref 11.5–15.5)
GLUCOSE BLD-MCNC: 302 MG/DL (ref 75–99)
GLUCOSE BLD-MCNC: 321 MG/DL (ref 75–99)
GLUCOSE BLD-MCNC: 372 MG/DL (ref 75–99)
GLUCOSE BLD-MCNC: 381 MG/DL (ref 75–99)
GLUCOSE SERPL-MCNC: 322 MG/DL (ref 74–99)
HCT VFR BLD AUTO: 39.5 % (ref 39–53)
HGB BLD-MCNC: 13.1 GM/DL (ref 13–17.5)
LDH SPEC-CCNC: 1059 U/L (ref 313–618)
LYMPHOCYTES # SPEC AUTO: 1.3 K/UL (ref 1–4.8)
LYMPHOCYTES NFR SPEC AUTO: 6 %
MCH RBC QN AUTO: 31.2 PG (ref 25–35)
MCHC RBC AUTO-ENTMCNC: 33.2 G/DL (ref 31–37)
MCV RBC AUTO: 94 FL (ref 80–100)
MONOCYTES # BLD AUTO: 0.8 K/UL (ref 0–1)
MONOCYTES NFR BLD AUTO: 4 %
NEUTROPHILS # BLD AUTO: 20.6 K/UL (ref 1.3–7.7)
NEUTROPHILS NFR BLD AUTO: 90 %
PLATELET # BLD AUTO: 299 K/UL (ref 150–450)
POTASSIUM SERPL-SCNC: 5.6 MMOL/L (ref 3.5–5.1)
SODIUM SERPL-SCNC: 136 MMOL/L (ref 137–145)
WBC # BLD AUTO: 22.9 K/UL (ref 3.8–10.6)

## 2021-03-27 RX ADMIN — INSULIN DETEMIR SCH UNIT: 100 INJECTION, SOLUTION SUBCUTANEOUS at 20:29

## 2021-03-27 RX ADMIN — INSULIN ASPART SCH UNIT: 100 INJECTION, SOLUTION INTRAVENOUS; SUBCUTANEOUS at 20:29

## 2021-03-27 RX ADMIN — OXYCODONE HYDROCHLORIDE AND ACETAMINOPHEN SCH MG: 500 TABLET ORAL at 07:30

## 2021-03-27 RX ADMIN — ATORVASTATIN CALCIUM SCH MG: 80 TABLET, FILM COATED ORAL at 07:30

## 2021-03-27 RX ADMIN — INSULIN ASPART SCH UNIT: 100 INJECTION, SOLUTION INTRAVENOUS; SUBCUTANEOUS at 17:05

## 2021-03-27 RX ADMIN — ASPIRIN 81 MG CHEWABLE TABLET SCH MG: 81 TABLET CHEWABLE at 07:30

## 2021-03-27 RX ADMIN — ENOXAPARIN SODIUM SCH MG: 40 INJECTION SUBCUTANEOUS at 07:31

## 2021-03-27 RX ADMIN — INSULIN ASPART SCH UNIT: 100 INJECTION, SOLUTION INTRAVENOUS; SUBCUTANEOUS at 07:31

## 2021-03-27 RX ADMIN — METHYLPREDNISOLONE SODIUM SUCCINATE SCH MG: 125 INJECTION, POWDER, FOR SOLUTION INTRAMUSCULAR; INTRAVENOUS at 17:13

## 2021-03-27 RX ADMIN — PIOGLITAZONE SCH MG: 30 TABLET ORAL at 07:31

## 2021-03-27 RX ADMIN — CLOPIDOGREL BISULFATE SCH MG: 75 TABLET ORAL at 07:30

## 2021-03-27 RX ADMIN — METHYLPREDNISOLONE SODIUM SUCCINATE SCH MG: 125 INJECTION, POWDER, FOR SOLUTION INTRAMUSCULAR; INTRAVENOUS at 23:03

## 2021-03-27 RX ADMIN — INSULIN ASPART SCH UNIT: 100 INJECTION, SOLUTION INTRAVENOUS; SUBCUTANEOUS at 12:38

## 2021-03-27 RX ADMIN — FAMOTIDINE SCH MG: 20 TABLET, FILM COATED ORAL at 07:30

## 2021-03-27 RX ADMIN — METHYLPREDNISOLONE SODIUM SUCCINATE SCH MG: 125 INJECTION, POWDER, FOR SOLUTION INTRAMUSCULAR; INTRAVENOUS at 12:38

## 2021-03-27 RX ADMIN — METOPROLOL SUCCINATE SCH MG: 50 TABLET, EXTENDED RELEASE ORAL at 07:30

## 2021-03-27 RX ADMIN — Medication SCH MG: at 07:30

## 2021-03-27 RX ADMIN — METHYLPREDNISOLONE SODIUM SUCCINATE SCH MG: 125 INJECTION, POWDER, FOR SOLUTION INTRAMUSCULAR; INTRAVENOUS at 05:31

## 2021-03-27 RX ADMIN — INSULIN ASPART SCH UNIT: 100 INJECTION, SOLUTION INTRAVENOUS; SUBCUTANEOUS at 17:06

## 2021-03-27 NOTE — XR
EXAMINATION TYPE: XR chest 1V portable

 

DATE OF EXAM: 3/27/2021

 

COMPARISON: 3/26/2021

 

HISTORY: Shortness of breath

 

TECHNIQUE: Single frontal view of the chest is obtained.

 

FINDINGS:  

 

There are diffuse partially consolidative opacities in both lungs predominantly in the mid and lower 
lung zones unchanged since the prior study. There is no pneumothorax or definite pleural effusion.

 

The heart size is prominent. There is a 2-lead cardiac pacemaker.

 

The osseous structures are intact.

 

IMPRESSION:

 

No change in the mild to moderate mid and lower lung zone infiltrates.

## 2021-03-27 NOTE — P.PN
Subjective


Progress Note Date: 03/27/21











80-year-old male, who was brought to the emergency department by EMS.  The 

patient apparently has had 2 weeks' worth of increasing shortness of breath, and

significant fatigue.  The patient also had chest congestion.  He had a fever.  

The patient states that he is just not been feeling well and getting 

progressively worse.  He also apparently had an episode or 2 of diarrhea.  The 

patient denied any chest pain or chest pressure or palpitations.  The patient 

also denied nausea, vomiting, and abdominal pain.  Apparently when EMS arrived, 

the patient's saturations were in the low 80s on room air.  For that reason, he 

was transported in, evaluated, and admitted with a diagnosis of COVID 19 19 

pneumonia.  The patient does have a history of diabetes, hypertension, and a 

previous pacemaker insertion.  White count was 4.6, hemoglobin 12.5, hematocrit 

37.1, and platelet count 153,000.  PT, INR, and PTT were all normal.  D-dimer 

was 0.77.  Sodium 136, potassium 4.5, chlorides 102, CO2 26, anion gap 8, BUN 

47, and creatinine 1.63.  Ferritin was 985, , C-reactive protein 58, and 

pro-calcitonin level was 0.12.  Chest x-ray did show some interstitial 

infiltrates.





On 03/18/2021 patient seen in follow-up on medical floor.  he is on 2 L of 

oxygen his pulse ox is 90-94%, breathing comfortably, no fever or chills, blood 

pressure has been stable.  Denies any worsening dyspnea or hypoxemia, he has a 

mild cough, no chest discomfort.  He continues on oral Decadron 6 mg daily, IV 

hydration, vitamins, d-dimer was low at 0.77, patient is on subcu heparin for 

DVT prophylaxis, pro-calcitonin level was low, inflammatory markers were not 

significantly elevated on admission.  Clinically symptoms have not progressed, 

and patient will be considered for discharge in next 24 hours.





The patient is seen today 03/19/2021 in follow-up on the regular medical floor. 

He is currently sitting up in chair at the bedside.  Awake and alert in no acute

distress.  Continue O2 saturations in the low 90s on 2 L/m per nasal cannula.  

Afebrile.  Hemodynamically stable.  Blood cultures reveal no growth.  Blood g

lucose 202.  Sodium 143.  Potassium 5.5.  Creatinine 1.19.  He remains on 

dexamethasone, so continues heparin, vitamin supplements.  Chest x-ray continues

to show bilateral multifocal opacities consistent with CoVID infection.  No 

change.





The patient is seen today 03/20/2021 in follow-up on the regular medical floor. 

He is currently resting comfortably in bed.  Awake and alert in no acute distre

ss.  He is maintaining O2 saturations in the 90s on 2 L/m per nasal cannula.  

He's been on dexamethasone, vitamin supplements, subcutaneous heparin.  No 

worsening shortness of breath, cough or congestion.  D-dimer 1.02.  Sodium 145. 

Potassium 5.1.  Creatinine 1.3.  .  C-reactive protein 4.4.





The patient is seen today 03/21/2021 in follow-up on the regular medical floor. 

He is currently resting quite comfortably in bed.  Maintaining O2 saturations in

the low 90s on 2 L/m per nasal cannula.  Sodium 144.  Potassium 4.7.  Creatinine

1.2.  Glucose 173.  He remains on dexamethasone, heparin, vitamin supplements.








03/22/2021 on seeing the patient for a follow-up regarding the patient's acute 

hypoxic respiratory failure due to Covid 19 related pneumonia.  The patient was 

not a candidate for Remdesivir, Tocilizumab or convalescent plasma, and the 

patient is currently on Decadron 6 mg on a daily basis and addition to Levemir 

insulin 50 units daily at bedtime and NovoLog 6 units 3 times a day with meals 

and a sliding scale coverage.  The patient was on 2 L about 2 by nasal cannula 

with pulse ox of 93%.  Earlier this morning, the patient's oxidation 

progressively got worse in the patient is currently on 100% nonrebreather 

facemask maintaining a saturation above 90% at around 94%.  Overall, the patient

is doing well.  Creatinine is at 1.2.  The patient had a CRP level of 4.4 with 

an LDH level of 485 from 03/20/2021.  Inflammatory markers are being monitored. 

Last d-dimer from 03/20/2021 was 1.02.  Blood cultures were negative repeat 

chest x-ray was done today and the findings are essentially stable and the 

patient has patchy bilateral perihilar pulmonary infiltrates without any 

significant interval change.  D-dimer from today is at 1.7 and the patient 

remains on Lovenox.





03/23/2021 the patient is being seen for a follow-up.  The patient is currently 

on high flow oxygen at 6 L.  Note that the patient was on 100% nonrebreather 

facemask and was switched him to a high flow oxygen yesterday at 60 L.  As part 

of his treatment, the patient received steroids and currently is receiving 

dexamethasone 6 mg by mouth daily.  He is also Toci 2 yesterday and today.  He 

completed treatment without any major side effects.  Note that the patient was 

on low-flow oxygen and he progressively got worse requiring 100% nonrebreather 

and subsequent high flow oxygen.  On today's evaluation, his white cell count is

at 10.  His d-dimer is at 1.6.  Rest of the inflammatory markers have not been 

checked and this will be repeated tomorrow.  LDH from yesterday was 491 with a 

CRP of 9.4.  Otherwise, his renal function shows a stable creatinine of 1.2.  

The patient has been on FiO2 of 60% with a high flow oxygen of 60 L and the 

patient has been essentially stable since yesterday.  No other significant 

events.  Remains on Lovenox 40 mg subcu on a daily basis. 





03/24/2021, the patient is still on high flow oxygen and 60 L.  He is not 

utilizing the 100% nonrebreather facemask this morning.  Note that the patient 

has received steroids, and he remains on Decadron 6 mg by mouth daily. He also 

completed Tocilizumab.  On today's evaluation, chest x-ray findings are stable 

without any interval change in the bilateral pulmonary infiltrates.  The patient

is resting comfortably in bed.  He is also able to move on a recliner.  Labs 

today shows an LDH of 414 and a CRP of 2.9 and the patient is d-dimer is at 

2.56.  The patient remains on Lovenox 40 mg subcu on a daily basis.  He is on 

Levemir insulin 50 units daily along with NovoLog 6 units with meals plus a 

sliding coverage.  I will say his condition essentially the same as unchanged 

compared to yesterday.  He is not doing much in progress for now. 





On today's evaluation of 03/25/2021 the patient is being seen for a follow-up.  

The patient remains on high flow oxygen with a flow of 6 L an FiO2 of 60%.  His 

current pulse ox is around 88%.  He is afebrile.  He is doing well.  Sitting up 

on a chair.  No signs of any respiratory distress.  His breathing is nonlabored 

and he doesn't have any significant cough unless he takes a deep inspiration.  

The patient  had follow-up blood work today.  His LDL level is up 1221 and a CRP

level is up to 12.6 and his d-dimer currently is at 2.95.  I cannot explain the 

rise in these inflammatory markers.  His creatinine is at 1.2.  Was a causative 

13.7 with a hemoglobin of 13.2.  His chest x-ray from yesterday was showing 

right basilar infiltrates which are nonspecific and it was mentioned that he was

getting worse.  The patient remains on Decadron 6 mg by mouth daily.  He remains

on 50 units of Levemir insulin along with 6 units of NovoLog around-the-clock.  

He remains on aspirin.  He remains on Lovenox for DVT prophylaxis 40 mg subcu.








03/26/2021 the patient is on high flow oxygen at 60 L with an FiO2 of 70%.  His 

pulse ox was around 93%.  He feels well.  No altered mentation.  Resting 

comfortably.  Sitting up and he has no significant shortness of breath at rest. 

His chest x-ray still showing diffuse bilateral pulmonary infiltrates, 

peripheral distribution, probably slightly worse compared to yesterday.  

However, the radiologist reports is stating that the findings are essentially 

stable.  Clinically he is also stable.  The labs from today shows a sugar of 407

is quite high and a d-dimer is down to 1.89 and the patient's LDH level is down 

to 419 with a CRP level of 0.7.  The patient remains on Levemir insulin and is 

currently taking 50 units along with 6 units with meals of NovoLog.





03/27/2021 the patient remains on high flow at 60 L with an FiO2 of 70% which is

essentially the same as yesterday.  His pulse ox currently is around 93%.  His 

chest x-ray from today is showing diffuse bilateral pulmonary infiltrates, 

peripheral distribution, I thought his chest x-ray and probably the x-ray 

findings are improved compared to yesterday's chest x-ray.  I have the patient 

on Solu-Medrol 60 mg every 6 hours.  He is also on anticoagulation.  He is 

receiving Lovenox..  His d-dimer is at 1.57 on today's evaluation.  His LDH 

level is high today at 1059 with a CRP of less than 5.  He remains on Levemir 

insulin 50 units along with 6 units of NovoLog with meals and his blood sugars 

under adequate control for now.  He has no other complaints otherwise.  Note 

that initially the patient was started on Decadron and during the course of his 

treatment he was given Toci


 





Objective





- Vital Signs


Vital signs: 


                                   Vital Signs











Temp  97.6 F   03/27/21 10:00


 


Pulse  61   03/27/21 10:00


 


Resp  19   03/27/21 10:00


 


BP  120/56   03/27/21 10:00


 


Pulse Ox  93 L  03/27/21 10:00








                                 Intake & Output











 03/26/21 03/27/21 03/27/21





 18:59 06:59 18:59


 


Other:   


 


  Voiding Method Bedside Commode Bedside Commode Bedside Commode





 Urinal Urinal Urinal


 


  # Voids 2 2 














- Exam











GENERAL EXAM: Alert, pleasant, 80-year-old male patient, on 60l with an FiO2 of 

70%


HEAD: Normocephalic/atraumatic.


EYES: Normal reaction of pupils, equal size.  Conjunctiva pink, sclera white.


NOSE: Clear with pink turbinates.


THROAT: No erythema or exudates.


NECK: No masses, no JVD, no thyroid enlargement, no adenopathy.


CHEST: No chest wall deformity.  Symmetrical expansion. 


LUNGS: Equal air entry with bibasilar coarse crackles


CVS: Regular rate and rhythm, normal S1 and S2, no gallops, no murmurs, no rubs


ABDOMEN: Soft, nontender.  No hepatosplenomegaly, normal bowel so milligrams 

unds, no guarding or rigidity.


EXTREMITIES: No clubbing, no edema, no cyanosis, 2+ pulses and upper and lower 

extremities.


MUSCULOSKELETAL: Muscle strength and tone normal.


SPINE: No scoliosis or deformity


SKIN: No rashes


CENTRAL NERVOUS SYSTEM: No focal deficits, tone is normal in all 4 extremities.


PSYCHIATRIC: Alert and oriented -3.  Appropriate affect.  Intact judgment and 

insight.





- Labs


CBC & Chem 7: 


                                 03/27/21 06:30





                                 03/27/21 06:30


Labs: 


                  Abnormal Lab Results - Last 24 Hours (Table)











  03/26/21 03/26/21 03/26/21 Range/Units





  11:08 16:38 20:38 


 


WBC     (3.8-10.6)  k/uL


 


RBC     (4.30-5.90)  m/uL


 


Neutrophils #     (1.3-7.7)  k/uL


 


D-Dimer     (<0.60)  mg/L FEU


 


Sodium     (137-145)  mmol/L


 


Potassium     (3.5-5.1)  mmol/L


 


BUN     (9-20)  mg/dL


 


Glucose     (74-99)  mg/dL


 


POC Glucose (mg/dL)  407 H  291 H  311 H  (75-99)  mg/dL


 


Lactate Dehydrogenase     (313-618)  U/L














  03/27/21 03/27/21 03/27/21 Range/Units





  06:30 06:30 06:30 


 


WBC    22.9 H  (3.8-10.6)  k/uL


 


RBC    4.20 L  (4.30-5.90)  m/uL


 


Neutrophils #    20.6 H  (1.3-7.7)  k/uL


 


D-Dimer  1.57 H    (<0.60)  mg/L FEU


 


Sodium   136 L   (137-145)  mmol/L


 


Potassium   5.6 H   (3.5-5.1)  mmol/L


 


BUN   55 H   (9-20)  mg/dL


 


Glucose   322 H   (74-99)  mg/dL


 


POC Glucose (mg/dL)     (75-99)  mg/dL


 


Lactate Dehydrogenase   1059 H   (313-618)  U/L














  03/27/21 Range/Units





  06:58 


 


WBC   (3.8-10.6)  k/uL


 


RBC   (4.30-5.90)  m/uL


 


Neutrophils #   (1.3-7.7)  k/uL


 


D-Dimer   (<0.60)  mg/L FEU


 


Sodium   (137-145)  mmol/L


 


Potassium   (3.5-5.1)  mmol/L


 


BUN   (9-20)  mg/dL


 


Glucose   (74-99)  mg/dL


 


POC Glucose (mg/dL)  321 H  (75-99)  mg/dL


 


Lactate Dehydrogenase   (313-618)  U/L














Assessment and Plan


Plan: 








1  Acute hypoxic respiratory failure secondary to CoVID 19 pneumonia, currently 

on IV Solu-Medrol and this received Tocilizumab 2 doses,   the patient on high 

flow oxygen 60 L with an FiO2 of 70%..   The chest x-ray findings are stable 

without any interval improvement or change.  Inflammatory markers arehigh again 

including  LDH  being above 1000.  Chest x-ray findings are stable, probably 

improved.  Oxygenation is also stable with FiO2 is ranging between 60 and 70%.





2  History of diabetes mellitus type 2, currently on Levemir insulin for blood 

sugar control





3  History of hypertension





4  History of chronic sinus disease





5  History of diverticulitis, status post bowel resection





Plan:


 


Continue same treatment


Changes oxygenation compared to yesterday.  The patient has been in the hospital

for more than 10 days.  He is very slow to progress.


Continue  Solu-Medrol.


Continue Lovenox 40 mg subcu for DVT prophylaxis


Completed Tocilizumab 2  


Chest x-ray findings are stable.  


 LDH and the d-dimer and the CRP are fluctuating and the most recent LDH level 

is higher


flow oxygen 60 L with an FiO2 of 70% 


Titrate the FiO2 as tolerated


Please the Levemir up to 60 units along with 10 units of NovoLog with meals and 

monitor the blood sugars 


We will continue to follow

## 2021-03-28 LAB
GLUCOSE BLD-MCNC: 224 MG/DL (ref 75–99)
GLUCOSE BLD-MCNC: 301 MG/DL (ref 75–99)
GLUCOSE BLD-MCNC: 315 MG/DL (ref 75–99)
GLUCOSE BLD-MCNC: 321 MG/DL (ref 75–99)
LDH SPEC-CCNC: 378 U/L (ref 120–246)

## 2021-03-28 RX ADMIN — Medication SCH MG: at 07:31

## 2021-03-28 RX ADMIN — INSULIN ASPART SCH UNIT: 100 INJECTION, SOLUTION INTRAVENOUS; SUBCUTANEOUS at 12:17

## 2021-03-28 RX ADMIN — INSULIN ASPART SCH UNIT: 100 INJECTION, SOLUTION INTRAVENOUS; SUBCUTANEOUS at 07:32

## 2021-03-28 RX ADMIN — INSULIN ASPART SCH UNIT: 100 INJECTION, SOLUTION INTRAVENOUS; SUBCUTANEOUS at 20:31

## 2021-03-28 RX ADMIN — CLOPIDOGREL BISULFATE SCH MG: 75 TABLET ORAL at 07:31

## 2021-03-28 RX ADMIN — METHYLPREDNISOLONE SODIUM SUCCINATE SCH MG: 125 INJECTION, POWDER, FOR SOLUTION INTRAMUSCULAR; INTRAVENOUS at 11:34

## 2021-03-28 RX ADMIN — METHYLPREDNISOLONE SODIUM SUCCINATE SCH MG: 125 INJECTION, POWDER, FOR SOLUTION INTRAMUSCULAR; INTRAVENOUS at 23:09

## 2021-03-28 RX ADMIN — METHYLPREDNISOLONE SODIUM SUCCINATE SCH MG: 125 INJECTION, POWDER, FOR SOLUTION INTRAMUSCULAR; INTRAVENOUS at 17:35

## 2021-03-28 RX ADMIN — INSULIN ASPART SCH UNIT: 100 INJECTION, SOLUTION INTRAVENOUS; SUBCUTANEOUS at 12:16

## 2021-03-28 RX ADMIN — FAMOTIDINE SCH MG: 20 TABLET, FILM COATED ORAL at 07:31

## 2021-03-28 RX ADMIN — ATORVASTATIN CALCIUM SCH MG: 80 TABLET, FILM COATED ORAL at 07:31

## 2021-03-28 RX ADMIN — INSULIN ASPART SCH UNIT: 100 INJECTION, SOLUTION INTRAVENOUS; SUBCUTANEOUS at 17:34

## 2021-03-28 RX ADMIN — OXYCODONE HYDROCHLORIDE AND ACETAMINOPHEN SCH MG: 500 TABLET ORAL at 07:32

## 2021-03-28 RX ADMIN — METOPROLOL SUCCINATE SCH MG: 50 TABLET, EXTENDED RELEASE ORAL at 07:31

## 2021-03-28 RX ADMIN — PIOGLITAZONE SCH MG: 30 TABLET ORAL at 07:31

## 2021-03-28 RX ADMIN — METHYLPREDNISOLONE SODIUM SUCCINATE SCH MG: 125 INJECTION, POWDER, FOR SOLUTION INTRAMUSCULAR; INTRAVENOUS at 05:21

## 2021-03-28 RX ADMIN — ENOXAPARIN SODIUM SCH MG: 40 INJECTION SUBCUTANEOUS at 07:31

## 2021-03-28 RX ADMIN — INSULIN DETEMIR SCH UNIT: 100 INJECTION, SOLUTION SUBCUTANEOUS at 20:31

## 2021-03-28 RX ADMIN — ASPIRIN 81 MG CHEWABLE TABLET SCH MG: 81 TABLET CHEWABLE at 07:32

## 2021-03-28 NOTE — P.PN
Subjective


Progress Note Date: 03/28/21











80-year-old male, who was brought to the emergency department by EMS.  The 

patient apparently has had 2 weeks' worth of increasing shortness of breath, and

significant fatigue.  The patient also had chest congestion.  He had a fever.  

The patient states that he is just not been feeling well and getting 

progressively worse.  He also apparently had an episode or 2 of diarrhea.  The 

patient denied any chest pain or chest pressure or palpitations.  The patient 

also denied nausea, vomiting, and abdominal pain.  Apparently when EMS arrived, 

the patient's saturations were in the low 80s on room air.  For that reason, he 

was transported in, evaluated, and admitted with a diagnosis of COVID 19 19 

pneumonia.  The patient does have a history of diabetes, hypertension, and a 

previous pacemaker insertion.  White count was 4.6, hemoglobin 12.5, hematocrit 

37.1, and platelet count 153,000.  PT, INR, and PTT were all normal.  D-dimer 

was 0.77.  Sodium 136, potassium 4.5, chlorides 102, CO2 26, anion gap 8, BUN 

47, and creatinine 1.63.  Ferritin was 985, , C-reactive protein 58, and 

pro-calcitonin level was 0.12.  Chest x-ray did show some interstitial 

infiltrates.





On 03/18/2021 patient seen in follow-up on medical floor.  he is on 2 L of 

oxygen his pulse ox is 90-94%, breathing comfortably, no fever or chills, blood 

pressure has been stable.  Denies any worsening dyspnea or hypoxemia, he has a 

mild cough, no chest discomfort.  He continues on oral Decadron 6 mg daily, IV 

hydration, vitamins, d-dimer was low at 0.77, patient is on subcu heparin for 

DVT prophylaxis, pro-calcitonin level was low, inflammatory markers were not 

significantly elevated on admission.  Clinically symptoms have not progressed, 

and patient will be considered for discharge in next 24 hours.





The patient is seen today 03/19/2021 in follow-up on the regular medical floor. 

He is currently sitting up in chair at the bedside.  Awake and alert in no acute

distress.  Continue O2 saturations in the low 90s on 2 L/m per nasal cannula.  

Afebrile.  Hemodynamically stable.  Blood cultures reveal no growth.  Blood g

lucose 202.  Sodium 143.  Potassium 5.5.  Creatinine 1.19.  He remains on 

dexamethasone, so continues heparin, vitamin supplements.  Chest x-ray continues

to show bilateral multifocal opacities consistent with CoVID infection.  No 

change.





The patient is seen today 03/20/2021 in follow-up on the regular medical floor. 

He is currently resting comfortably in bed.  Awake and alert in no acute distre

ss.  He is maintaining O2 saturations in the 90s on 2 L/m per nasal cannula.  

He's been on dexamethasone, vitamin supplements, subcutaneous heparin.  No 

worsening shortness of breath, cough or congestion.  D-dimer 1.02.  Sodium 145. 

Potassium 5.1.  Creatinine 1.3.  .  C-reactive protein 4.4.





The patient is seen today 03/21/2021 in follow-up on the regular medical floor. 

He is currently resting quite comfortably in bed.  Maintaining O2 saturations in

the low 90s on 2 L/m per nasal cannula.  Sodium 144.  Potassium 4.7.  Creatinine

1.2.  Glucose 173.  He remains on dexamethasone, heparin, vitamin supplements.








03/22/2021 on seeing the patient for a follow-up regarding the patient's acute 

hypoxic respiratory failure due to Covid 19 related pneumonia.  The patient was 

not a candidate for Remdesivir, Tocilizumab or convalescent plasma, and the 

patient is currently on Decadron 6 mg on a daily basis and addition to Levemir 

insulin 50 units daily at bedtime and NovoLog 6 units 3 times a day with meals 

and a sliding scale coverage.  The patient was on 2 L about 2 by nasal cannula 

with pulse ox of 93%.  Earlier this morning, the patient's oxidation 

progressively got worse in the patient is currently on 100% nonrebreather 

facemask maintaining a saturation above 90% at around 94%.  Overall, the patient

is doing well.  Creatinine is at 1.2.  The patient had a CRP level of 4.4 with 

an LDH level of 485 from 03/20/2021.  Inflammatory markers are being monitored. 

Last d-dimer from 03/20/2021 was 1.02.  Blood cultures were negative repeat 

chest x-ray was done today and the findings are essentially stable and the 

patient has patchy bilateral perihilar pulmonary infiltrates without any 

significant interval change.  D-dimer from today is at 1.7 and the patient 

remains on Lovenox.





03/23/2021 the patient is being seen for a follow-up.  The patient is currently 

on high flow oxygen at 6 L.  Note that the patient was on 100% nonrebreather 

facemask and was switched him to a high flow oxygen yesterday at 60 L.  As part 

of his treatment, the patient received steroids and currently is receiving 

dexamethasone 6 mg by mouth daily.  He is also Toci 2 yesterday and today.  He 

completed treatment without any major side effects.  Note that the patient was 

on low-flow oxygen and he progressively got worse requiring 100% nonrebreather 

and subsequent high flow oxygen.  On today's evaluation, his white cell count is

at 10.  His d-dimer is at 1.6.  Rest of the inflammatory markers have not been 

checked and this will be repeated tomorrow.  LDH from yesterday was 491 with a 

CRP of 9.4.  Otherwise, his renal function shows a stable creatinine of 1.2.  

The patient has been on FiO2 of 60% with a high flow oxygen of 60 L and the 

patient has been essentially stable since yesterday.  No other significant 

events.  Remains on Lovenox 40 mg subcu on a daily basis. 





03/24/2021, the patient is still on high flow oxygen and 60 L.  He is not 

utilizing the 100% nonrebreather facemask this morning.  Note that the patient 

has received steroids, and he remains on Decadron 6 mg by mouth daily. He also 

completed Tocilizumab.  On today's evaluation, chest x-ray findings are stable 

without any interval change in the bilateral pulmonary infiltrates.  The patient

is resting comfortably in bed.  He is also able to move on a recliner.  Labs 

today shows an LDH of 414 and a CRP of 2.9 and the patient is d-dimer is at 

2.56.  The patient remains on Lovenox 40 mg subcu on a daily basis.  He is on 

Levemir insulin 50 units daily along with NovoLog 6 units with meals plus a 

sliding coverage.  I will say his condition essentially the same as unchanged 

compared to yesterday.  He is not doing much in progress for now. 





On today's evaluation of 03/25/2021 the patient is being seen for a follow-up.  

The patient remains on high flow oxygen with a flow of 6 L an FiO2 of 60%.  His 

current pulse ox is around 88%.  He is afebrile.  He is doing well.  Sitting up 

on a chair.  No signs of any respiratory distress.  His breathing is nonlabored 

and he doesn't have any significant cough unless he takes a deep inspiration.  

The patient  had follow-up blood work today.  His LDL level is up 1221 and a CRP

level is up to 12.6 and his d-dimer currently is at 2.95.  I cannot explain the 

rise in these inflammatory markers.  His creatinine is at 1.2.  Was a causative 

13.7 with a hemoglobin of 13.2.  His chest x-ray from yesterday was showing 

right basilar infiltrates which are nonspecific and it was mentioned that he was

getting worse.  The patient remains on Decadron 6 mg by mouth daily.  He remains

on 50 units of Levemir insulin along with 6 units of NovoLog around-the-clock.  

He remains on aspirin.  He remains on Lovenox for DVT prophylaxis 40 mg subcu.








03/26/2021 the patient is on high flow oxygen at 60 L with an FiO2 of 70%.  His 

pulse ox was around 93%.  He feels well.  No altered mentation.  Resting 

comfortably.  Sitting up and he has no significant shortness of breath at rest. 

His chest x-ray still showing diffuse bilateral pulmonary infiltrates, 

peripheral distribution, probably slightly worse compared to yesterday.  

However, the radiologist reports is stating that the findings are essentially 

stable.  Clinically he is also stable.  The labs from today shows a sugar of 407

is quite high and a d-dimer is down to 1.89 and the patient's LDH level is down 

to 419 with a CRP level of 0.7.  The patient remains on Levemir insulin and is 

currently taking 50 units along with 6 units with meals of NovoLog.





03/27/2021 the patient remains on high flow at 60 L with an FiO2 of 70% which is

essentially the same as yesterday.  His pulse ox currently is around 93%.  His 

chest x-ray from today is showing diffuse bilateral pulmonary infiltrates, 

peripheral distribution, I thought his chest x-ray and probably the x-ray 

findings are improved compared to yesterday's chest x-ray.  I have the patient 

on Solu-Medrol 60 mg every 6 hours.  He is also on anticoagulation.  He is 

receiving Lovenox..  His d-dimer is at 1.57 on today's evaluation.  His LDH 

level is high today at 1059 with a CRP of less than 5.  He remains on Levemir 

insulin 50 units along with 6 units of NovoLog with meals and his blood sugars 

under adequate control for now.  He has no other complaints otherwise.  Note 

that initially the patient was started on Decadron and during the course of his 

treatment he was given Toci


 


03/28/2021, the patient is on high flow oxygen at 60 L along with an FiO2 of 

70%.  Pulse ox is in order of 96%.  I'm hoping to get down the FiO2 further 

today.  The patient is on IV Solu-Medrol.  He is on long-term anticoagulation.  

He is receiving Lovenox at a dose of 40 mg subcu on a daily basis.  In terms of 

inflammatory markers and blood work, the patient's CRP is less than 0.4 and the 

LDH level is at 378.  The chest x-ray was done yesterday showed some lower lobe 

active pulmonary infiltrates, findings are unchanged.  The patient is awake and 

alert and following commands and answering questions.  He is on Levemir insulin 

as a dose of 60 daily at bedtime and he is also on NovoLog 10 units with meals 

and a sliding scale coverage. He has completed tocilizumab





Objective





- Vital Signs


Vital signs: 


                                   Vital Signs











Temp  97.9 F   03/28/21 10:00


 


Pulse  63   03/28/21 10:00


 


Resp  20   03/28/21 10:00


 


BP  127/70   03/28/21 10:00


 


Pulse Ox  96   03/28/21 10:00








                                 Intake & Output











 03/27/21 03/28/21 03/28/21





 18:59 06:59 18:59


 


Output Total  775 


 


Balance  -775 


 


Output:   


 


  Urine  775 


 


Other:   


 


  Voiding Method Bedside Commode Bedside Commode 





 Urinal Urinal 


 


  # Voids 2  2














- Exam











GENERAL EXAM: Alert, pleasant, 80-year-old male patient, on 60l with an FiO2 of 

70%


HEAD: Normocephalic/atraumatic.


EYES: Normal reaction of pupils, equal size.  Conjunctiva pink, sclera white.


NOSE: Clear with pink turbinates.


THROAT: No erythema or exudates.


NECK: No masses, no JVD, no thyroid enlargement, no adenopathy.


CHEST: No chest wall deformity.  Symmetrical expansion. 


LUNGS: Equal air entry with bibasilar coarse crackles


CVS: Regular rate and rhythm, normal S1 and S2, no gallops, no murmurs, no rubs


ABDOMEN: Soft, nontender.  No hepatosplenomegaly, normal bowel so milligrams 

unds, no guarding or rigidity.


EXTREMITIES: No clubbing, no edema, no cyanosis, 2+ pulses and upper and lower 

extremities.


MUSCULOSKELETAL: Muscle strength and tone normal.


SPINE: No scoliosis or deformity


SKIN: No rashes


CENTRAL NERVOUS SYSTEM: No focal deficits, tone is normal in all 4 extremities.


PSYCHIATRIC: Alert and oriented -3.  Appropriate affect.  Intact judgment and 

insight.





- Labs


CBC & Chem 7: 


                                 03/27/21 06:30





                                 03/27/21 06:30


Labs: 


                  Abnormal Lab Results - Last 24 Hours (Table)











  03/27/21 03/27/21 03/28/21 Range/Units





  16:27 20:25 06:10 


 


D-Dimer    1.53 H  (<0.60)  mg/L FEU


 


POC Glucose (mg/dL)  372 H  381 H   (75-99)  mg/dL


 


Lactate Dehydrogenase     (120-246)  U/L














  03/28/21 03/28/21 03/28/21 Range/Units





  06:10 06:57 11:37 


 


D-Dimer     (<0.60)  mg/L FEU


 


POC Glucose (mg/dL)   224 H  301 H  (75-99)  mg/dL


 


Lactate Dehydrogenase  378 H    (120-246)  U/L














Assessment and Plan


Plan: 








1  Acute hypoxic respiratory failure secondary to CoVID 19 pneumonia


-IV Solu-Medrol and this received Tocilizumab 2 doses


-high flow oxygen 60 L with an FiO2 of 70%..   


-The chest x-ray findings are stable without any interval improvement or change.

 


-Inflammatory markers are improving and the LDH is back down to 378 and is 

declining again.





2  History of diabetes mellitus type 2, currently on Levemir insulin for blood 

sugar control





3  History of hypertension





4  History of chronic sinus disease





5  History of diverticulitis, status post bowel resection





Plan:


 


Continue same treatment


Changes oxygenation compared to yesterday.  The patient has been in the hospital

for more than 12 days.  He is very slow to progress.


Continue  Solu-Medrol.


Continue Lovenox 40 mg subcu for DVT prophylaxis


Completed Tocilizumab 2  


Chest x-ray findings are stable.  


 LDH and the d-dimer and the CRP are fluctuating and the most recent LDH level 

is improving and the levels are lower


flow oxygen 60 L with an FiO2 of 70% 


Titrate the FiO2 as tolerated


Please the Levemir up to 60 units along with 10 units of NovoLog with meals and 

monitor the blood sugars 


We will continue to follow

## 2021-03-29 LAB
ANION GAP SERPL CALC-SCNC: 5.8 MMOL/L (ref 4–12)
BASOPHILS # BLD AUTO: 0.03 X 10*3/UL (ref 0–0.1)
BASOPHILS NFR BLD AUTO: 0.2 %
BUN SERPL-SCNC: 57 MG/DL (ref 9–27)
BUN/CREAT SERPL: 47.5 RATIO (ref 12–20)
CALCIUM SPEC-MCNC: 8.8 MG/DL (ref 8.7–10.3)
CHLORIDE SERPL-SCNC: 106 MMOL/L (ref 96–109)
CO2 SERPL-SCNC: 24.2 MMOL/L (ref 21.6–31.8)
EOSINOPHIL # BLD AUTO: 0 X 10*3/UL (ref 0.04–0.35)
EOSINOPHIL NFR BLD AUTO: 0 %
ERYTHROCYTE [DISTWIDTH] IN BLOOD BY AUTOMATED COUNT: 4.34 X 10*6/UL (ref 4.4–5.6)
ERYTHROCYTE [DISTWIDTH] IN BLOOD: 13.6 % (ref 11.5–14.5)
GLUCOSE BLD-MCNC: 224 MG/DL (ref 75–99)
GLUCOSE BLD-MCNC: 303 MG/DL (ref 75–99)
GLUCOSE BLD-MCNC: 311 MG/DL (ref 75–99)
GLUCOSE BLD-MCNC: 326 MG/DL (ref 75–99)
GLUCOSE SERPL-MCNC: 241 MG/DL (ref 70–110)
HCT VFR BLD AUTO: 41.5 % (ref 39.6–50)
HGB BLD-MCNC: 13.2 G/DL (ref 13–17)
LDH SPEC-CCNC: 503 U/L (ref 120–246)
LYMPHOCYTES # SPEC AUTO: 1.1 X 10*3/UL (ref 0.9–5)
LYMPHOCYTES NFR SPEC AUTO: 5.9 %
MCH RBC QN AUTO: 30.4 PG (ref 27–32)
MCHC RBC AUTO-ENTMCNC: 31.8 G/DL (ref 32–37)
MCV RBC AUTO: 95.6 FL (ref 80–97)
MONOCYTES # BLD AUTO: 0.96 X 10*3/UL (ref 0.2–1)
MONOCYTES NFR BLD AUTO: 5.2 %
NEUTROPHILS # BLD AUTO: 16.29 X 10*3/UL (ref 1.8–7.7)
NEUTROPHILS NFR BLD AUTO: 87.3 %
PLATELET # BLD AUTO: 293 X 10*3/UL (ref 140–440)
POTASSIUM SERPL-SCNC: 6.1 MMOL/L (ref 3.5–5.5)
SODIUM SERPL-SCNC: 136 MMOL/L (ref 135–145)
WBC # BLD AUTO: 18.64 X 10*3/UL (ref 4.5–10)

## 2021-03-29 RX ADMIN — Medication SCH MG: at 07:36

## 2021-03-29 RX ADMIN — INSULIN ASPART SCH UNIT: 100 INJECTION, SOLUTION INTRAVENOUS; SUBCUTANEOUS at 07:37

## 2021-03-29 RX ADMIN — OXYCODONE HYDROCHLORIDE AND ACETAMINOPHEN SCH MG: 500 TABLET ORAL at 07:36

## 2021-03-29 RX ADMIN — FAMOTIDINE SCH MG: 20 TABLET, FILM COATED ORAL at 07:36

## 2021-03-29 RX ADMIN — ENOXAPARIN SODIUM SCH MG: 40 INJECTION SUBCUTANEOUS at 07:36

## 2021-03-29 RX ADMIN — INSULIN ASPART SCH UNIT: 100 INJECTION, SOLUTION INTRAVENOUS; SUBCUTANEOUS at 21:30

## 2021-03-29 RX ADMIN — INSULIN ASPART SCH UNIT: 100 INJECTION, SOLUTION INTRAVENOUS; SUBCUTANEOUS at 12:31

## 2021-03-29 RX ADMIN — INSULIN ASPART SCH UNIT: 100 INJECTION, SOLUTION INTRAVENOUS; SUBCUTANEOUS at 07:36

## 2021-03-29 RX ADMIN — METOPROLOL SUCCINATE SCH MG: 50 TABLET, EXTENDED RELEASE ORAL at 07:36

## 2021-03-29 RX ADMIN — INSULIN ASPART SCH UNIT: 100 INJECTION, SOLUTION INTRAVENOUS; SUBCUTANEOUS at 12:32

## 2021-03-29 RX ADMIN — METHYLPREDNISOLONE SODIUM SUCCINATE SCH MG: 125 INJECTION, POWDER, FOR SOLUTION INTRAMUSCULAR; INTRAVENOUS at 17:12

## 2021-03-29 RX ADMIN — INSULIN DETEMIR SCH UNIT: 100 INJECTION, SOLUTION SUBCUTANEOUS at 21:31

## 2021-03-29 RX ADMIN — METHYLPREDNISOLONE SODIUM SUCCINATE SCH MG: 125 INJECTION, POWDER, FOR SOLUTION INTRAMUSCULAR; INTRAVENOUS at 11:35

## 2021-03-29 RX ADMIN — METHYLPREDNISOLONE SODIUM SUCCINATE SCH MG: 125 INJECTION, POWDER, FOR SOLUTION INTRAMUSCULAR; INTRAVENOUS at 23:00

## 2021-03-29 RX ADMIN — INSULIN ASPART SCH UNIT: 100 INJECTION, SOLUTION INTRAVENOUS; SUBCUTANEOUS at 17:12

## 2021-03-29 RX ADMIN — CLOPIDOGREL BISULFATE SCH MG: 75 TABLET ORAL at 07:36

## 2021-03-29 RX ADMIN — PIOGLITAZONE SCH MG: 30 TABLET ORAL at 07:35

## 2021-03-29 RX ADMIN — INSULIN ASPART SCH UNIT: 100 INJECTION, SOLUTION INTRAVENOUS; SUBCUTANEOUS at 17:13

## 2021-03-29 RX ADMIN — METHYLPREDNISOLONE SODIUM SUCCINATE SCH MG: 125 INJECTION, POWDER, FOR SOLUTION INTRAMUSCULAR; INTRAVENOUS at 04:51

## 2021-03-29 RX ADMIN — ATORVASTATIN CALCIUM SCH MG: 80 TABLET, FILM COATED ORAL at 07:36

## 2021-03-29 RX ADMIN — ASPIRIN 81 MG CHEWABLE TABLET SCH MG: 81 TABLET CHEWABLE at 07:35

## 2021-03-29 NOTE — P.PN
Subjective


Progress Note Date: 03/27/21


Principal diagnosis: 





COVID pneumonia





80-year-old male who presents emergency Department stating that he has had 

shortness of breath and feeling fatigued for at least 2 weeks.  Patient states 

he has had exposure to COVID .  Patient states he has had a fever.  Patient 

states she's also states and smile.  Patient also states she's had diarrhea.  

Patient denies any chest pain or palpitations.  Patient denies any abdominal 

pain patient denies nausea vomiting diarrhea.  According to EMS with the patient

was coming in he was satting in the low 80s.  Patient is currently on a few 

liters of oxygen and sat in mid 90s.  Patient is a diabetic and has high blood 

pressure.  Patient also has a pacemaker.  Patient is presently on 2 L of oxygen 

was requiring 4 L yesterday.  Patient was started on IV fluids patient 

creatinine went up to 1.6 baseline is around 1.  Patient's blood sugars are 

highly elevated.  Patient tests positive for Covid.


03/18/2021


Patient is presently on 2 L of oxygen appears to be doing better possibility of 

discharge tomorrow.  Creatinine went up a bit to 1.6 baseline is around the 1.  

Patient was started on IV fluids will recheck the basic metabolic profile 

tomorrow.


03/19/2021


Patient seen and evaluated in follow-up continues to be on 2 L of oxygen and 

becomes dyspneic with exertion.  Patient states he does not know wear oxygen at 

home.  Pulmonary is following.  Patient's blood sugars are elevated and will 

continue sliding scale at this time as patient is on steroids.  Potassium was 

found to be slightly elevated at 5.5 and was given a dose of Kayexalate.  Will 

repeat a.m. labs.  Instructed and encouraged the patient to get up and increase 

activity as tolerated.


03/20/2021


Patient is presently in 2 does of hours and doing clinically well.  Patient is 

on dexamethasone, vitamin supplements, subcutaneous heparin.  No worsening 

shortness of breath, cough or congestion.  D-dimer 1.02.  Sodium 145.  Potassium

5.1.  Creatinine 1.3.  .  C-reactive protein 4.4.  His creatinine 

actually went up a little bit from 1.19-1.3


03/21/2021


Patient is fairly stable no significant improvement or worsening.  Patient is 

still on 2 L of oxygen.  We'll monitor him 1 more night.  Continues to require 

oxygen patient probably can be discharged on 2 L tomorrow this can be tapered or

discontinued as an outpatient.  Patient d-dimer is bit worse compared to 

admission.  Although other inflammatory markers are better


03/22/2021


Patient is seen this morning currently on nonrebreather and airvo as his oxygen 

was found to be 89% on 6 L of oxygen.  Pulmonary is following.  D-dimer has gone

up at 1.70 along with lactate dehydrogenase at 491 and CRP is 9.4.  Blood sugars

continue to be elevated and patient is maintained on sliding scale along with 

long-acting and pre-meal insulin and will continue at this time.  Patient 

continues on vitamin and zinc supplements along with Lovenox and dexamethasone 

daily.  Patient is scheduled to receive Tocilizumab today.  Will continue to 

monitor closely based on the clinical course of the patient.  Patient continues 

to be weak and was seen and evaluated by physical therapy recommending subacute 

rehab and patient is now agreeable and a social work consult was placed. 


03/23/2021


Patient is seen and evaluated this morning with no improvement in respiratory 

status.  Patient remains on Airvo with supplemental nonrebreather.  Patient is 

day 2 of receiving Tocilizumab.  Will repeat a.m. chest x-ray along with in

flammatory markers.  D-dimer today was 1.60.  White blood count has gone up in 

is 10.8, hemoglobin is 12.6.  Patient is afebrile.  To continue with 

dexamethasone, Lovenox, vitamin C and zinc supplements.  Pulmonary is following.

 Had a discussion with the patient about CODE STATUS along with his son Mamadou and 

currently wish to remain a full code until talking with family member more 

extensively about the situation.  Will continue to monitor closely.  Prognosis 

remains guarded.


03/24/2021


Patient is seen this morning status post getting up to the bathroom and 

continues to be extremely dyspneic with any exertion and was found to be low 80s

to 83% oxygenation.  Patient continues on the airvo with a flow rate of 60 and 

FiO2 of 64 and is currently 91%.  Patient denies any chest pain or palpitations.

 Patient is afebrile.  Patient reports to tolerating diet with no nausea or 

vomiting noted.  D-dimer continues to be elevated at 2.56, sodium today is 140 

with a potassium of 5.1 and creatinine is 1.2.  Blood sugars on the lower side 

this morning and will continue to monitor closely and treat accordingly with 

sliding scale.  Inflammatory markers trending down.  Pulmonary following 

closely.  Chest x-ray today shows continued bibasilar infiltrates which are 

nonspecific but worsening.


03/25/2021


Patient is seen in follow-up this morning and continues to be on Airvo no 

significant change.  Patient was on dexamethasone all being transitioned to IV 

Solu-Medrol and will continue with Accu-Cheks and close glycemic control with 

pre-meal, long-acting, and sliding scale.  White blood count is 13.9, hemoglobin

is stable at 13.2, d-dimer is 2.95, sodium is 137, potassium is 5.1, creatinine 

slightly elevated at 1.24.  Inflammatory markers have gone up.  Patient is 

sitting up in the chair and denies any chest pain or worsening shortness of 

breath.  Patient states he feels he is about the same.  Patient also states he 

has been sleeping a lot and tolerating diet with no reports of nausea or vom

iting.  Discussed with patient about increasing activity as tolerated although 

patient is extremely dyspneic with minimal exertion.


03/26/2021


Patient is seen and evaluated this morning and follow-up with no acute overnight

issues. Patient continues to be on Airvo with a flow rate of 60 and FiO2 of 70 

and is being closely monitored.  Transitioned to IV Solu-Medrol and will 

continue at this time.  Blood sugars elevated and increasing long-acting and 

pre-meal insulins and will continue sliding scale along with oral antidiabetic 

agents.  Need tighter glycemic control.  Patient has been eating 100% of all 

meals with no reports of nausea or vomiting noted.  Patient clinically looking 

better and chest x-ray displays stable infiltrates.  D-dimer slightly trending 

down along with inflammatory markers.  Creatinine is stable at 1.2 and potassium

is 5.5.  Will continue to monitor vital signs and labs closely.





On 3/27/2021 -patient is seen and examined at the bedside.  He is sitting up in 

the chair and is on Airvo 60 L with FiO2 of 70%, saturating about 90%.  He still

complains of exertional dyspnea and cough at times.  Denies having any fevers 

chills or rigors.  He complains of generalized weakness.  On reviewing his 

vitals temperature of 97.9, heart rate 63, respiratory rate 18, blood pressure 

120 x 62 .  On reviewing his labs white count of 22.9, hemoglobin 13, platelets 

299.  Sodium 136, potassium 5.6, chloride 104, bicarb 26, BUN 55, creatinine 

1.15.  Blood sugars running on the higher side around 300s.  Patient had a chest

x-ray done this morning showing mild to moderate mid and lower zone infiltrates.





All medications have been reviewed and pertinent changes made.





Objective





- Vital Signs


Vital signs: 


                                   Vital Signs











Temp  97.9 F   03/27/21 13:57


 


Pulse  63   03/27/21 13:57


 


Resp  18   03/27/21 13:57


 


BP  120/62   03/27/21 13:57


 


Pulse Ox  92 L  03/27/21 13:57








                                 Intake & Output











 03/26/21 03/27/21 03/27/21





 18:59 06:59 18:59


 


Other:   


 


  Voiding Method Bedside Commode Bedside Commode Bedside Commode





 Urinal Urinal Urinal


 


  # Voids 2 2 














- Exam





GENERAL: The patient is alert and oriented x3, not in any acute distress.  

Obese, ill appearing 


HEENT: Pupils are round and equally reacting to light. EOMI. No scleral icterus.

No conjunctival pallor. . 


CARDIOVASCULAR: S1 and S2 present. No murmurs, rubs, or gallops. 


PULMONARY: Diminished breath sounds with diffuse rhonchi 


ABDOMEN: Soft, nontender, nondistended, normoactive bowel sounds. No palpable 

organomegaly. 


MUSCULOSKELETAL: No joint swelling or deformity.


EXTREMITIES: No cyanosis, clubbing,  Mil pedal edema. 


NEUROLOGICAL: Gross neurological examination did not reveal any focal deficits. 


SKIN: No rashes. 








- Labs


CBC & Chem 7: 


                                 03/27/21 06:30





                                 03/27/21 06:30


Labs: 


                  Abnormal Lab Results - Last 24 Hours (Table)











  03/26/21 03/26/21 03/27/21 Range/Units





  16:38 20:38 06:30 


 


WBC     (3.8-10.6)  k/uL


 


RBC     (4.30-5.90)  m/uL


 


Neutrophils #     (1.3-7.7)  k/uL


 


D-Dimer    1.57 H  (<0.60)  mg/L FEU


 


Sodium     (137-145)  mmol/L


 


Potassium     (3.5-5.1)  mmol/L


 


BUN     (9-20)  mg/dL


 


Glucose     (74-99)  mg/dL


 


POC Glucose (mg/dL)  291 H  311 H   (75-99)  mg/dL


 


Lactate Dehydrogenase     (313-618)  U/L














  03/27/21 03/27/21 03/27/21 Range/Units





  06:30 06:30 06:58 


 


WBC   22.9 H   (3.8-10.6)  k/uL


 


RBC   4.20 L   (4.30-5.90)  m/uL


 


Neutrophils #   20.6 H   (1.3-7.7)  k/uL


 


D-Dimer     (<0.60)  mg/L FEU


 


Sodium  136 L    (137-145)  mmol/L


 


Potassium  5.6 H    (3.5-5.1)  mmol/L


 


BUN  55 H    (9-20)  mg/dL


 


Glucose  322 H    (74-99)  mg/dL


 


POC Glucose (mg/dL)    321 H  (75-99)  mg/dL


 


Lactate Dehydrogenase  1059 H    (313-618)  U/L














  03/27/21 Range/Units





  11:19 


 


WBC   (3.8-10.6)  k/uL


 


RBC   (4.30-5.90)  m/uL


 


Neutrophils #   (1.3-7.7)  k/uL


 


D-Dimer   (<0.60)  mg/L FEU


 


Sodium   (137-145)  mmol/L


 


Potassium   (3.5-5.1)  mmol/L


 


BUN   (9-20)  mg/dL


 


Glucose   (74-99)  mg/dL


 


POC Glucose (mg/dL)  302 H  (75-99)  mg/dL


 


Lactate Dehydrogenase   (313-618)  U/L














Assessment and Plan


Assessment: 





Assessment: 





-Acute hypoxic respiratory failure: Secondary to covid 19 pneumonia 


-Type 2 diabetes mellitus uncontrolled


-Hyperkalemia, improved


-Hypotonic hyponatremia, improved


-Acute renal failure secondary to Covid 19: Improved, current creatinine is 1.2


-Hypertension 


-DVT subcutaneous Lovenox


-Full code





Plan:


Continue with current medications.  Will continue IV Solu-Medrol with vitamin 

and zinc supplements, and lovenox . patient has received Tocilizumab x2.  

Pulmonary following .  Patient currently on  intermittent nonrebreather along 

with airvo slowly weaning as tolerated.  Inflammatory markers trending down 

today and will repeat and monitor closely tomorrow.  Repeat chest x-ray shows 

stable infiltrates.  Elevated blood sugars secondary to systemic steroids, 

continue with pre-meal, sliding scale, and long-acting, with adjustments made 

and will increase pre-male and long-acting doses.  PT/OT following and patient 

may likely require ECF for continued PT/OT therapy due to weakness once 

stabilized and discharged.  Will continue to monitor closely and make further 

recommendations based on the clinical course of the patient.  Prognosis remains 

guarded.

## 2021-03-29 NOTE — P.PN
Subjective


Progress Note Date: 03/28/21


Principal diagnosis: 





COVID pneumonia





80-year-old male who presents emergency Department stating that he has had 

shortness of breath and feeling fatigued for at least 2 weeks.  Patient states 

he has had exposure to COVID .  Patient states he has had a fever.  Patient 

states she's also states and smile.  Patient also states she's had diarrhea.  

Patient denies any chest pain or palpitations.  Patient denies any abdominal 

pain patient denies nausea vomiting diarrhea.  According to EMS with the patient

was coming in he was satting in the low 80s.  Patient is currently on a few 

liters of oxygen and sat in mid 90s.  Patient is a diabetic and has high blood 

pressure.  Patient also has a pacemaker.  Patient is presently on 2 L of oxygen 

was requiring 4 L yesterday.  Patient was started on IV fluids patient 

creatinine went up to 1.6 baseline is around 1.  Patient's blood sugars are 

highly elevated.  Patient tests positive for Covid.


03/18/2021


Patient is presently on 2 L of oxygen appears to be doing better possibility of 

discharge tomorrow.  Creatinine went up a bit to 1.6 baseline is around the 1.  

Patient was started on IV fluids will recheck the basic metabolic profile 

tomorrow.


03/19/2021


Patient seen and evaluated in follow-up continues to be on 2 L of oxygen and 

becomes dyspneic with exertion.  Patient states he does not know wear oxygen at 

home.  Pulmonary is following.  Patient's blood sugars are elevated and will 

continue sliding scale at this time as patient is on steroids.  Potassium was 

found to be slightly elevated at 5.5 and was given a dose of Kayexalate.  Will 

repeat a.m. labs.  Instructed and encouraged the patient to get up and increase 

activity as tolerated.


03/20/2021


Patient is presently in 2 does of hours and doing clinically well.  Patient is 

on dexamethasone, vitamin supplements, subcutaneous heparin.  No worsening 

shortness of breath, cough or congestion.  D-dimer 1.02.  Sodium 145.  Potassium

5.1.  Creatinine 1.3.  .  C-reactive protein 4.4.  His creatinine 

actually went up a little bit from 1.19-1.3


03/21/2021


Patient is fairly stable no significant improvement or worsening.  Patient is 

still on 2 L of oxygen.  We'll monitor him 1 more night.  Continues to require 

oxygen patient probably can be discharged on 2 L tomorrow this can be tapered or

discontinued as an outpatient.  Patient d-dimer is bit worse compared to 

admission.  Although other inflammatory markers are better


03/22/2021


Patient is seen this morning currently on nonrebreather and airvo as his oxygen 

was found to be 89% on 6 L of oxygen.  Pulmonary is following.  D-dimer has gone

up at 1.70 along with lactate dehydrogenase at 491 and CRP is 9.4.  Blood sugars

continue to be elevated and patient is maintained on sliding scale along with 

long-acting and pre-meal insulin and will continue at this time.  Patient 

continues on vitamin and zinc supplements along with Lovenox and dexamethasone 

daily.  Patient is scheduled to receive Tocilizumab today.  Will continue to 

monitor closely based on the clinical course of the patient.  Patient continues 

to be weak and was seen and evaluated by physical therapy recommending subacute 

rehab and patient is now agreeable and a social work consult was placed. 


03/23/2021


Patient is seen and evaluated this morning with no improvement in respiratory 

status.  Patient remains on Airvo with supplemental nonrebreather.  Patient is 

day 2 of receiving Tocilizumab.  Will repeat a.m. chest x-ray along with in

flammatory markers.  D-dimer today was 1.60.  White blood count has gone up in 

is 10.8, hemoglobin is 12.6.  Patient is afebrile.  To continue with 

dexamethasone, Lovenox, vitamin C and zinc supplements.  Pulmonary is following.

 Had a discussion with the patient about CODE STATUS along with his son Mamadou and 

currently wish to remain a full code until talking with family member more 

extensively about the situation.  Will continue to monitor closely.  Prognosis 

remains guarded.


03/24/2021


Patient is seen this morning status post getting up to the bathroom and 

continues to be extremely dyspneic with any exertion and was found to be low 80s

to 83% oxygenation.  Patient continues on the airvo with a flow rate of 60 and 

FiO2 of 64 and is currently 91%.  Patient denies any chest pain or palpitations.

 Patient is afebrile.  Patient reports to tolerating diet with no nausea or 

vomiting noted.  D-dimer continues to be elevated at 2.56, sodium today is 140 

with a potassium of 5.1 and creatinine is 1.2.  Blood sugars on the lower side 

this morning and will continue to monitor closely and treat accordingly with 

sliding scale.  Inflammatory markers trending down.  Pulmonary following 

closely.  Chest x-ray today shows continued bibasilar infiltrates which are 

nonspecific but worsening.


03/25/2021


Patient is seen in follow-up this morning and continues to be on Airvo no 

significant change.  Patient was on dexamethasone all being transitioned to IV 

Solu-Medrol and will continue with Accu-Cheks and close glycemic control with 

pre-meal, long-acting, and sliding scale.  White blood count is 13.9, hemoglobin

is stable at 13.2, d-dimer is 2.95, sodium is 137, potassium is 5.1, creatinine 

slightly elevated at 1.24.  Inflammatory markers have gone up.  Patient is 

sitting up in the chair and denies any chest pain or worsening shortness of 

breath.  Patient states he feels he is about the same.  Patient also states he 

has been sleeping a lot and tolerating diet with no reports of nausea or vom

iting.  Discussed with patient about increasing activity as tolerated although 

patient is extremely dyspneic with minimal exertion.


03/26/2021


Patient is seen and evaluated this morning and follow-up with no acute overnight

issues. Patient continues to be on Airvo with a flow rate of 60 and FiO2 of 70 

and is being closely monitored.  Transitioned to IV Solu-Medrol and will 

continue at this time.  Blood sugars elevated and increasing long-acting and 

pre-meal insulins and will continue sliding scale along with oral antidiabetic 

agents.  Need tighter glycemic control.  Patient has been eating 100% of all 

meals with no reports of nausea or vomiting noted.  Patient clinically looking 

better and chest x-ray displays stable infiltrates.  D-dimer slightly trending 

down along with inflammatory markers.  Creatinine is stable at 1.2 and potassium

is 5.5.  Will continue to monitor vital signs and labs closely.





On 3/27/2021 -patient is seen and examined at the bedside.  He is sitting up in 

the chair and is on Airvo 60 L with FiO2 of 70%, saturating about 90%.  He still

complains of exertional dyspnea and cough at times.  Denies having any fevers 

chills or rigors.  He complains of generalized weakness.  On reviewing his 

vitals temperature of 97.9, heart rate 63, respiratory rate 18, blood pressure 

120 x 62 .  On reviewing his labs white count of 22.9, hemoglobin 13, platelets 

299.  Sodium 136, potassium 5.6, chloride 104, bicarb 26, BUN 55, creatinine 

1.15.  Blood sugars running on the higher side around 300s.  Patient had a chest

x-ray done this morning showing mild to moderate mid and lower zone infiltrates.





On 3/28/2021 -patient is seen and examined at bedside.  He is sitting up in a 

chair at fifth high flow oxygen at 60 L FiO2 of 70% and pulse ox at 94%.  He is 

still having exertional dyspnea and complains of generalized weakness.  He 

states that he is tired of using the oxygen.  On reviewing the vitals T-max of 

97.9, heart rate 60, respiratory 19, blood pressure 130/60.  Reviewing the labs 

, CRP less than 0.04 and D-dimer of 1.53.  All medications have been 

reviewed and pertinent changes made.





Objective





- Vital Signs


Vital signs: 


                                   Vital Signs











Temp  98.2 F   03/28/21 14:00


 


Pulse  60   03/28/21 14:00


 


Resp  18   03/28/21 14:00


 


BP  134/61   03/28/21 14:00


 


Pulse Ox  93 L  03/28/21 14:00








                                 Intake & Output











 03/27/21 03/28/21 03/28/21





 18:59 06:59 18:59


 


Output Total  775 


 


Balance  -775 


 


Output:   


 


  Urine  775 


 


Other:   


 


  Voiding Method Bedside Commode Bedside Commode 





 Urinal Urinal 


 


  # Voids 2  2














- Exam





GENERAL: The patient is alert and oriented x3, not in any acute distress.  

Obese, ill appearing 


HEENT: Pupils are round and equally reacting to light. EOMI. No scleral icterus.

No conjunctival pallor. . 


CARDIOVASCULAR: S1 and S2 present. No murmurs, rubs, or gallops. 


PULMONARY: Diminished breath sounds with diffuse rhonchi 


ABDOMEN: Soft, nontender, nondistended, normoactive bowel sounds. 


MUSCULOSKELETAL: No joint swelling or deformity.


EXTREMITIES: No cyanosis, clubbing,  Mil pedal edema. 


NEUROLOGICAL: Gross neurological examination did not reveal any focal deficits. 


SKIN: No rashes. 








- Labs


CBC & Chem 7: 


                                 03/27/21 06:30





                                 03/27/21 06:30


Labs: 


                  Abnormal Lab Results - Last 24 Hours (Table)











  03/27/21 03/27/21 03/28/21 Range/Units





  16:27 20:25 06:10 


 


D-Dimer    1.53 H  (<0.60)  mg/L FEU


 


POC Glucose (mg/dL)  372 H  381 H   (75-99)  mg/dL


 


Lactate Dehydrogenase     (120-246)  U/L














  03/28/21 03/28/21 03/28/21 Range/Units





  06:10 06:57 11:37 


 


D-Dimer     (<0.60)  mg/L FEU


 


POC Glucose (mg/dL)   224 H  301 H  (75-99)  mg/dL


 


Lactate Dehydrogenase  378 H    (120-246)  U/L














Assessment and Plan


Assessment: 





Assessment: 





-Acute hypoxic respiratory failure: Secondary to covid 19 pneumonia 


-Type 2 diabetes mellitus uncontrolled


-Hyperkalemia, improved


-Hypotonic hyponatremia, improved


-Acute renal failure secondary to Covid 19: Improved, current creatinine is 1.2


-Hypertension 


-DVT subcutaneous Lovenox


-Full code





Plan:


Continue with current medications.  Will continue IV Solu-Medrol with vitamin 

and zinc supplements, and lovenox . patient has received Tocilizumab x2.    

Inflammatory markers and chest x-ray will be followed.   Elevated blood sugars 

secondary to systemic steroids, continue with pre-meal, sliding scale, and long-

acting, with adjustments made and will increase pre-male and long-acting doses. 

PT/OT following and patient may likely require ECF for continued PT/OT therapy 

due to weakness once stabilized and discharged.  Will continue to monitor 

closely and make further recommendations based on the clinical course of the 

patient.  He is response to treatment is gradual. Prognosis remains guarded.

## 2021-03-29 NOTE — P.PN
Subjective


Progress Note Date: 03/29/21





80-year-old male who presents emergency Department stating that he has had 

shortness of breath and feeling fatigued for at least 2 weeks.  Patient states 

he has had exposure to COVID .  Patient states he has had a fever.  Patient 

states she's also states and smile.  Patient also states she's had diarrhea.  

Patient denies any chest pain or palpitations.  Patient denies any abdominal 

pain patient denies nausea vomiting diarrhea.  According to EMS with the patient

was coming in he was satting in the low 80s.  Patient is currently on a few 

liters of oxygen and sat in mid 90s.  Patient is a diabetic and has high blood 

pressure.  Patient also has a pacemaker.  Patient is presently on 2 L of oxygen 

was requiring 4 L yesterday.  Patient was started on IV fluids patient 

creatinine went up to 1.6 baseline is around 1.  Patient's blood sugars are 

highly elevated.  Patient tests positive for Covid.





03/18/2021


Patient is presently on 2 L of oxygen appears to be doing better possibility of 

discharge tomorrow.  Creatinine went up a bit to 1.6 baseline is around the 1.  

Patient was started on IV fluids will recheck the basic metabolic profile 

tomorrow.





03/19/2021


Patient seen and evaluated in follow-up continues to be on 2 L of oxygen and 

becomes dyspneic with exertion.  Patient states he does not know wear oxygen at 

home.  Pulmonary is following.  Patient's blood sugars are elevated and will 

continue sliding scale at this time as patient is on steroids.  Potassium was 

found to be slightly elevated at 5.5 and was given a dose of Kayexalate.  Will 

repeat a.m. labs.  Instructed and encouraged the patient to get up and increase 

activity as tolerated.





03/20/2021


Patient is presently in 2 does of hours and doing clinically well.  Patient is 

on dexamethasone, vitamin supplements, subcutaneous heparin.  No worsening 

shortness of breath, cough or congestion.  D-dimer 1.02.  Sodium 145.  Potassium

5.1.  Creatinine 1.3.  .  C-reactive protein 4.4.  His creatinine 

actually went up a little bit from 1.19-1.3





03/21/2021


Patient is fairly stable no significant improvement or worsening.  Patient is 

still on 2 L of oxygen.  We'll monitor him 1 more night.  Continues to require 

oxygen patient probably can be discharged on 2 L tomorrow this can be tapered or

discontinued as an outpatient.  Patient d-dimer is bit worse compared to 

admission.  Although other inflammatory markers are better








03/22/2021


Patient is seen this morning currently on nonrebreather and airvo as his oxygen 

was found to be 89% on 6 L of oxygen.  Pulmonary is following.  D-dimer has gone

up at 1.70 along with lactate dehydrogenase at 491 and CRP is 9.4.  Blood sugars

continue to be elevated and patient is maintained on sliding scale along with 

long-acting and pre-meal insulin and will continue at this time.  Patient 

continues on vitamin and zinc supplements along with Lovenox and dexamethasone 

daily.  Patient is scheduled to receive Tocilizumab today.  Will continue to 

monitor closely based on the clinical course of the patient.  Patient continues 

to be weak and was seen and evaluated by physical therapy recommending subacute 

rehab and patient is now agreeable and a social work consult was placed. 





03/23/2021


Patient is seen and evaluated this morning with no improvement in respiratory 

status.  Patient remains on Airvo with supplemental nonrebreather.  Patient is 

day 2 of receiving Tocilizumab.  Will repeat a.m. chest x-ray along with 

inflammatory markers.  D-dimer today was 1.60.  White blood count has gone up in

is 10.8, hemoglobin is 12.6.  Patient is afebrile.  To continue with dexametha

sone, Lovenox, vitamin C and zinc supplements.  Pulmonary is following.  Had a 

discussion with the patient about CODE STATUS along with his son Mamadou and 

currently wish to remain a full code until talking with family member more 

extensively about the situation.  Will continue to monitor closely.  Prognosis 

remains guarded.





03/24/2021


Patient is seen this morning status post getting up to the bathroom and 

continues to be extremely dyspneic with any exertion and was found to be low 80s

to 83% oxygenation.  Patient continues on the airvo with a flow rate of 60 and 

FiO2 of 64 and is currently 91%.  Patient denies any chest pain or palpitations.

 Patient is afebrile.  Patient reports to tolerating diet with no nausea or 

vomiting noted.  D-dimer continues to be elevated at 2.56, sodium today is 140 

with a potassium of 5.1 and creatinine is 1.2.  Blood sugars on the lower side 

this morning and will continue to monitor closely and treat accordingly with 

sliding scale.  Inflammatory markers trending down.  Pulmonary following 

closely.  Chest x-ray today shows continued bibasilar infiltrates which are 

nonspecific but worsening.





03/25/2021





Patient is seen in follow-up this morning and continues to be on Airvo no 

significant change.  Patient was on dexamethasone all being transitioned to IV 

Solu-Medrol and will continue with Accu-Cheks and close glycemic control with 

pre-meal, long-acting, and sliding scale.  White blood count is 13.9, hemoglobin

is stable at 13.2, d-dimer is 2.95, sodium is 137, potassium is 5.1, creatinine 

slightly elevated at 1.24.  Inflammatory markers have gone up.  Patient is 

sitting up in the chair and denies any chest pain or worsening shortness of 

breath.  Patient states he feels he is about the same.  Patient also states he 

has been sleeping a lot and tolerating diet with no reports of nausea or 

vomiting.  Discussed with patient about increasing activity as tolerated 

although patient is extremely dyspneic with minimal exertion.





03/26/2021





Patient is seen and evaluated this morning and follow-up with no acute overnight

issues. Patient continues to be on Airvo with a flow rate of 60 and FiO2 of 70 

and is being closely monitored.  Transitioned to IV Solu-Medrol and will 

continue at this time.  Blood sugars elevated and increasing long-acting and 

pre-meal insulins and will continue sliding scale along with oral antidiabetic 

agents.  Need tighter glycemic control.  Patient has been eating 100% of all 

meals with no reports of nausea or vomiting noted.  Patient clinically looking 

better and chest x-ray displays stable infiltrates.  D-dimer slightly trending 

down along with inflammatory markers.  Creatinine is stable at 1.2 and potassium

is 5.5.  Will continue to monitor vital signs and labs closely.





On 3/27/2021 -patient is seen and examined at the bedside.  He is sitting up in 

the chair and is on Airvo 60 L with FiO2 of 70%, saturating about 90%.  He still

complains of exertional dyspnea and cough at times.  Denies having any fevers 

chills or rigors.  He complains of generalized weakness.  On reviewing his 

vitals temperature of 97.9, heart rate 63, respiratory rate 18, blood pressure 

120 x 62 .  On reviewing his labs white count of 22.9, hemoglobin 13, platelets 

299.  Sodium 136, potassium 5.6, chloride 104, bicarb 26, BUN 55, creatinine 

1.15.  Blood sugars running on the higher side around 300s.  Patient had a chest

x-ray done this morning showing mild to moderate mid and lower zone infiltrates.





On 3/28/2021 -patient is seen and examined at bedside.  He is sitting up in a 

chair at fifth high flow oxygen at 60 L FiO2 of 70% and pulse ox at 94%.  He is 

still having exertional dyspnea and complains of generalized weakness.  He 

states that he is tired of using the oxygen.  On reviewing the vitals T-max of 

97.9, heart rate 60, respiratory 19, blood pressure 130/60.  Reviewing the labs 

, CRP less than 0.04 and D-dimer of 1.53.  All medications have been 

reviewed and pertinent changes made.





03/29/2021


Patient is seen and evaluated this morning with no acute overnight issues.  

Patient continues to sit up in the chair throughout most of the day as he states

his breathing is better.  Patient continues to be on Airvo and flow rate has 

been decreased to 40 with an FiO2 of 70% and is currently 92%.  Patient con

tinues to have dyspnea with minimal exertion and continues to have weakness and 

will likely require some form rehab once stabilized and discharged.  PT/OT to 

continue to follow.  White blood count trending down at 18.64 and hemoglobin is 

stable at 13.2.  D-dimer is 1.60 with a sodium of 136 and potassium was found to

be 6.1 and being corrected and will repeat a.m. labs.  Creatinine is 1.2.  Blood

sugars continue to be elevated and will continue with sliding scale and long-

acting and monitor closely.  LDH trending back up at 503 and will monitor 

closely.  CRP is 0.4. 





Constitutional: Reports fatigue although slightly more awake today, denied any 

fever.


Cardio vascular: denied any chest pain, palpitations


Gastrointestinal denied any nausea vomiting


Pulmonary: Reports continued shortness of breath but feels has improved slightly




Neurologic reports generalized weakness 





All inpatient medications were reviewed and appropriate changes in these 

medications as dictated in the interval history and assessment and plan.























Objective





- Vital Signs


Vital signs: 


                                   Vital Signs











Temp  98.0 F   03/29/21 06:00


 


Pulse  59 L  03/29/21 06:00


 


Resp  18   03/29/21 06:00


 


BP  153/72   03/29/21 06:00


 


Pulse Ox  92 L  03/29/21 08:52








                                 Intake & Output











 03/28/21 03/29/21 03/29/21





 18:59 06:59 18:59


 


Output Total  450 


 


Balance  -450 


 


Output:   


 


  Urine  450 


 


Other:   


 


  Voiding Method  Urinal 


 


  # Voids 2 1 














- Exam





GENERAL: The patient is alert and oriented x3, not in any acute distress.  

Obese, currently on  airvo at a flow rate of 40 with an FiO2 of 70, continue to 

wean as tolerated


HEENT: Pupils are round and equally reacting to light. EOMI. No scleral icterus.

No conjunctival pallor. Normocephalic, atraumatic. No pharyngeal erythema. No 

thyromegaly. 


CARDIOVASCULAR: S1 and S2 present. No murmurs, rubs, or gallops. 


PULMONARY: Diminished breath sounds bilaterally with no wheezing or rhonchi 

noted


ABDOMEN: Soft, nontender, nondistended, normoactive bowel sounds. No palpable 

organomegaly. 


MUSCULOSKELETAL: No joint swelling or deformity.


EXTREMITIES: No cyanosis, clubbing, or pedal edema. 


NEUROLOGICAL: Gross neurological examination did not reveal any focal deficits. 


SKIN: No rashes. 








- Labs


CBC & Chem 7: 


                                 03/29/21 06:17





                                 03/29/21 06:17


Labs: 


                  Abnormal Lab Results - Last 24 Hours (Table)











  03/28/21 03/28/21 03/28/21 Range/Units





  11:37 16:33 20:28 


 


WBC     (4.50-10.00)  X 10*3/uL


 


RBC     (4.40-5.60)  X 10*6/uL


 


MCHC     (32.0-37.0)  g/dL


 


Immature Gran #     (0.00-0.04)  X 10*3/uL


 


Neutrophils #     (1.80-7.70)  X 10*3/uL


 


Eosinophils #     (0.04-0.35)  X 10*3/uL


 


D-Dimer     (<0.60)  mg/L FEU


 


Potassium     (3.5-5.5)  mmol/L


 


BUN     (9.0-27.0)  mg/dL


 


Est GFR (CKD-EPI)NonAf     (60.0-200.0)   


 


BUN/Creatinine Ratio     (12.00-20.00)  Ratio


 


Glucose     ()  mg/dL


 


POC Glucose (mg/dL)  301 H  315 H  321 H  (75-99)  mg/dL














  03/29/21 03/29/21 03/29/21 Range/Units





  06:17 06:17 06:17 


 


WBC  18.64 H    (4.50-10.00)  X 10*3/uL


 


RBC  4.34 L    (4.40-5.60)  X 10*6/uL


 


MCHC  31.8 L    (32.0-37.0)  g/dL


 


Immature Gran #  0.26 H    (0.00-0.04)  X 10*3/uL


 


Neutrophils #  16.29 H    (1.80-7.70)  X 10*3/uL


 


Eosinophils #  0 L    (0.04-0.35)  X 10*3/uL


 


D-Dimer   1.60 H   (<0.60)  mg/L FEU


 


Potassium    6.1 H*  (3.5-5.5)  mmol/L


 


BUN    57.0 H  (9.0-27.0)  mg/dL


 


Est GFR (CKD-EPI)NonAf    56.4 L  (60.0-200.0)   


 


BUN/Creatinine Ratio    47.50 H  (12.00-20.00)  Ratio


 


Glucose    241 H  ()  mg/dL


 


POC Glucose (mg/dL)     (75-99)  mg/dL














  03/29/21 Range/Units





  07:10 


 


WBC   (4.50-10.00)  X 10*3/uL


 


RBC   (4.40-5.60)  X 10*6/uL


 


MCHC   (32.0-37.0)  g/dL


 


Immature Gran #   (0.00-0.04)  X 10*3/uL


 


Neutrophils #   (1.80-7.70)  X 10*3/uL


 


Eosinophils #   (0.04-0.35)  X 10*3/uL


 


D-Dimer   (<0.60)  mg/L FEU


 


Potassium   (3.5-5.5)  mmol/L


 


BUN   (9.0-27.0)  mg/dL


 


Est GFR (CKD-EPI)NonAf   (60.0-200.0)   


 


BUN/Creatinine Ratio   (12.00-20.00)  Ratio


 


Glucose   ()  mg/dL


 


POC Glucose (mg/dL)  224 H  (75-99)  mg/dL














Assessment and Plan


Assessment: 





-acute hypoxic respiratory failure: Secondary to covid 19 pneumonia patient 

maintained on vitamin and zinc supplements.  Patient currently on IV solu-

Medrol.  has received 2 doses of Tocilizumab and is continued on Lovenox. 

inflammatory markers ,Patient currently on Airvo.  Pulmonary following


-Type 2 diabetes mellitus uncontrolled elevated blood sugars secondary to 

systemic steroids, continue with pre-meal, sliding scale, and long-acting, with 

adjustments made and will increase pre-male and long-acting doses


-Hyperkalemia, improved


-Hypotonic hyponatremia, improved


-Acute renal failure secondary to Covid 19: Improved, current creatinine is 1.2


-Hypertension 


-DVT subcutaneous Lovenox


-Full code





Plan:


Continue with current medications.  Will continue IV Solu-Medrol with vitamin 

and zinc supplements, and lovenox . patient has received Tocilizumab x2.  

Pulmonary following .  Patient currently on airvo and slowly weaning as 

tolerated.  PT/OT following and patient may likely require ECF for continued 

PT/OT therapy due to weakness once stabilized and discharged.  Patient response 

to treatment is slow.  Will continue to monitor closely.  Prognosis remains 

guarded.

## 2021-03-29 NOTE — P.PN
Subjective


Progress Note Date: 03/29/21


Principal diagnosis: 





CoVID 19 pneumonia





80-year-old male, who was brought to the emergency department by EMS.  The 

patient apparently has had 2 weeks' worth of increasing shortness of breath, and

significant fatigue.  The patient also had chest congestion.  He had a fever.  

The patient states that he is just not been feeling well and getting 

progressively worse.  He also apparently had an episode or 2 of diarrhea.  The 

patient denied any chest pain or chest pressure or palpitations.  The patient 

also denied nausea, vomiting, and abdominal pain.  Apparently when EMS arrived, 

the patient's saturations were in the low 80s on room air.  For that reason, he 

was transported in, evaluated, and admitted with a diagnosis of COVID 19 19 

pneumonia.  The patient does have a history of diabetes, hypertension, and a 

previous pacemaker insertion.  White count was 4.6, hemoglobin 12.5, hematocrit 

37.1, and platelet count 153,000.  PT, INR, and PTT were all normal.  D-dimer 

was 0.77.  Sodium 136, potassium 4.5, chlorides 102, CO2 26, anion gap 8, BUN 

47, and creatinine 1.63.  Ferritin was 985, , C-reactive protein 58, and 

pro-calcitonin level was 0.12.  Chest x-ray did show some interstitial 

infiltrates.





On 03/18/2021 patient seen in follow-up on medical floor.  he is on 2 L of 

oxygen his pulse ox is 90-94%, breathing comfortably, no fever or chills, blood 

pressure has been stable.  Denies any worsening dyspnea or hypoxemia, he has a 

mild cough, no chest discomfort.  He continues on oral Decadron 6 mg daily, IV 

hydration, vitamins, d-dimer was low at 0.77, patient is on subcu heparin for 

DVT prophylaxis, pro-calcitonin level was low, inflammatory markers were not 

significantly elevated on admission.  Clinically symptoms have not progressed, 

and patient will be considered for discharge in next 24 hours.





The patient is seen today 03/19/2021 in follow-up on the regular medical floor. 

He is currently sitting up in chair at the bedside.  Awake and alert in no acute

distress.  Continue O2 saturations in the low 90s on 2 L/m per nasal cannula.  

Afebrile.  Hemodynamically stable.  Blood cultures reveal no growth.  Blood 

glucose 202.  Sodium 143.  Potassium 5.5.  Creatinine 1.19.  He remains on 

dexamethasone, so continues heparin, vitamin supplements.  Chest x-ray continues

to show bilateral multifocal opacities consistent with CoVID infection.  No maria

ge.





The patient is seen today 03/20/2021 in follow-up on the regular medical floor. 

He is currently resting comfortably in bed.  Awake and alert in no acute 

distress.  He is maintaining O2 saturations in the 90s on 2 L/m per nasal 

cannula.  He's been on dexamethasone, vitamin supplements, subcutaneous heparin.

 No worsening shortness of breath, cough or congestion.  D-dimer 1.02.  Sodium 

145.  Potassium 5.1.  Creatinine 1.3.  .  C-reactive protein 4.4.





The patient is seen today 03/21/2021 in follow-up on the regular medical floor. 

He is currently resting quite comfortably in bed.  Maintaining O2 saturations in

the low 90s on 2 L/m per nasal cannula.  Sodium 144.  Potassium 4.7.  Creatinine

1.2.  Glucose 173.  He remains on dexamethasone, heparin, vitamin supplements.





03/22/2021 on seeing the patient for a follow-up regarding the patient's acute 

hypoxic respiratory failure due to Covid 19 related pneumonia.  The patient was 

not a candidate for Remdesivir, Tocilizumab or convalescent plasma, and the 

patient is currently on Decadron 6 mg on a daily basis and addition to Levemir 

insulin 50 units daily at bedtime and NovoLog 6 units 3 times a day with meals 

and a sliding scale coverage.  The patient was on 2 L about 2 by nasal cannula 

with pulse ox of 93%.  Earlier this morning, the patient's oxidation 

progressively got worse in the patient is currently on 100% nonrebreather 

facemask maintaining a saturation above 90% at around 94%.  Overall, the patient

is doing well.  Creatinine is at 1.2.  The patient had a CRP level of 4.4 with 

an LDH level of 485 from 03/20/2021.  Inflammatory markers are being monitored. 

Last d-dimer from 03/20/2021 was 1.02.  Blood cultures were negative repeat 

chest x-ray was done today and the findings are essentially stable and the pat

ient has patchy bilateral perihilar pulmonary infiltrates without any 

significant interval change.  D-dimer from today is at 1.7 and the patient 

remains on Lovenox.





03/23/2021 the patient is being seen for a follow-up.  The patient is currently 

on high flow oxygen at 6 L.  Note that the patient was on 100% nonrebreather 

facemask and was switched him to a high flow oxygen yesterday at 60 L.  As part 

of his treatment, the patient received steroids and currently is receiving 

dexamethasone 6 mg by mouth daily.  He is also Toci 2 yesterday and today.  He 

completed treatment without any major side effects.  Note that the patient was 

on low-flow oxygen and he progressively got worse requiring 100% nonrebreather 

and subsequent high flow oxygen.  On today's evaluation, his white cell count is

at 10.  His d-dimer is at 1.6.  Rest of the inflammatory markers have not been 

checked and this will be repeated tomorrow.  LDH from yesterday was 491 with a 

CRP of 9.4.  Otherwise, his renal function shows a stable creatinine of 1.2.  

The patient has been on FiO2 of 60% with a high flow oxygen of 60 L and the 

patient has been essentially stable since yesterday.  No other significant 

events.  Remains on Lovenox 40 mg subcu on a daily basis. 





03/24/2021, the patient is still on high flow oxygen and 60 L.  He is not 

utilizing the 100% nonrebreather facemask this morning.  Note that the patient 

has received steroids, and he remains on Decadron 6 mg by mouth daily. He also 

completed Tocilizumab.  On today's evaluation, chest x-ray findings are stable 

without any interval change in the bilateral pulmonary infiltrates.  The patient

is resting comfortably in bed.  He is also able to move on a recliner.  Labs 

today shows an LDH of 414 and a CRP of 2.9 and the patient is d-dimer is at 

2.56.  The patient remains on Lovenox 40 mg subcu on a daily basis.  He is on 

Levemir insulin 50 units daily along with NovoLog 6 units with meals plus a 

sliding coverage.  I will say his condition essentially the same as unchanged 

compared to yesterday.  He is not doing much in progress for now. 





On today's evaluation of 03/25/2021 the patient is being seen for a follow-up.  

The patient remains on high flow oxygen with a flow of 6 L an FiO2 of 60%.  His 

current pulse ox is around 88%.  He is afebrile.  He is doing well.  Sitting up 

on a chair.  No signs of any respiratory distress.  His breathing is nonlabored 

and he doesn't have any significant cough unless he takes a deep inspiration.  

The patient  had follow-up blood work today.  His LDL level is up 1221 and a CRP

level is up to 12.6 and his d-dimer currently is at 2.95.  I cannot explain the 

rise in these inflammatory markers.  His creatinine is at 1.2.  Was a causative 

13.7 with a hemoglobin of 13.2.  His chest x-ray from yesterday was showing 

right basilar infiltrates which are nonspecific and it was mentioned that he was

getting worse.  The patient remains on Decadron 6 mg by mouth daily.  He remains

on 50 units of Levemir insulin along with 6 units of NovoLog around-the-clock.  

He remains on aspirin.  He remains on Lovenox for DVT prophylaxis 40 mg subcu.








03/26/2021 the patient is on high flow oxygen at 60 L with an FiO2 of 70%.  His 

pulse ox was around 93%.  He feels well.  No altered mentation.  Resting 

comfortably.  Sitting up and he has no significant shortness of breath at rest. 

His chest x-ray still showing diffuse bilateral pulmonary infiltrates, 

peripheral distribution, probably slightly worse compared to yesterday.  

However, the radiologist reports is stating that the findings are essentially 

stable.  Clinically he is also stable.  The labs from today shows a sugar of 407

is quite high and a d-dimer is down to 1.89 and the patient's LDH level is down 

to 419 with a CRP level of 0.7.  The patient remains on Levemir insulin and is 

currently taking 50 units along with 6 units with meals of NovoLog.





03/27/2021 the patient remains on high flow at 60 L with an FiO2 of 70% which is

essentially the same as yesterday.  His pulse ox currently is around 93%.  His 

chest x-ray from today is showing diffuse bilateral pulmonary infiltrates, 

peripheral distribution, I thought his chest x-ray and probably the x-ray 

findings are improved compared to yesterday's chest x-ray.  I have the patient 

on Solu-Medrol 60 mg every 6 hours.  He is also on anticoagulation.  He is 

receiving Lovenox..  His d-dimer is at 1.57 on today's evaluation.  His LDH 

level is high today at 1059 with a CRP of less than 5.  He remains on Levemir 

insulin 50 units along with 6 units of NovoLog with meals and his blood sugars 

under adequate control for now.  He has no other complaints otherwise.  Note 

that initially the patient was started on Decadron and during the course of his 

treatment he was given Toci


 


03/28/2021, the patient is on high flow oxygen at 60 L along with an FiO2 of 

70%.  Pulse ox is in order of 96%.  I'm hoping to get down the FiO2 further 

today.  The patient is on IV Solu-Medrol.  He is on long-term anticoagulation.  

He is receiving Lovenox at a dose of 40 mg subcu on a daily basis.  In terms of 

inflammatory markers and blood work, the patient's CRP is less than 0.4 and the 

LDH level is at 378.  The chest x-ray was done yesterday showed some lower lobe 

active pulmonary infiltrates, findings are unchanged.  The patient is awake and 

alert and following commands and answering questions.  He is on Levemir insulin 

as a dose of 60 daily at bedtime and he is also on NovoLog 10 units with meals 

and a sliding scale coverage. He has completed tocilizumab





The patient is seen today 03/29/2021 in follow-up on the regular medical floor. 

He is currently sitting up in a chair at the bedside.  Awake and alert in no 

acute distress.  He remains on AirVo high flow oxygen at 40 L and 70% FiO2.  He 

is afebrile.  White count 18.6.  Hemoglobin 13.2.  D-dimer 1.6.  Sodium 136.  

Potassium 6.1.  Creatinine 1.2.  .  C-reactive protein less than 0.4.  

Remains on Lovenox, IV Soluj Medrol, vitamin supplements.





Objective





- Vital Signs


Vital signs: 


                                   Vital Signs











Temp  97.7 F   03/29/21 15:05


 


Pulse  74   03/29/21 15:05


 


Resp  18   03/29/21 15:05


 


BP  126/63   03/29/21 15:05


 


Pulse Ox  90 L  03/29/21 16:19








                                 Intake & Output











 03/28/21 03/29/21 03/29/21





 18:59 06:59 18:59


 


Output Total  450 


 


Balance  -450 


 


Output:   


 


  Urine  450 


 


Other:   


 


  Voiding Method  Urinal 


 


  # Voids 2 1 


 


  # Bowel Movements   1














- Exam





GENERAL EXAM: Alert, pleasant, 80-year-old male patient, on AirVo high flow 

oxygen at 40 L and 70% FiO2 comfortable in no apparent distress.


HEAD: Normocephalic/atraumatic.


EYES: Normal reaction of pupils, equal size.  Conjunctiva pink, sclera white.


NOSE: Clear with pink turbinates.


THROAT: No erythema or exudates.


NECK: No masses, no JVD, no thyroid enlargement, no adenopathy.


CHEST: No chest wall deformity.  Symmetrical expansion. 


LUNGS: Equal air entry with bibasilar coarse crackles


CVS: Regular rate and rhythm, normal S1 and S2, no gallops, no murmurs, no rubs


ABDOMEN: Soft, nontender.  No hepatosplenomegaly, normal bowel sounds, no 

guarding or rigidity.


EXTREMITIES: No clubbing, no edema, no cyanosis, 2+ pulses and upper and lower 

extremities.


MUSCULOSKELETAL: Muscle strength and tone normal.


SPINE: No scoliosis or deformity


SKIN: No rashes


CENTRAL NERVOUS SYSTEM: No focal deficits, tone is normal in all 4 extremities.


PSYCHIATRIC: Alert and oriented -3.  Appropriate affect.  Intact judgment and 

insight.





- Labs


CBC & Chem 7: 


                                 03/29/21 06:17





                                 03/29/21 06:17


Labs: 


                  Abnormal Lab Results - Last 24 Hours (Table)











  03/28/21 03/29/21 03/29/21 Range/Units





  20:28 06:17 06:17 


 


WBC   18.64 H   (4.50-10.00)  X 10*3/uL


 


RBC   4.34 L   (4.40-5.60)  X 10*6/uL


 


MCHC   31.8 L   (32.0-37.0)  g/dL


 


Immature Gran #   0.26 H   (0.00-0.04)  X 10*3/uL


 


Neutrophils #   16.29 H   (1.80-7.70)  X 10*3/uL


 


Eosinophils #   0 L   (0.04-0.35)  X 10*3/uL


 


D-Dimer    1.60 H  (<0.60)  mg/L FEU


 


Potassium     (3.5-5.5)  mmol/L


 


BUN     (9.0-27.0)  mg/dL


 


Est GFR (CKD-EPI)NonAf     (60.0-200.0)   


 


BUN/Creatinine Ratio     (12.00-20.00)  Ratio


 


Glucose     ()  mg/dL


 


POC Glucose (mg/dL)  321 H    (75-99)  mg/dL


 


Lactate Dehydrogenase     (120-246)  U/L














  03/29/21 03/29/21 03/29/21 Range/Units





  06:17 07:10 12:08 


 


WBC     (4.50-10.00)  X 10*3/uL


 


RBC     (4.40-5.60)  X 10*6/uL


 


MCHC     (32.0-37.0)  g/dL


 


Immature Gran #     (0.00-0.04)  X 10*3/uL


 


Neutrophils #     (1.80-7.70)  X 10*3/uL


 


Eosinophils #     (0.04-0.35)  X 10*3/uL


 


D-Dimer     (<0.60)  mg/L FEU


 


Potassium  6.1 H*    (3.5-5.5)  mmol/L


 


BUN  57.0 H    (9.0-27.0)  mg/dL


 


Est GFR (CKD-EPI)NonAf  56.4 L    (60.0-200.0)   


 


BUN/Creatinine Ratio  47.50 H    (12.00-20.00)  Ratio


 


Glucose  241 H    ()  mg/dL


 


POC Glucose (mg/dL)   224 H  326 H  (75-99)  mg/dL


 


Lactate Dehydrogenase  503 H    (120-246)  U/L














  03/29/21 Range/Units





  16:48 


 


WBC   (4.50-10.00)  X 10*3/uL


 


RBC   (4.40-5.60)  X 10*6/uL


 


MCHC   (32.0-37.0)  g/dL


 


Immature Gran #   (0.00-0.04)  X 10*3/uL


 


Neutrophils #   (1.80-7.70)  X 10*3/uL


 


Eosinophils #   (0.04-0.35)  X 10*3/uL


 


D-Dimer   (<0.60)  mg/L FEU


 


Potassium   (3.5-5.5)  mmol/L


 


BUN   (9.0-27.0)  mg/dL


 


Est GFR (CKD-EPI)NonAf   (60.0-200.0)   


 


BUN/Creatinine Ratio   (12.00-20.00)  Ratio


 


Glucose   ()  mg/dL


 


POC Glucose (mg/dL)  311 H  (75-99)  mg/dL


 


Lactate Dehydrogenase   (120-246)  U/L














Assessment and Plan


Assessment: 





1  Acute hypoxic respiratory failure secondary to CoVID 19 pneumonia, he did 

receive Tocilizumab





2  History of diabetes mellitus type 2





3  History of hypertension





4  History of chronic sinus disease





5  History of diverticulitis, status post bowel resection





6 Hyperkalemia





Plan:





The patient was seen and evaluated by Dr. Tariq


Hyperkalemia treated


Continue current treatment plan


Titrate the FiO2 as tolerated


Prognosis is guarded


We will continue to follow





I, the cosigning physician, performed a history & physical examination of the 

patient. Lungs sounds crackles in the bilateral posterior bases.  Maintaining 

good O2 saturations in the 90s onAirVo high flow oxygen at 40 L and 70% FiO2.  I

discussed the assessment and plan of care with my nurse practitioner, Abigail Thomas. I attest to the above note as dictated by her.

## 2021-03-30 LAB
ALBUMIN SERPL-MCNC: 3 G/DL (ref 3.8–4.9)
ALBUMIN/GLOB SERPL: 1.25 G/DL (ref 1.6–3.17)
ALP SERPL-CCNC: 107 U/L (ref 41–126)
ALT SERPL-CCNC: 37 U/L (ref 10–49)
ANION GAP SERPL CALC-SCNC: 5.5 MMOL/L (ref 4–12)
AST SERPL-CCNC: 20 U/L (ref 14–35)
BUN SERPL-SCNC: 63 MG/DL (ref 9–27)
BUN/CREAT SERPL: 48.46 RATIO (ref 12–20)
CALCIUM SPEC-MCNC: 8.7 MG/DL (ref 8.7–10.3)
CHLORIDE SERPL-SCNC: 109 MMOL/L (ref 96–109)
CO2 SERPL-SCNC: 22.5 MMOL/L (ref 21.6–31.8)
GLOBULIN SER CALC-MCNC: 2.4 G/DL (ref 1.6–3.3)
GLUCOSE BLD-MCNC: 214 MG/DL (ref 75–99)
GLUCOSE BLD-MCNC: 237 MG/DL (ref 75–99)
GLUCOSE BLD-MCNC: 253 MG/DL (ref 75–99)
GLUCOSE BLD-MCNC: 271 MG/DL (ref 75–99)
GLUCOSE SERPL-MCNC: 228 MG/DL (ref 70–110)
POTASSIUM SERPL-SCNC: 5.6 MMOL/L (ref 3.5–5.5)
PROT SERPL-MCNC: 5.4 G/DL (ref 6.2–8.2)
SODIUM SERPL-SCNC: 137 MMOL/L (ref 135–145)

## 2021-03-30 RX ADMIN — INSULIN DETEMIR SCH UNIT: 100 INJECTION, SOLUTION SUBCUTANEOUS at 20:43

## 2021-03-30 RX ADMIN — METHYLPREDNISOLONE SODIUM SUCCINATE SCH MG: 125 INJECTION, POWDER, FOR SOLUTION INTRAMUSCULAR; INTRAVENOUS at 23:41

## 2021-03-30 RX ADMIN — Medication SCH MG: at 07:47

## 2021-03-30 RX ADMIN — METHYLPREDNISOLONE SODIUM SUCCINATE SCH MG: 125 INJECTION, POWDER, FOR SOLUTION INTRAMUSCULAR; INTRAVENOUS at 15:24

## 2021-03-30 RX ADMIN — ENOXAPARIN SODIUM SCH MG: 40 INJECTION SUBCUTANEOUS at 07:47

## 2021-03-30 RX ADMIN — INSULIN ASPART SCH UNIT: 100 INJECTION, SOLUTION INTRAVENOUS; SUBCUTANEOUS at 20:42

## 2021-03-30 RX ADMIN — FAMOTIDINE SCH MG: 20 TABLET, FILM COATED ORAL at 07:47

## 2021-03-30 RX ADMIN — METHYLPREDNISOLONE SODIUM SUCCINATE SCH MG: 125 INJECTION, POWDER, FOR SOLUTION INTRAMUSCULAR; INTRAVENOUS at 11:55

## 2021-03-30 RX ADMIN — OXYCODONE HYDROCHLORIDE AND ACETAMINOPHEN SCH MG: 500 TABLET ORAL at 07:47

## 2021-03-30 RX ADMIN — INSULIN ASPART SCH UNIT: 100 INJECTION, SOLUTION INTRAVENOUS; SUBCUTANEOUS at 11:56

## 2021-03-30 RX ADMIN — PIOGLITAZONE SCH MG: 30 TABLET ORAL at 07:46

## 2021-03-30 RX ADMIN — CLOPIDOGREL BISULFATE SCH MG: 75 TABLET ORAL at 07:47

## 2021-03-30 RX ADMIN — ATORVASTATIN CALCIUM SCH MG: 80 TABLET, FILM COATED ORAL at 07:47

## 2021-03-30 RX ADMIN — INSULIN ASPART SCH UNIT: 100 INJECTION, SOLUTION INTRAVENOUS; SUBCUTANEOUS at 07:47

## 2021-03-30 RX ADMIN — INSULIN ASPART SCH UNIT: 100 INJECTION, SOLUTION INTRAVENOUS; SUBCUTANEOUS at 17:11

## 2021-03-30 RX ADMIN — INSULIN ASPART SCH UNIT: 100 INJECTION, SOLUTION INTRAVENOUS; SUBCUTANEOUS at 07:48

## 2021-03-30 RX ADMIN — METHYLPREDNISOLONE SODIUM SUCCINATE SCH MG: 125 INJECTION, POWDER, FOR SOLUTION INTRAMUSCULAR; INTRAVENOUS at 05:33

## 2021-03-30 RX ADMIN — INSULIN ASPART SCH UNIT: 100 INJECTION, SOLUTION INTRAVENOUS; SUBCUTANEOUS at 11:55

## 2021-03-30 RX ADMIN — ASPIRIN 81 MG CHEWABLE TABLET SCH MG: 81 TABLET CHEWABLE at 07:47

## 2021-03-30 RX ADMIN — METOPROLOL SUCCINATE SCH MG: 50 TABLET, EXTENDED RELEASE ORAL at 07:47

## 2021-03-30 NOTE — P.PN
Subjective


Progress Note Date: 03/30/21


Principal diagnosis: 





CoVID 19 pneumonia





80-year-old male, who was brought to the emergency department by EMS.  The 

patient apparently has had 2 weeks' worth of increasing shortness of breath, and

significant fatigue.  The patient also had chest congestion.  He had a fever.  

The patient states that he is just not been feeling well and getting 

progressively worse.  He also apparently had an episode or 2 of diarrhea.  The 

patient denied any chest pain or chest pressure or palpitations.  The patient 

also denied nausea, vomiting, and abdominal pain.  Apparently when EMS arrived, 

the patient's saturations were in the low 80s on room air.  For that reason, he 

was transported in, evaluated, and admitted with a diagnosis of COVID 19 19 

pneumonia.  The patient does have a history of diabetes, hypertension, and a 

previous pacemaker insertion.  White count was 4.6, hemoglobin 12.5, hematocrit 

37.1, and platelet count 153,000.  PT, INR, and PTT were all normal.  D-dimer 

was 0.77.  Sodium 136, potassium 4.5, chlorides 102, CO2 26, anion gap 8, BUN 

47, and creatinine 1.63.  Ferritin was 985, , C-reactive protein 58, and 

pro-calcitonin level was 0.12.  Chest x-ray did show some interstitial 

infiltrates.





On 03/18/2021 patient seen in follow-up on medical floor.  he is on 2 L of 

oxygen his pulse ox is 90-94%, breathing comfortably, no fever or chills, blood 

pressure has been stable.  Denies any worsening dyspnea or hypoxemia, he has a 

mild cough, no chest discomfort.  He continues on oral Decadron 6 mg daily, IV 

hydration, vitamins, d-dimer was low at 0.77, patient is on subcu heparin for 

DVT prophylaxis, pro-calcitonin level was low, inflammatory markers were not 

significantly elevated on admission.  Clinically symptoms have not progressed, 

and patient will be considered for discharge in next 24 hours.





The patient is seen today 03/19/2021 in follow-up on the regular medical floor. 

He is currently sitting up in chair at the bedside.  Awake and alert in no acute

distress.  Continue O2 saturations in the low 90s on 2 L/m per nasal cannula.  

Afebrile.  Hemodynamically stable.  Blood cultures reveal no growth.  Blood 

glucose 202.  Sodium 143.  Potassium 5.5.  Creatinine 1.19.  He remains on 

dexamethasone, so continues heparin, vitamin supplements.  Chest x-ray continues

to show bilateral multifocal opacities consistent with CoVID infection.  No maria

ge.





The patient is seen today 03/20/2021 in follow-up on the regular medical floor. 

He is currently resting comfortably in bed.  Awake and alert in no acute 

distress.  He is maintaining O2 saturations in the 90s on 2 L/m per nasal 

cannula.  He's been on dexamethasone, vitamin supplements, subcutaneous heparin.

 No worsening shortness of breath, cough or congestion.  D-dimer 1.02.  Sodium 

145.  Potassium 5.1.  Creatinine 1.3.  .  C-reactive protein 4.4.





The patient is seen today 03/21/2021 in follow-up on the regular medical floor. 

He is currently resting quite comfortably in bed.  Maintaining O2 saturations in

the low 90s on 2 L/m per nasal cannula.  Sodium 144.  Potassium 4.7.  Creatinine

1.2.  Glucose 173.  He remains on dexamethasone, heparin, vitamin supplements.





03/22/2021 on seeing the patient for a follow-up regarding the patient's acute 

hypoxic respiratory failure due to Covid 19 related pneumonia.  The patient was 

not a candidate for Remdesivir, Tocilizumab or convalescent plasma, and the 

patient is currently on Decadron 6 mg on a daily basis and addition to Levemir 

insulin 50 units daily at bedtime and NovoLog 6 units 3 times a day with meals 

and a sliding scale coverage.  The patient was on 2 L about 2 by nasal cannula 

with pulse ox of 93%.  Earlier this morning, the patient's oxidation 

progressively got worse in the patient is currently on 100% nonrebreather 

facemask maintaining a saturation above 90% at around 94%.  Overall, the patient

is doing well.  Creatinine is at 1.2.  The patient had a CRP level of 4.4 with 

an LDH level of 485 from 03/20/2021.  Inflammatory markers are being monitored. 

Last d-dimer from 03/20/2021 was 1.02.  Blood cultures were negative repeat 

chest x-ray was done today and the findings are essentially stable and the pat

ient has patchy bilateral perihilar pulmonary infiltrates without any 

significant interval change.  D-dimer from today is at 1.7 and the patient 

remains on Lovenox.





03/23/2021 the patient is being seen for a follow-up.  The patient is currently 

on high flow oxygen at 6 L.  Note that the patient was on 100% nonrebreather 

facemask and was switched him to a high flow oxygen yesterday at 60 L.  As part 

of his treatment, the patient received steroids and currently is receiving 

dexamethasone 6 mg by mouth daily.  He is also Toci 2 yesterday and today.  He 

completed treatment without any major side effects.  Note that the patient was 

on low-flow oxygen and he progressively got worse requiring 100% nonrebreather 

and subsequent high flow oxygen.  On today's evaluation, his white cell count is

at 10.  His d-dimer is at 1.6.  Rest of the inflammatory markers have not been 

checked and this will be repeated tomorrow.  LDH from yesterday was 491 with a 

CRP of 9.4.  Otherwise, his renal function shows a stable creatinine of 1.2.  

The patient has been on FiO2 of 60% with a high flow oxygen of 60 L and the 

patient has been essentially stable since yesterday.  No other significant 

events.  Remains on Lovenox 40 mg subcu on a daily basis. 





03/24/2021, the patient is still on high flow oxygen and 60 L.  He is not 

utilizing the 100% nonrebreather facemask this morning.  Note that the patient 

has received steroids, and he remains on Decadron 6 mg by mouth daily. He also 

completed Tocilizumab.  On today's evaluation, chest x-ray findings are stable 

without any interval change in the bilateral pulmonary infiltrates.  The patient

is resting comfortably in bed.  He is also able to move on a recliner.  Labs 

today shows an LDH of 414 and a CRP of 2.9 and the patient is d-dimer is at 

2.56.  The patient remains on Lovenox 40 mg subcu on a daily basis.  He is on 

Levemir insulin 50 units daily along with NovoLog 6 units with meals plus a 

sliding coverage.  I will say his condition essentially the same as unchanged 

compared to yesterday.  He is not doing much in progress for now. 





On today's evaluation of 03/25/2021 the patient is being seen for a follow-up.  

The patient remains on high flow oxygen with a flow of 6 L an FiO2 of 60%.  His 

current pulse ox is around 88%.  He is afebrile.  He is doing well.  Sitting up 

on a chair.  No signs of any respiratory distress.  His breathing is nonlabored 

and he doesn't have any significant cough unless he takes a deep inspiration.  

The patient  had follow-up blood work today.  His LDL level is up 1221 and a CRP

level is up to 12.6 and his d-dimer currently is at 2.95.  I cannot explain the 

rise in these inflammatory markers.  His creatinine is at 1.2.  Was a causative 

13.7 with a hemoglobin of 13.2.  His chest x-ray from yesterday was showing 

right basilar infiltrates which are nonspecific and it was mentioned that he was

getting worse.  The patient remains on Decadron 6 mg by mouth daily.  He remains

on 50 units of Levemir insulin along with 6 units of NovoLog around-the-clock.  

He remains on aspirin.  He remains on Lovenox for DVT prophylaxis 40 mg subcu.








03/26/2021 the patient is on high flow oxygen at 60 L with an FiO2 of 70%.  His 

pulse ox was around 93%.  He feels well.  No altered mentation.  Resting 

comfortably.  Sitting up and he has no significant shortness of breath at rest. 

His chest x-ray still showing diffuse bilateral pulmonary infiltrates, 

peripheral distribution, probably slightly worse compared to yesterday.  

However, the radiologist reports is stating that the findings are essentially 

stable.  Clinically he is also stable.  The labs from today shows a sugar of 407

is quite high and a d-dimer is down to 1.89 and the patient's LDH level is down 

to 419 with a CRP level of 0.7.  The patient remains on Levemir insulin and is 

currently taking 50 units along with 6 units with meals of NovoLog.





03/27/2021 the patient remains on high flow at 60 L with an FiO2 of 70% which is

essentially the same as yesterday.  His pulse ox currently is around 93%.  His 

chest x-ray from today is showing diffuse bilateral pulmonary infiltrates, 

peripheral distribution, I thought his chest x-ray and probably the x-ray 

findings are improved compared to yesterday's chest x-ray.  I have the patient 

on Solu-Medrol 60 mg every 6 hours.  He is also on anticoagulation.  He is 

receiving Lovenox..  His d-dimer is at 1.57 on today's evaluation.  His LDH 

level is high today at 1059 with a CRP of less than 5.  He remains on Levemir 

insulin 50 units along with 6 units of NovoLog with meals and his blood sugars 

under adequate control for now.  He has no other complaints otherwise.  Note 

that initially the patient was started on Decadron and during the course of his 

treatment he was given Toci


 


03/28/2021, the patient is on high flow oxygen at 60 L along with an FiO2 of 

70%.  Pulse ox is in order of 96%.  I'm hoping to get down the FiO2 further 

today.  The patient is on IV Solu-Medrol.  He is on long-term anticoagulation.  

He is receiving Lovenox at a dose of 40 mg subcu on a daily basis.  In terms of 

inflammatory markers and blood work, the patient's CRP is less than 0.4 and the 

LDH level is at 378.  The chest x-ray was done yesterday showed some lower lobe 

active pulmonary infiltrates, findings are unchanged.  The patient is awake and 

alert and following commands and answering questions.  He is on Levemir insulin 

as a dose of 60 daily at bedtime and he is also on NovoLog 10 units with meals 

and a sliding scale coverage. He has completed tocilizumab





The patient is seen today 03/29/2021 in follow-up on the regular medical floor. 

He is currently sitting up in a chair at the bedside.  Awake and alert in no 

acute distress.  He remains on AirVo high flow oxygen at 40 L and 70% FiO2.  He 

is afebrile.  White count 18.6.  Hemoglobin 13.2.  D-dimer 1.6.  Sodium 136.  

Potassium 6.1.  Creatinine 1.2.  .  C-reactive protein less than 0.4.  

Remains on Lovenox, IV Soluj Medrol, vitamin supplements.





The patient is seen today 03/30/2021 in follow-up on the regular medical floor. 

Currently sitting up in a chair at the bedside.  Awake and alert in no acute 

distress.  He remains on AirVo high flow oxygen at 40 L and 60% FiO2 to maintain

O2 saturations at 88%.  Sodium 137.  Potassium 5.6.  Creatinine 1.3.  Glucose 

271.  Remains on IV Solu-Medrol, Lovenox, vitamin supplements.  Levemir and 

NovoLog sliding scale.





Objective





- Vital Signs


Vital signs: 


                                   Vital Signs











Temp  97.6 F   03/30/21 13:44


 


Pulse  60   03/30/21 13:44


 


Resp  18   03/30/21 13:44


 


BP  127/65   03/30/21 13:44


 


Pulse Ox  92 L  03/30/21 16:46








                                 Intake & Output











 03/29/21 03/30/21 03/30/21





 18:59 06:59 18:59


 


Output Total  200 


 


Balance  -200 


 


Output:   


 


  Urine  200 


 


Other:   


 


  Voiding Method  Urinal 


 


  # Voids  1 2


 


  # Bowel Movements 1 1 














- Exam





GENERAL EXAM: Alert, pleasant, 80-year-old male patient, on AirVo high flow 

oxygen at 40 L and 60% FiO2 comfortable in no apparent distress.


HEAD: Normocephalic/atraumatic.


EYES: Normal reaction of pupils, equal size.  Conjunctiva pink, sclera white.


NOSE: Clear with pink turbinates.


THROAT: No erythema or exudates.


NECK: No masses, no JVD, no thyroid enlargement, no adenopathy.


CHEST: No chest wall deformity.  Symmetrical expansion. 


LUNGS: Equal air entry with bibasilar coarse crackles


CVS: Regular rate and rhythm, normal S1 and S2, no gallops, no murmurs, no rubs


ABDOMEN: Soft, nontender.  No hepatosplenomegaly, normal bowel sounds, no 

guarding or rigidity.


EXTREMITIES: No clubbing, no edema, no cyanosis, 2+ pulses and upper and lower 

extremities.


MUSCULOSKELETAL: Muscle strength and tone normal.


SPINE: No scoliosis or deformity


SKIN: No rashes


CENTRAL NERVOUS SYSTEM: No focal deficits, tone is normal in all 4 extremities.


PSYCHIATRIC: Alert and oriented -3.  Appropriate affect.  Intact judgment and 

insight.





- Labs


CBC & Chem 7: 


                                 03/29/21 06:17





                                 03/30/21 06:14


Labs: 


                  Abnormal Lab Results - Last 24 Hours (Table)











  03/29/21 03/30/21 03/30/21 Range/Units





  21:07 06:14 07:17 


 


Potassium   5.6 H   (3.5-5.5)  mmol/L


 


BUN   63.0 H   (9.0-27.0)  mg/dL


 


Est GFR (CKD-EPI)AfAm   59.3 L   (60.0-200.0)   


 


Est GFR (CKD-EPI)NonAf   51.2 L   (60.0-200.0)   


 


BUN/Creatinine Ratio   48.46 H   (12.00-20.00)  Ratio


 


Glucose   228 H   ()  mg/dL


 


POC Glucose (mg/dL)  303 H   214 H  (75-99)  mg/dL


 


Total Protein   5.4 L   (6.2-8.2)  g/dL


 


Albumin   3.00 L   (3.80-4.90)  g/dL


 


Albumin/Globulin Ratio   1.25 L   (1.60-3.17)  g/dL














  03/30/21 03/30/21 Range/Units





  11:33 16:57 


 


Potassium    (3.5-5.5)  mmol/L


 


BUN    (9.0-27.0)  mg/dL


 


Est GFR (CKD-EPI)AfAm    (60.0-200.0)   


 


Est GFR (CKD-EPI)NonAf    (60.0-200.0)   


 


BUN/Creatinine Ratio    (12.00-20.00)  Ratio


 


Glucose    ()  mg/dL


 


POC Glucose (mg/dL)  237 H  271 H  (75-99)  mg/dL


 


Total Protein    (6.2-8.2)  g/dL


 


Albumin    (3.80-4.90)  g/dL


 


Albumin/Globulin Ratio    (1.60-3.17)  g/dL














Assessment and Plan


Assessment: 





1  Acute hypoxic respiratory failure secondary to CoVID 19 pneumonia, he did 

receive Tocilizumab





2  History of diabetes mellitus type 2





3  History of hypertension





4  History of chronic sinus disease





5  History of diverticulitis, status post bowel resection





6 Hyperkalemia





Plan:





The patient was seen and evaluated by Dr. Tariq


Continue current treatment plan


Titrate the FiO2 as tolerated


Follow-up chest x-ray in the a.m.


We will continue to follow





I, the cosigning physician, performed a history & physical examination of the 

patient. Lungs sounds crackles in the bilateral posterior bases.  Maintaining 

good O2 saturations in the 90s on AirVo high flow oxygen at 40 L and 60% FiO2.  

I discussed the assessment and plan of care with my nurse practitioner, Abigail Thomas. I attest to the above note as dictated by her.

## 2021-03-30 NOTE — P.PN
Subjective





80-year-old male who presents emergency Department stating that he has had 

shortness of breath and feeling fatigued for at least 2 weeks.  Patient states 

he has had exposure to COVID .  Patient states he has had a fever.  Patient 

states she's also states and smile.  Patient also states she's had diarrhea.  

Patient denies any chest pain or palpitations.  Patient denies any abdominal 

pain patient denies nausea vomiting diarrhea.  According to EMS with the patient

was coming in he was satting in the low 80s.  Patient is currently on a few 

liters of oxygen and sat in mid 90s.  Patient is a diabetic and has high blood 

pressure.  Patient also has a pacemaker.  Patient is presently on 2 L of oxygen 

was requiring 4 L yesterday.  Patient was started on IV fluids patient 

creatinine went up to 1.6 baseline is around 1.  Patient's blood sugars are 

highly elevated.  Patient tests positive for Covid.





03/18/2021


Patient is presently on 2 L of oxygen appears to be doing better possibility of 

discharge tomorrow.  Creatinine went up a bit to 1.6 baseline is around the 1.  

Patient was started on IV fluids will recheck the basic metabolic profile to

chantel.





03/19/2021


Patient seen and evaluated in follow-up continues to be on 2 L of oxygen and bec

omes dyspneic with exertion.  Patient states he does not know wear oxygen at 

home.  Pulmonary is following.  Patient's blood sugars are elevated and will 

continue sliding scale at this time as patient is on steroids.  Potassium was 

found to be slightly elevated at 5.5 and was given a dose of Kayexalate.  Will 

repeat a.m. labs.  Instructed and encouraged the patient to get up and increase 

activity as tolerated.





03/20/2021


Patient is presently in 2 does of hours and doing clinically well.  Patient is 

on dexamethasone, vitamin supplements, subcutaneous heparin.  No worsening 

shortness of breath, cough or congestion.  D-dimer 1.02.  Sodium 145.  Potassium

5.1.  Creatinine 1.3.  .  C-reactive protein 4.4.  His creatinine 

actually went up a little bit from 1.19-1.3





03/21/2021


Patient is fairly stable no significant improvement or worsening.  Patient is 

still on 2 L of oxygen.  We'll monitor him 1 more night.  Continues to require 

oxygen patient probably can be discharged on 2 L tomorrow this can be tapered or

discontinued as an outpatient.  Patient d-dimer is bit worse compared to 

admission.  Although other inflammatory markers are better








03/22/2021


Patient is seen this morning currently on nonrebreather and airvo as his oxygen 

was found to be 89% on 6 L of oxygen.  Pulmonary is following.  D-dimer has gone

up at 1.70 along with lactate dehydrogenase at 491 and CRP is 9.4.  Blood sugars

continue to be elevated and patient is maintained on sliding scale along with 

long-acting and pre-meal insulin and will continue at this time.  Patient 

continues on vitamin and zinc supplements along with Lovenox and dexamethasone 

daily.  Patient is scheduled to receive Tocilizumab today.  Will continue to 

monitor closely based on the clinical course of the patient.  Patient continues 

to be weak and was seen and evaluated by physical therapy recommending subacute 

rehab and patient is now agreeable and a social work consult was placed. 





03/23/2021


Patient is seen and evaluated this morning with no improvement in respiratory 

status.  Patient remains on Airvo with supplemental nonrebreather.  Patient is 

day 2 of receiving Tocilizumab.  Will repeat a.m. chest x-ray along with i

nflammatory markers.  D-dimer today was 1.60.  White blood count has gone up in 

is 10.8, hemoglobin is 12.6.  Patient is afebrile.  To continue with 

dexamethasone, Lovenox, vitamin C and zinc supplements.  Pulmonary is following.

 Had a discussion with the patient about CODE STATUS along with his son Mamadou and 

currently wish to remain a full code until talking with family member more 

extensively about the situation.  Will continue to monitor closely.  Prognosis 

remains guarded.





03/24/2021


Patient is seen this morning status post getting up to the bathroom and 

continues to be extremely dyspneic with any exertion and was found to be low 80s

to 83% oxygenation.  Patient continues on the airvo with a flow rate of 60 and 

FiO2 of 64 and is currently 91%.  Patient denies any chest pain or palpitations.

 Patient is afebrile.  Patient reports to tolerating diet with no nausea or 

vomiting noted.  D-dimer continues to be elevated at 2.56, sodium today is 140 

with a potassium of 5.1 and creatinine is 1.2.  Blood sugars on the lower side 

this morning and will continue to monitor closely and treat accordingly with 

sliding scale.  Inflammatory markers trending down.  Pulmonary following 

closely.  Chest x-ray today shows continued bibasilar infiltrates which are 

nonspecific but worsening.





03/25/2021





Patient is seen in follow-up this morning and continues to be on Airvo no 

significant change.  Patient was on dexamethasone all being transitioned to IV 

Solu-Medrol and will continue with Accu-Cheks and close glycemic control with 

pre-meal, long-acting, and sliding scale.  White blood count is 13.9, hemoglobin

is stable at 13.2, d-dimer is 2.95, sodium is 137, potassium is 5.1, creatinine 

slightly elevated at 1.24.  Inflammatory markers have gone up.  Patient is 

sitting up in the chair and denies any chest pain or worsening shortness of 

breath.  Patient states he feels he is about the same.  Patient also states he 

has been sleeping a lot and tolerating diet with no reports of nausea or 

vomiting.  Discussed with patient about increasing activity as tolerated 

although patient is extremely dyspneic with minimal exertion.





03/26/2021





Patient is seen and evaluated this morning and follow-up with no acute overnight

issues. Patient continues to be on Airvo with a flow rate of 60 and FiO2 of 70 

and is being closely monitored.  Transitioned to IV Solu-Medrol and will 

continue at this time.  Blood sugars elevated and increasing long-acting and 

pre-meal insulins and will continue sliding scale along with oral antidiabetic 

agents.  Need tighter glycemic control.  Patient has been eating 100% of all 

meals with no reports of nausea or vomiting noted.  Patient clinically looking 

better and chest x-ray displays stable infiltrates.  D-dimer slightly trending 

down along with inflammatory markers.  Creatinine is stable at 1.2 and potassium

is 5.5.  Will continue to monitor vital signs and labs closely.





On 3/27/2021 -patient is seen and examined at the bedside.  He is sitting up in 

the chair and is on Airvo 60 L with FiO2 of 70%, saturating about 90%.  He still

complains of exertional dyspnea and cough at times.  Denies having any fevers 

chills or rigors.  He complains of generalized weakness.  On reviewing his 

vitals temperature of 97.9, heart rate 63, respiratory rate 18, blood pressure 

120 x 62 .  On reviewing his labs white count of 22.9, hemoglobin 13, platelets 

299.  Sodium 136, potassium 5.6, chloride 104, bicarb 26, BUN 55, creatinine 

1.15.  Blood sugars running on the higher side around 300s.  Patient had a chest

x-ray done this morning showing mild to moderate mid and lower zone infiltrates.





On 3/28/2021 -patient is seen and examined at bedside.  He is sitting up in a 

chair at fifth high flow oxygen at 60 L FiO2 of 70% and pulse ox at 94%.  He is 

still having exertional dyspnea and complains of generalized weakness.  He 

states that he is tired of using the oxygen.  On reviewing the vitals T-max of 

97.9, heart rate 60, respiratory 19, blood pressure 130/60.  Reviewing the labs 

, CRP less than 0.04 and D-dimer of 1.53.  All medications have been 

reviewed and pertinent changes made.





03/29/2021


Patient is seen and evaluated this morning with no acute overnight issues.  

Patient continues to sit up in the chair throughout most of the day as he states

his breathing is better.  Patient continues to be on Airvo and flow rate has 

been decreased to 40 with an FiO2 of 70% and is currently 92%.  Patient 

continues to have dyspnea with minimal exertion and continues to have weakness 

and will likely require some form rehab once stabilized and discharged.  PT/OT 

to continue to follow.  White blood count trending down at 18.64 and hemoglobin 

is stable at 13.2.  D-dimer is 1.60 with a sodium of 136 and potassium was found

to be 6.1 and being corrected and will repeat a.m. labs.  Creatinine is 1.2.  

Blood sugars continue to be elevated and will continue with sliding scale and 

long-acting and monitor closely.  LDH trending back up at 503 and will monitor 

closely.  CRP is 0.4. 








03/30/2021


Patient's potassium is still high received the calcium gluconate and insulin as 

today.  Will order capsulate.  This is nonhemolyzed sample patient was started 

on low potassium diet.  Patient remains on Airvo , 40 L and 60% FiO2 which is 

better compared to yesterday and saturating better appears to have been some 

improvement.





Constitutional: Reports fatigue although slightly more awake today, denied any 

fever.


Cardio vascular: denied any chest pain, palpitations


Gastrointestinal denied any nausea vomiting


Pulmonary: No shortness of breath today


Neurologic reports generalized weakness 





All inpatient medications were reviewed and appropriate changes in these 

medications as dictated in the interval history and assessment and plan.














Objective





- Vital Signs


Vital signs: 


                                   Vital Signs











Temp  97.9 F   03/30/21 09:00


 


Pulse  63   03/30/21 09:00


 


Resp  18   03/30/21 09:00


 


BP  123/65   03/30/21 09:00


 


Pulse Ox  88 L  03/30/21 09:46








                                 Intake & Output











 03/29/21 03/30/21 03/30/21





 18:59 06:59 18:59


 


Output Total  200 


 


Balance  -200 


 


Output:   


 


  Urine  200 


 


Other:   


 


  Voiding Method  Urinal 


 


  # Voids  1 2


 


  # Bowel Movements 1 1 














- Exam





- Exam





GENERAL: The patient is alert and oriented x3, not in any acute distress.  

Obese, currently on  airvo at a flow rate of 40 with an FiO2 of 70, continue to 

wean as tolerated


HEENT: Pupils are round and equally reacting to light. EOMI. No scleral icterus.

No conjunctival pallor. Normocephalic, atraumatic. No pharyngeal erythema. No 

thyromegaly. 


CARDIOVASCULAR: S1 and S2 present. No murmurs, rubs, or gallops. 


PULMONARY: Diminished breath sounds bilaterally with no wheezing or rhonchi 

noted


ABDOMEN: Soft, nontender, nondistended, normoactive bowel sounds. No palpable 

organomegaly. 


MUSCULOSKELETAL: No joint swelling or deformity.


EXTREMITIES: No cyanosis, clubbing, or pedal edema. 


NEUROLOGICAL: Gross neurological examination did not reveal any focal deficits. 


SKIN: No rashes. 








- Labs


CBC & Chem 7: 


                                 03/29/21 06:17





                                 03/30/21 06:14


Labs: 


                  Abnormal Lab Results - Last 24 Hours (Table)











  03/29/21 03/29/21 03/30/21 Range/Units





  16:48 21:07 06:14 


 


Potassium    5.6 H  (3.5-5.5)  mmol/L


 


BUN    63.0 H  (9.0-27.0)  mg/dL


 


Est GFR (CKD-EPI)AfAm    59.3 L  (60.0-200.0)   


 


Est GFR (CKD-EPI)NonAf    51.2 L  (60.0-200.0)   


 


BUN/Creatinine Ratio    48.46 H  (12.00-20.00)  Ratio


 


Glucose    228 H  ()  mg/dL


 


POC Glucose (mg/dL)  311 H  303 H   (75-99)  mg/dL


 


Total Protein    5.4 L  (6.2-8.2)  g/dL


 


Albumin    3.00 L  (3.80-4.90)  g/dL


 


Albumin/Globulin Ratio    1.25 L  (1.60-3.17)  g/dL














  03/30/21 03/30/21 Range/Units





  07:17 11:33 


 


Potassium    (3.5-5.5)  mmol/L


 


BUN    (9.0-27.0)  mg/dL


 


Est GFR (CKD-EPI)AfAm    (60.0-200.0)   


 


Est GFR (CKD-EPI)NonAf    (60.0-200.0)   


 


BUN/Creatinine Ratio    (12.00-20.00)  Ratio


 


Glucose    ()  mg/dL


 


POC Glucose (mg/dL)  214 H  237 H  (75-99)  mg/dL


 


Total Protein    (6.2-8.2)  g/dL


 


Albumin    (3.80-4.90)  g/dL


 


Albumin/Globulin Ratio    (1.60-3.17)  g/dL














Assessment and Plan


Plan: 





-acute hypoxic respiratory failure: Secondary to covid 19 pneumonia patient 

maintained on vitamin and zinc supplements.  Patient currently on IV 

solu-Medrol.  has received 2 doses of Tocilizumab and is continued on Lovenox. 

inflammatory markers ,Patient currently on Airvo.  Pulmonary following


-Type 2 diabetes mellitus uncontrolled elevated blood sugars secondary to 

systemic steroids, continue with pre-meal, sliding scale, and long-acting, with 

adjustments made and will increase pre-male and long-acting doses


-Hyperkalemia, improved


-Hypotonic hyponatremia, improved


-Acute renal failure secondary to Covid 19: Improved, current creatinine is 1.2


-Hypertension 


-DVT subcutaneous Lovenox


-Full code





Plan:


Continue with current medications.  Will continue IV Solu-Medrol with vitamin 

and zinc supplements, and lovenox . patient has received Tocilizumab x2.  

Pulmonary following .  Patient currently on airvo and slowly weaning as 

tolerated.  PT/OT following and patient may likely require ECF for continued 

PT/OT therapy due to weakness once stabilized and discharged.  Patient response 

to treatment is slow.  Will continue to monitor closely.  Prognosis remains 

guarded.

## 2021-03-31 LAB
ANION GAP SERPL CALC-SCNC: 5.1 MMOL/L (ref 4–12)
BUN SERPL-SCNC: 56 MG/DL (ref 9–27)
BUN/CREAT SERPL: 43.08 RATIO (ref 12–20)
CALCIUM SPEC-MCNC: 8.5 MG/DL (ref 8.7–10.3)
CHLORIDE SERPL-SCNC: 109 MMOL/L (ref 96–109)
CO2 SERPL-SCNC: 23.9 MMOL/L (ref 21.6–31.8)
GLUCOSE BLD-MCNC: 120 MG/DL (ref 75–99)
GLUCOSE BLD-MCNC: 213 MG/DL (ref 75–99)
GLUCOSE BLD-MCNC: 220 MG/DL (ref 75–99)
GLUCOSE BLD-MCNC: 252 MG/DL (ref 75–99)
GLUCOSE SERPL-MCNC: 119 MG/DL (ref 70–110)
POTASSIUM SERPL-SCNC: 5.2 MMOL/L (ref 3.5–5.5)
SODIUM SERPL-SCNC: 138 MMOL/L (ref 135–145)

## 2021-03-31 RX ADMIN — INSULIN DETEMIR SCH UNIT: 100 INJECTION, SOLUTION SUBCUTANEOUS at 20:38

## 2021-03-31 RX ADMIN — Medication SCH MG: at 08:06

## 2021-03-31 RX ADMIN — INSULIN ASPART SCH: 100 INJECTION, SOLUTION INTRAVENOUS; SUBCUTANEOUS at 07:59

## 2021-03-31 RX ADMIN — OXYCODONE HYDROCHLORIDE AND ACETAMINOPHEN SCH MG: 500 TABLET ORAL at 08:06

## 2021-03-31 RX ADMIN — INSULIN ASPART SCH UNIT: 100 INJECTION, SOLUTION INTRAVENOUS; SUBCUTANEOUS at 12:07

## 2021-03-31 RX ADMIN — ATORVASTATIN CALCIUM SCH MG: 80 TABLET, FILM COATED ORAL at 08:06

## 2021-03-31 RX ADMIN — PIOGLITAZONE SCH MG: 30 TABLET ORAL at 08:08

## 2021-03-31 RX ADMIN — METHYLPREDNISOLONE SODIUM SUCCINATE SCH MG: 125 INJECTION, POWDER, FOR SOLUTION INTRAMUSCULAR; INTRAVENOUS at 08:06

## 2021-03-31 RX ADMIN — METHYLPREDNISOLONE SODIUM SUCCINATE SCH MG: 125 INJECTION, POWDER, FOR SOLUTION INTRAMUSCULAR; INTRAVENOUS at 17:06

## 2021-03-31 RX ADMIN — INSULIN ASPART SCH UNIT: 100 INJECTION, SOLUTION INTRAVENOUS; SUBCUTANEOUS at 08:07

## 2021-03-31 RX ADMIN — INSULIN ASPART SCH UNIT: 100 INJECTION, SOLUTION INTRAVENOUS; SUBCUTANEOUS at 17:06

## 2021-03-31 RX ADMIN — ENOXAPARIN SODIUM SCH MG: 40 INJECTION SUBCUTANEOUS at 08:06

## 2021-03-31 RX ADMIN — INSULIN ASPART SCH UNIT: 100 INJECTION, SOLUTION INTRAVENOUS; SUBCUTANEOUS at 20:39

## 2021-03-31 RX ADMIN — ASPIRIN 81 MG CHEWABLE TABLET SCH MG: 81 TABLET CHEWABLE at 08:05

## 2021-03-31 RX ADMIN — FAMOTIDINE SCH MG: 20 TABLET, FILM COATED ORAL at 08:05

## 2021-03-31 RX ADMIN — CLOPIDOGREL BISULFATE SCH MG: 75 TABLET ORAL at 08:06

## 2021-03-31 RX ADMIN — METOPROLOL SUCCINATE SCH MG: 50 TABLET, EXTENDED RELEASE ORAL at 08:05

## 2021-03-31 NOTE — XR
EXAMINATION TYPE: XR chest 1V portable

 

DATE OF EXAM: 3/31/2021

 

COMPARISON: 3/27/2021

 

INDICATION: Atypical pneumonia

 

TECHNIQUE: Single frontal view of the chest is obtained.

 

FINDINGS:  

The heart size is normal.  

The pulmonary vasculature is prominent.  

Patchy bilateral infiltrates are present. Findings are nonspecific and can be compatible with atypica
l pneumonia. A pacemaker overlies left chest.  

 

 

IMPRESSION:  

1. Patchy bilateral infiltrates similar to prior exam can be compatible with atypical pneumonia.

## 2021-03-31 NOTE — P.PN
Subjective


Progress Note Date: 03/31/21


Principal diagnosis: 





CoVID 19 pneumonia





80-year-old male, who was brought to the emergency department by EMS.  The 

patient apparently has had 2 weeks' worth of increasing shortness of breath, and

significant fatigue.  The patient also had chest congestion.  He had a fever.  

The patient states that he is just not been feeling well and getting 

progressively worse.  He also apparently had an episode or 2 of diarrhea.  The 

patient denied any chest pain or chest pressure or palpitations.  The patient 

also denied nausea, vomiting, and abdominal pain.  Apparently when EMS arrived, 

the patient's saturations were in the low 80s on room air.  For that reason, he 

was transported in, evaluated, and admitted with a diagnosis of COVID 19 19 

pneumonia.  The patient does have a history of diabetes, hypertension, and a 

previous pacemaker insertion.  White count was 4.6, hemoglobin 12.5, hematocrit 

37.1, and platelet count 153,000.  PT, INR, and PTT were all normal.  D-dimer 

was 0.77.  Sodium 136, potassium 4.5, chlorides 102, CO2 26, anion gap 8, BUN 

47, and creatinine 1.63.  Ferritin was 985, , C-reactive protein 58, and 

pro-calcitonin level was 0.12.  Chest x-ray did show some interstitial 

infiltrates.





On 03/18/2021 patient seen in follow-up on medical floor.  he is on 2 L of 

oxygen his pulse ox is 90-94%, breathing comfortably, no fever or chills, blood 

pressure has been stable.  Denies any worsening dyspnea or hypoxemia, he has a 

mild cough, no chest discomfort.  He continues on oral Decadron 6 mg daily, IV 

hydration, vitamins, d-dimer was low at 0.77, patient is on subcu heparin for 

DVT prophylaxis, pro-calcitonin level was low, inflammatory markers were not 

significantly elevated on admission.  Clinically symptoms have not progressed, 

and patient will be considered for discharge in next 24 hours.





The patient is seen today 03/19/2021 in follow-up on the regular medical floor. 

He is currently sitting up in chair at the bedside.  Awake and alert in no acute

distress.  Continue O2 saturations in the low 90s on 2 L/m per nasal cannula.  

Afebrile.  Hemodynamically stable.  Blood cultures reveal no growth.  Blood 

glucose 202.  Sodium 143.  Potassium 5.5.  Creatinine 1.19.  He remains on 

dexamethasone, so continues heparin, vitamin supplements.  Chest x-ray continues

to show bilateral multifocal opacities consistent with CoVID infection.  No maria

ge.





The patient is seen today 03/20/2021 in follow-up on the regular medical floor. 

He is currently resting comfortably in bed.  Awake and alert in no acute 

distress.  He is maintaining O2 saturations in the 90s on 2 L/m per nasal 

cannula.  He's been on dexamethasone, vitamin supplements, subcutaneous heparin.

 No worsening shortness of breath, cough or congestion.  D-dimer 1.02.  Sodium 

145.  Potassium 5.1.  Creatinine 1.3.  .  C-reactive protein 4.4.





The patient is seen today 03/21/2021 in follow-up on the regular medical floor. 

He is currently resting quite comfortably in bed.  Maintaining O2 saturations in

the low 90s on 2 L/m per nasal cannula.  Sodium 144.  Potassium 4.7.  Creatinine

1.2.  Glucose 173.  He remains on dexamethasone, heparin, vitamin supplements.





03/22/2021 on seeing the patient for a follow-up regarding the patient's acute 

hypoxic respiratory failure due to Covid 19 related pneumonia.  The patient was 

not a candidate for Remdesivir, Tocilizumab or convalescent plasma, and the 

patient is currently on Decadron 6 mg on a daily basis and addition to Levemir 

insulin 50 units daily at bedtime and NovoLog 6 units 3 times a day with meals 

and a sliding scale coverage.  The patient was on 2 L about 2 by nasal cannula 

with pulse ox of 93%.  Earlier this morning, the patient's oxidation 

progressively got worse in the patient is currently on 100% nonrebreather 

facemask maintaining a saturation above 90% at around 94%.  Overall, the patient

is doing well.  Creatinine is at 1.2.  The patient had a CRP level of 4.4 with 

an LDH level of 485 from 03/20/2021.  Inflammatory markers are being monitored. 

Last d-dimer from 03/20/2021 was 1.02.  Blood cultures were negative repeat 

chest x-ray was done today and the findings are essentially stable and the pat

ient has patchy bilateral perihilar pulmonary infiltrates without any 

significant interval change.  D-dimer from today is at 1.7 and the patient 

remains on Lovenox.





03/23/2021 the patient is being seen for a follow-up.  The patient is currently 

on high flow oxygen at 6 L.  Note that the patient was on 100% nonrebreather 

facemask and was switched him to a high flow oxygen yesterday at 60 L.  As part 

of his treatment, the patient received steroids and currently is receiving 

dexamethasone 6 mg by mouth daily.  He is also Toci 2 yesterday and today.  He 

completed treatment without any major side effects.  Note that the patient was 

on low-flow oxygen and he progressively got worse requiring 100% nonrebreather 

and subsequent high flow oxygen.  On today's evaluation, his white cell count is

at 10.  His d-dimer is at 1.6.  Rest of the inflammatory markers have not been 

checked and this will be repeated tomorrow.  LDH from yesterday was 491 with a 

CRP of 9.4.  Otherwise, his renal function shows a stable creatinine of 1.2.  

The patient has been on FiO2 of 60% with a high flow oxygen of 60 L and the 

patient has been essentially stable since yesterday.  No other significant 

events.  Remains on Lovenox 40 mg subcu on a daily basis. 





03/24/2021, the patient is still on high flow oxygen and 60 L.  He is not 

utilizing the 100% nonrebreather facemask this morning.  Note that the patient 

has received steroids, and he remains on Decadron 6 mg by mouth daily. He also 

completed Tocilizumab.  On today's evaluation, chest x-ray findings are stable 

without any interval change in the bilateral pulmonary infiltrates.  The patient

is resting comfortably in bed.  He is also able to move on a recliner.  Labs 

today shows an LDH of 414 and a CRP of 2.9 and the patient is d-dimer is at 

2.56.  The patient remains on Lovenox 40 mg subcu on a daily basis.  He is on 

Levemir insulin 50 units daily along with NovoLog 6 units with meals plus a 

sliding coverage.  I will say his condition essentially the same as unchanged 

compared to yesterday.  He is not doing much in progress for now. 





On today's evaluation of 03/25/2021 the patient is being seen for a follow-up.  

The patient remains on high flow oxygen with a flow of 6 L an FiO2 of 60%.  His 

current pulse ox is around 88%.  He is afebrile.  He is doing well.  Sitting up 

on a chair.  No signs of any respiratory distress.  His breathing is nonlabored 

and he doesn't have any significant cough unless he takes a deep inspiration.  

The patient  had follow-up blood work today.  His LDL level is up 1221 and a CRP

level is up to 12.6 and his d-dimer currently is at 2.95.  I cannot explain the 

rise in these inflammatory markers.  His creatinine is at 1.2.  Was a causative 

13.7 with a hemoglobin of 13.2.  His chest x-ray from yesterday was showing 

right basilar infiltrates which are nonspecific and it was mentioned that he was

getting worse.  The patient remains on Decadron 6 mg by mouth daily.  He remains

on 50 units of Levemir insulin along with 6 units of NovoLog around-the-clock.  

He remains on aspirin.  He remains on Lovenox for DVT prophylaxis 40 mg subcu.








03/26/2021 the patient is on high flow oxygen at 60 L with an FiO2 of 70%.  His 

pulse ox was around 93%.  He feels well.  No altered mentation.  Resting 

comfortably.  Sitting up and he has no significant shortness of breath at rest. 

His chest x-ray still showing diffuse bilateral pulmonary infiltrates, 

peripheral distribution, probably slightly worse compared to yesterday.  

However, the radiologist reports is stating that the findings are essentially 

stable.  Clinically he is also stable.  The labs from today shows a sugar of 407

is quite high and a d-dimer is down to 1.89 and the patient's LDH level is down 

to 419 with a CRP level of 0.7.  The patient remains on Levemir insulin and is 

currently taking 50 units along with 6 units with meals of NovoLog.





03/27/2021 the patient remains on high flow at 60 L with an FiO2 of 70% which is

essentially the same as yesterday.  His pulse ox currently is around 93%.  His 

chest x-ray from today is showing diffuse bilateral pulmonary infiltrates, 

peripheral distribution, I thought his chest x-ray and probably the x-ray 

findings are improved compared to yesterday's chest x-ray.  I have the patient 

on Solu-Medrol 60 mg every 6 hours.  He is also on anticoagulation.  He is 

receiving Lovenox..  His d-dimer is at 1.57 on today's evaluation.  His LDH 

level is high today at 1059 with a CRP of less than 5.  He remains on Levemir 

insulin 50 units along with 6 units of NovoLog with meals and his blood sugars 

under adequate control for now.  He has no other complaints otherwise.  Note 

that initially the patient was started on Decadron and during the course of his 

treatment he was given Toci


 


03/28/2021, the patient is on high flow oxygen at 60 L along with an FiO2 of 

70%.  Pulse ox is in order of 96%.  I'm hoping to get down the FiO2 further 

today.  The patient is on IV Solu-Medrol.  He is on long-term anticoagulation.  

He is receiving Lovenox at a dose of 40 mg subcu on a daily basis.  In terms of 

inflammatory markers and blood work, the patient's CRP is less than 0.4 and the 

LDH level is at 378.  The chest x-ray was done yesterday showed some lower lobe 

active pulmonary infiltrates, findings are unchanged.  The patient is awake and 

alert and following commands and answering questions.  He is on Levemir insulin 

as a dose of 60 daily at bedtime and he is also on NovoLog 10 units with meals 

and a sliding scale coverage. He has completed tocilizumab





The patient is seen today 03/29/2021 in follow-up on the regular medical floor. 

He is currently sitting up in a chair at the bedside.  Awake and alert in no 

acute distress.  He remains on AirVo high flow oxygen at 40 L and 70% FiO2.  He 

is afebrile.  White count 18.6.  Hemoglobin 13.2.  D-dimer 1.6.  Sodium 136.  

Potassium 6.1.  Creatinine 1.2.  .  C-reactive protein less than 0.4.  

Remains on Lovenox, IV Soluj Medrol, vitamin supplements.





The patient is seen today 03/30/2021 in follow-up on the regular medical floor. 

Currently sitting up in a chair at the bedside.  Awake and alert in no acute 

distress.  He remains on AirVo high flow oxygen at 40 L and 60% FiO2 to maintain

O2 saturations at 88%.  Sodium 137.  Potassium 5.6.  Creatinine 1.3.  Glucose 

271.  Remains on IV Solu-Medrol, Lovenox, vitamin supplements.  Levemir and 

NovoLog sliding scale.





Patient is seen today 03/31/2021 in follow-up on the regular medical floor.  He 

is currently sitting up in a chair at the bedside.  Awake, alert in no acute 

distress.  Feeling a bit better today compared to yesterday.  He is still on 

AirVo high flow oxygen at 35 L and 65% FiO2 and maintaining O2 saturations at 

94%.  He remains on IV Solu-Medrol, Lovenox, vitamin supplements.  Chest x-ray 

continues to show patchy bilateral infiltrates similar to previous.





Objective





- Vital Signs


Vital signs: 


                                   Vital Signs











Temp  98.0 F   03/31/21 18:00


 


Pulse  60   03/31/21 18:00


 


Resp  16   03/31/21 18:00


 


BP  114/62   03/31/21 18:00


 


Pulse Ox  91 L  03/31/21 18:00








                                 Intake & Output











 03/31/21 03/31/21 04/01/21





 06:59 18:59 06:59


 


Output Total 900 2 


 


Balance -900 -2 


 


Output:   


 


  Urine 900 1 


 


  Stool  1 


 


Other:   


 


  Voiding Method Bedside Commode Bedside Commode 





 Urinal Urinal 














- Exam





GENERAL EXAM: Alert, pleasant, 80-year-old male patient, on AirVo high flow oxyg

en at 35 L and 65% FiO2 comfortable in no apparent distress.


HEAD: Normocephalic/atraumatic.


EYES: Normal reaction of pupils, equal size.  Conjunctiva pink, sclera white.


NOSE: Clear with pink turbinates.


THROAT: No erythema or exudates.


NECK: No masses, no JVD, no thyroid enlargement, no adenopathy.


CHEST: No chest wall deformity.  Symmetrical expansion. 


LUNGS: Equal air entry with bibasilar coarse crackles


CVS: Regular rate and rhythm, normal S1 and S2, no gallops, no murmurs, no rubs


ABDOMEN: Soft, nontender.  No hepatosplenomegaly, normal bowel sounds, no 

guarding or rigidity.


EXTREMITIES: No clubbing, no edema, no cyanosis, 2+ pulses and upper and lower 

extremities.


MUSCULOSKELETAL: Muscle strength and tone normal.


SPINE: No scoliosis or deformity


SKIN: No rashes


CENTRAL NERVOUS SYSTEM: No focal deficits, tone is normal in all 4 extremities.


PSYCHIATRIC: Alert and oriented -3.  Appropriate affect.  Intact judgment and 

insight.





- Labs


CBC & Chem 7: 


                                 03/29/21 06:17





                                 03/31/21 06:06


Labs: 


                  Abnormal Lab Results - Last 24 Hours (Table)











  03/30/21 03/31/21 03/31/21 Range/Units





  20:29 06:06 07:01 


 


BUN   56.0 H   (9.0-27.0)  mg/dL


 


Est GFR (CKD-EPI)AfAm   59.3 L   (60.0-200.0)   


 


Est GFR (CKD-EPI)NonAf   51.2 L   (60.0-200.0)   


 


BUN/Creatinine Ratio   43.08 H   (12.00-20.00)  Ratio


 


Glucose   119 H   ()  mg/dL


 


POC Glucose (mg/dL)  253 H   120 H  (75-99)  mg/dL


 


Calcium   8.5 L   (8.7-10.3)  mg/dL














  03/31/21 03/31/21 Range/Units





  11:55 16:39 


 


BUN    (9.0-27.0)  mg/dL


 


Est GFR (CKD-EPI)AfAm    (60.0-200.0)   


 


Est GFR (CKD-EPI)NonAf    (60.0-200.0)   


 


BUN/Creatinine Ratio    (12.00-20.00)  Ratio


 


Glucose    ()  mg/dL


 


POC Glucose (mg/dL)  252 H  220 H  (75-99)  mg/dL


 


Calcium    (8.7-10.3)  mg/dL














Assessment and Plan


Assessment: 





1  Acute hypoxic respiratory failure secondary to CoVID 19 pneumonia, he did 

receive Tocilizumab





2  History of diabetes mellitus type 2





3  History of hypertension





4  History of chronic sinus disease





5  History of diverticulitis, status post bowel resection





6 Hyperkalemia





Plan:





The patient was seen and evaluated by Dr. Tariq


Chest x-ray and labs reviewed


Titrate the FiO2 as tolerated, transition to high flow nasal cannula


We will continue to follow





I, the cosigning physician, performed a history & physical examination of the 

patient. Lungs sounds crackles in the bilateral posterior bases.  Maintaining 

good O2 saturations in the 90s on AirVo high flow oxygen at 35 L and 65% FiO2.  

I discussed the assessment and plan of care with my nurse practitioner, Abigail Thomas. I attest to the above note as dictated by her.

## 2021-03-31 NOTE — P.PN
Subjective


Progress Note Date: 03/31/21





80-year-old male who presents emergency Department stating that he has had 

shortness of breath and feeling fatigued for at least 2 weeks.  Patient states 

he has had exposure to COVID .  Patient states he has had a fever.  Patient 

states she's also states and smile.  Patient also states she's had diarrhea.  

Patient denies any chest pain or palpitations.  Patient denies any abdominal 

pain patient denies nausea vomiting diarrhea.  According to EMS with the patient

was coming in he was satting in the low 80s.  Patient is currently on a few 

liters of oxygen and sat in mid 90s.  Patient is a diabetic and has high blood 

pressure.  Patient also has a pacemaker.  Patient is presently on 2 L of oxygen 

was requiring 4 L yesterday.  Patient was started on IV fluids patient 

creatinine went up to 1.6 baseline is around 1.  Patient's blood sugars are 

highly elevated.  Patient tests positive for Covid.





03/18/2021


Patient is presently on 2 L of oxygen appears to be doing better possibility of 

discharge tomorrow.  Creatinine went up a bit to 1.6 baseline is around the 1.  

Patient was started on IV fluids will recheck the basic metabolic profile 

tomorrow.





03/19/2021


Patient seen and evaluated in follow-up continues to be on 2 L of oxygen and 

becomes dyspneic with exertion.  Patient states he does not know wear oxygen at 

home.  Pulmonary is following.  Patient's blood sugars are elevated and will 

continue sliding scale at this time as patient is on steroids.  Potassium was 

found to be slightly elevated at 5.5 and was given a dose of Kayexalate.  Will 

repeat a.m. labs.  Instructed and encouraged the patient to get up and increase 

activity as tolerated.





03/20/2021


Patient is presently in 2 does of hours and doing clinically well.  Patient is 

on dexamethasone, vitamin supplements, subcutaneous heparin.  No worsening 

shortness of breath, cough or congestion.  D-dimer 1.02.  Sodium 145.  Potassium

5.1.  Creatinine 1.3.  .  C-reactive protein 4.4.  His creatinine 

actually went up a little bit from 1.19-1.3





03/21/2021


Patient is fairly stable no significant improvement or worsening.  Patient is 

still on 2 L of oxygen.  We'll monitor him 1 more night.  Continues to require 

oxygen patient probably can be discharged on 2 L tomorrow this can be tapered or

discontinued as an outpatient.  Patient d-dimer is bit worse compared to 

admission.  Although other inflammatory markers are better








03/22/2021


Patient is seen this morning currently on nonrebreather and airvo as his oxygen 

was found to be 89% on 6 L of oxygen.  Pulmonary is following.  D-dimer has gone

up at 1.70 along with lactate dehydrogenase at 491 and CRP is 9.4.  Blood sugars

continue to be elevated and patient is maintained on sliding scale along with 

long-acting and pre-meal insulin and will continue at this time.  Patient 

continues on vitamin and zinc supplements along with Lovenox and dexamethasone 

daily.  Patient is scheduled to receive Tocilizumab today.  Will continue to 

monitor closely based on the clinical course of the patient.  Patient continues 

to be weak and was seen and evaluated by physical therapy recommending subacute 

rehab and patient is now agreeable and a social work consult was placed. 





03/23/2021


Patient is seen and evaluated this morning with no improvement in respiratory 

status.  Patient remains on Airvo with supplemental nonrebreather.  Patient is 

day 2 of receiving Tocilizumab.  Will repeat a.m. chest x-ray along with 

inflammatory markers.  D-dimer today was 1.60.  White blood count has gone up in

is 10.8, hemoglobin is 12.6.  Patient is afebrile.  To continue with dexametha

sone, Lovenox, vitamin C and zinc supplements.  Pulmonary is following.  Had a 

discussion with the patient about CODE STATUS along with his son Mamadou and 

currently wish to remain a full code until talking with family member more 

extensively about the situation.  Will continue to monitor closely.  Prognosis 

remains guarded.





03/24/2021


Patient is seen this morning status post getting up to the bathroom and 

continues to be extremely dyspneic with any exertion and was found to be low 80s

to 83% oxygenation.  Patient continues on the airvo with a flow rate of 60 and 

FiO2 of 64 and is currently 91%.  Patient denies any chest pain or palpitations.

 Patient is afebrile.  Patient reports to tolerating diet with no nausea or 

vomiting noted.  D-dimer continues to be elevated at 2.56, sodium today is 140 

with a potassium of 5.1 and creatinine is 1.2.  Blood sugars on the lower side 

this morning and will continue to monitor closely and treat accordingly with 

sliding scale.  Inflammatory markers trending down.  Pulmonary following 

closely.  Chest x-ray today shows continued bibasilar infiltrates which are 

nonspecific but worsening.





03/25/2021





Patient is seen in follow-up this morning and continues to be on Airvo no 

significant change.  Patient was on dexamethasone all being transitioned to IV 

Solu-Medrol and will continue with Accu-Cheks and close glycemic control with 

pre-meal, long-acting, and sliding scale.  White blood count is 13.9, hemoglobin

is stable at 13.2, d-dimer is 2.95, sodium is 137, potassium is 5.1, creatinine 

slightly elevated at 1.24.  Inflammatory markers have gone up.  Patient is 

sitting up in the chair and denies any chest pain or worsening shortness of 

breath.  Patient states he feels he is about the same.  Patient also states he 

has been sleeping a lot and tolerating diet with no reports of nausea or 

vomiting.  Discussed with patient about increasing activity as tolerated 

although patient is extremely dyspneic with minimal exertion.





03/26/2021





Patient is seen and evaluated this morning and follow-up with no acute overnight

issues. Patient continues to be on Airvo with a flow rate of 60 and FiO2 of 70 

and is being closely monitored.  Transitioned to IV Solu-Medrol and will 

continue at this time.  Blood sugars elevated and increasing long-acting and 

pre-meal insulins and will continue sliding scale along with oral antidiabetic 

agents.  Need tighter glycemic control.  Patient has been eating 100% of all 

meals with no reports of nausea or vomiting noted.  Patient clinically looking 

better and chest x-ray displays stable infiltrates.  D-dimer slightly trending 

down along with inflammatory markers.  Creatinine is stable at 1.2 and potassium

is 5.5.  Will continue to monitor vital signs and labs closely.





On 3/27/2021 -patient is seen and examined at the bedside.  He is sitting up in 

the chair and is on Airvo 60 L with FiO2 of 70%, saturating about 90%.  He still

complains of exertional dyspnea and cough at times.  Denies having any fevers 

chills or rigors.  He complains of generalized weakness.  On reviewing his 

vitals temperature of 97.9, heart rate 63, respiratory rate 18, blood pressure 

120 x 62 .  On reviewing his labs white count of 22.9, hemoglobin 13, platelets 

299.  Sodium 136, potassium 5.6, chloride 104, bicarb 26, BUN 55, creatinine 

1.15.  Blood sugars running on the higher side around 300s.  Patient had a chest

x-ray done this morning showing mild to moderate mid and lower zone infiltrates.





On 3/28/2021 -patient is seen and examined at bedside.  He is sitting up in a 

chair at fifth high flow oxygen at 60 L FiO2 of 70% and pulse ox at 94%.  He is 

still having exertional dyspnea and complains of generalized weakness.  He 

states that he is tired of using the oxygen.  On reviewing the vitals T-max of 

97.9, heart rate 60, respiratory 19, blood pressure 130/60.  Reviewing the labs 

, CRP less than 0.04 and D-dimer of 1.53.  All medications have been 

reviewed and pertinent changes made.





03/29/2021


Patient is seen and evaluated this morning with no acute overnight issues.  

Patient continues to sit up in the chair throughout most of the day as he states

his breathing is better.  Patient continues to be on Airvo and flow rate has 

been decreased to 40 with an FiO2 of 70% and is currently 92%.  Patient con

tinues to have dyspnea with minimal exertion and continues to have weakness and 

will likely require some form rehab once stabilized and discharged.  PT/OT to 

continue to follow.  White blood count trending down at 18.64 and hemoglobin is 

stable at 13.2.  D-dimer is 1.60 with a sodium of 136 and potassium was found to

be 6.1 and being corrected and will repeat a.m. labs.  Creatinine is 1.2.  Blood

sugars continue to be elevated and will continue with sliding scale and long-

acting and monitor closely.  LDH trending back up at 503 and will monitor 

closely.  CRP is 0.4. 





03/30/2021


Patient's potassium is still high received the calcium gluconate and insulin as 

today.  Will order capsulate.  This is nonhemolyzed sample patient was started 

on low potassium diet.  Patient remains on Airvo , 40 L and 60% FiO2 which is 

better compared to yesterday and saturating better appears to have been some 

improvement.








03/31/2021


Patient is seen and evaluated this morning continues to be sitting up in the 

chair.  Patient states he feels his breathing is improved but "I'm still here in

the hospital".  Patient has continued weakness and has been working with PT/OT 

therapy.  Patient does require assistance to the bedside commode and will have 

PT/OT therapy reevaluate with the possibility of ECF once stabilized and 

discharged.  Sodium today is 138 with a potassium of 5.2 and current creatinine 

is 1.3.  Blood sugars being closely monitored and will continue with current 

medication regimen. Respiratory slowly weaning Airvo settings and is maintained 

on a flow rate of 35 with an FiO2 of 65%.  Pulmonary is following.





Constitutional: No reports of fatigue, denied any fever.


Cardio vascular: denied any chest pain, palpitations


Gastrointestinal denied any nausea vomiting


Pulmonary: Reports continued shortness of breath but feels has improved slightly




Neurologic reports generalized weakness 





All inpatient medications were reviewed and appropriate changes in these 

medications as dictated in the interval history and assessment and plan.























Objective





- Vital Signs


Vital signs: 


                                   Vital Signs











Temp  97.6 F   03/31/21 10:11


 


Pulse  65   03/31/21 10:11


 


Resp  16   03/31/21 10:11


 


BP  125/56   03/31/21 10:11


 


Pulse Ox  88 L  03/31/21 10:11








                                 Intake & Output











 03/30/21 03/31/21 03/31/21





 18:59 06:59 18:59


 


Output Total  900 


 


Balance  -900 


 


Output:   


 


  Urine  900 


 


Other:   


 


  Voiding Method  Bedside Commode Bedside Commode





  Urinal Urinal


 


  # Voids 2  














- Exam





GENERAL: The patient is alert and oriented x3, not in any acute distress.  

Obese, currently on  airvo at a flow rate of 35 with an FiO2 of 65, continue to 

wean as tolerated


HEENT: Pupils are round and equally reacting to light. EOMI. No scleral icterus.

No conjunctival pallor. Normocephalic, atraumatic. No pharyngeal erythema. No 

thyromegaly. 


CARDIOVASCULAR: S1 and S2 present. No murmurs, rubs, or gallops. 


PULMONARY: Diminished breath sounds bilaterally with no wheezing or rhonchi 

noted


ABDOMEN: Soft, nontender, nondistended, normoactive bowel sounds. No palpable 

organomegaly. 


MUSCULOSKELETAL: No joint swelling or deformity.


EXTREMITIES: No cyanosis, clubbing, or pedal edema. 


NEUROLOGICAL: Gross neurological examination did not reveal any focal deficits. 

Diffuse weakness


SKIN: No rashes. 








- Labs


CBC & Chem 7: 


                                 03/29/21 06:17





                                 03/31/21 06:06


Labs: 


                  Abnormal Lab Results - Last 24 Hours (Table)











  03/30/21 03/30/21 03/31/21 Range/Units





  16:57 20:29 06:06 


 


BUN    56.0 H  (9.0-27.0)  mg/dL


 


Est GFR (CKD-EPI)AfAm    59.3 L  (60.0-200.0)   


 


Est GFR (CKD-EPI)NonAf    51.2 L  (60.0-200.0)   


 


BUN/Creatinine Ratio    43.08 H  (12.00-20.00)  Ratio


 


Glucose    119 H  ()  mg/dL


 


POC Glucose (mg/dL)  271 H  253 H   (75-99)  mg/dL


 


Calcium    8.5 L  (8.7-10.3)  mg/dL














  03/31/21 03/31/21 Range/Units





  07:01 11:55 


 


BUN    (9.0-27.0)  mg/dL


 


Est GFR (CKD-EPI)AfAm    (60.0-200.0)   


 


Est GFR (CKD-EPI)NonAf    (60.0-200.0)   


 


BUN/Creatinine Ratio    (12.00-20.00)  Ratio


 


Glucose    ()  mg/dL


 


POC Glucose (mg/dL)  120 H  252 H  (75-99)  mg/dL


 


Calcium    (8.7-10.3)  mg/dL














Assessment and Plan


Assessment: 





-acute hypoxic respiratory failure: Secondary to covid 19 pneumonia patient 

maintained on vitamin and zinc supplements.  Patient currently on IV solu-

Medrol.  has received 2 doses of Tocilizumab and is continued on Lovenox. 

inflammatory markers ,Patient currently on Airvo.  Pulmonary following


-Type 2 diabetes mellitus uncontrolled elevated blood sugars secondary to 

systemic steroids, continue with pre-meal, sliding scale, and long-acting, with 

adjustments made and will increase pre-male and long-acting doses


-Hyperkalemia, improved continue with low potassium diet


-Hypotonic hyponatremia, improved


-Acute renal failure secondary to Covid 19: Improved, current creatinine is 1.3


-Hypertension 


-DVT subcutaneous Lovenox


-Full code





Plan:


Continue with current medications.  Will continue IV Solu-Medrol with vitamin 

and zinc supplements, and lovenox . patient has received Tocilizumab x2.  

Pulmonary following .  Patient currently on airvo and slowly weaning as 

tolerated.  Current settings are flow rate of 35 and FiO2 of 65%.  Continue with

low potassium diet and will repeat a.m. labs potassium within normal limits 

today.  PT/OT following and patient may likely require ECF for continued PT/OT 

therapy due to weakness once stabilized and discharged.  Patient response to 

treatment is slow.  Will continue to monitor closely.  Prognosis remains 

guarded.

## 2021-04-01 LAB
ANION GAP SERPL CALC-SCNC: 3 MMOL/L
BASOPHILS # BLD AUTO: 0 K/UL (ref 0–0.2)
BASOPHILS NFR BLD AUTO: 0 %
BUN SERPL-SCNC: 58 MG/DL (ref 9–20)
CALCIUM SPEC-MCNC: 8.6 MG/DL (ref 8.4–10.2)
CHLORIDE SERPL-SCNC: 107 MMOL/L (ref 98–107)
CO2 SERPL-SCNC: 25 MMOL/L (ref 22–30)
EOSINOPHIL # BLD AUTO: 0 K/UL (ref 0–0.7)
EOSINOPHIL NFR BLD AUTO: 0 %
ERYTHROCYTE [DISTWIDTH] IN BLOOD BY AUTOMATED COUNT: 4.55 M/UL (ref 4.3–5.9)
ERYTHROCYTE [DISTWIDTH] IN BLOOD: 13.3 % (ref 11.5–15.5)
GLUCOSE BLD-MCNC: 108 MG/DL (ref 75–99)
GLUCOSE BLD-MCNC: 233 MG/DL (ref 75–99)
GLUCOSE BLD-MCNC: 297 MG/DL (ref 75–99)
GLUCOSE BLD-MCNC: 319 MG/DL (ref 75–99)
GLUCOSE SERPL-MCNC: 170 MG/DL (ref 74–99)
HCT VFR BLD AUTO: 42.8 % (ref 39–53)
HGB BLD-MCNC: 14.2 GM/DL (ref 13–17.5)
LYMPHOCYTES # SPEC AUTO: 1.3 K/UL (ref 1–4.8)
LYMPHOCYTES NFR SPEC AUTO: 9 %
MCH RBC QN AUTO: 31.2 PG (ref 25–35)
MCHC RBC AUTO-ENTMCNC: 33.1 G/DL (ref 31–37)
MCV RBC AUTO: 94.1 FL (ref 80–100)
MONOCYTES # BLD AUTO: 0.8 K/UL (ref 0–1)
MONOCYTES NFR BLD AUTO: 5 %
NEUTROPHILS # BLD AUTO: 13.2 K/UL (ref 1.3–7.7)
NEUTROPHILS NFR BLD AUTO: 86 %
PLATELET # BLD AUTO: 274 K/UL (ref 150–450)
POTASSIUM SERPL-SCNC: 5.2 MMOL/L (ref 3.5–5.1)
SODIUM SERPL-SCNC: 135 MMOL/L (ref 137–145)
WBC # BLD AUTO: 15.4 K/UL (ref 3.8–10.6)

## 2021-04-01 RX ADMIN — ENOXAPARIN SODIUM SCH MG: 40 INJECTION SUBCUTANEOUS at 07:50

## 2021-04-01 RX ADMIN — INSULIN ASPART SCH UNIT: 100 INJECTION, SOLUTION INTRAVENOUS; SUBCUTANEOUS at 17:16

## 2021-04-01 RX ADMIN — METHYLPREDNISOLONE SODIUM SUCCINATE SCH MG: 125 INJECTION, POWDER, FOR SOLUTION INTRAMUSCULAR; INTRAVENOUS at 17:17

## 2021-04-01 RX ADMIN — ASPIRIN 81 MG CHEWABLE TABLET SCH MG: 81 TABLET CHEWABLE at 07:50

## 2021-04-01 RX ADMIN — INSULIN ASPART SCH UNIT: 100 INJECTION, SOLUTION INTRAVENOUS; SUBCUTANEOUS at 11:44

## 2021-04-01 RX ADMIN — ATORVASTATIN CALCIUM SCH MG: 80 TABLET, FILM COATED ORAL at 07:49

## 2021-04-01 RX ADMIN — INSULIN DETEMIR SCH UNIT: 100 INJECTION, SOLUTION SUBCUTANEOUS at 20:17

## 2021-04-01 RX ADMIN — INSULIN ASPART SCH UNIT: 100 INJECTION, SOLUTION INTRAVENOUS; SUBCUTANEOUS at 07:50

## 2021-04-01 RX ADMIN — INSULIN ASPART SCH: 100 INJECTION, SOLUTION INTRAVENOUS; SUBCUTANEOUS at 07:41

## 2021-04-01 RX ADMIN — METHYLPREDNISOLONE SODIUM SUCCINATE SCH MG: 125 INJECTION, POWDER, FOR SOLUTION INTRAMUSCULAR; INTRAVENOUS at 07:50

## 2021-04-01 RX ADMIN — INSULIN ASPART SCH UNIT: 100 INJECTION, SOLUTION INTRAVENOUS; SUBCUTANEOUS at 20:17

## 2021-04-01 RX ADMIN — Medication SCH MG: at 07:49

## 2021-04-01 RX ADMIN — METOPROLOL SUCCINATE SCH MG: 50 TABLET, EXTENDED RELEASE ORAL at 07:49

## 2021-04-01 RX ADMIN — FAMOTIDINE SCH MG: 20 TABLET, FILM COATED ORAL at 07:49

## 2021-04-01 RX ADMIN — METHYLPREDNISOLONE SODIUM SUCCINATE SCH MG: 125 INJECTION, POWDER, FOR SOLUTION INTRAMUSCULAR; INTRAVENOUS at 00:02

## 2021-04-01 RX ADMIN — METHYLPREDNISOLONE SODIUM SUCCINATE SCH MG: 125 INJECTION, POWDER, FOR SOLUTION INTRAMUSCULAR; INTRAVENOUS at 23:51

## 2021-04-01 RX ADMIN — CLOPIDOGREL BISULFATE SCH MG: 75 TABLET ORAL at 07:50

## 2021-04-01 RX ADMIN — OXYCODONE HYDROCHLORIDE AND ACETAMINOPHEN SCH MG: 500 TABLET ORAL at 07:48

## 2021-04-01 RX ADMIN — PIOGLITAZONE SCH MG: 30 TABLET ORAL at 07:51

## 2021-04-01 NOTE — P.PN
Subjective


Progress Note Date: 04/01/21





80-year-old male who presents emergency Department stating that he has had 

shortness of breath and feeling fatigued for at least 2 weeks.  Patient states 

he has had exposure to COVID .  Patient states he has had a fever.  Patient 

states she's also states and smile.  Patient also states she's had diarrhea.  

Patient denies any chest pain or palpitations.  Patient denies any abdominal 

pain patient denies nausea vomiting diarrhea.  According to EMS with the patient

was coming in he was satting in the low 80s.  Patient is currently on a few 

liters of oxygen and sat in mid 90s.  Patient is a diabetic and has high blood 

pressure.  Patient also has a pacemaker.  Patient is presently on 2 L of oxygen 

was requiring 4 L yesterday.  Patient was started on IV fluids patient 

creatinine went up to 1.6 baseline is around 1.  Patient's blood sugars are 

highly elevated.  Patient tests positive for Covid.





03/18/2021


Patient is presently on 2 L of oxygen appears to be doing better possibility of 

discharge tomorrow.  Creatinine went up a bit to 1.6 baseline is around the 1.  

Patient was started on IV fluids will recheck the basic metabolic profile 

tomorrow.





03/19/2021


Patient seen and evaluated in follow-up continues to be on 2 L of oxygen and 

becomes dyspneic with exertion.  Patient states he does not know wear oxygen at 

home.  Pulmonary is following.  Patient's blood sugars are elevated and will 

continue sliding scale at this time as patient is on steroids.  Potassium was 

found to be slightly elevated at 5.5 and was given a dose of Kayexalate.  Will 

repeat a.m. labs.  Instructed and encouraged the patient to get up and increase 

activity as tolerated.





03/20/2021


Patient is presently in 2 does of hours and doing clinically well.  Patient is 

on dexamethasone, vitamin supplements, subcutaneous heparin.  No worsening 

shortness of breath, cough or congestion.  D-dimer 1.02.  Sodium 145.  Potassium

5.1.  Creatinine 1.3.  .  C-reactive protein 4.4.  His creatinine 

actually went up a little bit from 1.19-1.3





03/21/2021


Patient is fairly stable no significant improvement or worsening.  Patient is 

still on 2 L of oxygen.  We'll monitor him 1 more night.  Continues to require 

oxygen patient probably can be discharged on 2 L tomorrow this can be tapered or

discontinued as an outpatient.  Patient d-dimer is bit worse compared to 

admission.  Although other inflammatory markers are better








03/22/2021


Patient is seen this morning currently on nonrebreather and airvo as his oxygen 

was found to be 89% on 6 L of oxygen.  Pulmonary is following.  D-dimer has gone

up at 1.70 along with lactate dehydrogenase at 491 and CRP is 9.4.  Blood sugars

continue to be elevated and patient is maintained on sliding scale along with 

long-acting and pre-meal insulin and will continue at this time.  Patient 

continues on vitamin and zinc supplements along with Lovenox and dexamethasone 

daily.  Patient is scheduled to receive Tocilizumab today.  Will continue to 

monitor closely based on the clinical course of the patient.  Patient continues 

to be weak and was seen and evaluated by physical therapy recommending subacute 

rehab and patient is now agreeable and a social work consult was placed. 





03/23/2021


Patient is seen and evaluated this morning with no improvement in respiratory 

status.  Patient remains on Airvo with supplemental nonrebreather.  Patient is 

day 2 of receiving Tocilizumab.  Will repeat a.m. chest x-ray along with 

inflammatory markers.  D-dimer today was 1.60.  White blood count has gone up in

is 10.8, hemoglobin is 12.6.  Patient is afebrile.  To continue with dexametha

sone, Lovenox, vitamin C and zinc supplements.  Pulmonary is following.  Had a 

discussion with the patient about CODE STATUS along with his son Mamdaou and 

currently wish to remain a full code until talking with family member more 

extensively about the situation.  Will continue to monitor closely.  Prognosis 

remains guarded.





03/24/2021


Patient is seen this morning status post getting up to the bathroom and 

continues to be extremely dyspneic with any exertion and was found to be low 80s

to 83% oxygenation.  Patient continues on the airvo with a flow rate of 60 and 

FiO2 of 64 and is currently 91%.  Patient denies any chest pain or palpitations.

 Patient is afebrile.  Patient reports to tolerating diet with no nausea or 

vomiting noted.  D-dimer continues to be elevated at 2.56, sodium today is 140 

with a potassium of 5.1 and creatinine is 1.2.  Blood sugars on the lower side 

this morning and will continue to monitor closely and treat accordingly with 

sliding scale.  Inflammatory markers trending down.  Pulmonary following 

closely.  Chest x-ray today shows continued bibasilar infiltrates which are 

nonspecific but worsening.





03/25/2021





Patient is seen in follow-up this morning and continues to be on Airvo no 

significant change.  Patient was on dexamethasone all being transitioned to IV 

Solu-Medrol and will continue with Accu-Cheks and close glycemic control with 

pre-meal, long-acting, and sliding scale.  White blood count is 13.9, hemoglobin

is stable at 13.2, d-dimer is 2.95, sodium is 137, potassium is 5.1, creatinine 

slightly elevated at 1.24.  Inflammatory markers have gone up.  Patient is 

sitting up in the chair and denies any chest pain or worsening shortness of 

breath.  Patient states he feels he is about the same.  Patient also states he 

has been sleeping a lot and tolerating diet with no reports of nausea or 

vomiting.  Discussed with patient about increasing activity as tolerated 

although patient is extremely dyspneic with minimal exertion.





03/26/2021





Patient is seen and evaluated this morning and follow-up with no acute overnight

issues. Patient continues to be on Airvo with a flow rate of 60 and FiO2 of 70 

and is being closely monitored.  Transitioned to IV Solu-Medrol and will 

continue at this time.  Blood sugars elevated and increasing long-acting and 

pre-meal insulins and will continue sliding scale along with oral antidiabetic 

agents.  Need tighter glycemic control.  Patient has been eating 100% of all 

meals with no reports of nausea or vomiting noted.  Patient clinically looking 

better and chest x-ray displays stable infiltrates.  D-dimer slightly trending 

down along with inflammatory markers.  Creatinine is stable at 1.2 and potassium

is 5.5.  Will continue to monitor vital signs and labs closely.





On 3/27/2021 -patient is seen and examined at the bedside.  He is sitting up in 

the chair and is on Airvo 60 L with FiO2 of 70%, saturating about 90%.  He still

complains of exertional dyspnea and cough at times.  Denies having any fevers 

chills or rigors.  He complains of generalized weakness.  On reviewing his 

vitals temperature of 97.9, heart rate 63, respiratory rate 18, blood pressure 

120 x 62 .  On reviewing his labs white count of 22.9, hemoglobin 13, platelets 

299.  Sodium 136, potassium 5.6, chloride 104, bicarb 26, BUN 55, creatinine 

1.15.  Blood sugars running on the higher side around 300s.  Patient had a chest

x-ray done this morning showing mild to moderate mid and lower zone infiltrates.





On 3/28/2021 -patient is seen and examined at bedside.  He is sitting up in a 

chair at fifth high flow oxygen at 60 L FiO2 of 70% and pulse ox at 94%.  He is 

still having exertional dyspnea and complains of generalized weakness.  He 

states that he is tired of using the oxygen.  On reviewing the vitals T-max of 

97.9, heart rate 60, respiratory 19, blood pressure 130/60.  Reviewing the labs 

, CRP less than 0.04 and D-dimer of 1.53.  All medications have been 

reviewed and pertinent changes made.





03/29/2021


Patient is seen and evaluated this morning with no acute overnight issues.  

Patient continues to sit up in the chair throughout most of the day as he states

his breathing is better.  Patient continues to be on Airvo and flow rate has 

been decreased to 40 with an FiO2 of 70% and is currently 92%.  Patient con

tinues to have dyspnea with minimal exertion and continues to have weakness and 

will likely require some form rehab once stabilized and discharged.  PT/OT to 

continue to follow.  White blood count trending down at 18.64 and hemoglobin is 

stable at 13.2.  D-dimer is 1.60 with a sodium of 136 and potassium was found to

be 6.1 and being corrected and will repeat a.m. labs.  Creatinine is 1.2.  Blood

sugars continue to be elevated and will continue with sliding scale and long-

acting and monitor closely.  LDH trending back up at 503 and will monitor 

closely.  CRP is 0.4. 





03/30/2021


Patient's potassium is still high received the calcium gluconate and insulin as 

today.  Will order capsulate.  This is nonhemolyzed sample patient was started 

on low potassium diet.  Patient remains on Airvo , 40 L and 60% FiO2 which is 

better compared to yesterday and saturating better appears to have been some 

improvement.








03/31/2021


Patient is seen and evaluated this morning continues to be sitting up in the 

chair.  Patient states he feels his breathing is improved but "I'm still here in

the hospital".  Patient has continued weakness and has been working with PT/OT 

therapy.  Patient does require assistance to the bedside commode and will have 

PT/OT therapy reevaluate with the possibility of ECF once stabilized and 

discharged.  Sodium today is 138 with a potassium of 5.2 and current creatinine 

is 1.3.  Blood sugars being closely monitored and will continue with current 

medication regimen. Respiratory slowly weaning Airvo settings and is maintained 

on a flow rate of 35 with an FiO2 of 65%.  Pulmonary is following.





04/01/2021


Patient is seen this morning continues to be on Airvo and slow to respond.  

Current settings have a flow rate of 36 and FiO2 of 60%.  Patient denies any 

worsening shortness of breath.  White blood count trending down at 15.4, 

hemoglobin is stable at 14.2, sodium is 135, potassium is 5.2, current 

creatinine slightly improved at 1.12.





Constitutional: No reports of fatigue, denied any fever.


Cardio vascular: denied any chest pain, palpitations


Gastrointestinal denied any nausea vomiting


Pulmonary: Reports continued shortness of breath but feels has improved slightly




Neurologic reports generalized weakness 





All inpatient medications were reviewed and appropriate changes in these 

medications as dictated in the interval history and assessment and plan.























Objective





- Vital Signs


Vital signs: 


                                   Vital Signs











Temp  97.6 F   04/01/21 05:55


 


Pulse  65   04/01/21 05:55


 


Resp  16   04/01/21 08:00


 


BP  134/61   04/01/21 05:55


 


Pulse Ox  92 L  04/01/21 05:55








                                 Intake & Output











 03/31/21 04/01/21 04/01/21





 18:59 06:59 18:59


 


Output Total 2 700 


 


Balance -2 -700 


 


Output:   


 


  Urine 1 700 


 


  Stool 1  


 


Other:   


 


  Voiding Method Bedside Commode  Bedside Commode





 Urinal  Urinal














- Exam





GENERAL: The patient is alert and oriented x3, not in any acute distress.  

Obese, currently on  airvo at a flow rate of 36 with an FiO2 of 60, continue to 

wean as tolerated


HEENT: Pupils are round and equally reacting to light. EOMI. No scleral icterus.

No conjunctival pallor. Normocephalic, atraumatic. No pharyngeal erythema. No 

thyromegaly. 


CARDIOVASCULAR: S1 and S2 present. No murmurs, rubs, or gallops. 


PULMONARY: Diminished breath sounds bilaterally with no wheezing or rhonchi 

noted


ABDOMEN: Soft, nontender, nondistended, normoactive bowel sounds. No palpable 

organomegaly. 


MUSCULOSKELETAL: No joint swelling or deformity.


EXTREMITIES: No cyanosis, clubbing, or pedal edema. 


NEUROLOGICAL: Gross neurological examination did not reveal any focal deficits. 

Diffuse weakness


SKIN: No rashes. 








- Labs


CBC & Chem 7: 


                                 04/01/21 08:49





                                 04/01/21 08:49


Labs: 


                  Abnormal Lab Results - Last 24 Hours (Table)











  03/31/21 03/31/21 03/31/21 Range/Units





  06:06 11:55 16:39 


 


BUN  56.0 H    (9.0-27.0)  mg/dL


 


Est GFR (CKD-EPI)AfAm  59.3 L    (60.0-200.0)   


 


Est GFR (CKD-EPI)NonAf  51.2 L    (60.0-200.0)   


 


BUN/Creatinine Ratio  43.08 H    (12.00-20.00)  Ratio


 


Glucose  119 H    ()  mg/dL


 


POC Glucose (mg/dL)   252 H  220 H  (75-99)  mg/dL


 


Calcium  8.5 L    (8.7-10.3)  mg/dL














  03/31/21 04/01/21 Range/Units





  20:29 06:50 


 


BUN    (9.0-27.0)  mg/dL


 


Est GFR (CKD-EPI)AfAm    (60.0-200.0)   


 


Est GFR (CKD-EPI)NonAf    (60.0-200.0)   


 


BUN/Creatinine Ratio    (12.00-20.00)  Ratio


 


Glucose    ()  mg/dL


 


POC Glucose (mg/dL)  213 H  108 H  (75-99)  mg/dL


 


Calcium    (8.7-10.3)  mg/dL














Assessment and Plan


Assessment: 





-acute hypoxic respiratory failure: Secondary to covid 19 pneumonia patient 

maintained on vitamin and zinc supplements.  Patient currently on IV solu-

Medrol.  has received 2 doses of Tocilizumab and is continued on Lovenox. 

inflammatory markers ,Patient currently on Airvo.  Pulmonary following


-Type 2 diabetes mellitus uncontrolled elevated blood sugars secondary to 

systemic steroids, continue with pre-meal, sliding scale, and long-acting, with 

adjustments made and will increase pre-male and long-acting doses


-Hyperkalemia, improved continue with low potassium diet


-Hypotonic hyponatremia, improved


-Acute renal failure secondary to Covid 19: Improved, current creatinine is 1.1


-Hypertension 


-DVT subcutaneous Lovenox


-Full code





Plan:


Continue with current medications.  Will continue IV Solu-Medrol with vitamin 

and zinc supplements, and lovenox . patient has received Tocilizumab x2.  

Pulmonary following .  Patient currently on airvo and slowly weaning as 

tolerated.  Very slow to respond.  Current settings are flow rate of 36 and FiO2

of 60%.  Continue with low potassium diet.  PT/OT following and patient will 

likely require ECF for continued PT/OT therapy due to weakness once stabilized 

and discharged.  Patient response to treatment is slow.  Will continue to 

monitor closely.  Prognosis remains guarded.

## 2021-04-01 NOTE — P.PN
Subjective


Progress Note Date: 04/01/21


Principal diagnosis: 





CoVID 19 pneumonia





80-year-old male, who was brought to the emergency department by EMS.  The 

patient apparently has had 2 weeks' worth of increasing shortness of breath, and

significant fatigue.  The patient also had chest congestion.  He had a fever.  

The patient states that he is just not been feeling well and getting 

progressively worse.  He also apparently had an episode or 2 of diarrhea.  The 

patient denied any chest pain or chest pressure or palpitations.  The patient 

also denied nausea, vomiting, and abdominal pain.  Apparently when EMS arrived, 

the patient's saturations were in the low 80s on room air.  For that reason, he 

was transported in, evaluated, and admitted with a diagnosis of COVID 19 19 

pneumonia.  The patient does have a history of diabetes, hypertension, and a 

previous pacemaker insertion.  White count was 4.6, hemoglobin 12.5, hematocrit 

37.1, and platelet count 153,000.  PT, INR, and PTT were all normal.  D-dimer 

was 0.77.  Sodium 136, potassium 4.5, chlorides 102, CO2 26, anion gap 8, BUN 

47, and creatinine 1.63.  Ferritin was 985, , C-reactive protein 58, and 

pro-calcitonin level was 0.12.  Chest x-ray did show some interstitial 

infiltrates.





On 03/18/2021 patient seen in follow-up on medical floor.  he is on 2 L of 

oxygen his pulse ox is 90-94%, breathing comfortably, no fever or chills, blood 

pressure has been stable.  Denies any worsening dyspnea or hypoxemia, he has a 

mild cough, no chest discomfort.  He continues on oral Decadron 6 mg daily, IV 

hydration, vitamins, d-dimer was low at 0.77, patient is on subcu heparin for 

DVT prophylaxis, pro-calcitonin level was low, inflammatory markers were not 

significantly elevated on admission.  Clinically symptoms have not progressed, 

and patient will be considered for discharge in next 24 hours.





The patient is seen today 03/19/2021 in follow-up on the regular medical floor. 

He is currently sitting up in chair at the bedside.  Awake and alert in no acute

distress.  Continue O2 saturations in the low 90s on 2 L/m per nasal cannula.  

Afebrile.  Hemodynamically stable.  Blood cultures reveal no growth.  Blood 

glucose 202.  Sodium 143.  Potassium 5.5.  Creatinine 1.19.  He remains on 

dexamethasone, so continues heparin, vitamin supplements.  Chest x-ray continues

to show bilateral multifocal opacities consistent with CoVID infection.  No maria

ge.





The patient is seen today 03/20/2021 in follow-up on the regular medical floor. 

He is currently resting comfortably in bed.  Awake and alert in no acute 

distress.  He is maintaining O2 saturations in the 90s on 2 L/m per nasal 

cannula.  He's been on dexamethasone, vitamin supplements, subcutaneous heparin.

 No worsening shortness of breath, cough or congestion.  D-dimer 1.02.  Sodium 

145.  Potassium 5.1.  Creatinine 1.3.  .  C-reactive protein 4.4.





The patient is seen today 03/21/2021 in follow-up on the regular medical floor. 

He is currently resting quite comfortably in bed.  Maintaining O2 saturations in

the low 90s on 2 L/m per nasal cannula.  Sodium 144.  Potassium 4.7.  Creatinine

1.2.  Glucose 173.  He remains on dexamethasone, heparin, vitamin supplements.





03/22/2021 on seeing the patient for a follow-up regarding the patient's acute 

hypoxic respiratory failure due to Covid 19 related pneumonia.  The patient was 

not a candidate for Remdesivir, Tocilizumab or convalescent plasma, and the 

patient is currently on Decadron 6 mg on a daily basis and addition to Levemir 

insulin 50 units daily at bedtime and NovoLog 6 units 3 times a day with meals 

and a sliding scale coverage.  The patient was on 2 L about 2 by nasal cannula 

with pulse ox of 93%.  Earlier this morning, the patient's oxidation 

progressively got worse in the patient is currently on 100% nonrebreather 

facemask maintaining a saturation above 90% at around 94%.  Overall, the patient

is doing well.  Creatinine is at 1.2.  The patient had a CRP level of 4.4 with 

an LDH level of 485 from 03/20/2021.  Inflammatory markers are being monitored. 

Last d-dimer from 03/20/2021 was 1.02.  Blood cultures were negative repeat 

chest x-ray was done today and the findings are essentially stable and the pat

ient has patchy bilateral perihilar pulmonary infiltrates without any 

significant interval change.  D-dimer from today is at 1.7 and the patient 

remains on Lovenox.





03/23/2021 the patient is being seen for a follow-up.  The patient is currently 

on high flow oxygen at 6 L.  Note that the patient was on 100% nonrebreather 

facemask and was switched him to a high flow oxygen yesterday at 60 L.  As part 

of his treatment, the patient received steroids and currently is receiving 

dexamethasone 6 mg by mouth daily.  He is also Toci 2 yesterday and today.  He 

completed treatment without any major side effects.  Note that the patient was 

on low-flow oxygen and he progressively got worse requiring 100% nonrebreather 

and subsequent high flow oxygen.  On today's evaluation, his white cell count is

at 10.  His d-dimer is at 1.6.  Rest of the inflammatory markers have not been 

checked and this will be repeated tomorrow.  LDH from yesterday was 491 with a 

CRP of 9.4.  Otherwise, his renal function shows a stable creatinine of 1.2.  

The patient has been on FiO2 of 60% with a high flow oxygen of 60 L and the 

patient has been essentially stable since yesterday.  No other significant 

events.  Remains on Lovenox 40 mg subcu on a daily basis. 





03/24/2021, the patient is still on high flow oxygen and 60 L.  He is not 

utilizing the 100% nonrebreather facemask this morning.  Note that the patient 

has received steroids, and he remains on Decadron 6 mg by mouth daily. He also 

completed Tocilizumab.  On today's evaluation, chest x-ray findings are stable 

without any interval change in the bilateral pulmonary infiltrates.  The patient

is resting comfortably in bed.  He is also able to move on a recliner.  Labs 

today shows an LDH of 414 and a CRP of 2.9 and the patient is d-dimer is at 

2.56.  The patient remains on Lovenox 40 mg subcu on a daily basis.  He is on 

Levemir insulin 50 units daily along with NovoLog 6 units with meals plus a 

sliding coverage.  I will say his condition essentially the same as unchanged 

compared to yesterday.  He is not doing much in progress for now. 





On today's evaluation of 03/25/2021 the patient is being seen for a follow-up.  

The patient remains on high flow oxygen with a flow of 6 L an FiO2 of 60%.  His 

current pulse ox is around 88%.  He is afebrile.  He is doing well.  Sitting up 

on a chair.  No signs of any respiratory distress.  His breathing is nonlabored 

and he doesn't have any significant cough unless he takes a deep inspiration.  

The patient  had follow-up blood work today.  His LDL level is up 1221 and a CRP

level is up to 12.6 and his d-dimer currently is at 2.95.  I cannot explain the 

rise in these inflammatory markers.  His creatinine is at 1.2.  Was a causative 

13.7 with a hemoglobin of 13.2.  His chest x-ray from yesterday was showing 

right basilar infiltrates which are nonspecific and it was mentioned that he was

getting worse.  The patient remains on Decadron 6 mg by mouth daily.  He remains

on 50 units of Levemir insulin along with 6 units of NovoLog around-the-clock.  

He remains on aspirin.  He remains on Lovenox for DVT prophylaxis 40 mg subcu.








03/26/2021 the patient is on high flow oxygen at 60 L with an FiO2 of 70%.  His 

pulse ox was around 93%.  He feels well.  No altered mentation.  Resting 

comfortably.  Sitting up and he has no significant shortness of breath at rest. 

His chest x-ray still showing diffuse bilateral pulmonary infiltrates, 

peripheral distribution, probably slightly worse compared to yesterday.  

However, the radiologist reports is stating that the findings are essentially 

stable.  Clinically he is also stable.  The labs from today shows a sugar of 407

is quite high and a d-dimer is down to 1.89 and the patient's LDH level is down 

to 419 with a CRP level of 0.7.  The patient remains on Levemir insulin and is 

currently taking 50 units along with 6 units with meals of NovoLog.





03/27/2021 the patient remains on high flow at 60 L with an FiO2 of 70% which is

essentially the same as yesterday.  His pulse ox currently is around 93%.  His 

chest x-ray from today is showing diffuse bilateral pulmonary infiltrates, 

peripheral distribution, I thought his chest x-ray and probably the x-ray 

findings are improved compared to yesterday's chest x-ray.  I have the patient 

on Solu-Medrol 60 mg every 6 hours.  He is also on anticoagulation.  He is 

receiving Lovenox..  His d-dimer is at 1.57 on today's evaluation.  His LDH 

level is high today at 1059 with a CRP of less than 5.  He remains on Levemir 

insulin 50 units along with 6 units of NovoLog with meals and his blood sugars 

under adequate control for now.  He has no other complaints otherwise.  Note 

that initially the patient was started on Decadron and during the course of his 

treatment he was given Toci


 


03/28/2021, the patient is on high flow oxygen at 60 L along with an FiO2 of 

70%.  Pulse ox is in order of 96%.  I'm hoping to get down the FiO2 further 

today.  The patient is on IV Solu-Medrol.  He is on long-term anticoagulation.  

He is receiving Lovenox at a dose of 40 mg subcu on a daily basis.  In terms of 

inflammatory markers and blood work, the patient's CRP is less than 0.4 and the 

LDH level is at 378.  The chest x-ray was done yesterday showed some lower lobe 

active pulmonary infiltrates, findings are unchanged.  The patient is awake and 

alert and following commands and answering questions.  He is on Levemir insulin 

as a dose of 60 daily at bedtime and he is also on NovoLog 10 units with meals 

and a sliding scale coverage. He has completed tocilizumab





The patient is seen today 03/29/2021 in follow-up on the regular medical floor. 

He is currently sitting up in a chair at the bedside.  Awake and alert in no 

acute distress.  He remains on AirVo high flow oxygen at 40 L and 70% FiO2.  He 

is afebrile.  White count 18.6.  Hemoglobin 13.2.  D-dimer 1.6.  Sodium 136.  

Potassium 6.1.  Creatinine 1.2.  .  C-reactive protein less than 0.4.  

Remains on Lovenox, IV Soluj Medrol, vitamin supplements.





The patient is seen today 03/30/2021 in follow-up on the regular medical floor. 

Currently sitting up in a chair at the bedside.  Awake and alert in no acute 

distress.  He remains on AirVo high flow oxygen at 40 L and 60% FiO2 to maintain

O2 saturations at 88%.  Sodium 137.  Potassium 5.6.  Creatinine 1.3.  Glucose 

271.  Remains on IV Solu-Medrol, Lovenox, vitamin supplements.  Levemir and 

NovoLog sliding scale.





Patient is seen today 03/31/2021 in follow-up on the regular medical floor.  He 

is currently sitting up in a chair at the bedside.  Awake, alert in no acute 

distress.  Feeling a bit better today compared to yesterday.  He is still on 

AirVo high flow oxygen at 35 L and 65% FiO2 and maintaining O2 saturations at 

94%.  He remains on IV Solu-Medrol, Lovenox, vitamin supplements.  Chest x-ray 

continues to show patchy bilateral infiltrates similar to previous.





The patient is seen today 04/01/2021 in follow-up on the regular medical floor. 

He is currently resting in bed.  Awake and alert in no acute distress.  Still 

requiring AirVo high flow oxygen at 30 L and 60% FiO2.  Chest x-ray remains 

stable.  Lung sounds but are clearing.  He is feeling better today compared to 

yesterday.  Afebrile.  White count 15.4.  Hemoglobin 14.2.  Sodium 135.  

Potassium 5.2.  Creatinine 1.12.  He remains on Lovenox, Solu-Medrol, vitamin 

supplements.





Objective





- Vital Signs


Vital signs: 


                                   Vital Signs











Temp  98.1 F   04/01/21 13:56


 


Pulse  68   04/01/21 13:56


 


Resp  22   04/01/21 13:56


 


BP  141/58   04/01/21 13:56


 


Pulse Ox  88 L  04/01/21 13:56








                                 Intake & Output











 03/31/21 04/01/21 04/01/21





 18:59 06:59 18:59


 


Output Total 2 700 700


 


Balance -2 -700 -700


 


Output:   


 


  Urine 1 700 700


 


  Stool 1  


 


Other:   


 


  Voiding Method Bedside Commode  Bedside Commode





 Urinal  Urinal


 


  # Bowel Movements   1














- Exam





GENERAL EXAM: Alert, pleasant, 80-year-old male patient, on AirVo high flow 

oxygen at 30 L and 60% FiO2 comfortable in no apparent distress.


HEAD: Normocephalic/atraumatic.


EYES: Normal reaction of pupils, equal size.  Conjunctiva pink, sclera white.


NOSE: Clear with pink turbinates.


THROAT: No erythema or exudates.


NECK: No masses, no JVD, no thyroid enlargement, no adenopathy.


CHEST: No chest wall deformity.  Symmetrical expansion. 


LUNGS: Equal air entry with bibasilar coarse crackles


CVS: Regular rate and rhythm, normal S1 and S2, no gallops, no murmurs, no rubs


ABDOMEN: Soft, nontender.  No hepatosplenomegaly, normal bowel sounds, no 

guarding or rigidity.


EXTREMITIES: No clubbing, no edema, no cyanosis, 2+ pulses and upper and lower 

extremities.


MUSCULOSKELETAL: Muscle strength and tone normal.


SPINE: No scoliosis or deformity


SKIN: No rashes


CENTRAL NERVOUS SYSTEM: No focal deficits, tone is normal in all 4 extremities.


PSYCHIATRIC: Alert and oriented -3.  Appropriate affect.  Intact judgment and 

insight.





- Labs


CBC & Chem 7: 


                                 04/01/21 08:49





                                 04/01/21 08:49


Labs: 


                  Abnormal Lab Results - Last 24 Hours (Table)











  03/31/21 03/31/21 04/01/21 Range/Units





  16:39 20:29 06:50 


 


WBC     (3.8-10.6)  k/uL


 


Neutrophils #     (1.3-7.7)  k/uL


 


Sodium     (137-145)  mmol/L


 


Potassium     (3.5-5.1)  mmol/L


 


BUN     (9-20)  mg/dL


 


Glucose     (74-99)  mg/dL


 


POC Glucose (mg/dL)  220 H  213 H  108 H  (75-99)  mg/dL














  04/01/21 04/01/21 04/01/21 Range/Units





  08:49 08:49 11:22 


 


WBC  15.4 H    (3.8-10.6)  k/uL


 


Neutrophils #  13.2 H    (1.3-7.7)  k/uL


 


Sodium   135 L   (137-145)  mmol/L


 


Potassium   5.2 H   (3.5-5.1)  mmol/L


 


BUN   58 H   (9-20)  mg/dL


 


Glucose   170 H   (74-99)  mg/dL


 


POC Glucose (mg/dL)    297 H  (75-99)  mg/dL














Assessment and Plan


Assessment: 





1  Acute hypoxic respiratory failure secondary to CoVID 19 pneumonia, he did 

receive Tocilizumab





2  History of diabetes mellitus type 2





3  History of hypertension





4  History of chronic sinus disease





5  History of diverticulitis, status post bowel resection





6 Hyperkalemia





Plan:





The patient was seen and evaluated by Dr. Tariq


Oxygen requirements improving.  Currently at 30 L and 60% FiO2 via AirVo


Titrate the FiO2 as tolerated


Continue the current treatment plan


We will continue to follow





I, the cosigning physician, performed a history & physical examination of the 

patient. Lungs sounds crackles in the bilateral posterior bases.  Maintaining 

good O2 saturations in the 90s on AirVo high flow oxygen at 30 L and 60% FiO2.  

I discussed the assessment and plan of care with my nurse practitioner, Abigail Thomas. I attest to the above note as dictated by her.

## 2021-04-02 LAB
ANION GAP SERPL CALC-SCNC: 0 MMOL/L
BUN SERPL-SCNC: 54 MG/DL (ref 9–20)
CALCIUM SPEC-MCNC: 8.2 MG/DL (ref 8.4–10.2)
CHLORIDE SERPL-SCNC: 106 MMOL/L (ref 98–107)
CO2 SERPL-SCNC: 31 MMOL/L (ref 22–30)
GLUCOSE BLD-MCNC: 109 MG/DL (ref 75–99)
GLUCOSE BLD-MCNC: 136 MG/DL (ref 75–99)
GLUCOSE BLD-MCNC: 141 MG/DL (ref 75–99)
GLUCOSE BLD-MCNC: 162 MG/DL (ref 75–99)
GLUCOSE SERPL-MCNC: 133 MG/DL (ref 74–99)
POTASSIUM SERPL-SCNC: 4.7 MMOL/L (ref 3.5–5.1)
SODIUM SERPL-SCNC: 137 MMOL/L (ref 137–145)

## 2021-04-02 RX ADMIN — INSULIN ASPART SCH UNIT: 100 INJECTION, SOLUTION INTRAVENOUS; SUBCUTANEOUS at 20:22

## 2021-04-02 RX ADMIN — CLOPIDOGREL BISULFATE SCH MG: 75 TABLET ORAL at 08:13

## 2021-04-02 RX ADMIN — INSULIN ASPART SCH UNIT: 100 INJECTION, SOLUTION INTRAVENOUS; SUBCUTANEOUS at 11:52

## 2021-04-02 RX ADMIN — INSULIN ASPART SCH UNIT: 100 INJECTION, SOLUTION INTRAVENOUS; SUBCUTANEOUS at 08:12

## 2021-04-02 RX ADMIN — INSULIN ASPART SCH: 100 INJECTION, SOLUTION INTRAVENOUS; SUBCUTANEOUS at 08:04

## 2021-04-02 RX ADMIN — METHYLPREDNISOLONE SODIUM SUCCINATE SCH MG: 125 INJECTION, POWDER, FOR SOLUTION INTRAMUSCULAR; INTRAVENOUS at 16:03

## 2021-04-02 RX ADMIN — Medication SCH MG: at 08:14

## 2021-04-02 RX ADMIN — ATORVASTATIN CALCIUM SCH MG: 80 TABLET, FILM COATED ORAL at 08:13

## 2021-04-02 RX ADMIN — METOPROLOL SUCCINATE SCH MG: 50 TABLET, EXTENDED RELEASE ORAL at 08:13

## 2021-04-02 RX ADMIN — ENOXAPARIN SODIUM SCH MG: 40 INJECTION SUBCUTANEOUS at 08:14

## 2021-04-02 RX ADMIN — ASPIRIN 81 MG CHEWABLE TABLET SCH MG: 81 TABLET CHEWABLE at 08:14

## 2021-04-02 RX ADMIN — FAMOTIDINE SCH MG: 20 TABLET, FILM COATED ORAL at 08:14

## 2021-04-02 RX ADMIN — PIOGLITAZONE SCH MG: 30 TABLET ORAL at 08:14

## 2021-04-02 RX ADMIN — INSULIN DETEMIR SCH UNIT: 100 INJECTION, SOLUTION SUBCUTANEOUS at 20:22

## 2021-04-02 RX ADMIN — METHYLPREDNISOLONE SODIUM SUCCINATE SCH MG: 125 INJECTION, POWDER, FOR SOLUTION INTRAMUSCULAR; INTRAVENOUS at 08:13

## 2021-04-02 RX ADMIN — OXYCODONE HYDROCHLORIDE AND ACETAMINOPHEN SCH MG: 500 TABLET ORAL at 08:13

## 2021-04-02 RX ADMIN — INSULIN ASPART SCH UNIT: 100 INJECTION, SOLUTION INTRAVENOUS; SUBCUTANEOUS at 17:34

## 2021-04-02 RX ADMIN — METHYLPREDNISOLONE SODIUM SUCCINATE SCH MG: 125 INJECTION, POWDER, FOR SOLUTION INTRAMUSCULAR; INTRAVENOUS at 23:19

## 2021-04-02 RX ADMIN — INSULIN ASPART SCH: 100 INJECTION, SOLUTION INTRAVENOUS; SUBCUTANEOUS at 11:38

## 2021-04-02 NOTE — XR
EXAMINATION TYPE: XR chest 1V portable

 

DATE OF EXAM: 4/2/2021

 

Comparison: 3/31/2021

 

Clinical History: 80-year-old male shortness of breath, covid

 

Findings:

Left anterior chest wall pacemaker generator with right atrial and ventricular leads. Heart upper davison
its of normal in size. Patchy bilateral airspace opacities, slightly increased in the mid and lower l
ungs. No pleural effusion.

 

 

Impression:

Patchy bilateral airspace disease, slightly increased in the mid and lower lungs.

## 2021-04-02 NOTE — P.PN
Subjective


Progress Note Date: 04/02/21


Principal diagnosis: 





CoVID 19 pneumonia





80-year-old male, who was brought to the emergency department by EMS.  The 

patient apparently has had 2 weeks' worth of increasing shortness of breath, and

significant fatigue.  The patient also had chest congestion.  He had a fever.  

The patient states that he is just not been feeling well and getting 

progressively worse.  He also apparently had an episode or 2 of diarrhea.  The 

patient denied any chest pain or chest pressure or palpitations.  The patient 

also denied nausea, vomiting, and abdominal pain.  Apparently when EMS arrived, 

the patient's saturations were in the low 80s on room air.  For that reason, he 

was transported in, evaluated, and admitted with a diagnosis of COVID 19 19 

pneumonia.  The patient does have a history of diabetes, hypertension, and a 

previous pacemaker insertion.  White count was 4.6, hemoglobin 12.5, hematocrit 

37.1, and platelet count 153,000.  PT, INR, and PTT were all normal.  D-dimer 

was 0.77.  Sodium 136, potassium 4.5, chlorides 102, CO2 26, anion gap 8, BUN 

47, and creatinine 1.63.  Ferritin was 985, , C-reactive protein 58, and 

pro-calcitonin level was 0.12.  Chest x-ray did show some interstitial 

infiltrates.





On 03/18/2021 patient seen in follow-up on medical floor.  he is on 2 L of 

oxygen his pulse ox is 90-94%, breathing comfortably, no fever or chills, blood 

pressure has been stable.  Denies any worsening dyspnea or hypoxemia, he has a 

mild cough, no chest discomfort.  He continues on oral Decadron 6 mg daily, IV 

hydration, vitamins, d-dimer was low at 0.77, patient is on subcu heparin for 

DVT prophylaxis, pro-calcitonin level was low, inflammatory markers were not 

significantly elevated on admission.  Clinically symptoms have not progressed, 

and patient will be considered for discharge in next 24 hours.





The patient is seen today 03/19/2021 in follow-up on the regular medical floor. 

He is currently sitting up in chair at the bedside.  Awake and alert in no acute

distress.  Continue O2 saturations in the low 90s on 2 L/m per nasal cannula.  

Afebrile.  Hemodynamically stable.  Blood cultures reveal no growth.  Blood 

glucose 202.  Sodium 143.  Potassium 5.5.  Creatinine 1.19.  He remains on 

dexamethasone, so continues heparin, vitamin supplements.  Chest x-ray continues

to show bilateral multifocal opacities consistent with CoVID infection.  No maria

ge.





The patient is seen today 03/20/2021 in follow-up on the regular medical floor. 

He is currently resting comfortably in bed.  Awake and alert in no acute 

distress.  He is maintaining O2 saturations in the 90s on 2 L/m per nasal 

cannula.  He's been on dexamethasone, vitamin supplements, subcutaneous heparin.

 No worsening shortness of breath, cough or congestion.  D-dimer 1.02.  Sodium 

145.  Potassium 5.1.  Creatinine 1.3.  .  C-reactive protein 4.4.





The patient is seen today 03/21/2021 in follow-up on the regular medical floor. 

He is currently resting quite comfortably in bed.  Maintaining O2 saturations in

the low 90s on 2 L/m per nasal cannula.  Sodium 144.  Potassium 4.7.  Creatinine

1.2.  Glucose 173.  He remains on dexamethasone, heparin, vitamin supplements.





03/22/2021 on seeing the patient for a follow-up regarding the patient's acute 

hypoxic respiratory failure due to Covid 19 related pneumonia.  The patient was 

not a candidate for Remdesivir, Tocilizumab or convalescent plasma, and the 

patient is currently on Decadron 6 mg on a daily basis and addition to Levemir 

insulin 50 units daily at bedtime and NovoLog 6 units 3 times a day with meals 

and a sliding scale coverage.  The patient was on 2 L about 2 by nasal cannula 

with pulse ox of 93%.  Earlier this morning, the patient's oxidation 

progressively got worse in the patient is currently on 100% nonrebreather 

facemask maintaining a saturation above 90% at around 94%.  Overall, the patient

is doing well.  Creatinine is at 1.2.  The patient had a CRP level of 4.4 with 

an LDH level of 485 from 03/20/2021.  Inflammatory markers are being monitored. 

Last d-dimer from 03/20/2021 was 1.02.  Blood cultures were negative repeat 

chest x-ray was done today and the findings are essentially stable and the pat

ient has patchy bilateral perihilar pulmonary infiltrates without any 

significant interval change.  D-dimer from today is at 1.7 and the patient 

remains on Lovenox.





03/23/2021 the patient is being seen for a follow-up.  The patient is currently 

on high flow oxygen at 6 L.  Note that the patient was on 100% nonrebreather 

facemask and was switched him to a high flow oxygen yesterday at 60 L.  As part 

of his treatment, the patient received steroids and currently is receiving 

dexamethasone 6 mg by mouth daily.  He is also Toci 2 yesterday and today.  He 

completed treatment without any major side effects.  Note that the patient was 

on low-flow oxygen and he progressively got worse requiring 100% nonrebreather 

and subsequent high flow oxygen.  On today's evaluation, his white cell count is

at 10.  His d-dimer is at 1.6.  Rest of the inflammatory markers have not been 

checked and this will be repeated tomorrow.  LDH from yesterday was 491 with a 

CRP of 9.4.  Otherwise, his renal function shows a stable creatinine of 1.2.  

The patient has been on FiO2 of 60% with a high flow oxygen of 60 L and the 

patient has been essentially stable since yesterday.  No other significant 

events.  Remains on Lovenox 40 mg subcu on a daily basis. 





03/24/2021, the patient is still on high flow oxygen and 60 L.  He is not 

utilizing the 100% nonrebreather facemask this morning.  Note that the patient 

has received steroids, and he remains on Decadron 6 mg by mouth daily. He also 

completed Tocilizumab.  On today's evaluation, chest x-ray findings are stable 

without any interval change in the bilateral pulmonary infiltrates.  The patient

is resting comfortably in bed.  He is also able to move on a recliner.  Labs 

today shows an LDH of 414 and a CRP of 2.9 and the patient is d-dimer is at 

2.56.  The patient remains on Lovenox 40 mg subcu on a daily basis.  He is on 

Levemir insulin 50 units daily along with NovoLog 6 units with meals plus a 

sliding coverage.  I will say his condition essentially the same as unchanged 

compared to yesterday.  He is not doing much in progress for now. 





On today's evaluation of 03/25/2021 the patient is being seen for a follow-up.  

The patient remains on high flow oxygen with a flow of 6 L an FiO2 of 60%.  His 

current pulse ox is around 88%.  He is afebrile.  He is doing well.  Sitting up 

on a chair.  No signs of any respiratory distress.  His breathing is nonlabored 

and he doesn't have any significant cough unless he takes a deep inspiration.  

The patient  had follow-up blood work today.  His LDL level is up 1221 and a CRP

level is up to 12.6 and his d-dimer currently is at 2.95.  I cannot explain the 

rise in these inflammatory markers.  His creatinine is at 1.2.  Was a causative 

13.7 with a hemoglobin of 13.2.  His chest x-ray from yesterday was showing 

right basilar infiltrates which are nonspecific and it was mentioned that he was

getting worse.  The patient remains on Decadron 6 mg by mouth daily.  He remains

on 50 units of Levemir insulin along with 6 units of NovoLog around-the-clock.  

He remains on aspirin.  He remains on Lovenox for DVT prophylaxis 40 mg subcu.








03/26/2021 the patient is on high flow oxygen at 60 L with an FiO2 of 70%.  His 

pulse ox was around 93%.  He feels well.  No altered mentation.  Resting 

comfortably.  Sitting up and he has no significant shortness of breath at rest. 

His chest x-ray still showing diffuse bilateral pulmonary infiltrates, 

peripheral distribution, probably slightly worse compared to yesterday.  

However, the radiologist reports is stating that the findings are essentially 

stable.  Clinically he is also stable.  The labs from today shows a sugar of 407

is quite high and a d-dimer is down to 1.89 and the patient's LDH level is down 

to 419 with a CRP level of 0.7.  The patient remains on Levemir insulin and is 

currently taking 50 units along with 6 units with meals of NovoLog.





03/27/2021 the patient remains on high flow at 60 L with an FiO2 of 70% which is

essentially the same as yesterday.  His pulse ox currently is around 93%.  His 

chest x-ray from today is showing diffuse bilateral pulmonary infiltrates, 

peripheral distribution, I thought his chest x-ray and probably the x-ray 

findings are improved compared to yesterday's chest x-ray.  I have the patient 

on Solu-Medrol 60 mg every 6 hours.  He is also on anticoagulation.  He is 

receiving Lovenox..  His d-dimer is at 1.57 on today's evaluation.  His LDH 

level is high today at 1059 with a CRP of less than 5.  He remains on Levemir 

insulin 50 units along with 6 units of NovoLog with meals and his blood sugars 

under adequate control for now.  He has no other complaints otherwise.  Note 

that initially the patient was started on Decadron and during the course of his 

treatment he was given Toci


 


03/28/2021, the patient is on high flow oxygen at 60 L along with an FiO2 of 

70%.  Pulse ox is in order of 96%.  I'm hoping to get down the FiO2 further 

today.  The patient is on IV Solu-Medrol.  He is on long-term anticoagulation.  

He is receiving Lovenox at a dose of 40 mg subcu on a daily basis.  In terms of 

inflammatory markers and blood work, the patient's CRP is less than 0.4 and the 

LDH level is at 378.  The chest x-ray was done yesterday showed some lower lobe 

active pulmonary infiltrates, findings are unchanged.  The patient is awake and 

alert and following commands and answering questions.  He is on Levemir insulin 

as a dose of 60 daily at bedtime and he is also on NovoLog 10 units with meals 

and a sliding scale coverage. He has completed tocilizumab





The patient is seen today 03/29/2021 in follow-up on the regular medical floor. 

He is currently sitting up in a chair at the bedside.  Awake and alert in no 

acute distress.  He remains on AirVo high flow oxygen at 40 L and 70% FiO2.  He 

is afebrile.  White count 18.6.  Hemoglobin 13.2.  D-dimer 1.6.  Sodium 136.  

Potassium 6.1.  Creatinine 1.2.  .  C-reactive protein less than 0.4.  

Remains on Lovenox, IV Soluj Medrol, vitamin supplements.





The patient is seen today 03/30/2021 in follow-up on the regular medical floor. 

Currently sitting up in a chair at the bedside.  Awake and alert in no acute 

distress.  He remains on AirVo high flow oxygen at 40 L and 60% FiO2 to maintain

O2 saturations at 88%.  Sodium 137.  Potassium 5.6.  Creatinine 1.3.  Glucose 

271.  Remains on IV Solu-Medrol, Lovenox, vitamin supplements.  Levemir and 

NovoLog sliding scale.





Patient is seen today 03/31/2021 in follow-up on the regular medical floor.  He 

is currently sitting up in a chair at the bedside.  Awake, alert in no acute 

distress.  Feeling a bit better today compared to yesterday.  He is still on 

AirVo high flow oxygen at 35 L and 65% FiO2 and maintaining O2 saturations at 

94%.  He remains on IV Solu-Medrol, Lovenox, vitamin supplements.  Chest x-ray 

continues to show patchy bilateral infiltrates similar to previous.





The patient is seen today 04/01/2021 in follow-up on the regular medical floor. 

He is currently resting in bed.  Awake and alert in no acute distress.  Still 

requiring AirVo high flow oxygen at 30 L and 60% FiO2.  Chest x-ray remains 

stable.  Lung sounds but are clearing.  He is feeling better today compared to 

yesterday.  Afebrile.  White count 15.4.  Hemoglobin 14.2.  Sodium 135.  

Potassium 5.2.  Creatinine 1.12.  He remains on Lovenox, Solu-Medrol, vitamin 

supplements.





The patient is seen today 04/02/2021 in follow-up on the regular medical floor. 

He is currently resting comfortably in bed.  Awake and alert in no acute 

distress.  He has been slow to progress.  He remains on AirVo high flow oxygen 

at 50L and 90% FiO2 to maintain O2 saturations in the upper 80s lower 90s.  

Continue basilar crackles left greater than right.  Chest x-ray continues to 

show patchy bilateral airspace disease slightly increase in the mid and lower 

lung fields.  D-dimer 1.58.  Sodium 137.  Potassium 4.7.  Creatinine 1.22.  Rem

ains on IV Solu-Medrol, Lovenox, vitamin supplements.





Objective





- Vital Signs


Vital signs: 


                                   Vital Signs











Temp  98.3 F   04/02/21 14:00


 


Pulse  87   04/02/21 14:00


 


Resp  22   04/02/21 14:00


 


BP  176/80   04/02/21 14:00


 


Pulse Ox  94 L  04/02/21 15:31








                                 Intake & Output











 04/01/21 04/02/21 04/02/21





 18:59 06:59 18:59


 


Intake Total  300 


 


Output Total 700 1150 


 


Balance -700 -850 


 


Intake:   


 


  Oral  300 


 


Output:   


 


  Urine 700 1150 


 


Other:   


 


  Voiding Method Bedside Commode Bedside Commode 





 Urinal Urinal 


 


  # Bowel Movements 1  














- Exam





GENERAL EXAM: Alert, pleasant, 80-year-old male patient, on AirVo high flow 

oxygen at 50 L and 90% FiO2 comfortable in no apparent distress.


HEAD: Normocephalic/atraumatic.


EYES: Normal reaction of pupils, equal size.  Conjunctiva pink, sclera white.


NOSE: Clear with pink turbinates.


THROAT: No erythema or exudates.


NECK: No masses, no JVD, no thyroid enlargement, no adenopathy.


CHEST: No chest wall deformity.  Symmetrical expansion. 


LUNGS: Equal air entry with bibasilar coarse crackles


CVS: Regular rate and rhythm, normal S1 and S2, no gallops, no murmurs, no rubs


ABDOMEN: Soft, nontender.  No hepatosplenomegaly, normal bowel sounds, no 

guarding or rigidity.


EXTREMITIES: No clubbing, no edema, no cyanosis, 2+ pulses and upper and lower 

extremities.


MUSCULOSKELETAL: Muscle strength and tone normal.


SPINE: No scoliosis or deformity


SKIN: No rashes


CENTRAL NERVOUS SYSTEM: No focal deficits, tone is normal in all 4 extremities.


PSYCHIATRIC: Alert and oriented -3.  Appropriate affect.  Intact judgment and 

insight.





- Labs


CBC & Chem 7: 


                                 04/01/21 08:49





                                 04/02/21 10:04


Labs: 


                  Abnormal Lab Results - Last 24 Hours (Table)











  04/01/21 04/02/21 04/02/21 Range/Units





  20:05 07:08 10:04 


 


D-Dimer     (<0.60)  mg/L FEU


 


Carbon Dioxide    31 H  (22-30)  mmol/L


 


BUN    54 H  (9-20)  mg/dL


 


Glucose    133 H  (74-99)  mg/dL


 


POC Glucose (mg/dL)  233 H  109 H   (75-99)  mg/dL


 


Calcium    8.2 L  (8.4-10.2)  mg/dL














  04/02/21 04/02/21 04/02/21 Range/Units





  10:04 11:16 16:49 


 


D-Dimer  1.58 H    (<0.60)  mg/L FEU


 


Carbon Dioxide     (22-30)  mmol/L


 


BUN     (9-20)  mg/dL


 


Glucose     (74-99)  mg/dL


 


POC Glucose (mg/dL)   136 H  162 H  (75-99)  mg/dL


 


Calcium     (8.4-10.2)  mg/dL














Assessment and Plan


Assessment: 





1  Acute hypoxic respiratory failure secondary to CoVID 19 pneumonia, he did 

receive Tocilizumab





2  History of diabetes mellitus type 2





3  History of hypertension





4  History of chronic sinus disease





5  History of diverticulitis, status post bowel resection





6 Hyperkalemia





Plan:





The patient was seen and evaluated by Dr. Tariq


He has been slow to progress


Chest x-ray and labs reviewed


Oxygen requirements waxing and waning. 


Currently at 50 L and 90% FiO2 via AirVo


Titrate the FiO2 as tolerated


Continue the current treatment plan


We will continue to follow





I, the cosigning physician, performed a history & physical examination of the 

patient. Lungs sounds crackles in the bilateral posterior bases.  Maintaining 

good O2 saturations in the 90s on AirVo high flow oxygen at 50 L and 90% FiO2.  

I discussed the assessment and plan of care with my nurse practitioner, Abigail Thomas. I attest to the above note as dictated by her.

## 2021-04-02 NOTE — P.PN
Subjective


Progress Note Date: 04/02/21





80-year-old male who presents emergency Department stating that he has had 

shortness of breath and feeling fatigued for at least 2 weeks.  Patient states 

he has had exposure to COVID .  Patient states he has had a fever.  Patient 

states she's also states and smile.  Patient also states she's had diarrhea.  

Patient denies any chest pain or palpitations.  Patient denies any abdominal 

pain patient denies nausea vomiting diarrhea.  According to EMS with the patient

was coming in he was satting in the low 80s.  Patient is currently on a few 

liters of oxygen and sat in mid 90s.  Patient is a diabetic and has high blood 

pressure.  Patient also has a pacemaker.  Patient is presently on 2 L of oxygen 

was requiring 4 L yesterday.  Patient was started on IV fluids patient 

creatinine went up to 1.6 baseline is around 1.  Patient's blood sugars are 

highly elevated.  Patient tests positive for Covid.





03/18/2021


Patient is presently on 2 L of oxygen appears to be doing better possibility of 

discharge tomorrow.  Creatinine went up a bit to 1.6 baseline is around the 1.  

Patient was started on IV fluids will recheck the basic metabolic profile 

tomorrow.





03/19/2021


Patient seen and evaluated in follow-up continues to be on 2 L of oxygen and 

becomes dyspneic with exertion.  Patient states he does not know wear oxygen at 

home.  Pulmonary is following.  Patient's blood sugars are elevated and will 

continue sliding scale at this time as patient is on steroids.  Potassium was 

found to be slightly elevated at 5.5 and was given a dose of Kayexalate.  Will 

repeat a.m. labs.  Instructed and encouraged the patient to get up and increase 

activity as tolerated.





03/20/2021


Patient is presently in 2 does of hours and doing clinically well.  Patient is 

on dexamethasone, vitamin supplements, subcutaneous heparin.  No worsening 

shortness of breath, cough or congestion.  D-dimer 1.02.  Sodium 145.  Potassium

5.1.  Creatinine 1.3.  .  C-reactive protein 4.4.  His creatinine 

actually went up a little bit from 1.19-1.3





03/21/2021


Patient is fairly stable no significant improvement or worsening.  Patient is 

still on 2 L of oxygen.  We'll monitor him 1 more night.  Continues to require 

oxygen patient probably can be discharged on 2 L tomorrow this can be tapered or

discontinued as an outpatient.  Patient d-dimer is bit worse compared to 

admission.  Although other inflammatory markers are better








03/22/2021


Patient is seen this morning currently on nonrebreather and airvo as his oxygen 

was found to be 89% on 6 L of oxygen.  Pulmonary is following.  D-dimer has gone

up at 1.70 along with lactate dehydrogenase at 491 and CRP is 9.4.  Blood sugars

continue to be elevated and patient is maintained on sliding scale along with 

long-acting and pre-meal insulin and will continue at this time.  Patient 

continues on vitamin and zinc supplements along with Lovenox and dexamethasone 

daily.  Patient is scheduled to receive Tocilizumab today.  Will continue to 

monitor closely based on the clinical course of the patient.  Patient continues 

to be weak and was seen and evaluated by physical therapy recommending subacute 

rehab and patient is now agreeable and a social work consult was placed. 





03/23/2021


Patient is seen and evaluated this morning with no improvement in respiratory 

status.  Patient remains on Airvo with supplemental nonrebreather.  Patient is 

day 2 of receiving Tocilizumab.  Will repeat a.m. chest x-ray along with 

inflammatory markers.  D-dimer today was 1.60.  White blood count has gone up in

is 10.8, hemoglobin is 12.6.  Patient is afebrile.  To continue with dexametha

sone, Lovenox, vitamin C and zinc supplements.  Pulmonary is following.  Had a 

discussion with the patient about CODE STATUS along with his son Mamadou and 

currently wish to remain a full code until talking with family member more 

extensively about the situation.  Will continue to monitor closely.  Prognosis 

remains guarded.





03/24/2021


Patient is seen this morning status post getting up to the bathroom and 

continues to be extremely dyspneic with any exertion and was found to be low 80s

to 83% oxygenation.  Patient continues on the airvo with a flow rate of 60 and 

FiO2 of 64 and is currently 91%.  Patient denies any chest pain or palpitations.

 Patient is afebrile.  Patient reports to tolerating diet with no nausea or 

vomiting noted.  D-dimer continues to be elevated at 2.56, sodium today is 140 

with a potassium of 5.1 and creatinine is 1.2.  Blood sugars on the lower side 

this morning and will continue to monitor closely and treat accordingly with 

sliding scale.  Inflammatory markers trending down.  Pulmonary following 

closely.  Chest x-ray today shows continued bibasilar infiltrates which are 

nonspecific but worsening.





03/25/2021





Patient is seen in follow-up this morning and continues to be on Airvo no 

significant change.  Patient was on dexamethasone all being transitioned to IV 

Solu-Medrol and will continue with Accu-Cheks and close glycemic control with 

pre-meal, long-acting, and sliding scale.  White blood count is 13.9, hemoglobin

is stable at 13.2, d-dimer is 2.95, sodium is 137, potassium is 5.1, creatinine 

slightly elevated at 1.24.  Inflammatory markers have gone up.  Patient is 

sitting up in the chair and denies any chest pain or worsening shortness of 

breath.  Patient states he feels he is about the same.  Patient also states he 

has been sleeping a lot and tolerating diet with no reports of nausea or 

vomiting.  Discussed with patient about increasing activity as tolerated 

although patient is extremely dyspneic with minimal exertion.





03/26/2021





Patient is seen and evaluated this morning and follow-up with no acute overnight

issues. Patient continues to be on Airvo with a flow rate of 60 and FiO2 of 70 

and is being closely monitored.  Transitioned to IV Solu-Medrol and will 

continue at this time.  Blood sugars elevated and increasing long-acting and 

pre-meal insulins and will continue sliding scale along with oral antidiabetic 

agents.  Need tighter glycemic control.  Patient has been eating 100% of all 

meals with no reports of nausea or vomiting noted.  Patient clinically looking 

better and chest x-ray displays stable infiltrates.  D-dimer slightly trending 

down along with inflammatory markers.  Creatinine is stable at 1.2 and potassium

is 5.5.  Will continue to monitor vital signs and labs closely.





On 3/27/2021 -patient is seen and examined at the bedside.  He is sitting up in 

the chair and is on Airvo 60 L with FiO2 of 70%, saturating about 90%.  He still

complains of exertional dyspnea and cough at times.  Denies having any fevers 

chills or rigors.  He complains of generalized weakness.  On reviewing his 

vitals temperature of 97.9, heart rate 63, respiratory rate 18, blood pressure 

120 x 62 .  On reviewing his labs white count of 22.9, hemoglobin 13, platelets 

299.  Sodium 136, potassium 5.6, chloride 104, bicarb 26, BUN 55, creatinine 

1.15.  Blood sugars running on the higher side around 300s.  Patient had a chest

x-ray done this morning showing mild to moderate mid and lower zone infiltrates.





On 3/28/2021 -patient is seen and examined at bedside.  He is sitting up in a 

chair at fifth high flow oxygen at 60 L FiO2 of 70% and pulse ox at 94%.  He is 

still having exertional dyspnea and complains of generalized weakness.  He 

states that he is tired of using the oxygen.  On reviewing the vitals T-max of 

97.9, heart rate 60, respiratory 19, blood pressure 130/60.  Reviewing the labs 

, CRP less than 0.04 and D-dimer of 1.53.  All medications have been 

reviewed and pertinent changes made.





03/29/2021


Patient is seen and evaluated this morning with no acute overnight issues.  

Patient continues to sit up in the chair throughout most of the day as he states

his breathing is better.  Patient continues to be on Airvo and flow rate has 

been decreased to 40 with an FiO2 of 70% and is currently 92%.  Patient con

tinues to have dyspnea with minimal exertion and continues to have weakness and 

will likely require some form rehab once stabilized and discharged.  PT/OT to 

continue to follow.  White blood count trending down at 18.64 and hemoglobin is 

stable at 13.2.  D-dimer is 1.60 with a sodium of 136 and potassium was found to

be 6.1 and being corrected and will repeat a.m. labs.  Creatinine is 1.2.  Blood

sugars continue to be elevated and will continue with sliding scale and long-

acting and monitor closely.  LDH trending back up at 503 and will monitor 

closely.  CRP is 0.4. 





03/30/2021


Patient's potassium is still high received the calcium gluconate and insulin as 

today.  Will order capsulate.  This is nonhemolyzed sample patient was started 

on low potassium diet.  Patient remains on Airvo , 40 L and 60% FiO2 which is 

better compared to yesterday and saturating better appears to have been some 

improvement.








03/31/2021


Patient is seen and evaluated this morning continues to be sitting up in the 

chair.  Patient states he feels his breathing is improved but "I'm still here in

the hospital".  Patient has continued weakness and has been working with PT/OT 

therapy.  Patient does require assistance to the bedside commode and will have 

PT/OT therapy reevaluate with the possibility of ECF once stabilized and 

discharged.  Sodium today is 138 with a potassium of 5.2 and current creatinine 

is 1.3.  Blood sugars being closely monitored and will continue with current 

medication regimen. Respiratory slowly weaning Airvo settings and is maintained 

on a flow rate of 35 with an FiO2 of 65%.  Pulmonary is following.





04/01/2021


Patient is seen this morning continues to be on Airvo and slow to respond.  

Current settings have a flow rate of 36 and FiO2 of 60%.  Patient denies any 

worsening shortness of breath.  White blood count trending down at 15.4, 

hemoglobin is stable at 14.2, sodium is 135, potassium is 5.2, current 

creatinine slightly improved at 1.12.





04/02/2021





Patient is seen and evaluated and follow-up continues to be on airvo requiring 

more oxygen support with a flow rate of 50 and an FiO2 of 70%.  D-dimer 

continues to trend down at 1.58, sodium is 137, potassium is 4.7, current 

creatinine is 1.22.  Blood sugars have been variable and will continue with 

current regimen and monitor closely.  Continue with Accu-Cheks before meals and 

at bedtime.  Patient is afebrile.





Constitutional: No reports of fatigue, denied any fever.


Cardio vascular: denied any chest pain, palpitations


Gastrointestinal denied any nausea vomiting


Pulmonary: Reports continued shortness of breath but feels has improved slightly




Neurologic reports generalized weakness 





All inpatient medications were reviewed and appropriate changes in these 

medications as dictated in the interval history and assessment and plan.























Objective





- Vital Signs


Vital signs: 


                                   Vital Signs











Temp  97.5 F L  04/02/21 05:28


 


Pulse  63   04/02/21 05:28


 


Resp  16   04/02/21 05:28


 


BP  137/61   04/02/21 05:28


 


Pulse Ox  84 L  04/02/21 07:55








                                 Intake & Output











 04/01/21 04/02/21 04/02/21





 18:59 06:59 18:59


 


Intake Total  300 


 


Output Total 700 1150 


 


Balance -700 -850 


 


Intake:   


 


  Oral  300 


 


Output:   


 


  Urine 700 1150 


 


Other:   


 


  Voiding Method Bedside Commode Bedside Commode 





 Urinal Urinal 


 


  # Bowel Movements 1  














- Exam





GENERAL: The patient is alert and oriented x3, not in any acute distress.  

Obese, currently on  airvo at a flow rate of 36 with an FiO2 of 60, continue to 

wean as tolerated


HEENT: Pupils are round and equally reacting to light. EOMI. No scleral icterus.

No conjunctival pallor. Normocephalic, atraumatic. No pharyngeal erythema. No 

thyromegaly. 


CARDIOVASCULAR: S1 and S2 present. No murmurs, rubs, or gallops. 


PULMONARY: Diminished breath sounds bilaterally with no wheezing noted, coarse 

rhonchi noted bilaterally


ABDOMEN: Soft, nontender, nondistended, normoactive bowel sounds. No palpable 

organomegaly. 


MUSCULOSKELETAL: No joint swelling or deformity.


EXTREMITIES: No cyanosis, clubbing, or pedal edema. 


NEUROLOGICAL: Gross neurological examination did not reveal any focal deficits. 

Diffuse weakness


SKIN: No rashes. 








- Labs


CBC & Chem 7: 


                                 04/01/21 08:49





                                 04/02/21 10:04


Labs: 


                  Abnormal Lab Results - Last 24 Hours (Table)











  04/01/21 04/01/21 04/01/21 Range/Units





  08:49 11:22 16:26 


 


Sodium  135 L    (137-145)  mmol/L


 


Potassium  5.2 H    (3.5-5.1)  mmol/L


 


BUN  58 H    (9-20)  mg/dL


 


Glucose  170 H    (74-99)  mg/dL


 


POC Glucose (mg/dL)   297 H  319 H  (75-99)  mg/dL














  04/01/21 04/02/21 Range/Units





  20:05 07:08 


 


Sodium    (137-145)  mmol/L


 


Potassium    (3.5-5.1)  mmol/L


 


BUN    (9-20)  mg/dL


 


Glucose    (74-99)  mg/dL


 


POC Glucose (mg/dL)  233 H  109 H  (75-99)  mg/dL














Assessment and Plan


Assessment: 





-acute hypoxic respiratory failure: Secondary to covid 19 pneumonia patient 

maintained on vitamin and zinc supplements.  Patient currently on IV solu-

Medrol.  has received 2 doses of Tocilizumab and is continued on Lovenox. 

,Patient currently on Airvo requiring increase in titration.  Pulmonary 

following


-Type 2 diabetes mellitus uncontrolled elevated blood sugars secondary to 

systemic steroids, continue with pre-meal, sliding scale, and long-acting, with 

adjustments made and will increase pre-male and long-acting doses


-Hyperkalemia, improved continue with low potassium diet


-Hypotonic hyponatremia, improved


-Acute renal failure secondary to Covid 19: Improved, current creatinine is 1.22


-Hypertension 


-DVT subcutaneous Lovenox


-Full code





Plan:


Continue with current medications.  Will continue IV Solu-Medrol with vitamin 

and zinc supplements, and lovenox . patient has received Tocilizumab x2.  

Pulmonary following .  Patient currently on airvo and requiring an increase in 

titration today.  Very slow to respond.  Current settings are flow rate of 50 

and FiO2 of 70%.  Continue with low potassium diet.  Patient response to 

treatment is slow.  Will continue to monitor closely.  Prognosis remains 

guarded.

## 2021-04-03 LAB
ANION GAP SERPL CALC-SCNC: 0.6 MMOL/L (ref 4–12)
BASOPHILS # BLD AUTO: 0.01 X 10*3/UL (ref 0–0.1)
BASOPHILS NFR BLD AUTO: 0.1 %
BUN SERPL-SCNC: 57 MG/DL (ref 9–27)
BUN/CREAT SERPL: 51.82 RATIO (ref 12–20)
CALCIUM SPEC-MCNC: 7.7 MG/DL (ref 8.7–10.3)
CHLORIDE SERPL-SCNC: 109 MMOL/L (ref 96–109)
CO2 SERPL-SCNC: 27.4 MMOL/L (ref 21.6–31.8)
EOSINOPHIL # BLD AUTO: 0 X 10*3/UL (ref 0.04–0.35)
EOSINOPHIL NFR BLD AUTO: 0 %
ERYTHROCYTE [DISTWIDTH] IN BLOOD BY AUTOMATED COUNT: 3.69 X 10*6/UL (ref 4.4–5.6)
ERYTHROCYTE [DISTWIDTH] IN BLOOD: 14 % (ref 11.5–14.5)
GLUCOSE BLD-MCNC: 159 MG/DL (ref 75–99)
GLUCOSE BLD-MCNC: 208 MG/DL (ref 75–99)
GLUCOSE BLD-MCNC: 232 MG/DL (ref 75–99)
GLUCOSE BLD-MCNC: 240 MG/DL (ref 75–99)
GLUCOSE SERPL-MCNC: 164 MG/DL (ref 70–110)
HCT VFR BLD AUTO: 35.2 % (ref 39.6–50)
HGB BLD-MCNC: 11.2 G/DL (ref 13–17)
LYMPHOCYTES # SPEC AUTO: 1.34 X 10*3/UL (ref 0.9–5)
LYMPHOCYTES NFR SPEC AUTO: 10.2 %
MCH RBC QN AUTO: 30.4 PG (ref 27–32)
MCHC RBC AUTO-ENTMCNC: 31.8 G/DL (ref 32–37)
MCV RBC AUTO: 95.4 FL (ref 80–97)
MONOCYTES # BLD AUTO: 1.01 X 10*3/UL (ref 0.2–1)
MONOCYTES NFR BLD AUTO: 7.7 %
NEUTROPHILS # BLD AUTO: 10.63 X 10*3/UL (ref 1.8–7.7)
NEUTROPHILS NFR BLD AUTO: 81.1 %
PLATELET # BLD AUTO: 202 X 10*3/UL (ref 140–440)
POTASSIUM SERPL-SCNC: 5.3 MMOL/L (ref 3.5–5.5)
SODIUM SERPL-SCNC: 137 MMOL/L (ref 135–145)
WBC # BLD AUTO: 13.11 X 10*3/UL (ref 4.5–10)

## 2021-04-03 RX ADMIN — METHYLPREDNISOLONE SODIUM SUCCINATE SCH MG: 125 INJECTION, POWDER, FOR SOLUTION INTRAMUSCULAR; INTRAVENOUS at 07:32

## 2021-04-03 RX ADMIN — INSULIN ASPART SCH UNIT: 100 INJECTION, SOLUTION INTRAVENOUS; SUBCUTANEOUS at 17:22

## 2021-04-03 RX ADMIN — METOPROLOL SUCCINATE SCH MG: 50 TABLET, EXTENDED RELEASE ORAL at 07:32

## 2021-04-03 RX ADMIN — INSULIN ASPART SCH UNIT: 100 INJECTION, SOLUTION INTRAVENOUS; SUBCUTANEOUS at 12:01

## 2021-04-03 RX ADMIN — INSULIN ASPART SCH UNIT: 100 INJECTION, SOLUTION INTRAVENOUS; SUBCUTANEOUS at 12:00

## 2021-04-03 RX ADMIN — ASPIRIN 81 MG CHEWABLE TABLET SCH MG: 81 TABLET CHEWABLE at 07:32

## 2021-04-03 RX ADMIN — INSULIN ASPART SCH UNIT: 100 INJECTION, SOLUTION INTRAVENOUS; SUBCUTANEOUS at 07:33

## 2021-04-03 RX ADMIN — Medication SCH MG: at 07:33

## 2021-04-03 RX ADMIN — CLOPIDOGREL BISULFATE SCH MG: 75 TABLET ORAL at 07:32

## 2021-04-03 RX ADMIN — ENOXAPARIN SODIUM SCH MG: 40 INJECTION SUBCUTANEOUS at 07:33

## 2021-04-03 RX ADMIN — OXYCODONE HYDROCHLORIDE AND ACETAMINOPHEN SCH MG: 500 TABLET ORAL at 07:33

## 2021-04-03 RX ADMIN — INSULIN DETEMIR SCH UNIT: 100 INJECTION, SOLUTION SUBCUTANEOUS at 21:24

## 2021-04-03 RX ADMIN — INSULIN ASPART SCH: 100 INJECTION, SOLUTION INTRAVENOUS; SUBCUTANEOUS at 07:33

## 2021-04-03 RX ADMIN — PIOGLITAZONE SCH MG: 30 TABLET ORAL at 07:32

## 2021-04-03 RX ADMIN — FAMOTIDINE SCH MG: 20 TABLET, FILM COATED ORAL at 07:32

## 2021-04-03 RX ADMIN — METHYLPREDNISOLONE SODIUM SUCCINATE SCH MG: 125 INJECTION, POWDER, FOR SOLUTION INTRAMUSCULAR; INTRAVENOUS at 17:22

## 2021-04-03 RX ADMIN — INSULIN ASPART SCH UNIT: 100 INJECTION, SOLUTION INTRAVENOUS; SUBCUTANEOUS at 21:25

## 2021-04-03 RX ADMIN — ATORVASTATIN CALCIUM SCH MG: 80 TABLET, FILM COATED ORAL at 07:33

## 2021-04-03 NOTE — P.PN
Subjective


Progress Note Date: 04/03/21


Principal diagnosis: 





CoVID 19 pneumonia





80-year-old male, who was brought to the emergency department by EMS.  The 

patient apparently has had 2 weeks' worth of increasing shortness of breath, and

significant fatigue.  The patient also had chest congestion.  He had a fever.  

The patient states that he is just not been feeling well and getting 

progressively worse.  He also apparently had an episode or 2 of diarrhea.  The 

patient denied any chest pain or chest pressure or palpitations.  The patient 

also denied nausea, vomiting, and abdominal pain.  Apparently when EMS arrived, 

the patient's saturations were in the low 80s on room air.  For that reason, he 

was transported in, evaluated, and admitted with a diagnosis of COVID 19 19 

pneumonia.  The patient does have a history of diabetes, hypertension, and a 

previous pacemaker insertion.  White count was 4.6, hemoglobin 12.5, hematocrit 

37.1, and platelet count 153,000.  PT, INR, and PTT were all normal.  D-dimer 

was 0.77.  Sodium 136, potassium 4.5, chlorides 102, CO2 26, anion gap 8, BUN 

47, and creatinine 1.63.  Ferritin was 985, , C-reactive protein 58, and 

pro-calcitonin level was 0.12.  Chest x-ray did show some interstitial 

infiltrates.





On 03/18/2021 patient seen in follow-up on medical floor.  he is on 2 L of 

oxygen his pulse ox is 90-94%, breathing comfortably, no fever or chills, blood 

pressure has been stable.  Denies any worsening dyspnea or hypoxemia, he has a 

mild cough, no chest discomfort.  He continues on oral Decadron 6 mg daily, IV 

hydration, vitamins, d-dimer was low at 0.77, patient is on subcu heparin for 

DVT prophylaxis, pro-calcitonin level was low, inflammatory markers were not 

significantly elevated on admission.  Clinically symptoms have not progressed, 

and patient will be considered for discharge in next 24 hours.





The patient is seen today 03/19/2021 in follow-up on the regular medical floor. 

He is currently sitting up in chair at the bedside.  Awake and alert in no acute

distress.  Continue O2 saturations in the low 90s on 2 L/m per nasal cannula.  

Afebrile.  Hemodynamically stable.  Blood cultures reveal no growth.  Blood 

glucose 202.  Sodium 143.  Potassium 5.5.  Creatinine 1.19.  He remains on 

dexamethasone, so continues heparin, vitamin supplements.  Chest x-ray continues

to show bilateral multifocal opacities consistent with CoVID infection.  No maria

ge.





The patient is seen today 03/20/2021 in follow-up on the regular medical floor. 

He is currently resting comfortably in bed.  Awake and alert in no acute 

distress.  He is maintaining O2 saturations in the 90s on 2 L/m per nasal 

cannula.  He's been on dexamethasone, vitamin supplements, subcutaneous heparin.

 No worsening shortness of breath, cough or congestion.  D-dimer 1.02.  Sodium 

145.  Potassium 5.1.  Creatinine 1.3.  .  C-reactive protein 4.4.





The patient is seen today 03/21/2021 in follow-up on the regular medical floor. 

He is currently resting quite comfortably in bed.  Maintaining O2 saturations in

the low 90s on 2 L/m per nasal cannula.  Sodium 144.  Potassium 4.7.  Creatinine

1.2.  Glucose 173.  He remains on dexamethasone, heparin, vitamin supplements.





03/22/2021 on seeing the patient for a follow-up regarding the patient's acute 

hypoxic respiratory failure due to Covid 19 related pneumonia.  The patient was 

not a candidate for Remdesivir, Tocilizumab or convalescent plasma, and the 

patient is currently on Decadron 6 mg on a daily basis and addition to Levemir 

insulin 50 units daily at bedtime and NovoLog 6 units 3 times a day with meals 

and a sliding scale coverage.  The patient was on 2 L about 2 by nasal cannula 

with pulse ox of 93%.  Earlier this morning, the patient's oxidation 

progressively got worse in the patient is currently on 100% nonrebreather 

facemask maintaining a saturation above 90% at around 94%.  Overall, the patient

is doing well.  Creatinine is at 1.2.  The patient had a CRP level of 4.4 with 

an LDH level of 485 from 03/20/2021.  Inflammatory markers are being monitored. 

Last d-dimer from 03/20/2021 was 1.02.  Blood cultures were negative repeat 

chest x-ray was done today and the findings are essentially stable and the pat

ient has patchy bilateral perihilar pulmonary infiltrates without any 

significant interval change.  D-dimer from today is at 1.7 and the patient 

remains on Lovenox.





03/23/2021 the patient is being seen for a follow-up.  The patient is currently 

on high flow oxygen at 6 L.  Note that the patient was on 100% nonrebreather 

facemask and was switched him to a high flow oxygen yesterday at 60 L.  As part 

of his treatment, the patient received steroids and currently is receiving 

dexamethasone 6 mg by mouth daily.  He is also Toci 2 yesterday and today.  He 

completed treatment without any major side effects.  Note that the patient was 

on low-flow oxygen and he progressively got worse requiring 100% nonrebreather 

and subsequent high flow oxygen.  On today's evaluation, his white cell count is

at 10.  His d-dimer is at 1.6.  Rest of the inflammatory markers have not been 

checked and this will be repeated tomorrow.  LDH from yesterday was 491 with a 

CRP of 9.4.  Otherwise, his renal function shows a stable creatinine of 1.2.  

The patient has been on FiO2 of 60% with a high flow oxygen of 60 L and the 

patient has been essentially stable since yesterday.  No other significant 

events.  Remains on Lovenox 40 mg subcu on a daily basis. 





03/24/2021, the patient is still on high flow oxygen and 60 L.  He is not 

utilizing the 100% nonrebreather facemask this morning.  Note that the patient 

has received steroids, and he remains on Decadron 6 mg by mouth daily. He also 

completed Tocilizumab.  On today's evaluation, chest x-ray findings are stable 

without any interval change in the bilateral pulmonary infiltrates.  The patient

is resting comfortably in bed.  He is also able to move on a recliner.  Labs 

today shows an LDH of 414 and a CRP of 2.9 and the patient is d-dimer is at 

2.56.  The patient remains on Lovenox 40 mg subcu on a daily basis.  He is on 

Levemir insulin 50 units daily along with NovoLog 6 units with meals plus a 

sliding coverage.  I will say his condition essentially the same as unchanged 

compared to yesterday.  He is not doing much in progress for now. 





On today's evaluation of 03/25/2021 the patient is being seen for a follow-up.  

The patient remains on high flow oxygen with a flow of 6 L an FiO2 of 60%.  His 

current pulse ox is around 88%.  He is afebrile.  He is doing well.  Sitting up 

on a chair.  No signs of any respiratory distress.  His breathing is nonlabored 

and he doesn't have any significant cough unless he takes a deep inspiration.  

The patient  had follow-up blood work today.  His LDL level is up 1221 and a CRP

level is up to 12.6 and his d-dimer currently is at 2.95.  I cannot explain the 

rise in these inflammatory markers.  His creatinine is at 1.2.  Was a causative 

13.7 with a hemoglobin of 13.2.  His chest x-ray from yesterday was showing 

right basilar infiltrates which are nonspecific and it was mentioned that he was

getting worse.  The patient remains on Decadron 6 mg by mouth daily.  He remains

on 50 units of Levemir insulin along with 6 units of NovoLog around-the-clock.  

He remains on aspirin.  He remains on Lovenox for DVT prophylaxis 40 mg subcu.








03/26/2021 the patient is on high flow oxygen at 60 L with an FiO2 of 70%.  His 

pulse ox was around 93%.  He feels well.  No altered mentation.  Resting 

comfortably.  Sitting up and he has no significant shortness of breath at rest. 

His chest x-ray still showing diffuse bilateral pulmonary infiltrates, 

peripheral distribution, probably slightly worse compared to yesterday.  

However, the radiologist reports is stating that the findings are essentially 

stable.  Clinically he is also stable.  The labs from today shows a sugar of 407

is quite high and a d-dimer is down to 1.89 and the patient's LDH level is down 

to 419 with a CRP level of 0.7.  The patient remains on Levemir insulin and is 

currently taking 50 units along with 6 units with meals of NovoLog.





03/27/2021 the patient remains on high flow at 60 L with an FiO2 of 70% which is

essentially the same as yesterday.  His pulse ox currently is around 93%.  His 

chest x-ray from today is showing diffuse bilateral pulmonary infiltrates, 

peripheral distribution, I thought his chest x-ray and probably the x-ray 

findings are improved compared to yesterday's chest x-ray.  I have the patient 

on Solu-Medrol 60 mg every 6 hours.  He is also on anticoagulation.  He is 

receiving Lovenox..  His d-dimer is at 1.57 on today's evaluation.  His LDH 

level is high today at 1059 with a CRP of less than 5.  He remains on Levemir 

insulin 50 units along with 6 units of NovoLog with meals and his blood sugars 

under adequate control for now.  He has no other complaints otherwise.  Note 

that initially the patient was started on Decadron and during the course of his 

treatment he was given Toci


 


03/28/2021, the patient is on high flow oxygen at 60 L along with an FiO2 of 

70%.  Pulse ox is in order of 96%.  I'm hoping to get down the FiO2 further 

today.  The patient is on IV Solu-Medrol.  He is on long-term anticoagulation.  

He is receiving Lovenox at a dose of 40 mg subcu on a daily basis.  In terms of 

inflammatory markers and blood work, the patient's CRP is less than 0.4 and the 

LDH level is at 378.  The chest x-ray was done yesterday showed some lower lobe 

active pulmonary infiltrates, findings are unchanged.  The patient is awake and 

alert and following commands and answering questions.  He is on Levemir insulin 

as a dose of 60 daily at bedtime and he is also on NovoLog 10 units with meals 

and a sliding scale coverage. He has completed tocilizumab





The patient is seen today 03/29/2021 in follow-up on the regular medical floor. 

He is currently sitting up in a chair at the bedside.  Awake and alert in no 

acute distress.  He remains on AirVo high flow oxygen at 40 L and 70% FiO2.  He 

is afebrile.  White count 18.6.  Hemoglobin 13.2.  D-dimer 1.6.  Sodium 136.  

Potassium 6.1.  Creatinine 1.2.  .  C-reactive protein less than 0.4.  

Remains on Lovenox, IV Soluj Medrol, vitamin supplements.





The patient is seen today 03/30/2021 in follow-up on the regular medical floor. 

Currently sitting up in a chair at the bedside.  Awake and alert in no acute 

distress.  He remains on AirVo high flow oxygen at 40 L and 60% FiO2 to maintain

O2 saturations at 88%.  Sodium 137.  Potassium 5.6.  Creatinine 1.3.  Glucose 

271.  Remains on IV Solu-Medrol, Lovenox, vitamin supplements.  Levemir and 

NovoLog sliding scale.





Patient is seen today 03/31/2021 in follow-up on the regular medical floor.  He 

is currently sitting up in a chair at the bedside.  Awake, alert in no acute 

distress.  Feeling a bit better today compared to yesterday.  He is still on 

AirVo high flow oxygen at 35 L and 65% FiO2 and maintaining O2 saturations at 

94%.  He remains on IV Solu-Medrol, Lovenox, vitamin supplements.  Chest x-ray 

continues to show patchy bilateral infiltrates similar to previous.





The patient is seen today 04/01/2021 in follow-up on the regular medical floor. 

He is currently resting in bed.  Awake and alert in no acute distress.  Still 

requiring AirVo high flow oxygen at 30 L and 60% FiO2.  Chest x-ray remains 

stable.  Lung sounds but are clearing.  He is feeling better today compared to 

yesterday.  Afebrile.  White count 15.4.  Hemoglobin 14.2.  Sodium 135.  

Potassium 5.2.  Creatinine 1.12.  He remains on Lovenox, Solu-Medrol, vitamin 

supplements.





The patient is seen today 04/02/2021 in follow-up on the regular medical floor. 

He is currently resting comfortably in bed.  Awake and alert in no acute 

distress.  He has been slow to progress.  He remains on AirVo high flow oxygen 

at 50L and 90% FiO2 to maintain O2 saturations in the upper 80s lower 90s.  

Continue basilar crackles left greater than right.  Chest x-ray continues to 

show patchy bilateral airspace disease slightly increase in the mid and lower 

lung fields.  D-dimer 1.58.  Sodium 137.  Potassium 4.7.  Creatinine 1.22.  Rem

ains on IV Solu-Medrol, Lovenox, vitamin supplements.





The patient is seen today 04/03/2021 in follow-up on the regular medical floor. 

He is currently sitting up at the bedside.  Awake and alert in no acute 

distress.  States he is feeling stronger each day.  He remains on AirVo high 

flow oxygen 40 L and 70% FiO2.  Working well with the incentive spirometer.  

White count 13.1.  Hemoglobin 11.2.  Leukocytes 1.34.  Sodium 137.  Potassium 

5.3.  Creatinine 1.1. Remains on IV Solu-Medrol, Lovenox, vitamin supplements.





Objective





- Vital Signs


Vital signs: 


                                   Vital Signs











Temp  97.9 F   04/03/21 14:00


 


Pulse  60   04/03/21 14:00


 


Resp  19   04/03/21 14:00


 


BP  97/68   04/03/21 14:00


 


Pulse Ox  91 L  04/03/21 14:00








                                 Intake & Output











 04/02/21 04/03/21 04/03/21





 18:59 06:59 18:59


 


Output Total  525 


 


Balance  -525 


 


Weight   136.078 kg


 


Output:   


 


  Urine  525 


 


Other:   


 


  Voiding Method   Bedside Commode





   Urinal


 


  # Voids  2 














- Exam





GENERAL EXAM: Alert, pleasant, 80-year-old male patient, on AirVo high flow 

oxygen at 40 L and 70% FiO2 comfortable in no apparent distress.


HEAD: Normocephalic/atraumatic.


EYES: Normal reaction of pupils, equal size.  Conjunctiva pink, sclera white.


NOSE: Clear with pink turbinates.


THROAT: No erythema or exudates.


NECK: No masses, no JVD, no thyroid enlargement, no adenopathy.


CHEST: No chest wall deformity.  Symmetrical expansion. 


LUNGS: Equal air entry with bibasilar coarse crackles


CVS: Regular rate and rhythm, normal S1 and S2, no gallops, no murmurs, no rubs


ABDOMEN: Soft, nontender.  No hepatosplenomegaly, normal bowel sounds, no 

guarding or rigidity.


EXTREMITIES: No clubbing, no edema, no cyanosis, 2+ pulses and upper and lower 

extremities.


MUSCULOSKELETAL: Muscle strength and tone normal.


SPINE: No scoliosis or deformity


SKIN: No rashes


CENTRAL NERVOUS SYSTEM: No focal deficits, tone is normal in all 4 extremities.


PSYCHIATRIC: Alert and oriented -3.  Appropriate affect.  Intact judgment and 

insight.





- Labs


CBC & Chem 7: 


                                 04/03/21 07:13





                                 04/03/21 07:13


Labs: 


                  Abnormal Lab Results - Last 24 Hours (Table)











  04/02/21 04/02/21 04/03/21 Range/Units





  16:49 19:59 06:54 


 


WBC     (4.50-10.00)  X 10*3/uL


 


RBC     (4.40-5.60)  X 10*6/uL


 


Hgb     (13.0-17.0)  g/dL


 


Hct     (39.6-50.0)  %


 


MCHC     (32.0-37.0)  g/dL


 


Immature Gran #     (0.00-0.04)  X 10*3/uL


 


Neutrophils #     (1.80-7.70)  X 10*3/uL


 


Monocytes #     (0.20-1.00)  X 10*3/uL


 


Eosinophils #     (0.04-0.35)  X 10*3/uL


 


Anion Gap     (4.00-12.00)  mmol/L


 


BUN     (9.0-27.0)  mg/dL


 


BUN/Creatinine Ratio     (12.00-20.00)  Ratio


 


Glucose     ()  mg/dL


 


POC Glucose (mg/dL)  162 H  141 H  159 H  (75-99)  mg/dL


 


Calcium     (8.7-10.3)  mg/dL














  04/03/21 04/03/21 04/03/21 Range/Units





  07:13 07:13 11:34 


 


WBC  13.11 H    (4.50-10.00)  X 10*3/uL


 


RBC  3.69 L    (4.40-5.60)  X 10*6/uL


 


Hgb  11.2 L    (13.0-17.0)  g/dL


 


Hct  35.2 L    (39.6-50.0)  %


 


MCHC  31.8 L    (32.0-37.0)  g/dL


 


Immature Gran #  0.12 H    (0.00-0.04)  X 10*3/uL


 


Neutrophils #  10.63 H    (1.80-7.70)  X 10*3/uL


 


Monocytes #  1.01 H    (0.20-1.00)  X 10*3/uL


 


Eosinophils #  0 L    (0.04-0.35)  X 10*3/uL


 


Anion Gap   0.60 L   (4.00-12.00)  mmol/L


 


BUN   57.0 H   (9.0-27.0)  mg/dL


 


BUN/Creatinine Ratio   51.82 H   (12.00-20.00)  Ratio


 


Glucose   164 H   ()  mg/dL


 


POC Glucose (mg/dL)    232 H  (75-99)  mg/dL


 


Calcium   7.7 L   (8.7-10.3)  mg/dL














Assessment and Plan


Assessment: 





1  Acute hypoxic respiratory failure secondary to CoVID 19 pneumonia, he did 

receive Tocilizumab





2  History of diabetes mellitus type 2





3  History of hypertension





4  History of chronic sinus disease





5  History of diverticulitis, status post bowel resection





6 Hyperkalemia





Plan:





The patient was seen and evaluated by Dr. Chandni


Currently at 40 L and 70% FiO2 via AirVo


Titrate the FiO2 as tolerated


Continue the current treatment plan


We will continue to follow





I, the cosigning physician, performed a history & physical examination of the 

patient. Lungs sounds crackles in the bilateral posterior bases.  Maintaining 

good O2 saturations in the 90s on AirVo high flow oxygen at 40 L and 70% FiO2.  

I discussed the assessment and plan of care with my nurse practitioner, Abigail Thomas. I attest to the above note as dictated by her.

## 2021-04-03 NOTE — P.PN
Subjective





80-year-old male who presents emergency Department stating that he has had 

shortness of breath and feeling fatigued for at least 2 weeks.  Patient states 

he has had exposure to COVID .  Patient states he has had a fever.  Patient 

states she's also states and smile.  Patient also states she's had diarrhea.  

Patient denies any chest pain or palpitations.  Patient denies any abdominal 

pain patient denies nausea vomiting diarrhea.  According to EMS with the patient

was coming in he was satting in the low 80s.  Patient is currently on a few 

liters of oxygen and sat in mid 90s.  Patient is a diabetic and has high blood 

pressure.  Patient also has a pacemaker.  Patient is presently on 2 L of oxygen 

was requiring 4 L yesterday.  Patient was started on IV fluids patient 

creatinine went up to 1.6 baseline is around 1.  Patient's blood sugars are 

highly elevated.  Patient tests positive for Covid.





03/18/2021


Patient is presently on 2 L of oxygen appears to be doing better possibility of 

discharge tomorrow.  Creatinine went up a bit to 1.6 baseline is around the 1.  

Patient was started on IV fluids will recheck the basic metabolic profile to

chantel.





03/19/2021


Patient seen and evaluated in follow-up continues to be on 2 L of oxygen and bec

omes dyspneic with exertion.  Patient states he does not know wear oxygen at 

home.  Pulmonary is following.  Patient's blood sugars are elevated and will 

continue sliding scale at this time as patient is on steroids.  Potassium was 

found to be slightly elevated at 5.5 and was given a dose of Kayexalate.  Will 

repeat a.m. labs.  Instructed and encouraged the patient to get up and increase 

activity as tolerated.





03/20/2021


Patient is presently in 2 does of hours and doing clinically well.  Patient is 

on dexamethasone, vitamin supplements, subcutaneous heparin.  No worsening 

shortness of breath, cough or congestion.  D-dimer 1.02.  Sodium 145.  Potassium

5.1.  Creatinine 1.3.  .  C-reactive protein 4.4.  His creatinine 

actually went up a little bit from 1.19-1.3





03/21/2021


Patient is fairly stable no significant improvement or worsening.  Patient is 

still on 2 L of oxygen.  We'll monitor him 1 more night.  Continues to require 

oxygen patient probably can be discharged on 2 L tomorrow this can be tapered or

discontinued as an outpatient.  Patient d-dimer is bit worse compared to 

admission.  Although other inflammatory markers are better








03/22/2021


Patient is seen this morning currently on nonrebreather and airvo as his oxygen 

was found to be 89% on 6 L of oxygen.  Pulmonary is following.  D-dimer has gone

up at 1.70 along with lactate dehydrogenase at 491 and CRP is 9.4.  Blood sugars

continue to be elevated and patient is maintained on sliding scale along with 

long-acting and pre-meal insulin and will continue at this time.  Patient 

continues on vitamin and zinc supplements along with Lovenox and dexamethasone 

daily.  Patient is scheduled to receive Tocilizumab today.  Will continue to 

monitor closely based on the clinical course of the patient.  Patient continues 

to be weak and was seen and evaluated by physical therapy recommending subacute 

rehab and patient is now agreeable and a social work consult was placed. 





03/23/2021


Patient is seen and evaluated this morning with no improvement in respiratory 

status.  Patient remains on Airvo with supplemental nonrebreather.  Patient is 

day 2 of receiving Tocilizumab.  Will repeat a.m. chest x-ray along with i

nflammatory markers.  D-dimer today was 1.60.  White blood count has gone up in 

is 10.8, hemoglobin is 12.6.  Patient is afebrile.  To continue with 

dexamethasone, Lovenox, vitamin C and zinc supplements.  Pulmonary is following.

 Had a discussion with the patient about CODE STATUS along with his son Mamadou and 

currently wish to remain a full code until talking with family member more 

extensively about the situation.  Will continue to monitor closely.  Prognosis 

remains guarded.





03/24/2021


Patient is seen this morning status post getting up to the bathroom and 

continues to be extremely dyspneic with any exertion and was found to be low 80s

to 83% oxygenation.  Patient continues on the airvo with a flow rate of 60 and 

FiO2 of 64 and is currently 91%.  Patient denies any chest pain or palpitations.

 Patient is afebrile.  Patient reports to tolerating diet with no nausea or 

vomiting noted.  D-dimer continues to be elevated at 2.56, sodium today is 140 

with a potassium of 5.1 and creatinine is 1.2.  Blood sugars on the lower side 

this morning and will continue to monitor closely and treat accordingly with 

sliding scale.  Inflammatory markers trending down.  Pulmonary following 

closely.  Chest x-ray today shows continued bibasilar infiltrates which are 

nonspecific but worsening.





03/25/2021





Patient is seen in follow-up this morning and continues to be on Airvo no 

significant change.  Patient was on dexamethasone all being transitioned to IV 

Solu-Medrol and will continue with Accu-Cheks and close glycemic control with 

pre-meal, long-acting, and sliding scale.  White blood count is 13.9, hemoglobin

is stable at 13.2, d-dimer is 2.95, sodium is 137, potassium is 5.1, creatinine 

slightly elevated at 1.24.  Inflammatory markers have gone up.  Patient is 

sitting up in the chair and denies any chest pain or worsening shortness of 

breath.  Patient states he feels he is about the same.  Patient also states he 

has been sleeping a lot and tolerating diet with no reports of nausea or 

vomiting.  Discussed with patient about increasing activity as tolerated 

although patient is extremely dyspneic with minimal exertion.





03/26/2021





Patient is seen and evaluated this morning and follow-up with no acute overnight

issues. Patient continues to be on Airvo with a flow rate of 60 and FiO2 of 70 

and is being closely monitored.  Transitioned to IV Solu-Medrol and will 

continue at this time.  Blood sugars elevated and increasing long-acting and 

pre-meal insulins and will continue sliding scale along with oral antidiabetic 

agents.  Need tighter glycemic control.  Patient has been eating 100% of all 

meals with no reports of nausea or vomiting noted.  Patient clinically looking 

better and chest x-ray displays stable infiltrates.  D-dimer slightly trending 

down along with inflammatory markers.  Creatinine is stable at 1.2 and potassium

is 5.5.  Will continue to monitor vital signs and labs closely.





On 3/27/2021 -patient is seen and examined at the bedside.  He is sitting up in 

the chair and is on Airvo 60 L with FiO2 of 70%, saturating about 90%.  He still

complains of exertional dyspnea and cough at times.  Denies having any fevers 

chills or rigors.  He complains of generalized weakness.  On reviewing his 

vitals temperature of 97.9, heart rate 63, respiratory rate 18, blood pressure 

120 x 62 .  On reviewing his labs white count of 22.9, hemoglobin 13, platelets 

299.  Sodium 136, potassium 5.6, chloride 104, bicarb 26, BUN 55, creatinine 

1.15.  Blood sugars running on the higher side around 300s.  Patient had a chest

x-ray done this morning showing mild to moderate mid and lower zone infiltrates.





On 3/28/2021 -patient is seen and examined at bedside.  He is sitting up in a 

chair at fifth high flow oxygen at 60 L FiO2 of 70% and pulse ox at 94%.  He is 

still having exertional dyspnea and complains of generalized weakness.  He 

states that he is tired of using the oxygen.  On reviewing the vitals T-max of 

97.9, heart rate 60, respiratory 19, blood pressure 130/60.  Reviewing the labs 

, CRP less than 0.04 and D-dimer of 1.53.  All medications have been 

reviewed and pertinent changes made.





03/29/2021


Patient is seen and evaluated this morning with no acute overnight issues.  

Patient continues to sit up in the chair throughout most of the day as he states

his breathing is better.  Patient continues to be on Airvo and flow rate has 

been decreased to 40 with an FiO2 of 70% and is currently 92%.  Patient 

continues to have dyspnea with minimal exertion and continues to have weakness 

and will likely require some form rehab once stabilized and discharged.  PT/OT 

to continue to follow.  White blood count trending down at 18.64 and hemoglobin 

is stable at 13.2.  D-dimer is 1.60 with a sodium of 136 and potassium was found

to be 6.1 and being corrected and will repeat a.m. labs.  Creatinine is 1.2.  

Blood sugars continue to be elevated and will continue with sliding scale and 

long-acting and monitor closely.  LDH trending back up at 503 and will monitor 

closely.  CRP is 0.4. 








03/30/2021


Patient's potassium is still high received the calcium gluconate and insulin as 

today.  Will order capsulate.  This is nonhemolyzed sample patient was started 

on low potassium diet.  Patient remains on Airvo , 40 L and 60% FiO2 which is 

better compared to yesterday and saturating better appears to have been some 

improvement.





04/04/2021


Patient's overall respiratory status remains the patient is oxygen flow rate of 

40 L with FiO2 of 60% may be a mild improvement but saturations are going down 

again.  Patient is already on low potassium diet not an ACE inhibitor potassium 

supplementation will repeat basic metabolic profile again tomorrow.  Patient is 

not on any IV fluids is eating well.





Constitutional: Reports fatigue although slightly more awake today, denied any 

fever.


Cardio vascular: denied any chest pain, palpitations


Gastrointestinal denied any nausea vomiting


Pulmonary: No shortness of breath today


Neurologic reports generalized weakness 





All inpatient medications were reviewed and appropriate changes in these 

medications as dictated in the interval history and assessment and plan.  

Creatinine improved but potassium is high














Objective





- Vital Signs


Vital signs: 


                                   Vital Signs











Temp  97.9 F   04/03/21 14:00


 


Pulse  60   04/03/21 14:00


 


Resp  19   04/03/21 14:00


 


BP  97/68   04/03/21 14:00


 


Pulse Ox  91 L  04/03/21 14:00








                                 Intake & Output











 04/02/21 04/03/21 04/03/21





 18:59 06:59 18:59


 


Output Total  525 


 


Balance  -525 


 


Weight   136.078 kg


 


Output:   


 


  Urine  525 


 


Other:   


 


  Voiding Method   Bedside Commode





   Urinal


 


  # Voids  2 














- Exam





- Exam





GENERAL: The patient is alert and oriented x3, not in any acute distress.  

Obese, currently on  airvo at a flow rate of 40 with an FiO2 of 70, continue to 

wean as tolerated


HEENT: Pupils are round and equally reacting to light. EOMI. No scleral icterus.

No conjunctival pallor. Normocephalic, atraumatic. No pharyngeal erythema. No 

thyromegaly. 


CARDIOVASCULAR: S1 and S2 present. No murmurs, rubs, or gallops. 


PULMONARY: Diminished breath sounds bilaterally with no wheezing or rhonchi 

noted


ABDOMEN: Soft, nontender, nondistended, normoactive bowel sounds. No palpable 

organomegaly. 


MUSCULOSKELETAL: No joint swelling or deformity.


EXTREMITIES: No cyanosis, clubbing, or pedal edema. 


NEUROLOGICAL: Gross neurological examination did not reveal any focal deficits. 


SKIN: No rashes. 








- Labs


CBC & Chem 7: 


                                 04/03/21 07:13





                                 04/03/21 07:13


Labs: 


                  Abnormal Lab Results - Last 24 Hours (Table)











  04/02/21 04/02/21 04/03/21 Range/Units





  16:49 19:59 06:54 


 


WBC     (4.50-10.00)  X 10*3/uL


 


RBC     (4.40-5.60)  X 10*6/uL


 


Hgb     (13.0-17.0)  g/dL


 


Hct     (39.6-50.0)  %


 


MCHC     (32.0-37.0)  g/dL


 


Immature Gran #     (0.00-0.04)  X 10*3/uL


 


Neutrophils #     (1.80-7.70)  X 10*3/uL


 


Monocytes #     (0.20-1.00)  X 10*3/uL


 


Eosinophils #     (0.04-0.35)  X 10*3/uL


 


Anion Gap     (4.00-12.00)  mmol/L


 


BUN     (9.0-27.0)  mg/dL


 


BUN/Creatinine Ratio     (12.00-20.00)  Ratio


 


Glucose     ()  mg/dL


 


POC Glucose (mg/dL)  162 H  141 H  159 H  (75-99)  mg/dL


 


Calcium     (8.7-10.3)  mg/dL














  04/03/21 04/03/21 04/03/21 Range/Units





  07:13 07:13 11:34 


 


WBC  13.11 H    (4.50-10.00)  X 10*3/uL


 


RBC  3.69 L    (4.40-5.60)  X 10*6/uL


 


Hgb  11.2 L    (13.0-17.0)  g/dL


 


Hct  35.2 L    (39.6-50.0)  %


 


MCHC  31.8 L    (32.0-37.0)  g/dL


 


Immature Gran #  0.12 H    (0.00-0.04)  X 10*3/uL


 


Neutrophils #  10.63 H    (1.80-7.70)  X 10*3/uL


 


Monocytes #  1.01 H    (0.20-1.00)  X 10*3/uL


 


Eosinophils #  0 L    (0.04-0.35)  X 10*3/uL


 


Anion Gap   0.60 L   (4.00-12.00)  mmol/L


 


BUN   57.0 H   (9.0-27.0)  mg/dL


 


BUN/Creatinine Ratio   51.82 H   (12.00-20.00)  Ratio


 


Glucose   164 H   ()  mg/dL


 


POC Glucose (mg/dL)    232 H  (75-99)  mg/dL


 


Calcium   7.7 L   (8.7-10.3)  mg/dL














Assessment and Plan


Plan: 





-acute hypoxic respiratory failure: Secondary to covid 19 pneumonia patient 

maintained on vitamin and zinc supplements.  Patient currently on IV solu-

Medrol.  has received 2 doses of Tocilizumab and is continued on Lovenox. 

inflammatory markers ,Patient currently on Airvo.  Pulmonary following


-Type 2 diabetes mellitus uncontrolled elevated blood sugars secondary to 

systemic steroids, continue with pre-meal, sliding scale, and long-acting, with 

adjustments made and will increase pre-male and long-acting doses


-Hyperkalemia, improved


-Hypotonic hyponatremia, improved


-Acute renal failure secondary to Covid 19: Improved, current creatinine is 1.2


-Hypertension 


-DVT subcutaneous Lovenox


-Full code





Plan:


Continue with current medications.  Will continue IV Solu-Medrol with vitamin 

and zinc supplements, and lovenox . patient has received Tocilizumab.  Pulmonary

following .  Patient currently on airvo and slowly weaning as tolerated.  PT/OT 

following and patient may likely require ECF for continued PT/OT therapy due to 

weakness once stabilized and discharged.  Patient response to treatment is slow.

 Will continue to monitor closely.  Prognosis remains guarded.

## 2021-04-04 LAB
ANION GAP SERPL CALC-SCNC: 9.5 MMOL/L (ref 4–12)
BASOPHILS # BLD AUTO: 0 K/UL (ref 0–0.2)
BASOPHILS NFR BLD AUTO: 0 %
BUN SERPL-SCNC: 71 MG/DL (ref 9–27)
BUN/CREAT SERPL: 71 RATIO (ref 12–20)
CALCIUM SPEC-MCNC: 8 MG/DL (ref 8.7–10.3)
CHLORIDE SERPL-SCNC: 108 MMOL/L (ref 96–109)
CO2 SERPL-SCNC: 17.5 MMOL/L (ref 21.6–31.8)
EOSINOPHIL # BLD AUTO: 0 K/UL (ref 0–0.7)
EOSINOPHIL NFR BLD AUTO: 0 %
ERYTHROCYTE [DISTWIDTH] IN BLOOD BY AUTOMATED COUNT: 3.48 M/UL (ref 4.3–5.9)
ERYTHROCYTE [DISTWIDTH] IN BLOOD: 14.3 % (ref 11.5–15.5)
GLUCOSE BLD-MCNC: 170 MG/DL (ref 75–99)
GLUCOSE BLD-MCNC: 178 MG/DL (ref 75–99)
GLUCOSE BLD-MCNC: 235 MG/DL (ref 75–99)
GLUCOSE BLD-MCNC: 289 MG/DL (ref 75–99)
GLUCOSE SERPL-MCNC: 243 MG/DL (ref 70–110)
HCT VFR BLD AUTO: 33.1 % (ref 39–53)
HGB BLD-MCNC: 10.7 GM/DL (ref 13–17.5)
LYMPHOCYTES # SPEC AUTO: 1.3 K/UL (ref 1–4.8)
LYMPHOCYTES NFR SPEC AUTO: 9 %
MCH RBC QN AUTO: 30.6 PG (ref 25–35)
MCHC RBC AUTO-ENTMCNC: 32.2 G/DL (ref 31–37)
MCV RBC AUTO: 95 FL (ref 80–100)
MONOCYTES # BLD AUTO: 1 K/UL (ref 0–1)
MONOCYTES NFR BLD AUTO: 6 %
NEUTROPHILS # BLD AUTO: 13.1 K/UL (ref 1.3–7.7)
NEUTROPHILS NFR BLD AUTO: 84 %
PLATELET # BLD AUTO: 208 K/UL (ref 150–450)
POTASSIUM SERPL-SCNC: 5.8 MMOL/L (ref 3.5–5.5)
SODIUM SERPL-SCNC: 135 MMOL/L (ref 135–145)
WBC # BLD AUTO: 15.6 K/UL (ref 3.8–10.6)

## 2021-04-04 RX ADMIN — INSULIN ASPART SCH UNIT: 100 INJECTION, SOLUTION INTRAVENOUS; SUBCUTANEOUS at 07:47

## 2021-04-04 RX ADMIN — INSULIN ASPART SCH UNIT: 100 INJECTION, SOLUTION INTRAVENOUS; SUBCUTANEOUS at 12:20

## 2021-04-04 RX ADMIN — Medication SCH MG: at 07:45

## 2021-04-04 RX ADMIN — ATORVASTATIN CALCIUM SCH MG: 80 TABLET, FILM COATED ORAL at 07:45

## 2021-04-04 RX ADMIN — METHYLPREDNISOLONE SODIUM SUCCINATE SCH MG: 125 INJECTION, POWDER, FOR SOLUTION INTRAMUSCULAR; INTRAVENOUS at 00:02

## 2021-04-04 RX ADMIN — CLOPIDOGREL BISULFATE SCH MG: 75 TABLET ORAL at 07:45

## 2021-04-04 RX ADMIN — OXYCODONE HYDROCHLORIDE AND ACETAMINOPHEN SCH MG: 500 TABLET ORAL at 07:46

## 2021-04-04 RX ADMIN — METHYLPREDNISOLONE SODIUM SUCCINATE SCH MG: 125 INJECTION, POWDER, FOR SOLUTION INTRAMUSCULAR; INTRAVENOUS at 16:35

## 2021-04-04 RX ADMIN — ENOXAPARIN SODIUM SCH MG: 40 INJECTION SUBCUTANEOUS at 07:45

## 2021-04-04 RX ADMIN — INSULIN ASPART SCH UNIT: 100 INJECTION, SOLUTION INTRAVENOUS; SUBCUTANEOUS at 17:23

## 2021-04-04 RX ADMIN — ASPIRIN 81 MG CHEWABLE TABLET SCH MG: 81 TABLET CHEWABLE at 07:45

## 2021-04-04 RX ADMIN — INSULIN DETEMIR SCH UNIT: 100 INJECTION, SOLUTION SUBCUTANEOUS at 20:51

## 2021-04-04 RX ADMIN — METHYLPREDNISOLONE SODIUM SUCCINATE SCH MG: 125 INJECTION, POWDER, FOR SOLUTION INTRAMUSCULAR; INTRAVENOUS at 07:46

## 2021-04-04 RX ADMIN — PIOGLITAZONE SCH MG: 30 TABLET ORAL at 07:46

## 2021-04-04 RX ADMIN — INSULIN ASPART SCH UNIT: 100 INJECTION, SOLUTION INTRAVENOUS; SUBCUTANEOUS at 20:51

## 2021-04-04 RX ADMIN — INSULIN ASPART SCH UNIT: 100 INJECTION, SOLUTION INTRAVENOUS; SUBCUTANEOUS at 07:46

## 2021-04-04 RX ADMIN — FAMOTIDINE SCH MG: 20 TABLET, FILM COATED ORAL at 07:45

## 2021-04-04 RX ADMIN — METOPROLOL SUCCINATE SCH MG: 50 TABLET, EXTENDED RELEASE ORAL at 07:46

## 2021-04-04 NOTE — P.PN
Subjective





80-year-old male who presents emergency Department stating that he has had 

shortness of breath and feeling fatigued for at least 2 weeks.  Patient states 

he has had exposure to COVID .  Patient states he has had a fever.  Patient 

states she's also states and smile.  Patient also states she's had diarrhea.  

Patient denies any chest pain or palpitations.  Patient denies any abdominal 

pain patient denies nausea vomiting diarrhea.  According to EMS with the patient

was coming in he was satting in the low 80s.  Patient is currently on a few 

liters of oxygen and sat in mid 90s.  Patient is a diabetic and has high blood 

pressure.  Patient also has a pacemaker.  Patient is presently on 2 L of oxygen 

was requiring 4 L yesterday.  Patient was started on IV fluids patient 

creatinine went up to 1.6 baseline is around 1.  Patient's blood sugars are 

highly elevated.  Patient tests positive for Covid.





03/18/2021


Patient is presently on 2 L of oxygen appears to be doing better possibility of 

discharge tomorrow.  Creatinine went up a bit to 1.6 baseline is around the 1.  

Patient was started on IV fluids will recheck the basic metabolic profile to

chantel.





03/19/2021


Patient seen and evaluated in follow-up continues to be on 2 L of oxygen and bec

omes dyspneic with exertion.  Patient states he does not know wear oxygen at 

home.  Pulmonary is following.  Patient's blood sugars are elevated and will 

continue sliding scale at this time as patient is on steroids.  Potassium was 

found to be slightly elevated at 5.5 and was given a dose of Kayexalate.  Will 

repeat a.m. labs.  Instructed and encouraged the patient to get up and increase 

activity as tolerated.





03/20/2021


Patient is presently in 2 does of hours and doing clinically well.  Patient is 

on dexamethasone, vitamin supplements, subcutaneous heparin.  No worsening 

shortness of breath, cough or congestion.  D-dimer 1.02.  Sodium 145.  Potassium

5.1.  Creatinine 1.3.  .  C-reactive protein 4.4.  His creatinine 

actually went up a little bit from 1.19-1.3





03/21/2021


Patient is fairly stable no significant improvement or worsening.  Patient is 

still on 2 L of oxygen.  We'll monitor him 1 more night.  Continues to require 

oxygen patient probably can be discharged on 2 L tomorrow this can be tapered or

discontinued as an outpatient.  Patient d-dimer is bit worse compared to 

admission.  Although other inflammatory markers are better








03/22/2021


Patient is seen this morning currently on nonrebreather and airvo as his oxygen 

was found to be 89% on 6 L of oxygen.  Pulmonary is following.  D-dimer has gone

up at 1.70 along with lactate dehydrogenase at 491 and CRP is 9.4.  Blood sugars

continue to be elevated and patient is maintained on sliding scale along with 

long-acting and pre-meal insulin and will continue at this time.  Patient 

continues on vitamin and zinc supplements along with Lovenox and dexamethasone 

daily.  Patient is scheduled to receive Tocilizumab today.  Will continue to 

monitor closely based on the clinical course of the patient.  Patient continues 

to be weak and was seen and evaluated by physical therapy recommending subacute 

rehab and patient is now agreeable and a social work consult was placed. 





03/23/2021


Patient is seen and evaluated this morning with no improvement in respiratory 

status.  Patient remains on Airvo with supplemental nonrebreather.  Patient is 

day 2 of receiving Tocilizumab.  Will repeat a.m. chest x-ray along with i

nflammatory markers.  D-dimer today was 1.60.  White blood count has gone up in 

is 10.8, hemoglobin is 12.6.  Patient is afebrile.  To continue with 

dexamethasone, Lovenox, vitamin C and zinc supplements.  Pulmonary is following.

 Had a discussion with the patient about CODE STATUS along with his son Mamadou and 

currently wish to remain a full code until talking with family member more 

extensively about the situation.  Will continue to monitor closely.  Prognosis 

remains guarded.





03/24/2021


Patient is seen this morning status post getting up to the bathroom and 

continues to be extremely dyspneic with any exertion and was found to be low 80s

to 83% oxygenation.  Patient continues on the airvo with a flow rate of 60 and 

FiO2 of 64 and is currently 91%.  Patient denies any chest pain or palpitations.

 Patient is afebrile.  Patient reports to tolerating diet with no nausea or 

vomiting noted.  D-dimer continues to be elevated at 2.56, sodium today is 140 

with a potassium of 5.1 and creatinine is 1.2.  Blood sugars on the lower side 

this morning and will continue to monitor closely and treat accordingly with 

sliding scale.  Inflammatory markers trending down.  Pulmonary following 

closely.  Chest x-ray today shows continued bibasilar infiltrates which are 

nonspecific but worsening.





03/25/2021





Patient is seen in follow-up this morning and continues to be on Airvo no 

significant change.  Patient was on dexamethasone all being transitioned to IV 

Solu-Medrol and will continue with Accu-Cheks and close glycemic control with 

pre-meal, long-acting, and sliding scale.  White blood count is 13.9, hemoglobin

is stable at 13.2, d-dimer is 2.95, sodium is 137, potassium is 5.1, creatinine 

slightly elevated at 1.24.  Inflammatory markers have gone up.  Patient is 

sitting up in the chair and denies any chest pain or worsening shortness of 

breath.  Patient states he feels he is about the same.  Patient also states he 

has been sleeping a lot and tolerating diet with no reports of nausea or 

vomiting.  Discussed with patient about increasing activity as tolerated 

although patient is extremely dyspneic with minimal exertion.





03/26/2021





Patient is seen and evaluated this morning and follow-up with no acute overnight

issues. Patient continues to be on Airvo with a flow rate of 60 and FiO2 of 70 

and is being closely monitored.  Transitioned to IV Solu-Medrol and will 

continue at this time.  Blood sugars elevated and increasing long-acting and 

pre-meal insulins and will continue sliding scale along with oral antidiabetic 

agents.  Need tighter glycemic control.  Patient has been eating 100% of all 

meals with no reports of nausea or vomiting noted.  Patient clinically looking 

better and chest x-ray displays stable infiltrates.  D-dimer slightly trending 

down along with inflammatory markers.  Creatinine is stable at 1.2 and potassium

is 5.5.  Will continue to monitor vital signs and labs closely.





On 3/27/2021 -patient is seen and examined at the bedside.  He is sitting up in 

the chair and is on Airvo 60 L with FiO2 of 70%, saturating about 90%.  He still

complains of exertional dyspnea and cough at times.  Denies having any fevers 

chills or rigors.  He complains of generalized weakness.  On reviewing his 

vitals temperature of 97.9, heart rate 63, respiratory rate 18, blood pressure 

120 x 62 .  On reviewing his labs white count of 22.9, hemoglobin 13, platelets 

299.  Sodium 136, potassium 5.6, chloride 104, bicarb 26, BUN 55, creatinine 

1.15.  Blood sugars running on the higher side around 300s.  Patient had a chest

x-ray done this morning showing mild to moderate mid and lower zone infiltrates.





On 3/28/2021 -patient is seen and examined at bedside.  He is sitting up in a 

chair at fifth high flow oxygen at 60 L FiO2 of 70% and pulse ox at 94%.  He is 

still having exertional dyspnea and complains of generalized weakness.  He 

states that he is tired of using the oxygen.  On reviewing the vitals T-max of 

97.9, heart rate 60, respiratory 19, blood pressure 130/60.  Reviewing the labs 

, CRP less than 0.04 and D-dimer of 1.53.  All medications have been 

reviewed and pertinent changes made.





03/29/2021


Patient is seen and evaluated this morning with no acute overnight issues.  

Patient continues to sit up in the chair throughout most of the day as he states

his breathing is better.  Patient continues to be on Airvo and flow rate has 

been decreased to 40 with an FiO2 of 70% and is currently 92%.  Patient 

continues to have dyspnea with minimal exertion and continues to have weakness 

and will likely require some form rehab once stabilized and discharged.  PT/OT 

to continue to follow.  White blood count trending down at 18.64 and hemoglobin 

is stable at 13.2.  D-dimer is 1.60 with a sodium of 136 and potassium was found

to be 6.1 and being corrected and will repeat a.m. labs.  Creatinine is 1.2.  

Blood sugars continue to be elevated and will continue with sliding scale and 

long-acting and monitor closely.  LDH trending back up at 503 and will monitor 

closely.  CRP is 0.4. 








03/30/2021


Patient's potassium is still high received the calcium gluconate and insulin as 

today.  Will order capsulate.  This is nonhemolyzed sample patient was started 

on low potassium diet.  Patient remains on Airvo , 40 L and 60% FiO2 which is 

better compared to yesterday and saturating better appears to have been some 

improvement.





04/03/2021


Patient's overall respiratory status remains the patient is oxygen flow rate of 

40 L with FiO2 of 60% may be a mild improvement but saturations are going down 

again.  Patient is already on low potassium diet not an ACE inhibitor potassium 

supplementation will repeat basic metabolic profile again tomorrow.  Patient is 

not on any IV fluids is eating well.





04/04/2021


Patient is doing bit better today his oxygen requirements are going down patient

is on Shailesh at the cefoxitin and 50% FiO2.  Blood glucose is bit elevated.  

Patient had an episode of GI bleed will continue with anticoagulation and 

aspirin gastro-oncology will be consulted probably hemorrhoidal bleed patient 

had blood in the stools.





Constitutional: Reports fatigue although slightly more awake today, denied any 

fever.


Cardio vascular: denied any chest pain, palpitations


Gastrointestinal denied any nausea vomiting


Pulmonary: No shortness of breath today


Neurologic reports generalized weakness 





All inpatient medications were reviewed and appropriate changes in these 

medications as dictated in the interval history and assessment and plan.  

Creatinine improved but potassium is high














Objective





- Vital Signs


Vital signs: 


                                   Vital Signs











Temp  97.9 F   04/04/21 14:00


 


Pulse  64   04/04/21 14:00


 


Resp  18   04/04/21 14:00


 


BP  124/51   04/04/21 14:00


 


Pulse Ox  93 L  04/04/21 16:12








                                 Intake & Output











 04/03/21 04/04/21 04/04/21





 18:59 06:59 18:59


 


Intake Total   540


 


Output Total  400 1


 


Balance  -400 539


 


Weight 136.078 kg  


 


Intake:   


 


  Oral   540


 


Output:   


 


  Urine  400 


 


  Stool   1


 


Other:   


 


  Voiding Method Bedside Commode  Bedside Commode





 Urinal  Urinal


 


  # Voids 3 2 3


 


  # Bowel Movements   2














- Exam





- Exam





GENERAL: The patient is alert and oriented x3, not in any acute distress.  

Obese, currently on  airvo at a flow rate of 40 with an FiO2 of 70, continue to 

wean as tolerated


HEENT: Pupils are round and equally reacting to light. EOMI. No scleral icterus.

No conjunctival pallor. Normocephalic, atraumatic. No pharyngeal erythema. No 

thyromegaly. 


CARDIOVASCULAR: S1 and S2 present. No murmurs, rubs, or gallops. 


PULMONARY: Diminished breath sounds bilaterally with no wheezing or rhonchi 

noted


ABDOMEN: Soft, nontender, nondistended, normoactive bowel sounds. No palpable 

organomegaly. 


MUSCULOSKELETAL: No joint swelling or deformity.


EXTREMITIES: No cyanosis, clubbing, or pedal edema. 


NEUROLOGICAL: Gross neurological examination did not reveal any focal deficits. 


SKIN: No rashes. 








- Labs


CBC & Chem 7: 


                                 04/03/21 07:13





                                 04/04/21 06:28


Labs: 


                  Abnormal Lab Results - Last 24 Hours (Table)











  04/03/21 04/03/21 04/04/21 Range/Units





  16:54 20:45 06:28 


 


Potassium    5.8 H  (3.5-5.5)  mmol/L


 


Carbon Dioxide    17.5 L  (21.6-31.8)  mmol/L


 


BUN    71.0 H  (9.0-27.0)  mg/dL


 


BUN/Creatinine Ratio    71.00 H  (12.00-20.00)  Ratio


 


Glucose    243 H  ()  mg/dL


 


POC Glucose (mg/dL)  240 H  208 H   (75-99)  mg/dL


 


Calcium    8.0 L  (8.7-10.3)  mg/dL














  04/04/21 04/04/21 Range/Units





  06:53 11:57 


 


Potassium    (3.5-5.5)  mmol/L


 


Carbon Dioxide    (21.6-31.8)  mmol/L


 


BUN    (9.0-27.0)  mg/dL


 


BUN/Creatinine Ratio    (12.00-20.00)  Ratio


 


Glucose    ()  mg/dL


 


POC Glucose (mg/dL)  235 H  289 H  (75-99)  mg/dL


 


Calcium    (8.7-10.3)  mg/dL














Assessment and Plan


Plan: 





-acute hypoxic respiratory failure: Secondary to covid 19 pneumonia patient 

maintained on vitamin and zinc supplements.  Patient currently on IV solu-

Medrol.  has received  Tocilizumab, monitor inflammatory markers ,Patient 

currently on Airvo.  Pulmonary following


-Acute lower GI bleed: Possibly hemorrhoidal bleed hold off on Lovenox and 

aspirin, gastroneurology was consulted


-Type 2 diabetes mellitus uncontrolled elevated blood sugars secondary to 

systemic steroids, continue with pre-meal, sliding scale, and long-acting, with 

adjustments made and will increase pre-male and long-acting doses


-Hyperkalemia, improved


-Hypotonic hyponatremia, improved


-Acute renal failure secondary to Covid 19: Improved, current creatinine is 1.2


-Hypertension 


-DVT subcutaneous Lovenox


-Full code





Plan:


Continue with current medications.  Will continue IV Solu-Medrol with vitamin 

and zinc supplements . patient has received Tocilizumab.  Pulmonary following . 

Patient currently on airvo and slowly weaning as tolerated.  PT/OT following and

patient may likely require ECF for continued PT/OT therapy due to weakness once 

stabilized and discharged.  Patient response to treatment is slow.  Will 

continue to monitor closely.  Prognosis remains guarded.

## 2021-04-04 NOTE — P.PN
Subjective


Progress Note Date: 04/04/21


Principal diagnosis: 





CoVID 19 pneumonia





80-year-old male, who was brought to the emergency department by EMS.  The 

patient apparently has had 2 weeks' worth of increasing shortness of breath, and

significant fatigue.  The patient also had chest congestion.  He had a fever.  

The patient states that he is just not been feeling well and getting 

progressively worse.  He also apparently had an episode or 2 of diarrhea.  The 

patient denied any chest pain or chest pressure or palpitations.  The patient 

also denied nausea, vomiting, and abdominal pain.  Apparently when EMS arrived, 

the patient's saturations were in the low 80s on room air.  For that reason, he 

was transported in, evaluated, and admitted with a diagnosis of COVID 19 19 

pneumonia.  The patient does have a history of diabetes, hypertension, and a 

previous pacemaker insertion.  White count was 4.6, hemoglobin 12.5, hematocrit 

37.1, and platelet count 153,000.  PT, INR, and PTT were all normal.  D-dimer 

was 0.77.  Sodium 136, potassium 4.5, chlorides 102, CO2 26, anion gap 8, BUN 

47, and creatinine 1.63.  Ferritin was 985, , C-reactive protein 58, and 

pro-calcitonin level was 0.12.  Chest x-ray did show some interstitial 

infiltrates.





On 03/18/2021 patient seen in follow-up on medical floor.  he is on 2 L of 

oxygen his pulse ox is 90-94%, breathing comfortably, no fever or chills, blood 

pressure has been stable.  Denies any worsening dyspnea or hypoxemia, he has a 

mild cough, no chest discomfort.  He continues on oral Decadron 6 mg daily, IV 

hydration, vitamins, d-dimer was low at 0.77, patient is on subcu heparin for 

DVT prophylaxis, pro-calcitonin level was low, inflammatory markers were not 

significantly elevated on admission.  Clinically symptoms have not progressed, 

and patient will be considered for discharge in next 24 hours.





The patient is seen today 03/19/2021 in follow-up on the regular medical floor. 

He is currently sitting up in chair at the bedside.  Awake and alert in no acute

distress.  Continue O2 saturations in the low 90s on 2 L/m per nasal cannula.  

Afebrile.  Hemodynamically stable.  Blood cultures reveal no growth.  Blood 

glucose 202.  Sodium 143.  Potassium 5.5.  Creatinine 1.19.  He remains on 

dexamethasone, so continues heparin, vitamin supplements.  Chest x-ray continues

to show bilateral multifocal opacities consistent with CoVID infection.  No maria

ge.





The patient is seen today 03/20/2021 in follow-up on the regular medical floor. 

He is currently resting comfortably in bed.  Awake and alert in no acute 

distress.  He is maintaining O2 saturations in the 90s on 2 L/m per nasal 

cannula.  He's been on dexamethasone, vitamin supplements, subcutaneous heparin.

 No worsening shortness of breath, cough or congestion.  D-dimer 1.02.  Sodium 

145.  Potassium 5.1.  Creatinine 1.3.  .  C-reactive protein 4.4.





The patient is seen today 03/21/2021 in follow-up on the regular medical floor. 

He is currently resting quite comfortably in bed.  Maintaining O2 saturations in

the low 90s on 2 L/m per nasal cannula.  Sodium 144.  Potassium 4.7.  Creatinine

1.2.  Glucose 173.  He remains on dexamethasone, heparin, vitamin supplements.





03/22/2021 on seeing the patient for a follow-up regarding the patient's acute 

hypoxic respiratory failure due to Covid 19 related pneumonia.  The patient was 

not a candidate for Remdesivir, Tocilizumab or convalescent plasma, and the 

patient is currently on Decadron 6 mg on a daily basis and addition to Levemir 

insulin 50 units daily at bedtime and NovoLog 6 units 3 times a day with meals 

and a sliding scale coverage.  The patient was on 2 L about 2 by nasal cannula 

with pulse ox of 93%.  Earlier this morning, the patient's oxidation 

progressively got worse in the patient is currently on 100% nonrebreather 

facemask maintaining a saturation above 90% at around 94%.  Overall, the patient

is doing well.  Creatinine is at 1.2.  The patient had a CRP level of 4.4 with 

an LDH level of 485 from 03/20/2021.  Inflammatory markers are being monitored. 

Last d-dimer from 03/20/2021 was 1.02.  Blood cultures were negative repeat 

chest x-ray was done today and the findings are essentially stable and the pat

ient has patchy bilateral perihilar pulmonary infiltrates without any 

significant interval change.  D-dimer from today is at 1.7 and the patient 

remains on Lovenox.





03/23/2021 the patient is being seen for a follow-up.  The patient is currently 

on high flow oxygen at 6 L.  Note that the patient was on 100% nonrebreather 

facemask and was switched him to a high flow oxygen yesterday at 60 L.  As part 

of his treatment, the patient received steroids and currently is receiving 

dexamethasone 6 mg by mouth daily.  He is also Toci 2 yesterday and today.  He 

completed treatment without any major side effects.  Note that the patient was 

on low-flow oxygen and he progressively got worse requiring 100% nonrebreather 

and subsequent high flow oxygen.  On today's evaluation, his white cell count is

at 10.  His d-dimer is at 1.6.  Rest of the inflammatory markers have not been 

checked and this will be repeated tomorrow.  LDH from yesterday was 491 with a 

CRP of 9.4.  Otherwise, his renal function shows a stable creatinine of 1.2.  

The patient has been on FiO2 of 60% with a high flow oxygen of 60 L and the 

patient has been essentially stable since yesterday.  No other significant 

events.  Remains on Lovenox 40 mg subcu on a daily basis. 





03/24/2021, the patient is still on high flow oxygen and 60 L.  He is not 

utilizing the 100% nonrebreather facemask this morning.  Note that the patient 

has received steroids, and he remains on Decadron 6 mg by mouth daily. He also 

completed Tocilizumab.  On today's evaluation, chest x-ray findings are stable 

without any interval change in the bilateral pulmonary infiltrates.  The patient

is resting comfortably in bed.  He is also able to move on a recliner.  Labs 

today shows an LDH of 414 and a CRP of 2.9 and the patient is d-dimer is at 

2.56.  The patient remains on Lovenox 40 mg subcu on a daily basis.  He is on 

Levemir insulin 50 units daily along with NovoLog 6 units with meals plus a 

sliding coverage.  I will say his condition essentially the same as unchanged 

compared to yesterday.  He is not doing much in progress for now. 





On today's evaluation of 03/25/2021 the patient is being seen for a follow-up.  

The patient remains on high flow oxygen with a flow of 6 L an FiO2 of 60%.  His 

current pulse ox is around 88%.  He is afebrile.  He is doing well.  Sitting up 

on a chair.  No signs of any respiratory distress.  His breathing is nonlabored 

and he doesn't have any significant cough unless he takes a deep inspiration.  

The patient  had follow-up blood work today.  His LDL level is up 1221 and a CRP

level is up to 12.6 and his d-dimer currently is at 2.95.  I cannot explain the 

rise in these inflammatory markers.  His creatinine is at 1.2.  Was a causative 

13.7 with a hemoglobin of 13.2.  His chest x-ray from yesterday was showing 

right basilar infiltrates which are nonspecific and it was mentioned that he was

getting worse.  The patient remains on Decadron 6 mg by mouth daily.  He remains

on 50 units of Levemir insulin along with 6 units of NovoLog around-the-clock.  

He remains on aspirin.  He remains on Lovenox for DVT prophylaxis 40 mg subcu.








03/26/2021 the patient is on high flow oxygen at 60 L with an FiO2 of 70%.  His 

pulse ox was around 93%.  He feels well.  No altered mentation.  Resting 

comfortably.  Sitting up and he has no significant shortness of breath at rest. 

His chest x-ray still showing diffuse bilateral pulmonary infiltrates, 

peripheral distribution, probably slightly worse compared to yesterday.  

However, the radiologist reports is stating that the findings are essentially 

stable.  Clinically he is also stable.  The labs from today shows a sugar of 407

is quite high and a d-dimer is down to 1.89 and the patient's LDH level is down 

to 419 with a CRP level of 0.7.  The patient remains on Levemir insulin and is 

currently taking 50 units along with 6 units with meals of NovoLog.





03/27/2021 the patient remains on high flow at 60 L with an FiO2 of 70% which is

essentially the same as yesterday.  His pulse ox currently is around 93%.  His 

chest x-ray from today is showing diffuse bilateral pulmonary infiltrates, 

peripheral distribution, I thought his chest x-ray and probably the x-ray 

findings are improved compared to yesterday's chest x-ray.  I have the patient 

on Solu-Medrol 60 mg every 6 hours.  He is also on anticoagulation.  He is 

receiving Lovenox..  His d-dimer is at 1.57 on today's evaluation.  His LDH 

level is high today at 1059 with a CRP of less than 5.  He remains on Levemir 

insulin 50 units along with 6 units of NovoLog with meals and his blood sugars 

under adequate control for now.  He has no other complaints otherwise.  Note 

that initially the patient was started on Decadron and during the course of his 

treatment he was given Toci


 


03/28/2021, the patient is on high flow oxygen at 60 L along with an FiO2 of 

70%.  Pulse ox is in order of 96%.  I'm hoping to get down the FiO2 further 

today.  The patient is on IV Solu-Medrol.  He is on long-term anticoagulation.  

He is receiving Lovenox at a dose of 40 mg subcu on a daily basis.  In terms of 

inflammatory markers and blood work, the patient's CRP is less than 0.4 and the 

LDH level is at 378.  The chest x-ray was done yesterday showed some lower lobe 

active pulmonary infiltrates, findings are unchanged.  The patient is awake and 

alert and following commands and answering questions.  He is on Levemir insulin 

as a dose of 60 daily at bedtime and he is also on NovoLog 10 units with meals 

and a sliding scale coverage. He has completed tocilizumab





The patient is seen today 03/29/2021 in follow-up on the regular medical floor. 

He is currently sitting up in a chair at the bedside.  Awake and alert in no 

acute distress.  He remains on AirVo high flow oxygen at 40 L and 70% FiO2.  He 

is afebrile.  White count 18.6.  Hemoglobin 13.2.  D-dimer 1.6.  Sodium 136.  

Potassium 6.1.  Creatinine 1.2.  .  C-reactive protein less than 0.4.  

Remains on Lovenox, IV Soluj Medrol, vitamin supplements.





The patient is seen today 03/30/2021 in follow-up on the regular medical floor. 

Currently sitting up in a chair at the bedside.  Awake and alert in no acute 

distress.  He remains on AirVo high flow oxygen at 40 L and 60% FiO2 to maintain

O2 saturations at 88%.  Sodium 137.  Potassium 5.6.  Creatinine 1.3.  Glucose 

271.  Remains on IV Solu-Medrol, Lovenox, vitamin supplements.  Levemir and 

NovoLog sliding scale.





Patient is seen today 03/31/2021 in follow-up on the regular medical floor.  He 

is currently sitting up in a chair at the bedside.  Awake, alert in no acute 

distress.  Feeling a bit better today compared to yesterday.  He is still on 

AirVo high flow oxygen at 35 L and 65% FiO2 and maintaining O2 saturations at 

94%.  He remains on IV Solu-Medrol, Lovenox, vitamin supplements.  Chest x-ray 

continues to show patchy bilateral infiltrates similar to previous.





The patient is seen today 04/01/2021 in follow-up on the regular medical floor. 

He is currently resting in bed.  Awake and alert in no acute distress.  Still 

requiring AirVo high flow oxygen at 30 L and 60% FiO2.  Chest x-ray remains 

stable.  Lung sounds but are clearing.  He is feeling better today compared to 

yesterday.  Afebrile.  White count 15.4.  Hemoglobin 14.2.  Sodium 135.  

Potassium 5.2.  Creatinine 1.12.  He remains on Lovenox, Solu-Medrol, vitamin 

supplements.





The patient is seen today 04/02/2021 in follow-up on the regular medical floor. 

He is currently resting comfortably in bed.  Awake and alert in no acute 

distress.  He has been slow to progress.  He remains on AirVo high flow oxygen 

at 50L and 90% FiO2 to maintain O2 saturations in the upper 80s lower 90s.  

Continue basilar crackles left greater than right.  Chest x-ray continues to 

show patchy bilateral airspace disease slightly increase in the mid and lower 

lung fields.  D-dimer 1.58.  Sodium 137.  Potassium 4.7.  Creatinine 1.22.  Rem

ains on IV Solu-Medrol, Lovenox, vitamin supplements.





The patient is seen today 04/03/2021 in follow-up on the regular medical floor. 

He is currently sitting up at the bedside.  Awake and alert in no acute 

distress.  States he is feeling stronger each day.  He remains on AirVo high 

flow oxygen 40 L and 70% FiO2.  Working well with the incentive spirometer.  

White count 13.1.  Hemoglobin 11.2.  Leukocytes 1.34.  Sodium 137.  Potassium 

5.3.  Creatinine 1.1. Remains on IV Solu-Medrol, Lovenox, vitamin supplements.





The patient is seen today 04/04/2021 in follow-up on the regular medical floor. 

Awake and alert in no acute distress.  Sitting up in a chair at the bedside.  He

is improving daily.  He has been slow to progress.  Remains on airflow high flow

oxygen at 40 L and 50% FiO2.  Sodium 135.  Potassium 5.8.  Creatinine 1.0.  

Glucose 243.  He remains on Lovenox, IV Solu-Medrol, vitamin supplements.





Objective





- Vital Signs


Vital signs: 


                                   Vital Signs











Temp  97.9 F   04/04/21 14:00


 


Pulse  64   04/04/21 14:00


 


Resp  18   04/04/21 14:00


 


BP  124/51   04/04/21 14:00


 


Pulse Ox  89 L  04/04/21 14:00








                                 Intake & Output











 04/03/21 04/04/21 04/04/21





 18:59 06:59 18:59


 


Output Total  400 1


 


Balance  -400 -1


 


Weight 136.078 kg  


 


Output:   


 


  Urine  400 


 


  Stool   1


 


Other:   


 


  Voiding Method Bedside Commode  Bedside Commode





 Urinal  Urinal


 


  # Voids 3 2 














- Exam





GENERAL EXAM: Alert, pleasant, 80-year-old male patient, on AirVo high flow 

oxygen at 40 L and 50% FiO2 comfortable in no apparent distress.


HEAD: Normocephalic/atraumatic.


EYES: Normal reaction of pupils, equal size.  Conjunctiva pink, sclera white.


NOSE: Clear with pink turbinates.


THROAT: No erythema or exudates.


NECK: No masses, no JVD, no thyroid enlargement, no adenopathy.


CHEST: No chest wall deformity.  Symmetrical expansion. 


LUNGS: Equal air entry with bibasilar coarse crackles


CVS: Regular rate and rhythm, normal S1 and S2, no gallops, no murmurs, no rubs


ABDOMEN: Soft, nontender.  No hepatosplenomegaly, normal bowel sounds, no 

guarding or rigidity.


EXTREMITIES: No clubbing, no edema, no cyanosis, 2+ pulses and upper and lower 

extremities.


MUSCULOSKELETAL: Muscle strength and tone normal.


SPINE: No scoliosis or deformity


SKIN: No rashes


CENTRAL NERVOUS SYSTEM: No focal deficits, tone is normal in all 4 extremities.


PSYCHIATRIC: Alert and oriented -3.  Appropriate affect.  Intact judgment and 

insight.





- Labs


CBC & Chem 7: 


                                 04/03/21 07:13





                                 04/04/21 06:28


Labs: 


                  Abnormal Lab Results - Last 24 Hours (Table)











  04/03/21 04/03/21 04/04/21 Range/Units





  16:54 20:45 06:28 


 


Potassium    5.8 H  (3.5-5.5)  mmol/L


 


Carbon Dioxide    17.5 L  (21.6-31.8)  mmol/L


 


BUN    71.0 H  (9.0-27.0)  mg/dL


 


BUN/Creatinine Ratio    71.00 H  (12.00-20.00)  Ratio


 


Glucose    243 H  ()  mg/dL


 


POC Glucose (mg/dL)  240 H  208 H   (75-99)  mg/dL


 


Calcium    8.0 L  (8.7-10.3)  mg/dL














  04/04/21 04/04/21 Range/Units





  06:53 11:57 


 


Potassium    (3.5-5.5)  mmol/L


 


Carbon Dioxide    (21.6-31.8)  mmol/L


 


BUN    (9.0-27.0)  mg/dL


 


BUN/Creatinine Ratio    (12.00-20.00)  Ratio


 


Glucose    ()  mg/dL


 


POC Glucose (mg/dL)  235 H  289 H  (75-99)  mg/dL


 


Calcium    (8.7-10.3)  mg/dL














Assessment and Plan


Assessment: 





1  Acute hypoxic respiratory failure secondary to CoVID 19 pneumonia, he did 

receive Tocilizumab





2  History of diabetes mellitus type 2





3  History of hypertension





4  History of chronic sinus disease





5  History of diverticulitis, status post bowel resection





6 Hyperkalemia





Plan:





The patient was seen and evaluated by Dr. Tariq


Currently at 40 L and 50% FiO2 via AirVo


Titrate the FiO2 as tolerated


Continue the current treatment plan


Home once down to 4-5 L of oxygen


We will continue to follow





I, the cosigning physician, performed a history & physical examination of the 

patient. Lungs sounds crackles in the bilateral posterior bases.  Maintaining 

good O2 saturations in the 90s on AirVo high flow oxygen at 40 L and 50% FiO2.  

I discussed the assessment and plan of care with my nurse practitioner, Abigail Thomas. I attest to the above note as dictated by her.

## 2021-04-05 LAB
ANION GAP SERPL CALC-SCNC: 2.1 MMOL/L (ref 4–12)
BUN SERPL-SCNC: 66 MG/DL (ref 9–27)
BUN/CREAT SERPL: 60 RATIO (ref 12–20)
CALCIUM SPEC-MCNC: 8.3 MG/DL (ref 8.7–10.3)
CHLORIDE SERPL-SCNC: 109 MMOL/L (ref 96–109)
CO2 SERPL-SCNC: 26.9 MMOL/L (ref 21.6–31.8)
ERYTHROCYTE [DISTWIDTH] IN BLOOD BY AUTOMATED COUNT: 3.18 X 10*6/UL (ref 4.4–5.6)
ERYTHROCYTE [DISTWIDTH] IN BLOOD: 14.3 % (ref 11.5–14.5)
GLUCOSE BLD-MCNC: 218 MG/DL (ref 75–99)
GLUCOSE BLD-MCNC: 249 MG/DL (ref 75–99)
GLUCOSE BLD-MCNC: 281 MG/DL (ref 75–99)
GLUCOSE BLD-MCNC: 91 MG/DL (ref 75–99)
GLUCOSE SERPL-MCNC: 86 MG/DL (ref 70–110)
HCT VFR BLD AUTO: 30.5 % (ref 39.6–50)
HGB BLD-MCNC: 9.9 G/DL (ref 13–17)
MCH RBC QN AUTO: 31.1 PG (ref 27–32)
MCHC RBC AUTO-ENTMCNC: 32.5 G/DL (ref 32–37)
MCV RBC AUTO: 95.9 FL (ref 80–97)
PLATELET # BLD AUTO: 193 X 10*3/UL (ref 140–440)
POTASSIUM SERPL-SCNC: 5.1 MMOL/L (ref 3.5–5.5)
SODIUM SERPL-SCNC: 138 MMOL/L (ref 135–145)
WBC # BLD AUTO: 14.75 X 10*3/UL (ref 4.5–10)

## 2021-04-05 RX ADMIN — PIOGLITAZONE SCH MG: 30 TABLET ORAL at 07:36

## 2021-04-05 RX ADMIN — ATORVASTATIN CALCIUM SCH MG: 80 TABLET, FILM COATED ORAL at 07:35

## 2021-04-05 RX ADMIN — INSULIN ASPART SCH UNIT: 100 INJECTION, SOLUTION INTRAVENOUS; SUBCUTANEOUS at 17:55

## 2021-04-05 RX ADMIN — INSULIN ASPART SCH UNIT: 100 INJECTION, SOLUTION INTRAVENOUS; SUBCUTANEOUS at 12:19

## 2021-04-05 RX ADMIN — METHYLPREDNISOLONE SODIUM SUCCINATE SCH MG: 125 INJECTION, POWDER, FOR SOLUTION INTRAMUSCULAR; INTRAVENOUS at 07:35

## 2021-04-05 RX ADMIN — INSULIN ASPART SCH UNIT: 100 INJECTION, SOLUTION INTRAVENOUS; SUBCUTANEOUS at 07:33

## 2021-04-05 RX ADMIN — Medication SCH MG: at 07:36

## 2021-04-05 RX ADMIN — OXYCODONE HYDROCHLORIDE AND ACETAMINOPHEN SCH MG: 500 TABLET ORAL at 07:35

## 2021-04-05 RX ADMIN — INSULIN DETEMIR SCH UNIT: 100 INJECTION, SOLUTION SUBCUTANEOUS at 21:06

## 2021-04-05 RX ADMIN — METHYLPREDNISOLONE SODIUM SUCCINATE SCH MG: 125 INJECTION, POWDER, FOR SOLUTION INTRAMUSCULAR; INTRAVENOUS at 17:54

## 2021-04-05 RX ADMIN — HYDROCORTISONE SCH APPLIC: 25 CREAM TOPICAL at 21:07

## 2021-04-05 RX ADMIN — INSULIN ASPART SCH UNIT: 100 INJECTION, SOLUTION INTRAVENOUS; SUBCUTANEOUS at 21:06

## 2021-04-05 RX ADMIN — INSULIN ASPART SCH UNIT: 100 INJECTION, SOLUTION INTRAVENOUS; SUBCUTANEOUS at 12:16

## 2021-04-05 RX ADMIN — METHYLPREDNISOLONE SODIUM SUCCINATE SCH MG: 125 INJECTION, POWDER, FOR SOLUTION INTRAMUSCULAR; INTRAVENOUS at 00:30

## 2021-04-05 RX ADMIN — CLOPIDOGREL BISULFATE SCH MG: 75 TABLET ORAL at 07:35

## 2021-04-05 RX ADMIN — FAMOTIDINE SCH MG: 20 TABLET, FILM COATED ORAL at 07:34

## 2021-04-05 RX ADMIN — INSULIN ASPART SCH: 100 INJECTION, SOLUTION INTRAVENOUS; SUBCUTANEOUS at 07:24

## 2021-04-05 RX ADMIN — METOPROLOL SUCCINATE SCH MG: 50 TABLET, EXTENDED RELEASE ORAL at 07:35

## 2021-04-05 NOTE — P.PN
Subjective


Progress Note Date: 04/05/21


Principal diagnosis: 


 COVID 19





80-year-old male, who was brought to the emergency department by EMS.  The 

patient apparently has had 2 weeks' worth of increasing shortness of breath, and

significant fatigue.  The patient also had chest congestion.  He had a fever.  

The patient states that he is just not been feeling well and getting 

progressively worse.  He also apparently had an episode or 2 of diarrhea.  The 

patient denied any chest pain or chest pressure or palpitations.  The patient 

also denied nausea, vomiting, and abdominal pain.  Apparently when EMS arrived, 

the patient's saturations were in the low 80s on room air.  For that reason, he 

was transported in, evaluated, and admitted with a diagnosis of COVID 19 19 

pneumonia.  The patient does have a history of diabetes, hypertension, and a 

previous pacemaker insertion.  White count was 4.6, hemoglobin 12.5, hematocrit 

37.1, and platelet count 153,000.  PT, INR, and PTT were all normal.  D-dimer 

was 0.77.  Sodium 136, potassium 4.5, chlorides 102, CO2 26, anion gap 8, BUN 

47, and creatinine 1.63.  Ferritin was 985, , C-reactive protein 58, and 

pro-calcitonin level was 0.12.  Chest x-ray did show some interstitial 

infiltrates.





On 03/18/2021 patient seen in follow-up on medical floor.  he is on 2 L of 

oxygen his pulse ox is 90-94%, breathing comfortably, no fever or chills, blood 

pressure has been stable.  Denies any worsening dyspnea or hypoxemia, he has a 

mild cough, no chest discomfort.  He continues on oral Decadron 6 mg daily, IV 

hydration, vitamins, d-dimer was low at 0.77, patient is on subcu heparin for 

DVT prophylaxis, pro-calcitonin level was low, inflammatory markers were not 

significantly elevated on admission.  Clinically symptoms have not progressed, 

and patient will be considered for discharge in next 24 hours.





On 04/05/2021 patient seen in follow-up on medical surgical floor, patient 

remains on irritable at 35 L and FiO2 of 60%, with a pulse ox is ranging between

86-93%, no worsening dyspnea, no chills, hemodynamically stable.  Patient is 

resting in bed, despite requiring high flow oxygen his lung sounds are clear, no

significant wheezes or crackles or rhonchi.  Patient states he was up in a chair

earlier and he felt dizzy, occasional cough with no phlegm production, no chest 

pain or hemoptysis, his last chest x-ray was on O2 2021 showing patchy bilateral

airspace disease, but slightly increased in the mid and lower lungs.








Objective





- Vital Signs


Vital signs: 


                                   Vital Signs











Temp  97.9 F   04/05/21 17:45


 


Pulse  65   04/05/21 17:45


 


Resp  18   04/05/21 17:45


 


BP  125/42   04/05/21 17:45


 


Pulse Ox  95   04/05/21 17:45








                                 Intake & Output











 04/04/21 04/05/21 04/05/21





 18:59 06:59 18:59


 


Intake Total 1080  


 


Output Total 1  600


 


Balance 1079  -600


 


Intake:   


 


  Oral 1080  


 


Output:   


 


  Urine   600


 


  Stool 1  


 


Other:   


 


  Voiding Method Bedside Commode  





 Urinal  


 


  # Voids 2 2 


 


  # Bowel Movements 2  














- Exam


 GENERAL EXAM: Alert, pleasant, 80-year-old white male currently on Airvo at 35 

L and FiO2 of 60% with a pulse ox between 86-93% comfortable in no apparent 

distress.


HEAD: Normocephalic/atraumatic.


EYES: Normal reaction of pupils, equal size.  Conjunctiva pink, sclera white.


NOSE: Clear with pink turbinates.


THROAT: No erythema or exudates.


NECK: No masses, no JVD, no thyroid enlargement, no adenopathy.


CHEST: No chest wall deformity.  Symmetrical expansion. 


LUNGS: Equal air entry with no crackles, wheeze, rhonchi or dullness.


CVS: Regular rate and rhythm, normal S1 and S2, no gallops, no murmurs, no rubs


ABDOMEN: Soft, nontender.  No hepatosplenomegaly, normal bowel sounds, no 

guarding or rigidity.


EXTREMITIES: No clubbing, no edema, no cyanosis, 2+ pulses and upper and lower 

extremities.


MUSCULOSKELETAL: Muscle strength and tone normal.


SPINE: No scoliosis or deformity


SKIN: No rashes


CENTRAL NERVOUS SYSTEM: Alert and oriented -3.  No focal deficits, tone is 

normal in all 4 extremities.


PSYCHIATRIC: Alert and oriented -3.  Appropriate affect.  Intact judgment and 

insight.











- Labs


CBC & Chem 7: 


                                 04/05/21 06:09





                                 04/05/21 06:09


Labs: 


                  Abnormal Lab Results - Last 24 Hours (Table)











  04/04/21 04/05/21 04/05/21 Range/Units





  20:32 06:09 06:09 


 


WBC   14.75 H   (4.50-10.00)  X 10*3/uL


 


RBC   3.18 L   (4.40-5.60)  X 10*6/uL


 


Hgb   9.9 L   (13.0-17.0)  g/dL


 


Hct   30.5 L   (39.6-50.0)  %


 


Anion Gap    2.10 L  (4.00-12.00)  mmol/L


 


BUN    66.0 H  (9.0-27.0)  mg/dL


 


BUN/Creatinine Ratio    60.00 H  (12.00-20.00)  Ratio


 


POC Glucose (mg/dL)  178 H    (75-99)  mg/dL


 


Calcium    8.3 L  (8.7-10.3)  mg/dL














  04/05/21 04/05/21 Range/Units





  11:16 16:37 


 


WBC    (4.50-10.00)  X 10*3/uL


 


RBC    (4.40-5.60)  X 10*6/uL


 


Hgb    (13.0-17.0)  g/dL


 


Hct    (39.6-50.0)  %


 


Anion Gap    (4.00-12.00)  mmol/L


 


BUN    (9.0-27.0)  mg/dL


 


BUN/Creatinine Ratio    (12.00-20.00)  Ratio


 


POC Glucose (mg/dL)  218 H  281 H  (75-99)  mg/dL


 


Calcium    (8.7-10.3)  mg/dL














Assessment and Plan


Plan: 


 Assessment:





#1.  Acute hypoxic respiratory failure secondary to quit drinking pneumonia, she

was not a candidate for Remdesivir, patient did receive Tocilizumab 400 mg IV 

piggyback 2 on oral 03/22/2021 and 03/23/2021





#2.  History of diabetes mellitus type 2





#3.  History of hypertension





#4.  History of chronic sinus disease





#5.  History of diverticulitis, status post bowel resection





#6.  Anemia with blood per rectum, GI service is following, anticoagulation is 

on hold





Plan:





Continue current medical treatment, wean FiO2, encourage deep breathing and 

coughing, anticoagulation remains on, hemoglobin is 9.9, GI service is 

following, no plans for endoscopic studies at this time.  Use mechanical DVT 

prophylaxis, follow d-dimer tomorrow, follow up chest x-ray inflammatory camron

ers, continue Decadron





I performed a history & physical examination of the patient and discussed their 

management with my nurse practitioner, Teresa Naik.  I reviewed the nurse 

practitioner's note and agree with the documented findings and plan of care.  

Lung sounds are positive for dim breath sounds throughout the lung fields.  The 

findings and the impression was discussed with the patient.  I attest to the 

documentation by the nurse practitioner. 





Time with Patient: Less than 30

## 2021-04-05 NOTE — P.PN
Subjective


Progress Note Date: 04/05/21





80-year-old male who presents emergency Department stating that he has had 

shortness of breath and feeling fatigued for at least 2 weeks.  Patient states 

he has had exposure to COVID .  Patient states he has had a fever.  Patient 

states she's also states and smile.  Patient also states she's had diarrhea.  

Patient denies any chest pain or palpitations.  Patient denies any abdominal 

pain patient denies nausea vomiting diarrhea.  According to EMS with the patient

was coming in he was satting in the low 80s.  Patient is currently on a few 

liters of oxygen and sat in mid 90s.  Patient is a diabetic and has high blood 

pressure.  Patient also has a pacemaker.  Patient is presently on 2 L of oxygen 

was requiring 4 L yesterday.  Patient was started on IV fluids patient 

creatinine went up to 1.6 baseline is around 1.  Patient's blood sugars are 

highly elevated.  Patient tests positive for Covid.





03/18/2021


Patient is presently on 2 L of oxygen appears to be doing better possibility of 

discharge tomorrow.  Creatinine went up a bit to 1.6 baseline is around the 1.  

Patient was started on IV fluids will recheck the basic metabolic profile 

tomorrow.





03/19/2021


Patient seen and evaluated in follow-up continues to be on 2 L of oxygen and 

becomes dyspneic with exertion.  Patient states he does not know wear oxygen at 

home.  Pulmonary is following.  Patient's blood sugars are elevated and will 

continue sliding scale at this time as patient is on steroids.  Potassium was 

found to be slightly elevated at 5.5 and was given a dose of Kayexalate.  Will 

repeat a.m. labs.  Instructed and encouraged the patient to get up and increase 

activity as tolerated.





03/20/2021


Patient is presently in 2 does of hours and doing clinically well.  Patient is 

on dexamethasone, vitamin supplements, subcutaneous heparin.  No worsening 

shortness of breath, cough or congestion.  D-dimer 1.02.  Sodium 145.  Potassium

5.1.  Creatinine 1.3.  .  C-reactive protein 4.4.  His creatinine 

actually went up a little bit from 1.19-1.3





03/21/2021


Patient is fairly stable no significant improvement or worsening.  Patient is 

still on 2 L of oxygen.  We'll monitor him 1 more night.  Continues to require 

oxygen patient probably can be discharged on 2 L tomorrow this can be tapered or

discontinued as an outpatient.  Patient d-dimer is bit worse compared to 

admission.  Although other inflammatory markers are better








03/22/2021


Patient is seen this morning currently on nonrebreather and airvo as his oxygen 

was found to be 89% on 6 L of oxygen.  Pulmonary is following.  D-dimer has gone

up at 1.70 along with lactate dehydrogenase at 491 and CRP is 9.4.  Blood sugars

continue to be elevated and patient is maintained on sliding scale along with 

long-acting and pre-meal insulin and will continue at this time.  Patient 

continues on vitamin and zinc supplements along with Lovenox and dexamethasone 

daily.  Patient is scheduled to receive Tocilizumab today.  Will continue to 

monitor closely based on the clinical course of the patient.  Patient continues 

to be weak and was seen and evaluated by physical therapy recommending subacute 

rehab and patient is now agreeable and a social work consult was placed. 





03/23/2021


Patient is seen and evaluated this morning with no improvement in respiratory 

status.  Patient remains on Airvo with supplemental nonrebreather.  Patient is 

day 2 of receiving Tocilizumab.  Will repeat a.m. chest x-ray along with 

inflammatory markers.  D-dimer today was 1.60.  White blood count has gone up in

is 10.8, hemoglobin is 12.6.  Patient is afebrile.  To continue with dexametha

sone, Lovenox, vitamin C and zinc supplements.  Pulmonary is following.  Had a 

discussion with the patient about CODE STATUS along with his son Mamadou and 

currently wish to remain a full code until talking with family member more 

extensively about the situation.  Will continue to monitor closely.  Prognosis 

remains guarded.





03/24/2021


Patient is seen this morning status post getting up to the bathroom and 

continues to be extremely dyspneic with any exertion and was found to be low 80s

to 83% oxygenation.  Patient continues on the airvo with a flow rate of 60 and 

FiO2 of 64 and is currently 91%.  Patient denies any chest pain or palpitations.

 Patient is afebrile.  Patient reports to tolerating diet with no nausea or 

vomiting noted.  D-dimer continues to be elevated at 2.56, sodium today is 140 

with a potassium of 5.1 and creatinine is 1.2.  Blood sugars on the lower side 

this morning and will continue to monitor closely and treat accordingly with 

sliding scale.  Inflammatory markers trending down.  Pulmonary following 

closely.  Chest x-ray today shows continued bibasilar infiltrates which are 

nonspecific but worsening.





03/25/2021





Patient is seen in follow-up this morning and continues to be on Airvo no 

significant change.  Patient was on dexamethasone all being transitioned to IV 

Solu-Medrol and will continue with Accu-Cheks and close glycemic control with 

pre-meal, long-acting, and sliding scale.  White blood count is 13.9, hemoglobin

is stable at 13.2, d-dimer is 2.95, sodium is 137, potassium is 5.1, creatinine 

slightly elevated at 1.24.  Inflammatory markers have gone up.  Patient is 

sitting up in the chair and denies any chest pain or worsening shortness of 

breath.  Patient states he feels he is about the same.  Patient also states he 

has been sleeping a lot and tolerating diet with no reports of nausea or 

vomiting.  Discussed with patient about increasing activity as tolerated 

although patient is extremely dyspneic with minimal exertion.





03/26/2021





Patient is seen and evaluated this morning and follow-up with no acute overnight

issues. Patient continues to be on Airvo with a flow rate of 60 and FiO2 of 70 

and is being closely monitored.  Transitioned to IV Solu-Medrol and will 

continue at this time.  Blood sugars elevated and increasing long-acting and 

pre-meal insulins and will continue sliding scale along with oral antidiabetic 

agents.  Need tighter glycemic control.  Patient has been eating 100% of all 

meals with no reports of nausea or vomiting noted.  Patient clinically looking 

better and chest x-ray displays stable infiltrates.  D-dimer slightly trending 

down along with inflammatory markers.  Creatinine is stable at 1.2 and potassium

is 5.5.  Will continue to monitor vital signs and labs closely.





On 3/27/2021 -patient is seen and examined at the bedside.  He is sitting up in 

the chair and is on Airvo 60 L with FiO2 of 70%, saturating about 90%.  He still

complains of exertional dyspnea and cough at times.  Denies having any fevers 

chills or rigors.  He complains of generalized weakness.  On reviewing his 

vitals temperature of 97.9, heart rate 63, respiratory rate 18, blood pressure 

120 x 62 .  On reviewing his labs white count of 22.9, hemoglobin 13, platelets 

299.  Sodium 136, potassium 5.6, chloride 104, bicarb 26, BUN 55, creatinine 

1.15.  Blood sugars running on the higher side around 300s.  Patient had a chest

x-ray done this morning showing mild to moderate mid and lower zone infiltrates.





On 3/28/2021 -patient is seen and examined at bedside.  He is sitting up in a 

chair at fifth high flow oxygen at 60 L FiO2 of 70% and pulse ox at 94%.  He is 

still having exertional dyspnea and complains of generalized weakness.  He 

states that he is tired of using the oxygen.  On reviewing the vitals T-max of 

97.9, heart rate 60, respiratory 19, blood pressure 130/60.  Reviewing the labs 

, CRP less than 0.04 and D-dimer of 1.53.  All medications have been 

reviewed and pertinent changes made.





03/29/2021


Patient is seen and evaluated this morning with no acute overnight issues.  

Patient continues to sit up in the chair throughout most of the day as he states

his breathing is better.  Patient continues to be on Airvo and flow rate has 

been decreased to 40 with an FiO2 of 70% and is currently 92%.  Patient con

tinues to have dyspnea with minimal exertion and continues to have weakness and 

will likely require some form rehab once stabilized and discharged.  PT/OT to 

continue to follow.  White blood count trending down at 18.64 and hemoglobin is 

stable at 13.2.  D-dimer is 1.60 with a sodium of 136 and potassium was found to

be 6.1 and being corrected and will repeat a.m. labs.  Creatinine is 1.2.  Blood

sugars continue to be elevated and will continue with sliding scale and long-

acting and monitor closely.  LDH trending back up at 503 and will monitor 

closely.  CRP is 0.4. 





03/30/2021


Patient's potassium is still high received the calcium gluconate and insulin as 

today.  Will order capsulate.  This is nonhemolyzed sample patient was started 

on low potassium diet.  Patient remains on Airvo , 40 L and 60% FiO2 which is 

better compared to yesterday and saturating better appears to have been some 

improvement.








03/31/2021


Patient is seen and evaluated this morning continues to be sitting up in the 

chair.  Patient states he feels his breathing is improved but "I'm still here in

the hospital".  Patient has continued weakness and has been working with PT/OT 

therapy.  Patient does require assistance to the bedside commode and will have 

PT/OT therapy reevaluate with the possibility of ECF once stabilized and 

discharged.  Sodium today is 138 with a potassium of 5.2 and current creatinine 

is 1.3.  Blood sugars being closely monitored and will continue with current 

medication regimen. Respiratory slowly weaning Airvo settings and is maintained 

on a flow rate of 35 with an FiO2 of 65%.  Pulmonary is following.





04/01/2021


Patient is seen this morning continues to be on Airvo and slow to respond.  

Current settings have a flow rate of 36 and FiO2 of 60%.  Patient denies any 

worsening shortness of breath.  White blood count trending down at 15.4, 

hemoglobin is stable at 14.2, sodium is 135, potassium is 5.2, current 

creatinine slightly improved at 1.12.





04/02/2021





Patient is seen and evaluated and follow-up continues to be on airvo requiring 

more oxygen support with a flow rate of 50 and an FiO2 of 70%.  D-dimer 

continues to trend down at 1.58, sodium is 137, potassium is 4.7, current 

creatinine is 1.22.  Blood sugars have been variable and will continue with 

current regimen and monitor closely.  Continue with Accu-Cheks before meals and 

at bedtime.  Patient is afebrile.





04/03/2021


Patient's overall respiratory status remains the patient is oxygen flow rate of 

40 L with FiO2 of 60% may be a mild improvement but saturations are going down 

again.  Patient is already on low potassium diet not an ACE inhibitor potassium 

supplementation will repeat basic metabolic profile again tomorrow.  Patient is 

not on any IV fluids is eating well.





04/04/2021


Patient is doing bit better today his oxygen requirements are going down patient

is on Airvo and 50% FiO2.  Blood glucose is bit elevated.  Patient had an 

episode of GI bleed will continue with anticoagulation and aspirin gastro-

oncology will be consulted probably hemorrhoidal bleed patient had blood in the 

stools.





04/05/2021


Patient is seen and evaluated and follow-up with no acute overnight issues.  

Apparently patient had a few episodes of blood in the stool and was evaluated by

GI and no plans for endoscopic intervention at this time as patient is not 

interested.  Patient denies any pain or discomfort.  Hemoglobin today is able at

9.9 and will continue to monitor closely.  Patient continues on Airvo the flow 

rate of 35 and FiO2 of 60% and currently 93%.  Patient is afebrile.  Discussed 

with the patient about increasing activity as tolerated and sitting up in the 

chair more often.





Constitutional: No reports of fatigue, denied any fever.


Cardio vascular: denied any chest pain, palpitations


Gastrointestinal denied any nausea vomiting, denies any bleeding in the stool 

today


Pulmonary: Reports continued shortness of breath no worse


Neurologic reports generalized weakness 





All inpatient medications were reviewed and appropriate changes in these 

medications as dictated in the interval history and assessment and plan.























Objective





- Vital Signs


Vital signs: 


                                   Vital Signs











Temp  98.1 F   04/05/21 13:24


 


Pulse  84   04/05/21 13:24


 


Resp  20   04/05/21 13:24


 


BP  114/57   04/05/21 13:24


 


Pulse Ox  93 L  04/05/21 15:53








                                 Intake & Output











 04/04/21 04/05/21 04/05/21





 18:59 06:59 18:59


 


Intake Total 1080  


 


Output Total 1  


 


Balance 1079  


 


Intake:   


 


  Oral 1080  


 


Output:   


 


  Stool 1  


 


Other:   


 


  Voiding Method Bedside Commode  





 Urinal  


 


  # Voids 2 2 


 


  # Bowel Movements 2  














- Exam





GENERAL: The patient is alert and oriented x3, not in any acute distress.  

Obese, currently on  airvo at a flow rate of 35 with an FiO2 of 60, continue to 

wean as tolerated


HEENT: Pupils are round and equally reacting to light. EOMI. No scleral icterus.

No conjunctival pallor. Normocephalic, atraumatic. No pharyngeal erythema. No 

thyromegaly. 


CARDIOVASCULAR: S1 and S2 present. No murmurs, rubs, or gallops. 


PULMONARY: Diminished breath sounds bilaterally with no wheezing noted, coarse 

rhonchi noted bilaterally


ABDOMEN: Soft, nontender, nondistended, normoactive bowel sounds. No palpable 

organomegaly. 


MUSCULOSKELETAL: No joint swelling or deformity.


EXTREMITIES: No cyanosis, clubbing, or pedal edema. 


NEUROLOGICAL: Gross neurological examination did not reveal any focal deficits. 

Diffuse weakness


SKIN: No rashes. 








- Labs


CBC & Chem 7: 


                                 04/05/21 06:09





                                 04/05/21 06:09


Labs: 


                  Abnormal Lab Results - Last 24 Hours (Table)











  04/04/21 04/04/21 04/04/21 Range/Units





  15:45 17:09 20:32 


 


WBC  15.6 H    (3.8-10.6)  k/uL


 


RBC  3.48 L    (4.30-5.90)  m/uL


 


Hgb  10.7 L D    (13.0-17.5)  gm/dL


 


Hct  33.1 L    (39.0-53.0)  %


 


Neutrophils #  13.1 H    (1.3-7.7)  k/uL


 


Anion Gap     (4.00-12.00)  mmol/L


 


BUN     (9.0-27.0)  mg/dL


 


BUN/Creatinine Ratio     (12.00-20.00)  Ratio


 


POC Glucose (mg/dL)   170 H  178 H  (75-99)  mg/dL


 


Calcium     (8.7-10.3)  mg/dL














  04/05/21 04/05/21 04/05/21 Range/Units





  06:09 06:09 11:16 


 


WBC  14.75 H    (3.8-10.6)  k/uL


 


RBC  3.18 L    (4.30-5.90)  m/uL


 


Hgb  9.9 L    (13.0-17.5)  gm/dL


 


Hct  30.5 L    (39.0-53.0)  %


 


Neutrophils #     (1.3-7.7)  k/uL


 


Anion Gap   2.10 L   (4.00-12.00)  mmol/L


 


BUN   66.0 H   (9.0-27.0)  mg/dL


 


BUN/Creatinine Ratio   60.00 H   (12.00-20.00)  Ratio


 


POC Glucose (mg/dL)    218 H  (75-99)  mg/dL


 


Calcium   8.3 L   (8.7-10.3)  mg/dL














Assessment and Plan


Assessment: 





-acute hypoxic respiratory failure: Secondary to covid 19 pneumonia patient 

maintained on vitamin and zinc supplements.  Patient currently on IV solu-

Medrol.  has received 2 doses of Tocilizumab and is continued on Lovenox. 

,Patient currently on Airvo and weaning as tolerated.  Pulmonary following


-Possible hemorrhoids with blood noted in the stool, GI evaluated the patient 

and ordered anti-hemorrhoidal and no plans for endoscopic intervention, will 

monitor hemoglobin closely


-Type 2 diabetes mellitus uncontrolled elevated blood sugars secondary to 

systemic steroids, continue with pre-meal, sliding scale, and long-acting, with 

adjustments made and will increase pre-male and long-acting doses


-Hyperkalemia, improved 


-Hypotonic hyponatremia, improved


-Acute renal failure secondary to Covid 19: Improved, current creatinine is 1.1


-Hypertension 


-DVT subcutaneous Lovenox


-Full code





Plan:


Continue with current medications.  Will continue IV Solu-Medrol with vitamin 

and zinc supplements, and lovenox . patient has received Tocilizumab x2.  

Pulmonary following .  Patient currently on airvo and weaning as tolerated.  

Very slow to respond.  Current settings are flow rate of 35 and FiO2 of 60%.  

Discussed with the patient to increase activity as tolerated.  Hemoglobin is 

stable at 9.9 with no further bleeding noted in the stool.  Will repeat a.m. 

labs and continue to monitor closely Continue with low potassium diet.  Patient 

response to treatment is slow.  Will continue to monitor closely.  Prognosis 

remains guarded.

## 2021-04-05 NOTE — P.CONS
History of Present Illness





- Reason for Consult


Consult date: 04/05/21


Blood per rectum


Requesting physician: Cr Ha





- Chief Complaint


Shortness of breath, fever





- History of Present Illness





80-year-old male with multiple medical comorbidities including diabetes 

mellitus, hypertension, prior pacemaker placement, diverticulosis with prior 

bowel resection for complicated diverticulitis who presented to the hospital due

to concerns over shortness of breath and fever.  Patient was diagnosed with 

Covid 19 pneumonia for which he is currently receiving treatment.  The patient 

is on high flow nasal cannula at this time setting bedside and eating.  He is 

unsure if he previously had a colonoscopy and reports that his prior bowel res

ection was 40 years ago.  The patient has been treated for viral infection and 

developed some bleeding yesterday.  Multiple episodes of blood per rectum 

otherwise bowel movements have remained normal with no diarrhea or constipation.

 Only one day of bleeding with no further bleeding today.  Hemoglobin found to 

fall to 9.9.  He denies any abdominal pain.





Review of Systems





REVIEW OF SYSTEMS:


CONSTITUTIONAL: Denies any fevers, chills, weight change but he had been 

experiencing fevers on presentation.


CARDIOVASCULAR: Denies any chest pain, palpitations high or low blood pressures


RESPIRATORY: Denies any hemoptysis but the patient had been complaining of 

shortness of breath on presentation, currently having symptoms of shortness of 

breath on exertion.  


GENITOURINARY:  No dysuria or hematuria. 


MUSCULOSKELETAL: No weakness reported. 


SKIN: Denies any new rashes or lesions, jaundice or pallor. 


PSYCHIATRIC: Denies any depression or anxiety. 


NEUROLOGY: Denies headache, denies any new focal deficits. 


EARS/NOSE/THROAT: No recent hearing change, congestion, nasal discharge or sore 

throat.


EYES: No pain in eyes, discharge or change in vision. 


GASTROINTESTINAL: As per HPI.





Past Medical History


Past Medical History: Diabetes Mellitus, Hypertension


Additional Past Medical History / Comment(s): sinusitis, diverticulits


History of Any Multi-Drug Resistant Organisms: None Reported


Past Surgical History: Bowel Resection, Cholecystectomy


Additional Past Surgical History / Comment(s): bowel resection d/t 

diverticulitis, colonoscopy


Past Anesthesia/Blood Transfusion Reactions: No Reported Reaction


Past Psychological History: No Psychological Hx Reported


Additional Psychological History / Comment(s): pt lives with his girlfriend 

antonio, is independant, no assistive devices.no outside services. worked in 

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


Smoking Status: Former smoker


Past Alcohol Use History: Occasional


Past Drug Use History: None Reported





- Past Family History


  ** Mother


Additional Family Medical History / Comment(s): reproductive cancer





  ** Father


Family Medical History: Diabetes Mellitus





Medications and Allergies


                                Home Medications











 Medication  Instructions  Recorded  Confirmed  Type


 


glipiZIDE [Glipizide] 20 mg PO BID 11/25/15 03/16/21 History


 


metFORMIN HCL 1,000 mg PO BID 11/25/15 03/16/21 History


 


Pioglitazone [Actos] 30 mg PO DAILY 05/15/20 03/16/21 History


 


Aspirin [Adult Low Dose Aspirin EC] 81 mg PO DAILY #30 tab 05/22/20 03/16/21 Rx


 


Atorvastatin [Lipitor] 80 mg PO DAILY #30 tab 05/22/20 03/16/21 Rx


 


Furosemide [Lasix] 20 mg PO DAILY #30 tab 05/22/20 03/16/21 Rx


 


Clopidogrel Bisulfate [Plavix] 75 mg PO DAILY 03/16/21 03/16/21 History


 


Losartan [Cozaar] 50 mg PO DAILY 03/16/21 03/16/21 History


 


Metoprolol Succinate (ER) [Toprol 50 mg PO DAILY 03/16/21 03/16/21 History





Xl]    








                                    Allergies











Allergy/AdvReac Type Severity Reaction Status Date / Time


 


Mushroom Allergy  Vomiting Verified 03/16/21 20:46


 


Penicillins Allergy  Unknown Verified 03/16/21 20:46














Physical Exam


Vitals: 


                                   Vital Signs











  Temp Pulse Resp BP Pulse Ox


 


 04/05/21 09:39  97.7 F  64  18  105/48  94 L


 


 04/05/21 08:04      89 L


 


 04/05/21 05:40  97.6 F   19  122/62  92 L


 


 04/05/21 02:00  98.1 F  58 L  19  128/56  96


 


 04/04/21 23:34      92 L


 


 04/04/21 20:21  97.8 F  62  15  122/55  94 L


 


 04/04/21 20:00      97


 


 04/04/21 17:53  98.2 F  56 L  18  125/61  97


 


 04/04/21 16:12      93 L


 


 04/04/21 14:00  97.9 F  64  18  124/51  89 L








                                Intake and Output











 04/04/21 04/05/21 04/05/21





 22:59 06:59 14:59


 


Intake Total 1080  


 


Balance 1080  


 


Intake:   


 


  Oral 1080  


 


Other:   


 


  # Voids 2 2 


 


  # Bowel Movements 2  














On physical examination, patient appears comfortable in no apparent distress. 


HEAD: Normocephalic, atraumatic. 


EYES: No scleral icterus. No conjunctival injection. 


MOUTH: No lesions, tongue midline. 


NECK: Trachea midline, no gross abnormalities. 


CHEST: Decreased air entry in all lung fields. 


HEART: S1-S2 appreciated. 


ABDOMEN: Soft, obese, nontender to palpation. Bowel sounds are positive. No 

organomegaly.  No guarding or rigidity.


EXTREMITIES: Bilateral pedal edema. 


SKIN: No rashes, no jaundice. 


NEUROLOGIC: Alert and oriented to person and place. 





Results


CBC & Chem 7: 


                                 04/05/21 06:09





                                 04/05/21 06:09


Labs: 


                  Abnormal Lab Results - Last 24 Hours (Table)











  04/04/21 04/04/21 04/04/21 Range/Units





  11:57 15:45 17:09 


 


WBC   15.6 H   (3.8-10.6)  k/uL


 


RBC   3.48 L   (4.30-5.90)  m/uL


 


Hgb   10.7 L D   (13.0-17.5)  gm/dL


 


Hct   33.1 L   (39.0-53.0)  %


 


Neutrophils #   13.1 H   (1.3-7.7)  k/uL


 


POC Glucose (mg/dL)  289 H   170 H  (75-99)  mg/dL














  04/04/21 04/05/21 04/05/21 Range/Units





  20:32 06:09 11:16 


 


WBC   14.75 H   (3.8-10.6)  k/uL


 


RBC   3.18 L   (4.30-5.90)  m/uL


 


Hgb   9.9 L   (13.0-17.5)  gm/dL


 


Hct   30.5 L   (39.0-53.0)  %


 


Neutrophils #     (1.3-7.7)  k/uL


 


POC Glucose (mg/dL)  178 H   218 H  (75-99)  mg/dL











Chest x-ray: report reviewed





Assessment and Plan


(1) Blood per rectum


Narrative/Plan: 


80-year-old male with multiple medical comorbidities currently receiving 

treatment for Covid 19 pneumonia.  Patient had witnessed episodes of bright red 

blood per rectum on the medical floor.  Currently improved and is stable at 9.9,

was initially 14 on presentation.  Patient does have a history of prior bowel 

resection approximate 40 years ago for consultative diverticulitis.  No further 

bleeding today.  No abdominal pain reported.  He is tolerating his diet.  

Unclear etiology, may be related to perirectal and hemorrhoidal disease, cannot 

rule out anastomotic site ulcer, ischemic colitis less likely given absence of 

abdominal pain, or other etiology.


Current Visit: Yes   Status: Acute   Code(s): K62.5 - HEMORRHAGE OF ANUS AND 

RECTUM   SNOMED Code(s): 89356035


   





(2) Anemia associated with acute blood loss


Current Visit: Yes   Status: Acute   Code(s): D62 - ACUTE POSTHEMORRHAGIC ANEMIA

  SNOMED Code(s): 432596815


   


Plan: 





Supportive care


Okay for diet as tolerated


Continue to monitor CBC and transfuse as needed


Continue to monitor for signs and symptoms of GI bleeding


Local hemorrhoidal care with topical steroid therapy ordered


Anemia laboratory evaluation ordered


Extensive discussion with the patient regarding possible treatment options at 

this time the patient is not interested in any endoscopy, continue to monitor 

and reevaluated further bleeding or fall in hemoglobin


Thank you for allowing us to persist in the care of this patient we will 

continue to follow

## 2021-04-06 LAB
ANION GAP SERPL CALC-SCNC: 4.3 MMOL/L (ref 4–12)
BASOPHILS # BLD AUTO: 0.02 X 10*3/UL (ref 0–0.1)
BASOPHILS NFR BLD AUTO: 0.1 %
BUN SERPL-SCNC: 60 MG/DL (ref 9–27)
BUN/CREAT SERPL: 50 RATIO (ref 12–20)
CALCIUM SPEC-MCNC: 8.3 MG/DL (ref 8.7–10.3)
CHLORIDE SERPL-SCNC: 108 MMOL/L (ref 96–109)
CO2 SERPL-SCNC: 27.7 MMOL/L (ref 21.6–31.8)
EOSINOPHIL # BLD AUTO: 0 X 10*3/UL (ref 0.04–0.35)
EOSINOPHIL NFR BLD AUTO: 0 %
ERYTHROCYTE [DISTWIDTH] IN BLOOD BY AUTOMATED COUNT: 3.24 X 10*6/UL (ref 4.4–5.6)
ERYTHROCYTE [DISTWIDTH] IN BLOOD: 14.6 % (ref 11.5–14.5)
FERRITIN SERPL-MCNC: 344 NG/ML (ref 22–322)
GLUCOSE BLD-MCNC: 193 MG/DL (ref 75–99)
GLUCOSE BLD-MCNC: 228 MG/DL (ref 75–99)
GLUCOSE BLD-MCNC: 256 MG/DL (ref 75–99)
GLUCOSE BLD-MCNC: 69 MG/DL (ref 75–99)
GLUCOSE BLD-MCNC: 85 MG/DL (ref 75–99)
GLUCOSE SERPL-MCNC: 52 MG/DL (ref 70–110)
HCT VFR BLD AUTO: 31.6 % (ref 39.6–50)
HGB BLD-MCNC: 10 G/DL (ref 13–17)
IRON SERPL-MCNC: 104 UG/DL (ref 65–175)
LDH SPEC-CCNC: 425 U/L (ref 120–246)
LYMPHOCYTES # SPEC AUTO: 1.25 X 10*3/UL (ref 0.9–5)
LYMPHOCYTES NFR SPEC AUTO: 7.6 %
MCH RBC QN AUTO: 30.9 PG (ref 27–32)
MCHC RBC AUTO-ENTMCNC: 31.6 G/DL (ref 32–37)
MCV RBC AUTO: 97.5 FL (ref 80–97)
MONOCYTES # BLD AUTO: 1.23 X 10*3/UL (ref 0.2–1)
MONOCYTES NFR BLD AUTO: 7.5 %
NEUTROPHILS # BLD AUTO: 13.52 X 10*3/UL (ref 1.8–7.7)
NEUTROPHILS NFR BLD AUTO: 82.8 %
PLATELET # BLD AUTO: 194 X 10*3/UL (ref 140–440)
POTASSIUM SERPL-SCNC: 5.5 MMOL/L (ref 3.5–5.5)
SODIUM SERPL-SCNC: 140 MMOL/L (ref 135–145)
TIBC SERPL-MCNC: 289 UG/DL (ref 228–460)
VIT B12 SERPL-MCNC: 520 PG/ML (ref 200–944)
WBC # BLD AUTO: 16.35 X 10*3/UL (ref 4.5–10)

## 2021-04-06 RX ADMIN — METHYLPREDNISOLONE SODIUM SUCCINATE SCH MG: 125 INJECTION, POWDER, FOR SOLUTION INTRAMUSCULAR; INTRAVENOUS at 07:45

## 2021-04-06 RX ADMIN — INSULIN ASPART SCH UNIT: 100 INJECTION, SOLUTION INTRAVENOUS; SUBCUTANEOUS at 12:07

## 2021-04-06 RX ADMIN — METHYLPREDNISOLONE SODIUM SUCCINATE SCH MG: 125 INJECTION, POWDER, FOR SOLUTION INTRAMUSCULAR; INTRAVENOUS at 23:08

## 2021-04-06 RX ADMIN — INSULIN DETEMIR SCH UNIT: 100 INJECTION, SOLUTION SUBCUTANEOUS at 21:53

## 2021-04-06 RX ADMIN — INSULIN ASPART SCH UNIT: 100 INJECTION, SOLUTION INTRAVENOUS; SUBCUTANEOUS at 21:52

## 2021-04-06 RX ADMIN — ATORVASTATIN CALCIUM SCH MG: 80 TABLET, FILM COATED ORAL at 07:46

## 2021-04-06 RX ADMIN — HYDROCORTISONE SCH: 25 CREAM TOPICAL at 07:56

## 2021-04-06 RX ADMIN — METOPROLOL SUCCINATE SCH MG: 50 TABLET, EXTENDED RELEASE ORAL at 07:46

## 2021-04-06 RX ADMIN — INSULIN ASPART SCH: 100 INJECTION, SOLUTION INTRAVENOUS; SUBCUTANEOUS at 07:27

## 2021-04-06 RX ADMIN — CLOPIDOGREL BISULFATE SCH MG: 75 TABLET ORAL at 07:46

## 2021-04-06 RX ADMIN — INSULIN ASPART SCH UNIT: 100 INJECTION, SOLUTION INTRAVENOUS; SUBCUTANEOUS at 17:39

## 2021-04-06 RX ADMIN — Medication SCH MG: at 07:46

## 2021-04-06 RX ADMIN — OXYCODONE HYDROCHLORIDE AND ACETAMINOPHEN SCH MG: 500 TABLET ORAL at 07:45

## 2021-04-06 RX ADMIN — INSULIN ASPART SCH UNIT: 100 INJECTION, SOLUTION INTRAVENOUS; SUBCUTANEOUS at 12:08

## 2021-04-06 RX ADMIN — METHYLPREDNISOLONE SODIUM SUCCINATE SCH MG: 125 INJECTION, POWDER, FOR SOLUTION INTRAMUSCULAR; INTRAVENOUS at 00:00

## 2021-04-06 RX ADMIN — HYDROCORTISONE SCH APPLIC: 25 CREAM TOPICAL at 21:53

## 2021-04-06 RX ADMIN — FAMOTIDINE SCH MG: 20 TABLET, FILM COATED ORAL at 07:45

## 2021-04-06 RX ADMIN — PIOGLITAZONE SCH MG: 30 TABLET ORAL at 07:46

## 2021-04-06 RX ADMIN — METHYLPREDNISOLONE SODIUM SUCCINATE SCH MG: 125 INJECTION, POWDER, FOR SOLUTION INTRAMUSCULAR; INTRAVENOUS at 16:30

## 2021-04-06 NOTE — XR
EXAMINATION TYPE: XR chest 1V portable

 

DATE OF EXAM: 4/6/2021

 

COMPARISON: 4/2/2021

 

INDICATION: Covid

 

TECHNIQUE: Single frontal view of the chest is obtained.

 

FINDINGS:  

The heart size is normal.  

The pulmonary vasculature is slightly prominent.  

Mild diffuse increased lung markings are present bilaterally. Findings are similar to comparison. Pac
emaker overlies left chest  

 

 

IMPRESSION:  

1. Diffuse increased lung markings are nonspecific but can be compatible with atypical pneumonia.

## 2021-04-06 NOTE — P.PN
Subjective


Progress Note Date: 04/06/21


Principal diagnosis: 





Acute hypoxic respiratory failure secondary to covid with 19 pneumonitis.





80-year-old male, who was brought to the emergency department by EMS.  The 

patient apparently has had 2 weeks' worth of increasing shortness of breath, and

significant fatigue.  The patient also had chest congestion.  He had a fever.  

The patient states that he is just not been feeling well and getting 

progressively worse.  He also apparently had an episode or 2 of diarrhea.  The p

atient denied any chest pain or chest pressure or palpitations.  The patient 

also denied nausea, vomiting, and abdominal pain.  Apparently when EMS arrived, 

the patient's saturations were in the low 80s on room air.  For that reason, he 

was transported in, evaluated, and admitted with a diagnosis of COVID 19 19 

pneumonia.  The patient does have a history of diabetes, hypertension, and a 

previous pacemaker insertion.  White count was 4.6, hemoglobin 12.5, hematocrit 

37.1, and platelet count 153,000.  PT, INR, and PTT were all normal.  D-dimer 

was 0.77.  Sodium 136, potassium 4.5, chlorides 102, CO2 26, anion gap 8, BUN 

47, and creatinine 1.63.  Ferritin was 985, , C-reactive protein 58, and 

pro-calcitonin level was 0.12.  Chest x-ray did show some interstitial 

infiltrates.





On 03/18/2021 patient seen in follow-up on medical floor.  he is on 2 L of 

oxygen his pulse ox is 90-94%, breathing comfortably, no fever or chills, blood 

pressure has been stable.  Denies any worsening dyspnea or hypoxemia, he has a 

mild cough, no chest discomfort.  He continues on oral Decadron 6 mg daily, IV 

hydration, vitamins, d-dimer was low at 0.77, patient is on subcu heparin for 

DVT prophylaxis, pro-calcitonin level was low, inflammatory markers were not 

significantly elevated on admission.  Clinically symptoms have not progressed, 

and patient will be considered for discharge in next 24 hours.





On 04/05/2021 patient seen in follow-up on medical surgical floor, patient 

remains on irritable at 35 L and FiO2 of 60%, with a pulse ox is ranging between

86-93%, no worsening dyspnea, no chills, hemodynamically stable.  Patient is 

resting in bed, despite requiring high flow oxygen his lung sounds are clear, no

significant wheezes or crackles or rhonchi.  Patient states he was up in a chair

earlier and he felt dizzy, occasional cough with no phlegm production, no chest 

pain or hemoptysis, his last chest x-ray was on O2 2021 showing patchy bilateral

airspace disease, but slightly increased in the mid and lower lungs.





Patient was reevaluated today on 4/6/2021, he is now on 40% FiO2 and 33 L flow 

using airvo, patient seems to be comfortable, doing quite well, his O2 

saturation is 92% present.  Patient denies any shortness of breath at rest, he 

does have dyspnea on exertion.  No cough no fever no chills no hemoptysis.  WBC 

count today is 16.3 his platelets are 194.  No inflammatory markers ordered 

today.  Chest x-ray from today shows diffuse interstitial infiltrates 

bilaterally














Objective





- Vital Signs


Vital signs: 


                                   Vital Signs











Temp  98.5 F   04/06/21 10:00


 


Pulse  65   04/06/21 10:00


 


Resp  20   04/06/21 10:00


 


BP  136/81   04/06/21 10:00


 


Pulse Ox  86 L  04/06/21 13:08








                                 Intake & Output











 04/05/21 04/06/21 04/06/21





 18:59 06:59 18:59


 


Output Total 600 900 


 


Balance -600 -900 


 


Output:   


 


  Urine 600 900 


 


Other:   


 


  Voiding Method  Bedside Commode 





  Urinal 


 


  # Voids  1 


 


  # Bowel Movements  1 














- Exam


 GENERAL EXAM: Alert, pleasant, 80-year-old white male currently on Airvo as 

noted earlier.


HEAD: Normocephalic/atraumatic.


HEENT: PERRLA, EOMI, nonicteric, no neck masses, no JVD, no stridor.


CHEST: No chest wall deformity.  Symmetrical expansion. 


LUNGS: Equal air entry fine crackles at the bases noted.


CVS: Regular rate and rhythm, normal S1 and S2, no gallops, no murmurs, no rubs


ABDOMEN: Soft, nontender.  No hepatosplenomegaly, normal bowel sounds, no 

guarding or rigidity.


EXTREMITIES: No clubbing, no edema, no cyanosis, 2+ pulses and upper and lower 

extremities.


MUSCULOSKELETAL: Muscle strength and tone normal.


SPINE: No scoliosis or deformity


SKIN: No rashes


CENTRAL NERVOUS SYSTEM: Alert and oriented 3 focal deficits.


PSYCHIATRIC: Normal mood, affect and normal mental status examination





- Labs


CBC & Chem 7: 


                                 04/06/21 06:45





                                 04/05/21 06:09


Labs: 


                  Abnormal Lab Results - Last 24 Hours (Table)











  04/05/21 04/05/21 04/06/21 Range/Units





  16:37 20:52 06:45 


 


WBC    16.35 H  (4.50-10.00)  X 10*3/uL


 


RBC    3.24 L  (4.40-5.60)  X 10*6/uL


 


Hgb    10.0 L  (13.0-17.0)  g/dL


 


Hct    31.6 L  (39.6-50.0)  %


 


MCV    97.5 H  (80.0-97.0)  fL


 


MCHC    31.6 L  (32.0-37.0)  g/dL


 


RDW    14.6 H  (11.5-14.5)  %


 


Immature Gran #    0.33 H  (0.00-0.04)  X 10*3/uL


 


Neutrophils #    13.52 H  (1.80-7.70)  X 10*3/uL


 


Monocytes #    1.23 H  (0.20-1.00)  X 10*3/uL


 


Eosinophils #    0 L  (0.04-0.35)  X 10*3/uL


 


D-Dimer     (<0.60)  mg/L FEU


 


POC Glucose (mg/dL)  281 H  249 H   (75-99)  mg/dL














  04/06/21 04/06/21 04/06/21 Range/Units





  06:45 06:58 11:16 


 


WBC     (4.50-10.00)  X 10*3/uL


 


RBC     (4.40-5.60)  X 10*6/uL


 


Hgb     (13.0-17.0)  g/dL


 


Hct     (39.6-50.0)  %


 


MCV     (80.0-97.0)  fL


 


MCHC     (32.0-37.0)  g/dL


 


RDW     (11.5-14.5)  %


 


Immature Gran #     (0.00-0.04)  X 10*3/uL


 


Neutrophils #     (1.80-7.70)  X 10*3/uL


 


Monocytes #     (0.20-1.00)  X 10*3/uL


 


Eosinophils #     (0.04-0.35)  X 10*3/uL


 


D-Dimer  1.12 H    (<0.60)  mg/L FEU


 


POC Glucose (mg/dL)   69 L  193 H  (75-99)  mg/dL














Assessment and Plan


Assessment: 





Impression:


Acute hypoxic respiratory failure secondary to cope at 19 pneumonia.  Patient 

did receive toci


Type 2 diabetes.


Benign essential hypertension.


History of bowel resection for diverticulitis.


Lower GI bleeding, anticoagulation is presently on hold.  Being addressed by 

gastroenterology.





Recommendation:


Continue present supportive care measures


Continue oxygen and titrate accordingly.


GI to evaluate for his GI bleeding.


Continue DVT prophylaxis.


Continue to follow markers.


Continue Decadron.


We will continue to follow, patient is not ready for discharge at this time.


Time with Patient: Less than 30

## 2021-04-06 NOTE — P.PN
Subjective


Progress Note Date: 04/06/21


Principal diagnosis: 





Blood per rectum





Should seen and examined lying in bed.  He still remains on high flow oxygen.  

He denies any further bowel movements her rectal bleeding.  His hemoglobin is 

stable at 10.0.  He denies any abdominal pain, nausea, or vomiting.





Objective





- Vital Signs


Vital signs: 


                                   Vital Signs











Temp  98.4 F   04/06/21 06:00


 


Pulse  60   04/06/21 06:00


 


Resp  17   04/06/21 06:00


 


BP  103/53   04/06/21 06:00


 


Pulse Ox  93 L  04/06/21 06:00








                                 Intake & Output











 04/05/21 04/06/21 04/06/21





 18:59 06:59 18:59


 


Output Total 600 900 


 


Balance -600 -900 


 


Output:   


 


  Urine 600 900 


 


Other:   


 


  Voiding Method  Bedside Commode 





  Urinal 


 


  # Voids  1 


 


  # Bowel Movements  1 














- Exam





General appearance: The patient is alert, oriented, appears in no acute 

distress.


HET: Head is normocephalic and atraumatic.  Conjunctiva pink.  Sclera anicteric.


Neck: Supple without lymphadenopathy.  


Abdomen: Soft,  nontender, nondistended with  bowel sounds.  No guarding or 

rigidity.


Extremities: Normal skin color and turgor.  No pedal edema


Skin: No rashes, no jaundice


Neurological: No focal deficits.  Alert and oriented 3.





- Labs


CBC & Chem 7: 


                                 04/06/21 06:45





                                 04/05/21 06:09


Labs: 


                  Abnormal Lab Results - Last 24 Hours (Table)











  04/05/21 04/05/21 04/05/21 Range/Units





  06:09 11:16 16:37 


 


D-Dimer     (<0.60)  mg/L FEU


 


Anion Gap  2.10 L    (4.00-12.00)  mmol/L


 


BUN  66.0 H    (9.0-27.0)  mg/dL


 


BUN/Creatinine Ratio  60.00 H    (12.00-20.00)  Ratio


 


POC Glucose (mg/dL)   218 H  281 H  (75-99)  mg/dL


 


Calcium  8.3 L    (8.7-10.3)  mg/dL














  04/05/21 04/06/21 04/06/21 Range/Units





  20:52 06:45 06:58 


 


D-Dimer   1.12 H   (<0.60)  mg/L FEU


 


Anion Gap     (4.00-12.00)  mmol/L


 


BUN     (9.0-27.0)  mg/dL


 


BUN/Creatinine Ratio     (12.00-20.00)  Ratio


 


POC Glucose (mg/dL)  249 H   69 L  (75-99)  mg/dL


 


Calcium     (8.7-10.3)  mg/dL














Assessment and Plan


(1) Anemia associated with acute blood loss


Narrative/Plan: 


80-year-old male with multiple medical comorbidities currently receiving 

treatment for Covid 19 pneumonia.  Patient had witnessed episodes of bright red 

blood per rectum on the medical floor.  Currently improved and is stable at 9.9,

was initially 14 on presentation.  Patient does have a history of prior bowel 

resection approximate 40 years ago for consultative diverticulitis.  No further 

bleeding today.  No abdominal pain reported.  He is tolerating his diet.  

Unclear etiology, may be related to perirectal and hemorrhoidal disease, cannot 

rule out anastomotic site ulcer, ischemic colitis less likely given absence of 

abdominal pain, or other etiology.








Current Visit: Yes   Status: Acute   Code(s): D62 - ACUTE POSTHEMORRHAGIC ANEMIA

  SNOMED Code(s): 632783291


   





(2) Blood per rectum


Current Visit: Yes   Status: Acute   Code(s): K62.5 - HEMORRHAGE OF ANUS AND 

RECTUM   SNOMED Code(s): 05376961


   


Plan: 





1.  Supportive care


2.  Okay for diet as tolerated


3.  Continue to monitor CBC and transfuse as needed


4.  Continue to monitor for signs and symptoms of GI bleeding


5.  Local hemorrhoidal care with topical steroid therapy ordered


6.  Anemia laboratory evaluation ordered


Extensive discussion with the patient regarding possible treatment options at 

this time the patient is not interested in any endoscopy, continue to monitor 

and reevaluated further bleeding or fall in hemoglobin


Thank you for this consultation, we will be on standby

## 2021-04-06 NOTE — P.PN
Subjective


Progress Note Date: 04/06/21





80-year-old male who presents emergency Department stating that he has had 

shortness of breath and feeling fatigued for at least 2 weeks.  Patient states 

he has had exposure to COVID .  Patient states he has had a fever.  Patient 

states she's also states and smile.  Patient also states she's had diarrhea.  

Patient denies any chest pain or palpitations.  Patient denies any abdominal 

pain patient denies nausea vomiting diarrhea.  According to EMS with the patient

was coming in he was satting in the low 80s.  Patient is currently on a few 

liters of oxygen and sat in mid 90s.  Patient is a diabetic and has high blood 

pressure.  Patient also has a pacemaker.  Patient is presently on 2 L of oxygen 

was requiring 4 L yesterday.  Patient was started on IV fluids patient 

creatinine went up to 1.6 baseline is around 1.  Patient's blood sugars are 

highly elevated.  Patient tests positive for Covid.





03/18/2021


Patient is presently on 2 L of oxygen appears to be doing better possibility of 

discharge tomorrow.  Creatinine went up a bit to 1.6 baseline is around the 1.  

Patient was started on IV fluids will recheck the basic metabolic profile 

tomorrow.





03/19/2021


Patient seen and evaluated in follow-up continues to be on 2 L of oxygen and 

becomes dyspneic with exertion.  Patient states he does not know wear oxygen at 

home.  Pulmonary is following.  Patient's blood sugars are elevated and will 

continue sliding scale at this time as patient is on steroids.  Potassium was 

found to be slightly elevated at 5.5 and was given a dose of Kayexalate.  Will 

repeat a.m. labs.  Instructed and encouraged the patient to get up and increase 

activity as tolerated.





03/20/2021


Patient is presently in 2 does of hours and doing clinically well.  Patient is 

on dexamethasone, vitamin supplements, subcutaneous heparin.  No worsening 

shortness of breath, cough or congestion.  D-dimer 1.02.  Sodium 145.  Potassium

5.1.  Creatinine 1.3.  .  C-reactive protein 4.4.  His creatinine 

actually went up a little bit from 1.19-1.3





03/21/2021


Patient is fairly stable no significant improvement or worsening.  Patient is 

still on 2 L of oxygen.  We'll monitor him 1 more night.  Continues to require 

oxygen patient probably can be discharged on 2 L tomorrow this can be tapered or

discontinued as an outpatient.  Patient d-dimer is bit worse compared to 

admission.  Although other inflammatory markers are better








03/22/2021


Patient is seen this morning currently on nonrebreather and airvo as his oxygen 

was found to be 89% on 6 L of oxygen.  Pulmonary is following.  D-dimer has gone

up at 1.70 along with lactate dehydrogenase at 491 and CRP is 9.4.  Blood sugars

continue to be elevated and patient is maintained on sliding scale along with 

long-acting and pre-meal insulin and will continue at this time.  Patient 

continues on vitamin and zinc supplements along with Lovenox and dexamethasone 

daily.  Patient is scheduled to receive Tocilizumab today.  Will continue to 

monitor closely based on the clinical course of the patient.  Patient continues 

to be weak and was seen and evaluated by physical therapy recommending subacute 

rehab and patient is now agreeable and a social work consult was placed. 





03/23/2021


Patient is seen and evaluated this morning with no improvement in respiratory 

status.  Patient remains on Airvo with supplemental nonrebreather.  Patient is 

day 2 of receiving Tocilizumab.  Will repeat a.m. chest x-ray along with 

inflammatory markers.  D-dimer today was 1.60.  White blood count has gone up in

is 10.8, hemoglobin is 12.6.  Patient is afebrile.  To continue with dexametha

sone, Lovenox, vitamin C and zinc supplements.  Pulmonary is following.  Had a 

discussion with the patient about CODE STATUS along with his son Mamadou and 

currently wish to remain a full code until talking with family member more 

extensively about the situation.  Will continue to monitor closely.  Prognosis 

remains guarded.





03/24/2021


Patient is seen this morning status post getting up to the bathroom and 

continues to be extremely dyspneic with any exertion and was found to be low 80s

to 83% oxygenation.  Patient continues on the airvo with a flow rate of 60 and 

FiO2 of 64 and is currently 91%.  Patient denies any chest pain or palpitations.

 Patient is afebrile.  Patient reports to tolerating diet with no nausea or 

vomiting noted.  D-dimer continues to be elevated at 2.56, sodium today is 140 

with a potassium of 5.1 and creatinine is 1.2.  Blood sugars on the lower side 

this morning and will continue to monitor closely and treat accordingly with 

sliding scale.  Inflammatory markers trending down.  Pulmonary following 

closely.  Chest x-ray today shows continued bibasilar infiltrates which are 

nonspecific but worsening.





03/25/2021





Patient is seen in follow-up this morning and continues to be on Airvo no 

significant change.  Patient was on dexamethasone all being transitioned to IV 

Solu-Medrol and will continue with Accu-Cheks and close glycemic control with 

pre-meal, long-acting, and sliding scale.  White blood count is 13.9, hemoglobin

is stable at 13.2, d-dimer is 2.95, sodium is 137, potassium is 5.1, creatinine 

slightly elevated at 1.24.  Inflammatory markers have gone up.  Patient is 

sitting up in the chair and denies any chest pain or worsening shortness of 

breath.  Patient states he feels he is about the same.  Patient also states he 

has been sleeping a lot and tolerating diet with no reports of nausea or 

vomiting.  Discussed with patient about increasing activity as tolerated 

although patient is extremely dyspneic with minimal exertion.





03/26/2021





Patient is seen and evaluated this morning and follow-up with no acute overnight

issues. Patient continues to be on Airvo with a flow rate of 60 and FiO2 of 70 

and is being closely monitored.  Transitioned to IV Solu-Medrol and will 

continue at this time.  Blood sugars elevated and increasing long-acting and 

pre-meal insulins and will continue sliding scale along with oral antidiabetic 

agents.  Need tighter glycemic control.  Patient has been eating 100% of all 

meals with no reports of nausea or vomiting noted.  Patient clinically looking 

better and chest x-ray displays stable infiltrates.  D-dimer slightly trending 

down along with inflammatory markers.  Creatinine is stable at 1.2 and potassium

is 5.5.  Will continue to monitor vital signs and labs closely.





On 3/27/2021 -patient is seen and examined at the bedside.  He is sitting up in 

the chair and is on Airvo 60 L with FiO2 of 70%, saturating about 90%.  He still

complains of exertional dyspnea and cough at times.  Denies having any fevers 

chills or rigors.  He complains of generalized weakness.  On reviewing his 

vitals temperature of 97.9, heart rate 63, respiratory rate 18, blood pressure 

120 x 62 .  On reviewing his labs white count of 22.9, hemoglobin 13, platelets 

299.  Sodium 136, potassium 5.6, chloride 104, bicarb 26, BUN 55, creatinine 

1.15.  Blood sugars running on the higher side around 300s.  Patient had a chest

x-ray done this morning showing mild to moderate mid and lower zone infiltrates.





On 3/28/2021 -patient is seen and examined at bedside.  He is sitting up in a 

chair at fifth high flow oxygen at 60 L FiO2 of 70% and pulse ox at 94%.  He is 

still having exertional dyspnea and complains of generalized weakness.  He 

states that he is tired of using the oxygen.  On reviewing the vitals T-max of 

97.9, heart rate 60, respiratory 19, blood pressure 130/60.  Reviewing the labs 

, CRP less than 0.04 and D-dimer of 1.53.  All medications have been 

reviewed and pertinent changes made.





03/29/2021


Patient is seen and evaluated this morning with no acute overnight issues.  

Patient continues to sit up in the chair throughout most of the day as he states

his breathing is better.  Patient continues to be on Airvo and flow rate has 

been decreased to 40 with an FiO2 of 70% and is currently 92%.  Patient con

tinues to have dyspnea with minimal exertion and continues to have weakness and 

will likely require some form rehab once stabilized and discharged.  PT/OT to 

continue to follow.  White blood count trending down at 18.64 and hemoglobin is 

stable at 13.2.  D-dimer is 1.60 with a sodium of 136 and potassium was found to

be 6.1 and being corrected and will repeat a.m. labs.  Creatinine is 1.2.  Blood

sugars continue to be elevated and will continue with sliding scale and long-

acting and monitor closely.  LDH trending back up at 503 and will monitor 

closely.  CRP is 0.4. 





03/30/2021


Patient's potassium is still high received the calcium gluconate and insulin as 

today.  Will order capsulate.  This is nonhemolyzed sample patient was started 

on low potassium diet.  Patient remains on Airvo , 40 L and 60% FiO2 which is 

better compared to yesterday and saturating better appears to have been some 

improvement.








03/31/2021


Patient is seen and evaluated this morning continues to be sitting up in the 

chair.  Patient states he feels his breathing is improved but "I'm still here in

the hospital".  Patient has continued weakness and has been working with PT/OT 

therapy.  Patient does require assistance to the bedside commode and will have 

PT/OT therapy reevaluate with the possibility of ECF once stabilized and 

discharged.  Sodium today is 138 with a potassium of 5.2 and current creatinine 

is 1.3.  Blood sugars being closely monitored and will continue with current 

medication regimen. Respiratory slowly weaning Airvo settings and is maintained 

on a flow rate of 35 with an FiO2 of 65%.  Pulmonary is following.





04/01/2021


Patient is seen this morning continues to be on Airvo and slow to respond.  

Current settings have a flow rate of 36 and FiO2 of 60%.  Patient denies any 

worsening shortness of breath.  White blood count trending down at 15.4, 

hemoglobin is stable at 14.2, sodium is 135, potassium is 5.2, current 

creatinine slightly improved at 1.12.





04/02/2021





Patient is seen and evaluated and follow-up continues to be on airvo requiring 

more oxygen support with a flow rate of 50 and an FiO2 of 70%.  D-dimer 

continues to trend down at 1.58, sodium is 137, potassium is 4.7, current 

creatinine is 1.22.  Blood sugars have been variable and will continue with 

current regimen and monitor closely.  Continue with Accu-Cheks before meals and 

at bedtime.  Patient is afebrile.





04/03/2021


Patient's overall respiratory status remains the patient is oxygen flow rate of 

40 L with FiO2 of 60% may be a mild improvement but saturations are going down 

again.  Patient is already on low potassium diet not an ACE inhibitor potassium 

supplementation will repeat basic metabolic profile again tomorrow.  Patient is 

not on any IV fluids is eating well.





04/04/2021


Patient is doing bit better today his oxygen requirements are going down patient

is on Airvo and 50% FiO2.  Blood glucose is bit elevated.  Patient had an 

episode of GI bleed will continue with anticoagulation and aspirin gastro-

oncology will be consulted probably hemorrhoidal bleed patient had blood in the 

stools.





04/05/2021


Patient is seen and evaluated and follow-up with no acute overnight issues.  

Apparently patient had a few episodes of blood in the stool and was evaluated by

GI and no plans for endoscopic intervention at this time as patient is not 

interested.  Patient denies any pain or discomfort.  Hemoglobin today is able at

9.9 and will continue to monitor closely.  Patient continues on Airvo the flow 

rate of 35 and FiO2 of 60% and currently 93%.  Patient is afebrile.  Discussed 

with the patient about increasing activity as tolerated and sitting up in the 

chair more often.





04/06/2021


Patient is seen and evaluated and follow-up currently asleep but easily 

arousable.  Per nursing staff patient has not been getting up out of the bed 

only sitting at the bedside for meals and continues to be fatigued and short of 

breath.  Patient is maintained on a flow rate of 30 and an FiO2 of 40% and 

currently 86% oxygen saturation on the Airvo.  Pulmonary is following.  Patient 

white count blood count going up at 16.35, hemoglobin is 10.0 with no active 

bleeding noted.  D-dimer is 1.12 and inflammatory markers trending down.  Sodium

today is 140 with a potassium of 5.5 and current creatinine is 1.2.  Patient to 

continue low potassium diet and monitor labs closely.  Patient needs aggressive 

PT/OT therapy daily as he continues to be weak and has been mostly lying in the 

bed.  Will discuss with PT/OT.





Constitutional:  reports of fatigue, denied any fever.


Cardio vascular: denied any chest pain, palpitations


Gastrointestinal denied any nausea vomiting, denies any bleeding in the stool 

today


Pulmonary: Reports continued shortness of breath no worse


Neurologic reports generalized weakness 





All inpatient medications were reviewed and appropriate changes in these 

medications as dictated in the interval history and assessment and plan.























Objective





- Vital Signs


Vital signs: 


                                   Vital Signs











Temp  98.5 F   04/06/21 10:00


 


Pulse  65   04/06/21 10:00


 


Resp  20   04/06/21 10:00


 


BP  136/81   04/06/21 10:00


 


Pulse Ox  86 L  04/06/21 13:08








                                 Intake & Output











 04/05/21 04/06/21 04/06/21





 18:59 06:59 18:59


 


Output Total 600 900 


 


Balance -600 -900 


 


Output:   


 


  Urine 600 900 


 


Other:   


 


  Voiding Method  Bedside Commode 





  Urinal 


 


  # Voids  1 


 


  # Bowel Movements  1 














- Exam





GENERAL: The patient is alert and oriented x3, not in any acute distress.  

Obese, currently on  airvo at a flow rate of 30 with an FiO2 of 40, continue to 

wean as tolerated


HEENT: Pupils are round and equally reacting to light. EOMI. No scleral icterus.

No conjunctival pallor. Normocephalic, atraumatic. No pharyngeal erythema. No 

thyromegaly. 


CARDIOVASCULAR: S1 and S2 present. No murmurs, rubs, or gallops. 


PULMONARY: Diminished breath sounds bilaterally with no wheezing noted, coarse 

rhonchi noted bilaterally


ABDOMEN: Soft, nontender, nondistended, normoactive bowel sounds. No palpable 

organomegaly. 


MUSCULOSKELETAL: No joint swelling or deformity.


EXTREMITIES: No cyanosis, clubbing, or pedal edema. 


NEUROLOGICAL: Gross neurological examination did not reveal any focal deficits. 

Diffuse weakness


SKIN: No rashes. 








- Labs


CBC & Chem 7: 


                                 04/06/21 06:45





                                 04/06/21 06:44


Labs: 


                  Abnormal Lab Results - Last 24 Hours (Table)











  04/05/21 04/05/21 04/06/21 Range/Units





  16:37 20:52 06:44 


 


WBC     (4.50-10.00)  X 10*3/uL


 


RBC     (4.40-5.60)  X 10*6/uL


 


Hgb     (13.0-17.0)  g/dL


 


Hct     (39.6-50.0)  %


 


MCV     (80.0-97.0)  fL


 


MCHC     (32.0-37.0)  g/dL


 


RDW     (11.5-14.5)  %


 


Immature Gran #     (0.00-0.04)  X 10*3/uL


 


Neutrophils #     (1.80-7.70)  X 10*3/uL


 


Monocytes #     (0.20-1.00)  X 10*3/uL


 


Eosinophils #     (0.04-0.35)  X 10*3/uL


 


D-Dimer     (<0.60)  mg/L FEU


 


BUN    60.0 H  (9.0-27.0)  mg/dL


 


Est GFR (CKD-EPI)NonAf    56.4 L  (60.0-200.0)   


 


BUN/Creatinine Ratio    50.00 H  (12.00-20.00)  Ratio


 


Glucose    52 L  ()  mg/dL


 


POC Glucose (mg/dL)  281 H  249 H   (75-99)  mg/dL


 


Calcium    8.3 L  (8.7-10.3)  mg/dL


 


Ferritin    344.0 H  (22.0-322.0)  ng/mL


 


Lactate Dehydrogenase    425 H  (120-246)  U/L














  04/06/21 04/06/21 04/06/21 Range/Units





  06:45 06:45 06:58 


 


WBC  16.35 H    (4.50-10.00)  X 10*3/uL


 


RBC  3.24 L    (4.40-5.60)  X 10*6/uL


 


Hgb  10.0 L    (13.0-17.0)  g/dL


 


Hct  31.6 L    (39.6-50.0)  %


 


MCV  97.5 H    (80.0-97.0)  fL


 


MCHC  31.6 L    (32.0-37.0)  g/dL


 


RDW  14.6 H    (11.5-14.5)  %


 


Immature Gran #  0.33 H    (0.00-0.04)  X 10*3/uL


 


Neutrophils #  13.52 H    (1.80-7.70)  X 10*3/uL


 


Monocytes #  1.23 H    (0.20-1.00)  X 10*3/uL


 


Eosinophils #  0 L    (0.04-0.35)  X 10*3/uL


 


D-Dimer   1.12 H   (<0.60)  mg/L FEU


 


BUN     (9.0-27.0)  mg/dL


 


Est GFR (CKD-EPI)NonAf     (60.0-200.0)   


 


BUN/Creatinine Ratio     (12.00-20.00)  Ratio


 


Glucose     ()  mg/dL


 


POC Glucose (mg/dL)    69 L  (75-99)  mg/dL


 


Calcium     (8.7-10.3)  mg/dL


 


Ferritin     (22.0-322.0)  ng/mL


 


Lactate Dehydrogenase     (120-246)  U/L














  04/06/21 Range/Units





  11:16 


 


WBC   (4.50-10.00)  X 10*3/uL


 


RBC   (4.40-5.60)  X 10*6/uL


 


Hgb   (13.0-17.0)  g/dL


 


Hct   (39.6-50.0)  %


 


MCV   (80.0-97.0)  fL


 


MCHC   (32.0-37.0)  g/dL


 


RDW   (11.5-14.5)  %


 


Immature Gran #   (0.00-0.04)  X 10*3/uL


 


Neutrophils #   (1.80-7.70)  X 10*3/uL


 


Monocytes #   (0.20-1.00)  X 10*3/uL


 


Eosinophils #   (0.04-0.35)  X 10*3/uL


 


D-Dimer   (<0.60)  mg/L FEU


 


BUN   (9.0-27.0)  mg/dL


 


Est GFR (CKD-EPI)NonAf   (60.0-200.0)   


 


BUN/Creatinine Ratio   (12.00-20.00)  Ratio


 


Glucose   ()  mg/dL


 


POC Glucose (mg/dL)  193 H  (75-99)  mg/dL


 


Calcium   (8.7-10.3)  mg/dL


 


Ferritin   (22.0-322.0)  ng/mL


 


Lactate Dehydrogenase   (120-246)  U/L














Assessment and Plan


Assessment: 





-acute hypoxic respiratory failure: Secondary to covid 19 pneumonia patient 

maintained on vitamin and zinc supplements.  Patient currently on IV solu-

Medrol.  has received 2 doses of Tocilizumab and is continued on Lovenox. 

,Patient currently on Airvo and weaning as tolerated.  Will ask us with 

respiratory about attempting high flow nasal cannula.  Pulmonary following


-Possible hemorrhoids with blood noted in the stool, GI evaluated the patient 

and ordered anti-hemorrhoidal and no plans for endoscopic intervention, will 

monitor hemoglobin closely, hemoglobin is stable at 10.0


-Type 2 diabetes mellitus uncontrolled elevated blood sugars secondary to 

systemic steroids, continue with pre-meal, sliding scale, and long-acting, with 

adjustments made and will increase pre-male and long-acting doses


-Hyperkalemia, improved 


-Hypotonic hyponatremia, improved


-Acute renal failure secondary to Covid 19: Improved, current creatinine is 1.2


-Hypertension 


-DVT subcutaneous Lovenox


-Full code





Plan:


Continue with current medications.  Will continue IV Solu-Medrol with vitamin 

and zinc supplements, and lovenox . patient has received Tocilizumab x2.  

Pulmonary following.  Potential he transitioned oral prednisone taper tomorrow. 

Patient currently on airvo and weaning as tolerated.  Very slow to respond.  

Current settings are flow rate of 30 and FiO2 of 40%.  Discussed with the 

patient to increase activity as tolerated.  Will need aggressive PT/OT and have 

them see him daily.  Hemoglobin is stable at 10.0 with no further bleeding noted

in the stool.  Will repeat a.m. labs and continue to monitor closely Continue 

with low potassium diet.  Patient response to treatment is slow.  Will continue 

to monitor closely.  Prognosis remains guarded.

## 2021-04-07 LAB
ANION GAP SERPL CALC-SCNC: 1 MMOL/L
BUN SERPL-SCNC: 54 MG/DL (ref 9–20)
CALCIUM SPEC-MCNC: 7.8 MG/DL (ref 8.4–10.2)
CHLORIDE SERPL-SCNC: 108 MMOL/L (ref 98–107)
CO2 SERPL-SCNC: 26 MMOL/L (ref 22–30)
GLUCOSE BLD-MCNC: 163 MG/DL (ref 75–99)
GLUCOSE BLD-MCNC: 180 MG/DL (ref 75–99)
GLUCOSE BLD-MCNC: 227 MG/DL (ref 75–99)
GLUCOSE BLD-MCNC: 252 MG/DL (ref 75–99)
GLUCOSE SERPL-MCNC: 150 MG/DL (ref 74–99)
POTASSIUM SERPL-SCNC: 5.3 MMOL/L (ref 3.5–5.1)
SODIUM SERPL-SCNC: 135 MMOL/L (ref 137–145)

## 2021-04-07 RX ADMIN — INSULIN ASPART SCH UNIT: 100 INJECTION, SOLUTION INTRAVENOUS; SUBCUTANEOUS at 12:25

## 2021-04-07 RX ADMIN — INSULIN ASPART SCH UNIT: 100 INJECTION, SOLUTION INTRAVENOUS; SUBCUTANEOUS at 07:38

## 2021-04-07 RX ADMIN — ATORVASTATIN CALCIUM SCH MG: 80 TABLET, FILM COATED ORAL at 07:38

## 2021-04-07 RX ADMIN — INSULIN ASPART SCH UNIT: 100 INJECTION, SOLUTION INTRAVENOUS; SUBCUTANEOUS at 22:26

## 2021-04-07 RX ADMIN — FAMOTIDINE SCH MG: 20 TABLET, FILM COATED ORAL at 07:37

## 2021-04-07 RX ADMIN — METOPROLOL SUCCINATE SCH MG: 50 TABLET, EXTENDED RELEASE ORAL at 07:38

## 2021-04-07 RX ADMIN — CLOPIDOGREL BISULFATE SCH MG: 75 TABLET ORAL at 07:38

## 2021-04-07 RX ADMIN — INSULIN ASPART SCH UNIT: 100 INJECTION, SOLUTION INTRAVENOUS; SUBCUTANEOUS at 17:28

## 2021-04-07 RX ADMIN — INSULIN DETEMIR SCH UNIT: 100 INJECTION, SOLUTION SUBCUTANEOUS at 22:26

## 2021-04-07 RX ADMIN — METHYLPREDNISOLONE SODIUM SUCCINATE SCH MG: 125 INJECTION, POWDER, FOR SOLUTION INTRAMUSCULAR; INTRAVENOUS at 07:37

## 2021-04-07 RX ADMIN — Medication SCH MG: at 07:38

## 2021-04-07 RX ADMIN — HYDROCORTISONE SCH: 25 CREAM TOPICAL at 07:50

## 2021-04-07 RX ADMIN — METHYLPREDNISOLONE SODIUM SUCCINATE SCH MG: 40 INJECTION, POWDER, FOR SOLUTION INTRAMUSCULAR; INTRAVENOUS at 20:49

## 2021-04-07 RX ADMIN — INSULIN ASPART SCH UNIT: 100 INJECTION, SOLUTION INTRAVENOUS; SUBCUTANEOUS at 07:39

## 2021-04-07 RX ADMIN — OXYCODONE HYDROCHLORIDE AND ACETAMINOPHEN SCH MG: 500 TABLET ORAL at 07:38

## 2021-04-07 RX ADMIN — HYDROCORTISONE SCH APPLIC: 25 CREAM TOPICAL at 20:49

## 2021-04-07 RX ADMIN — PIOGLITAZONE SCH MG: 30 TABLET ORAL at 07:39

## 2021-04-07 NOTE — P.PN
Subjective


Progress Note Date: 04/07/21


Principal diagnosis: 





Acute hypoxic respiratory failure secondary to covid with 19 pneumonitis.





80-year-old male, who was brought to the emergency department by EMS.  The 

patient apparently has had 2 weeks' worth of increasing shortness of breath, and

significant fatigue.  The patient also had chest congestion.  He had a fever.  

The patient states that he is just not been feeling well and getting 

progressively worse.  He also apparently had an episode or 2 of diarrhea.  The p

atient denied any chest pain or chest pressure or palpitations.  The patient 

also denied nausea, vomiting, and abdominal pain.  Apparently when EMS arrived, 

the patient's saturations were in the low 80s on room air.  For that reason, he 

was transported in, evaluated, and admitted with a diagnosis of COVID 19 19 

pneumonia.  The patient does have a history of diabetes, hypertension, and a 

previous pacemaker insertion.  White count was 4.6, hemoglobin 12.5, hematocrit 

37.1, and platelet count 153,000.  PT, INR, and PTT were all normal.  D-dimer 

was 0.77.  Sodium 136, potassium 4.5, chlorides 102, CO2 26, anion gap 8, BUN 

47, and creatinine 1.63.  Ferritin was 985, , C-reactive protein 58, and 

pro-calcitonin level was 0.12.  Chest x-ray did show some interstitial 

infiltrates.





On 03/18/2021 patient seen in follow-up on medical floor.  he is on 2 L of 

oxygen his pulse ox is 90-94%, breathing comfortably, no fever or chills, blood 

pressure has been stable.  Denies any worsening dyspnea or hypoxemia, he has a 

mild cough, no chest discomfort.  He continues on oral Decadron 6 mg daily, IV 

hydration, vitamins, d-dimer was low at 0.77, patient is on subcu heparin for 

DVT prophylaxis, pro-calcitonin level was low, inflammatory markers were not 

significantly elevated on admission.  Clinically symptoms have not progressed, 

and patient will be considered for discharge in next 24 hours.





On 04/05/2021 patient seen in follow-up on medical surgical floor, patient 

remains on irritable at 35 L and FiO2 of 60%, with a pulse ox is ranging between

86-93%, no worsening dyspnea, no chills, hemodynamically stable.  Patient is 

resting in bed, despite requiring high flow oxygen his lung sounds are clear, no

significant wheezes or crackles or rhonchi.  Patient states he was up in a chair

earlier and he felt dizzy, occasional cough with no phlegm production, no chest 

pain or hemoptysis, his last chest x-ray was on O2 2021 showing patchy bilateral

airspace disease, but slightly increased in the mid and lower lungs.





Patient was reevaluated today on 4/6/2021, he is now on 40% FiO2 and 33 L flow 

using airvo, patient seems to be comfortable, doing quite well, his O2 

saturation is 92% present.  Patient denies any shortness of breath at rest, he 

does have dyspnea on exertion.  No cough no fever no chills no hemoptysis.  WBC 

count today is 16.3 his platelets are 194.  No inflammatory markers ordered 

today.  Chest x-ray from today shows diffuse interstitial infiltrates 

bilaterally





Patient was reevaluated today on 4/7/2021, patient has been tapered down to 6 L 

high flow nasal cannula, and his O2 saturations 97%.  Patient is feeling much 

better, breathing a lot easier, hardly any cough no wheezing, and he has no 

other constitutional symptoms.  Basic metabolic profile is normal today, BUN is 

54 creatinine 1.05.  Sugar is 150.  Chest x-ray from yesterday continues to show

bilateral significant airspace disease and opacities left more so than right.  

Patient was seen by GI on consultation for his rectal bleeding, and the 

recommendation was to monitor closely transfuse as needed and local hemorrhoidal

care with topical steroids therapy ordered.  No endoscopy is recommended because

the patient is not interested.














Objective





- Vital Signs


Vital signs: 


                                   Vital Signs











Temp  97.9 F   04/07/21 06:00


 


Pulse  90   04/07/21 06:00


 


Resp  18   04/07/21 06:00


 


BP  142/60   04/07/21 06:00


 


Pulse Ox  97   04/07/21 06:00








                                 Intake & Output











 04/06/21 04/07/21 04/07/21





 18:59 06:59 18:59


 


Intake Total  540 


 


Output Total 800 325 


 


Balance -800 215 


 


Intake:   


 


  Oral  540 


 


Output:   


 


  Urine 800 325 


 


Other:   


 


  Voiding Method  Bedside Commode 





  Urinal 


 


  # Voids  2 














- Exam


 GENERAL EXAM: Alert, pleasant, 80-year-old white male currently on 6 L high 

flow nasal cannula


HEAD: Normocephalic/atraumatic.


HEENT: PERRLA, EOMI, nonicteric, no neck masses, no JVD, no stridor.


CHEST: No chest wall deformity.  Symmetrical expansion. 


LUNGS: Equal air bilateral crackles noted posteriorly.


CVS: Regular rate and rhythm, normal S1 and S2, no gallops, no murmurs, no rubs


ABDOMEN: Soft, nontender.  No hepatosplenomegaly, normal bowel sounds, no 

guarding or rigidity.


EXTREMITIES: No clubbing, no edema, no cyanosis, 2+ pulses and upper and lower 

extremities.


MUSCULOSKELETAL: Muscle strength and tone normal.


SPINE: No scoliosis or deformity


SKIN: No rashes


CENTRAL NERVOUS SYSTEM: Alert and oriented 3 focal deficits.


PSYCHIATRIC: Normal mood, affect and normal mental status examination





- Labs


CBC & Chem 7: 


                                 04/06/21 06:45





                                 04/07/21 07:04


Labs: 


                  Abnormal Lab Results - Last 24 Hours (Table)











  04/06/21 04/06/21 04/06/21 Range/Units





  06:44 06:45 11:16 


 


WBC   16.35 H   (4.50-10.00)  X 10*3/uL


 


RBC   3.24 L   (4.40-5.60)  X 10*6/uL


 


Hgb   10.0 L   (13.0-17.0)  g/dL


 


Hct   31.6 L   (39.6-50.0)  %


 


MCV   97.5 H   (80.0-97.0)  fL


 


MCHC   31.6 L   (32.0-37.0)  g/dL


 


RDW   14.6 H   (11.5-14.5)  %


 


Immature Gran #   0.33 H   (0.00-0.04)  X 10*3/uL


 


Neutrophils #   13.52 H   (1.80-7.70)  X 10*3/uL


 


Monocytes #   1.23 H   (0.20-1.00)  X 10*3/uL


 


Eosinophils #   0 L   (0.04-0.35)  X 10*3/uL


 


Sodium     (137-145)  mmol/L


 


Potassium     (3.5-5.1)  mmol/L


 


Chloride     ()  mmol/L


 


BUN  60.0 H    (9.0-27.0)  mg/dL


 


Est GFR (CKD-EPI)NonAf  56.4 L    (60.0-200.0)   


 


BUN/Creatinine Ratio  50.00 H    (12.00-20.00)  Ratio


 


Glucose  52 L    ()  mg/dL


 


POC Glucose (mg/dL)    193 H  (75-99)  mg/dL


 


Calcium  8.3 L    (8.7-10.3)  mg/dL


 


Ferritin  344.0 H    (22.0-322.0)  ng/mL


 


Lactate Dehydrogenase  425 H    (120-246)  U/L














  04/06/21 04/06/21 04/07/21 Range/Units





  16:56 20:42 07:04 


 


WBC     (4.50-10.00)  X 10*3/uL


 


RBC     (4.40-5.60)  X 10*6/uL


 


Hgb     (13.0-17.0)  g/dL


 


Hct     (39.6-50.0)  %


 


MCV     (80.0-97.0)  fL


 


MCHC     (32.0-37.0)  g/dL


 


RDW     (11.5-14.5)  %


 


Immature Gran #     (0.00-0.04)  X 10*3/uL


 


Neutrophils #     (1.80-7.70)  X 10*3/uL


 


Monocytes #     (0.20-1.00)  X 10*3/uL


 


Eosinophils #     (0.04-0.35)  X 10*3/uL


 


Sodium    135 L  (137-145)  mmol/L


 


Potassium    5.3 H  (3.5-5.1)  mmol/L


 


Chloride    108 H  ()  mmol/L


 


BUN    54 H  (9.0-27.0)  mg/dL


 


Est GFR (CKD-EPI)NonAf     (60.0-200.0)   


 


BUN/Creatinine Ratio     (12.00-20.00)  Ratio


 


Glucose    150 H  ()  mg/dL


 


POC Glucose (mg/dL)  228 H  256 H   (75-99)  mg/dL


 


Calcium    7.8 L  (8.7-10.3)  mg/dL


 


Ferritin     (22.0-322.0)  ng/mL


 


Lactate Dehydrogenase     (120-246)  U/L














  04/07/21 Range/Units





  07:23 


 


WBC   (4.50-10.00)  X 10*3/uL


 


RBC   (4.40-5.60)  X 10*6/uL


 


Hgb   (13.0-17.0)  g/dL


 


Hct   (39.6-50.0)  %


 


MCV   (80.0-97.0)  fL


 


MCHC   (32.0-37.0)  g/dL


 


RDW   (11.5-14.5)  %


 


Immature Gran #   (0.00-0.04)  X 10*3/uL


 


Neutrophils #   (1.80-7.70)  X 10*3/uL


 


Monocytes #   (0.20-1.00)  X 10*3/uL


 


Eosinophils #   (0.04-0.35)  X 10*3/uL


 


Sodium   (137-145)  mmol/L


 


Potassium   (3.5-5.1)  mmol/L


 


Chloride   ()  mmol/L


 


BUN   (9.0-27.0)  mg/dL


 


Est GFR (CKD-EPI)NonAf   (60.0-200.0)   


 


BUN/Creatinine Ratio   (12.00-20.00)  Ratio


 


Glucose   ()  mg/dL


 


POC Glucose (mg/dL)  163 H  (75-99)  mg/dL


 


Calcium   (8.7-10.3)  mg/dL


 


Ferritin   (22.0-322.0)  ng/mL


 


Lactate Dehydrogenase   (120-246)  U/L














Assessment and Plan


Assessment: 





Impression:


Acute hypoxic respiratory failure secondary to covid 19 pneumonia


Type 2 diabetes.


Benign essential hypertension.


History of bowel resection for diverticulitis.


Lower GI bleeding, anticoagulation is presently on hold.  Being addressed by 

gastroenterology.





Recommendation:


Continue present supportive care measures


Continue oxygen and titrate accordingly.  Patient could possibly tolerate going 

down below 6 L high flow nasal cannula, and if that is the case we can start 

discussing discharge planning.


GI to evaluate for his GI bleeding.


Continue DVT prophylaxis.


Continue Decadron.


Patient will be cleared from our perspective for discharge once his oxygen 

requirement is less then 6 L nasal cannula.


Time with Patient: Less than 30

## 2021-04-07 NOTE — P.PN
Subjective


Progress Note Date: 04/07/21





80-year-old male who presents emergency Department stating that he has had 

shortness of breath and feeling fatigued for at least 2 weeks.  Patient states 

he has had exposure to COVID .  Patient states he has had a fever.  Patient 

states she's also states and smile.  Patient also states she's had diarrhea.  

Patient denies any chest pain or palpitations.  Patient denies any abdominal 

pain patient denies nausea vomiting diarrhea.  According to EMS with the patient

was coming in he was satting in the low 80s.  Patient is currently on a few 

liters of oxygen and sat in mid 90s.  Patient is a diabetic and has high blood 

pressure.  Patient also has a pacemaker.  Patient is presently on 2 L of oxygen 

was requiring 4 L yesterday.  Patient was started on IV fluids patient 

creatinine went up to 1.6 baseline is around 1.  Patient's blood sugars are 

highly elevated.  Patient tests positive for Covid.





03/18/2021


Patient is presently on 2 L of oxygen appears to be doing better possibility of 

discharge tomorrow.  Creatinine went up a bit to 1.6 baseline is around the 1.  

Patient was started on IV fluids will recheck the basic metabolic profile 

tomorrow.





03/19/2021


Patient seen and evaluated in follow-up continues to be on 2 L of oxygen and 

becomes dyspneic with exertion.  Patient states he does not know wear oxygen at 

home.  Pulmonary is following.  Patient's blood sugars are elevated and will 

continue sliding scale at this time as patient is on steroids.  Potassium was 

found to be slightly elevated at 5.5 and was given a dose of Kayexalate.  Will 

repeat a.m. labs.  Instructed and encouraged the patient to get up and increase 

activity as tolerated.





03/20/2021


Patient is presently in 2 does of hours and doing clinically well.  Patient is 

on dexamethasone, vitamin supplements, subcutaneous heparin.  No worsening 

shortness of breath, cough or congestion.  D-dimer 1.02.  Sodium 145.  Potassium

5.1.  Creatinine 1.3.  .  C-reactive protein 4.4.  His creatinine 

actually went up a little bit from 1.19-1.3





03/21/2021


Patient is fairly stable no significant improvement or worsening.  Patient is 

still on 2 L of oxygen.  We'll monitor him 1 more night.  Continues to require 

oxygen patient probably can be discharged on 2 L tomorrow this can be tapered or

discontinued as an outpatient.  Patient d-dimer is bit worse compared to 

admission.  Although other inflammatory markers are better








03/22/2021


Patient is seen this morning currently on nonrebreather and airvo as his oxygen 

was found to be 89% on 6 L of oxygen.  Pulmonary is following.  D-dimer has gone

up at 1.70 along with lactate dehydrogenase at 491 and CRP is 9.4.  Blood sugars

continue to be elevated and patient is maintained on sliding scale along with 

long-acting and pre-meal insulin and will continue at this time.  Patient 

continues on vitamin and zinc supplements along with Lovenox and dexamethasone 

daily.  Patient is scheduled to receive Tocilizumab today.  Will continue to 

monitor closely based on the clinical course of the patient.  Patient continues 

to be weak and was seen and evaluated by physical therapy recommending subacute 

rehab and patient is now agreeable and a social work consult was placed. 





03/23/2021


Patient is seen and evaluated this morning with no improvement in respiratory 

status.  Patient remains on Airvo with supplemental nonrebreather.  Patient is 

day 2 of receiving Tocilizumab.  Will repeat a.m. chest x-ray along with 

inflammatory markers.  D-dimer today was 1.60.  White blood count has gone up in

is 10.8, hemoglobin is 12.6.  Patient is afebrile.  To continue with dexametha

sone, Lovenox, vitamin C and zinc supplements.  Pulmonary is following.  Had a 

discussion with the patient about CODE STATUS along with his son Mamadou and 

currently wish to remain a full code until talking with family member more 

extensively about the situation.  Will continue to monitor closely.  Prognosis 

remains guarded.





03/24/2021


Patient is seen this morning status post getting up to the bathroom and 

continues to be extremely dyspneic with any exertion and was found to be low 80s

to 83% oxygenation.  Patient continues on the airvo with a flow rate of 60 and 

FiO2 of 64 and is currently 91%.  Patient denies any chest pain or palpitations.

 Patient is afebrile.  Patient reports to tolerating diet with no nausea or 

vomiting noted.  D-dimer continues to be elevated at 2.56, sodium today is 140 

with a potassium of 5.1 and creatinine is 1.2.  Blood sugars on the lower side 

this morning and will continue to monitor closely and treat accordingly with 

sliding scale.  Inflammatory markers trending down.  Pulmonary following 

closely.  Chest x-ray today shows continued bibasilar infiltrates which are 

nonspecific but worsening.





03/25/2021





Patient is seen in follow-up this morning and continues to be on Airvo no 

significant change.  Patient was on dexamethasone all being transitioned to IV 

Solu-Medrol and will continue with Accu-Cheks and close glycemic control with 

pre-meal, long-acting, and sliding scale.  White blood count is 13.9, hemoglobin

is stable at 13.2, d-dimer is 2.95, sodium is 137, potassium is 5.1, creatinine 

slightly elevated at 1.24.  Inflammatory markers have gone up.  Patient is 

sitting up in the chair and denies any chest pain or worsening shortness of 

breath.  Patient states he feels he is about the same.  Patient also states he 

has been sleeping a lot and tolerating diet with no reports of nausea or 

vomiting.  Discussed with patient about increasing activity as tolerated 

although patient is extremely dyspneic with minimal exertion.





03/26/2021





Patient is seen and evaluated this morning and follow-up with no acute overnight

issues. Patient continues to be on Airvo with a flow rate of 60 and FiO2 of 70 

and is being closely monitored.  Transitioned to IV Solu-Medrol and will 

continue at this time.  Blood sugars elevated and increasing long-acting and 

pre-meal insulins and will continue sliding scale along with oral antidiabetic 

agents.  Need tighter glycemic control.  Patient has been eating 100% of all 

meals with no reports of nausea or vomiting noted.  Patient clinically looking 

better and chest x-ray displays stable infiltrates.  D-dimer slightly trending 

down along with inflammatory markers.  Creatinine is stable at 1.2 and potassium

is 5.5.  Will continue to monitor vital signs and labs closely.





On 3/27/2021 -patient is seen and examined at the bedside.  He is sitting up in 

the chair and is on Airvo 60 L with FiO2 of 70%, saturating about 90%.  He still

complains of exertional dyspnea and cough at times.  Denies having any fevers 

chills or rigors.  He complains of generalized weakness.  On reviewing his 

vitals temperature of 97.9, heart rate 63, respiratory rate 18, blood pressure 

120 x 62 .  On reviewing his labs white count of 22.9, hemoglobin 13, platelets 

299.  Sodium 136, potassium 5.6, chloride 104, bicarb 26, BUN 55, creatinine 

1.15.  Blood sugars running on the higher side around 300s.  Patient had a chest

x-ray done this morning showing mild to moderate mid and lower zone infiltrates.





On 3/28/2021 -patient is seen and examined at bedside.  He is sitting up in a 

chair at fifth high flow oxygen at 60 L FiO2 of 70% and pulse ox at 94%.  He is 

still having exertional dyspnea and complains of generalized weakness.  He 

states that he is tired of using the oxygen.  On reviewing the vitals T-max of 

97.9, heart rate 60, respiratory 19, blood pressure 130/60.  Reviewing the labs 

, CRP less than 0.04 and D-dimer of 1.53.  All medications have been 

reviewed and pertinent changes made.





03/29/2021


Patient is seen and evaluated this morning with no acute overnight issues.  

Patient continues to sit up in the chair throughout most of the day as he states

his breathing is better.  Patient continues to be on Airvo and flow rate has 

been decreased to 40 with an FiO2 of 70% and is currently 92%.  Patient con

tinues to have dyspnea with minimal exertion and continues to have weakness and 

will likely require some form rehab once stabilized and discharged.  PT/OT to 

continue to follow.  White blood count trending down at 18.64 and hemoglobin is 

stable at 13.2.  D-dimer is 1.60 with a sodium of 136 and potassium was found to

be 6.1 and being corrected and will repeat a.m. labs.  Creatinine is 1.2.  Blood

sugars continue to be elevated and will continue with sliding scale and long-

acting and monitor closely.  LDH trending back up at 503 and will monitor 

closely.  CRP is 0.4. 





03/30/2021


Patient's potassium is still high received the calcium gluconate and insulin as 

today.  Will order capsulate.  This is nonhemolyzed sample patient was started 

on low potassium diet.  Patient remains on Airvo , 40 L and 60% FiO2 which is 

better compared to yesterday and saturating better appears to have been some 

improvement.








03/31/2021


Patient is seen and evaluated this morning continues to be sitting up in the 

chair.  Patient states he feels his breathing is improved but "I'm still here in

the hospital".  Patient has continued weakness and has been working with PT/OT 

therapy.  Patient does require assistance to the bedside commode and will have 

PT/OT therapy reevaluate with the possibility of ECF once stabilized and 

discharged.  Sodium today is 138 with a potassium of 5.2 and current creatinine 

is 1.3.  Blood sugars being closely monitored and will continue with current 

medication regimen. Respiratory slowly weaning Airvo settings and is maintained 

on a flow rate of 35 with an FiO2 of 65%.  Pulmonary is following.





04/01/2021


Patient is seen this morning continues to be on Airvo and slow to respond.  

Current settings have a flow rate of 36 and FiO2 of 60%.  Patient denies any 

worsening shortness of breath.  White blood count trending down at 15.4, 

hemoglobin is stable at 14.2, sodium is 135, potassium is 5.2, current 

creatinine slightly improved at 1.12.





04/02/2021





Patient is seen and evaluated and follow-up continues to be on airvo requiring 

more oxygen support with a flow rate of 50 and an FiO2 of 70%.  D-dimer 

continues to trend down at 1.58, sodium is 137, potassium is 4.7, current 

creatinine is 1.22.  Blood sugars have been variable and will continue with 

current regimen and monitor closely.  Continue with Accu-Cheks before meals and 

at bedtime.  Patient is afebrile.





04/03/2021


Patient's overall respiratory status remains the patient is oxygen flow rate of 

40 L with FiO2 of 60% may be a mild improvement but saturations are going down 

again.  Patient is already on low potassium diet not an ACE inhibitor potassium 

supplementation will repeat basic metabolic profile again tomorrow.  Patient is 

not on any IV fluids is eating well.





04/04/2021


Patient is doing bit better today his oxygen requirements are going down patient

is on Airvo and 50% FiO2.  Blood glucose is bit elevated.  Patient had an 

episode of GI bleed will continue with anticoagulation and aspirin gastro-

oncology will be consulted probably hemorrhoidal bleed patient had blood in the 

stools.





04/05/2021


Patient is seen and evaluated and follow-up with no acute overnight issues.  

Apparently patient had a few episodes of blood in the stool and was evaluated by

GI and no plans for endoscopic intervention at this time as patient is not 

interested.  Patient denies any pain or discomfort.  Hemoglobin today is able at

9.9 and will continue to monitor closely.  Patient continues on Airvo the flow 

rate of 35 and FiO2 of 60% and currently 93%.  Patient is afebrile.  Discussed 

with the patient about increasing activity as tolerated and sitting up in the 

chair more often.





04/06/2021


Patient is seen and evaluated and follow-up currently asleep but easily 

arousable.  Per nursing staff patient has not been getting up out of the bed 

only sitting at the bedside for meals and continues to be fatigued and short of 

breath.  Patient is maintained on a flow rate of 30 and an FiO2 of 40% and 

currently 86% oxygen saturation on the Airvo.  Pulmonary is following.  Patient 

white count blood count going up at 16.35, hemoglobin is 10.0 with no active 

bleeding noted.  D-dimer is 1.12 and inflammatory markers trending down.  Sodium

today is 140 with a potassium of 5.5 and current creatinine is 1.2.  Patient to 

continue low potassium diet and monitor labs closely.  Patient needs aggressive 

PT/OT therapy daily as he continues to be weak and has been mostly lying in the 

bed.  Will discuss with PT/OT.





04/07/2021


Patient is seen this morning and follow-up and did not want to work with 

physical therapy at the bedside although with encouragement patient has gotten 

up and is currently sitting up in the chair.  Discussed with respiratory 

yesterday about attempting nasal cannula and patient was maintaining above 90% 

on 6 L and is currently maintaining 93% on 4 L via nasal cannula.  Patient is 

afebrile.  Potassium today is 5.3 and current creatinine is 1.05.  Social work 

following and working on placement with the possibility of discharged tomorrow.





Constitutional:  reports of fatigue, denied any fever.


Cardio vascular: denied any chest pain, palpitations


Gastrointestinal denied any nausea vomiting, or diarrhea


Pulmonary: Reports less shortness of breath 


Neurologic reports generalized weakness 





All inpatient medications were reviewed and appropriate changes in these 

medications as dictated in the interval history and assessment and plan.























Objective





- Vital Signs


Vital signs: 


                                   Vital Signs











Temp  97.9 F   04/07/21 06:00


 


Pulse  90   04/07/21 06:00


 


Resp  18   04/07/21 06:00


 


BP  142/60   04/07/21 06:00


 


Pulse Ox  97   04/07/21 06:00








                                 Intake & Output











 04/06/21 04/07/21 04/07/21





 18:59 06:59 18:59


 


Intake Total  540 


 


Output Total 800 325 


 


Balance -800 215 


 


Intake:   


 


  Oral  540 


 


Output:   


 


  Urine 800 325 


 


Other:   


 


  Voiding Method  Bedside Commode 





  Urinal 


 


  # Voids  2 














- Exam





GENERAL: The patient is alert and oriented x3, not in any acute distress.  

Obese, currently on 4 L via nasal cannula and maintaining 93-94% oxygen 

saturation


HEENT: Pupils are round and equally reacting to light. EOMI. No scleral icterus.

No conjunctival pallor. Normocephalic, atraumatic. No pharyngeal erythema. No 

thyromegaly. 


CARDIOVASCULAR: S1 and S2 present. No murmurs, rubs, or gallops. 


PULMONARY: Diminished breath sounds bilaterally with no wheezing noted or 

rhonchi noted


ABDOMEN: Soft, nontender, nondistended, normoactive bowel sounds. No palpable 

organomegaly. 


MUSCULOSKELETAL: No joint swelling or deformity.


EXTREMITIES: No cyanosis, clubbing, or pedal edema. 


NEUROLOGICAL: Gross neurological examination did not reveal any focal deficits. 

Diffuse weakness


SKIN: No rashes. 








- Labs


CBC & Chem 7: 


                                 04/06/21 06:45





                                 04/07/21 07:04


Labs: 


                  Abnormal Lab Results - Last 24 Hours (Table)











  04/06/21 04/06/21 04/06/21 Range/Units





  06:44 06:45 11:16 


 


WBC   16.35 H   (4.50-10.00)  X 10*3/uL


 


RBC   3.24 L   (4.40-5.60)  X 10*6/uL


 


Hgb   10.0 L   (13.0-17.0)  g/dL


 


Hct   31.6 L   (39.6-50.0)  %


 


MCV   97.5 H   (80.0-97.0)  fL


 


MCHC   31.6 L   (32.0-37.0)  g/dL


 


RDW   14.6 H   (11.5-14.5)  %


 


Immature Gran #   0.33 H   (0.00-0.04)  X 10*3/uL


 


Neutrophils #   13.52 H   (1.80-7.70)  X 10*3/uL


 


Monocytes #   1.23 H   (0.20-1.00)  X 10*3/uL


 


Eosinophils #   0 L   (0.04-0.35)  X 10*3/uL


 


Sodium     (137-145)  mmol/L


 


Potassium     (3.5-5.1)  mmol/L


 


Chloride     ()  mmol/L


 


BUN  60.0 H    (9.0-27.0)  mg/dL


 


Est GFR (CKD-EPI)NonAf  56.4 L    (60.0-200.0)   


 


BUN/Creatinine Ratio  50.00 H    (12.00-20.00)  Ratio


 


Glucose  52 L    ()  mg/dL


 


POC Glucose (mg/dL)    193 H  (75-99)  mg/dL


 


Calcium  8.3 L    (8.7-10.3)  mg/dL


 


Ferritin  344.0 H    (22.0-322.0)  ng/mL


 


Lactate Dehydrogenase  425 H    (120-246)  U/L














  04/06/21 04/06/21 04/07/21 Range/Units





  16:56 20:42 07:04 


 


WBC     (4.50-10.00)  X 10*3/uL


 


RBC     (4.40-5.60)  X 10*6/uL


 


Hgb     (13.0-17.0)  g/dL


 


Hct     (39.6-50.0)  %


 


MCV     (80.0-97.0)  fL


 


MCHC     (32.0-37.0)  g/dL


 


RDW     (11.5-14.5)  %


 


Immature Gran #     (0.00-0.04)  X 10*3/uL


 


Neutrophils #     (1.80-7.70)  X 10*3/uL


 


Monocytes #     (0.20-1.00)  X 10*3/uL


 


Eosinophils #     (0.04-0.35)  X 10*3/uL


 


Sodium    135 L  (137-145)  mmol/L


 


Potassium    5.3 H  (3.5-5.1)  mmol/L


 


Chloride    108 H  ()  mmol/L


 


BUN    54 H  (9.0-27.0)  mg/dL


 


Est GFR (CKD-EPI)NonAf     (60.0-200.0)   


 


BUN/Creatinine Ratio     (12.00-20.00)  Ratio


 


Glucose    150 H  ()  mg/dL


 


POC Glucose (mg/dL)  228 H  256 H   (75-99)  mg/dL


 


Calcium    7.8 L  (8.7-10.3)  mg/dL


 


Ferritin     (22.0-322.0)  ng/mL


 


Lactate Dehydrogenase     (120-246)  U/L














  04/07/21 Range/Units





  07:23 


 


WBC   (4.50-10.00)  X 10*3/uL


 


RBC   (4.40-5.60)  X 10*6/uL


 


Hgb   (13.0-17.0)  g/dL


 


Hct   (39.6-50.0)  %


 


MCV   (80.0-97.0)  fL


 


MCHC   (32.0-37.0)  g/dL


 


RDW   (11.5-14.5)  %


 


Immature Gran #   (0.00-0.04)  X 10*3/uL


 


Neutrophils #   (1.80-7.70)  X 10*3/uL


 


Monocytes #   (0.20-1.00)  X 10*3/uL


 


Eosinophils #   (0.04-0.35)  X 10*3/uL


 


Sodium   (137-145)  mmol/L


 


Potassium   (3.5-5.1)  mmol/L


 


Chloride   ()  mmol/L


 


BUN   (9.0-27.0)  mg/dL


 


Est GFR (CKD-EPI)NonAf   (60.0-200.0)   


 


BUN/Creatinine Ratio   (12.00-20.00)  Ratio


 


Glucose   ()  mg/dL


 


POC Glucose (mg/dL)  163 H  (75-99)  mg/dL


 


Calcium   (8.7-10.3)  mg/dL


 


Ferritin   (22.0-322.0)  ng/mL


 


Lactate Dehydrogenase   (120-246)  U/L














Assessment and Plan


Assessment: 





-acute hypoxic respiratory failure: Secondary to covid 19 pneumonia patient 

maintained on vitamin and zinc supplements.  Patient currently on IV solu-Medrol

and we will transition to oral prednisone with a taper.  has received 2 doses of

Tocilizumab and is continued on Lovenox. ,Patient currently on 4 L via nasal 

cannula.  Pulmonary following


-Possible hemorrhoids with blood noted in the stool, GI evaluated the patient 

and ordered anti-hemorrhoidal and no plans for endoscopic intervention


-Type 2 diabetes mellitus uncontrolled elevated blood sugars secondary to 

systemic steroids, continue with pre-meal, sliding scale, and long-acting, with 

adjustments made and will increase pre-male and long-acting doses


-Hyperkalemia, improved 


-Hypotonic hyponatremia, improved


-Acute renal failure secondary to Covid 19: Improved, current creatinine is 1.05


-Hypertension 


-DVT subcutaneous Lovenox


-Full code





Plan:


Continue with current medications.  Will continue with vitamin and zinc 

supplements, and lovenox . patient has received Tocilizumab x2.  Pulmonary 

following.  Will transition to oral prednisone taper.  Patient currently on 4 L 

of oxygen via nasal cannula.    Discussed with the patient to increase activity 

as tolerated.  PT OT following daily as patient needs continued encouragement to

increase activity.  Continue low potassium diet.   Will continue to monitor 

closely.  Prognosis remains guarded.  Social work following and working on 

placement and anticipate discharge in the morning.

## 2021-04-08 VITALS
TEMPERATURE: 97.6 F | HEART RATE: 75 BPM | DIASTOLIC BLOOD PRESSURE: 50 MMHG | RESPIRATION RATE: 20 BRPM | SYSTOLIC BLOOD PRESSURE: 117 MMHG

## 2021-04-08 LAB
GLUCOSE BLD-MCNC: 174 MG/DL (ref 75–99)
GLUCOSE BLD-MCNC: 241 MG/DL (ref 75–99)

## 2021-04-08 RX ADMIN — INSULIN ASPART SCH UNIT: 100 INJECTION, SOLUTION INTRAVENOUS; SUBCUTANEOUS at 12:45

## 2021-04-08 RX ADMIN — ATORVASTATIN CALCIUM SCH MG: 80 TABLET, FILM COATED ORAL at 09:37

## 2021-04-08 RX ADMIN — PIOGLITAZONE SCH MG: 30 TABLET ORAL at 09:38

## 2021-04-08 RX ADMIN — Medication SCH MG: at 09:36

## 2021-04-08 RX ADMIN — FAMOTIDINE SCH MG: 20 TABLET, FILM COATED ORAL at 09:37

## 2021-04-08 RX ADMIN — CLOPIDOGREL BISULFATE SCH MG: 75 TABLET ORAL at 09:37

## 2021-04-08 RX ADMIN — INSULIN ASPART SCH UNIT: 100 INJECTION, SOLUTION INTRAVENOUS; SUBCUTANEOUS at 09:36

## 2021-04-08 RX ADMIN — HYDROCORTISONE SCH APPLIC: 25 CREAM TOPICAL at 12:45

## 2021-04-08 RX ADMIN — OXYCODONE HYDROCHLORIDE AND ACETAMINOPHEN SCH MG: 500 TABLET ORAL at 09:36

## 2021-04-08 RX ADMIN — METHYLPREDNISOLONE SODIUM SUCCINATE SCH MG: 40 INJECTION, POWDER, FOR SOLUTION INTRAMUSCULAR; INTRAVENOUS at 09:36

## 2021-04-08 RX ADMIN — METOPROLOL SUCCINATE SCH MG: 50 TABLET, EXTENDED RELEASE ORAL at 09:36

## 2021-04-08 NOTE — P.PN
Subjective


Progress Note Date: 04/08/21


Principal diagnosis: 





Acute hypoxic respiratory failure secondary to covid with 19 pneumonitis.





80-year-old male, who was brought to the emergency department by EMS.  The 

patient apparently has had 2 weeks' worth of increasing shortness of breath, and

significant fatigue.  The patient also had chest congestion.  He had a fever.  

The patient states that he is just not been feeling well and getting 

progressively worse.  He also apparently had an episode or 2 of diarrhea.  The p

atient denied any chest pain or chest pressure or palpitations.  The patient 

also denied nausea, vomiting, and abdominal pain.  Apparently when EMS arrived, 

the patient's saturations were in the low 80s on room air.  For that reason, he 

was transported in, evaluated, and admitted with a diagnosis of COVID 19 19 

pneumonia.  The patient does have a history of diabetes, hypertension, and a 

previous pacemaker insertion.  White count was 4.6, hemoglobin 12.5, hematocrit 

37.1, and platelet count 153,000.  PT, INR, and PTT were all normal.  D-dimer 

was 0.77.  Sodium 136, potassium 4.5, chlorides 102, CO2 26, anion gap 8, BUN 

47, and creatinine 1.63.  Ferritin was 985, , C-reactive protein 58, and 

pro-calcitonin level was 0.12.  Chest x-ray did show some interstitial 

infiltrates.





On 03/18/2021 patient seen in follow-up on medical floor.  he is on 2 L of 

oxygen his pulse ox is 90-94%, breathing comfortably, no fever or chills, blood 

pressure has been stable.  Denies any worsening dyspnea or hypoxemia, he has a 

mild cough, no chest discomfort.  He continues on oral Decadron 6 mg daily, IV 

hydration, vitamins, d-dimer was low at 0.77, patient is on subcu heparin for 

DVT prophylaxis, pro-calcitonin level was low, inflammatory markers were not 

significantly elevated on admission.  Clinically symptoms have not progressed, 

and patient will be considered for discharge in next 24 hours.





On 04/05/2021 patient seen in follow-up on medical surgical floor, patient 

remains on irritable at 35 L and FiO2 of 60%, with a pulse ox is ranging between

86-93%, no worsening dyspnea, no chills, hemodynamically stable.  Patient is 

resting in bed, despite requiring high flow oxygen his lung sounds are clear, no

significant wheezes or crackles or rhonchi.  Patient states he was up in a chair

earlier and he felt dizzy, occasional cough with no phlegm production, no chest 

pain or hemoptysis, his last chest x-ray was on O2 2021 showing patchy bilateral

airspace disease, but slightly increased in the mid and lower lungs.





Patient was reevaluated today on 4/6/2021, he is now on 40% FiO2 and 33 L flow 

using airvo, patient seems to be comfortable, doing quite well, his O2 

saturation is 92% present.  Patient denies any shortness of breath at rest, he 

does have dyspnea on exertion.  No cough no fever no chills no hemoptysis.  WBC 

count today is 16.3 his platelets are 194.  No inflammatory markers ordered 

today.  Chest x-ray from today shows diffuse interstitial infiltrates 

bilaterally





Patient was reevaluated today on 4/7/2021, patient has been tapered down to 6 L 

high flow nasal cannula, and his O2 saturations 97%.  Patient is feeling much 

better, breathing a lot easier, hardly any cough no wheezing, and he has no 

other constitutional symptoms.  Basic metabolic profile is normal today, BUN is 

54 creatinine 1.05.  Sugar is 150.  Chest x-ray from yesterday continues to show

bilateral significant airspace disease and opacities left more so than right.  

Patient was seen by GI on consultation for his rectal bleeding, and the 

recommendation was to monitor closely transfuse as needed and local hemorrhoidal

care with topical steroids therapy ordered.  No endoscopy is recommended because

the patient is not interested.





Reevaluated today on 4/8/2021, patient is doing great today, he is on 4 L nasal 

cannula.  Asymptomatic, no cough no wheezing no shortness of breath, being 

evaluated for possible rehab placement.














Objective





- Vital Signs


Vital signs: 


                                   Vital Signs











Temp  97.6 F   04/08/21 10:11


 


Pulse  75   04/08/21 10:11


 


Resp  20   04/08/21 10:11


 


BP  117/50   04/08/21 10:11


 


Pulse Ox  99   04/08/21 10:11








                                 Intake & Output











 04/07/21 04/08/21 04/08/21





 18:59 06:59 18:59


 


Intake Total  540 


 


Output Total  300 


 


Balance  240 


 


Intake:   


 


  Oral  540 


 


Output:   


 


  Urine  300 


 


Other:   


 


  Voiding Method  Bedside Commode 





  Urinal 














- Exam


 GENERAL EXAM: Alert, pleasant, 80-year-old white male currently on 4 L nasal 

cannula


HEAD: Normocephalic/atraumatic.


HEENT: PERRLA, EOMI, nonicteric, no neck masses, no JVD, no stridor.


CHEST: No chest wall deformity.  Symmetrical expansion. 


LUNGS: Equal air bilateral crackles noted posteriorly.


CVS: Regular rate and rhythm, normal S1 and S2, no gallops, no murmurs, no rubs


ABDOMEN: Soft, nontender.  No hepatosplenomegaly, normal bowel sounds, no 

guarding or rigidity.


EXTREMITIES: No clubbing, no edema, no cyanosis, 2+ pulses and upper and lower 

extremities.


MUSCULOSKELETAL: Muscle strength and tone normal.


SPINE: No scoliosis or deformity


SKIN: No rashes


CENTRAL NERVOUS SYSTEM: Alert and oriented 3 focal deficits.


PSYCHIATRIC: Normal mood, affect and normal mental status examination





- Labs


CBC & Chem 7: 


                                 04/06/21 06:45





                                 04/07/21 07:04


Labs: 


                  Abnormal Lab Results - Last 24 Hours (Table)











  04/07/21 04/07/21 04/07/21 Range/Units





  12:06 16:44 22:05 


 


POC Glucose (mg/dL)  252 H  227 H  180 H  (75-99)  mg/dL














  04/08/21 04/08/21 Range/Units





  07:19 11:37 


 


POC Glucose (mg/dL)  174 H  241 H  (75-99)  mg/dL














Assessment and Plan


Assessment: 





Impression:


Acute hypoxic respiratory failure secondary to covid 19 pneumonia


Type 2 diabetes.


Benign essential hypertension.


History of bowel resection for diverticulitis.


Lower GI bleeding, anticoagulation is presently on hold.  Being addressed by 

gastroenterology.





Recommendation:


Continue present supportive care measures


Continue option at 4 L/m via nasal cannula


GI to evaluate for his GI bleeding.


Continue DVT prophylaxis.


Continue Decadron.  Possibly one more week of Decadron.  Orally upon discharge


From my perspective for discharge planning and follow-up on outpatient basis.


Time with Patient: Less than 30

## 2021-04-08 NOTE — P.DS
Providers


Date of admission: 


03/16/21 21:07





Expected date of discharge: 04/08/21


Attending physician: 


Musa Stein MD





Consults: 





                                        





03/16/21 21:07


Consult Physician Urgent 


   Consulting Provider: Jaun Alfred


   Consult Reason/Comments: COVID pneumonia


   Do you want consulting provider notified?: Yes





04/04/21 16:06


Consult Physician Urgent 


   Consulting Provider: Mariam Parmar


   Consult Reason/Comments: bright red blood in stool


   Do you want consulting provider notified?: Yes











Primary care physician: 


Physician Nonstaff





Hospital Course: 








Final diagnosis





-acute hypoxic respiratory failure: Secondary to covid 19 pneumonia has received

2 doses of Tocilizumab 


-Possible hemorrhoids with blood noted in the stool, resolved


-Type 2 diabetes mellitus uncontrolled elevated blood sugars secondary to 

systemic steroids


-Hyperkalemia, improved 


-Hypotonic hyponatremia, improved


-Acute renal failure secondary to Covid 19


-Hypertension 


-DVT prophylaxis


-Full code





Discharge disposition


Patient is being discharged in a stable condition with guarded prognosis to 

extended care facility for continued PT/OT therapy.  Patient will follow-up with

primary care provider in the outpatient setting upon discharge.  Patient also 

instructed to follow-up with pulmonary outpatient once discharged from [facility].  

Patient is to continue with vitamin and zinc supplements along with a prednisone

taper upon discharge.  Total time taken is greater than 35 minutes.





Hospital course





This is an 80-year-old male who was recently admitted with shortness of breath 

and generalized weakness and fatigue with recent exposure to Covid 19 and was 

found to be positive himself.  Patient was having multiple episodes of diarrhea 

which is since resolved and was maintained on high flow oxygen and recently 

titrated and maintaining high 90% oxygen saturations on 4 L via nasal cannula.  

Continue to be weak and was seen and evaluated by PT/OT therapy recommending 

subacute rehab for strength and mobility.  Patient needs aggressive daily 

physical therapy as he tends to lay in the bed if not instructed and enforce to 

get up and move around.  Patient's blood sugars have been variable and 

maintained on pre-meal, sliding scale, and long acting and will continue.  

Recommend Accu-Cheks before meals and at bedtime with possible titration of 

insulins once prednisone taper has finished.  Patient was out of the window for 

Remdesivir although did receive Tocilizumab.  Patient was also maintained on IV 

steroids and has transitioned oral prednisone. Currently no reports of chest 

pain, worsening shortness of breath, or palpitations.  Patient is afebrile.  No 

reports of nausea or vomiting and patient is tolerating diet.  Patient will be 

going to [facility] today.





On exam vital signs are stable.  Temp is 97.6F, pulse is 75, respirations are 

20, blood pressure is 117/50, oxygen saturation is 99% on 4 L via nasal cannula.

 Cardio S1, S2 are muffled.  Respiratory system shows diminished breath sounds 

at the bases with no wheezing or rhonchi noted.  Abdomen is soft and obese, and 

nontender.  Nervous system shows diffuse weakness.





Please refer to medication reconciliation sheet for a list of medications.


Patient Condition at Discharge: Fair





Plan - Discharge Summary


Discharge Rx Participant: No


New Discharge Prescriptions: 


New


   Insulin Detemir (Levemir) [Levemir] 60 unit SQ HS  syr


   Famotidine [Pepcid] 40 mg PO DAILY  tab


   predniSONE 10 mg PO AS DIRECTED #30 tab


   Hydrocortisone Pr Cream [Proctosol-Hc 2.5%] 1 applic RECTAL BID  applic


   Ascorbic Acid [Vitamin C] 1,000 mg PO DAILY  tab


   INSULIN ASPART (NovoLOG) [NovoLOG (formulary)] 10 unit SQ AC-TID  vial


   INSULIN ASPART (NovoLOG) [NovoLOG (formulary)] 0 unit SQ ACHS  vial


   Zinc Sulfate [Orazinc] 220 mg PO DAILY  cap


   Acetaminophen Tab [Tylenol] 650 mg PO Q6HR PRN  tab


     PRN Reason: Fever And/ Or Pain





Continue


   glipiZIDE [Glipizide] 20 mg PO BID


   Pioglitazone [Actos] 30 mg PO DAILY


   Atorvastatin [Lipitor] 80 mg PO DAILY #30 tab


   Metoprolol Succinate (ER) [Toprol XL] 50 mg PO DAILY


   Clopidogrel Bisulfate [Plavix] 75 mg PO DAILY





Discontinued


   metFORMIN HCL 1,000 mg PO BID


   Aspirin [Adult Low Dose Aspirin EC] 81 mg PO DAILY #30 tab


   Furosemide [Lasix] 20 mg PO DAILY #30 tab


   Losartan [Cozaar] 50 mg PO DAILY


Discharge Medication List





glipiZIDE [Glipizide] 20 mg PO BID 11/25/15 [History]


Pioglitazone [Actos] 30 mg PO DAILY 05/15/20 [History]


Atorvastatin [Lipitor] 80 mg PO DAILY #30 tab 05/22/20 [Rx]


Clopidogrel Bisulfate [Plavix] 75 mg PO DAILY 03/16/21 [History]


Metoprolol Succinate (ER) [Toprol XL] 50 mg PO DAILY 03/16/21 [History]


Acetaminophen Tab [Tylenol] 650 mg PO Q6HR PRN  tab 04/08/21 [Rx]


Ascorbic Acid [Vitamin C] 1,000 mg PO DAILY  tab 04/08/21 [Rx]


Famotidine [Pepcid] 40 mg PO DAILY  tab 04/08/21 [Rx]


Hydrocortisone Pr Cream [Proctosol-Hc 2.5%] 1 applic RECTAL BID  applic 04/08/21

[Rx]


INSULIN ASPART (NovoLOG) [NovoLOG (formulary)] 0 unit SQ ACHS  vial 04/08/21 

[Rx]


INSULIN ASPART (NovoLOG) [NovoLOG (formulary)] 10 unit SQ AC-TID  vial 04/08/21 

[Rx]


Insulin Detemir (Levemir) [Levemir] 60 unit SQ HS  syr 04/08/21 [Rx]


Zinc Sulfate [Orazinc] 220 mg PO DAILY  cap 04/08/21 [Rx]


predniSONE 10 mg PO AS DIRECTED #30 tab 04/08/21 [Rx]








Follow up Appointment(s)/Referral(s): 


Jaun Alfred DO [Doctor of Osteopathic Medicine] - 2 Weeks


[facility], [NON-STAFF] - As Needed


Nonstaff,Physician [Primary Care Provider] - 1-2 days


Patient Instructions/Handouts:  Coronavirus Disease 2019 (COVID-19)


Activity/Diet/Wound Care/Special Instructions: 


Patient is going to F today


Activity as tolerated


Needs aggressive daily physical therapy 


Continue with oxygen therapy and titrate as tolerated


Continue with prednisone taper


Continue to monitor blood sugars before meals and at bedtime and treat 

accordingly with sliding scale along with long-acting and pre-meal insulins


Adjust insulins once patient has completed prednisone taper





NovoLog sliding scale


0-150 equals 0 units


151-200 equals 2 units


201-250 equals 4 units


251-300 equals 6 units


301-350 equals 8 units


351-400 equals 10 units


Please notify provider if blood sugar is 400 or above





Continue with consistent carb low potassium low phosphorus diet


Discharge Disposition: TRANSFER TO SNF/ECF

## 2021-04-26 ENCOUNTER — HOSPITAL ENCOUNTER (INPATIENT)
Dept: HOSPITAL 47 - EC | Age: 81
LOS: 8 days | Discharge: HOSPICE | DRG: 291 | End: 2021-05-04
Attending: HOSPITALIST | Admitting: HOSPITALIST
Payer: MEDICARE

## 2021-04-26 VITALS — BODY MASS INDEX: 39.1 KG/M2

## 2021-04-26 DIAGNOSIS — Z87.891: ICD-10-CM

## 2021-04-26 DIAGNOSIS — H91.90: ICD-10-CM

## 2021-04-26 DIAGNOSIS — I25.2: ICD-10-CM

## 2021-04-26 DIAGNOSIS — J18.9: ICD-10-CM

## 2021-04-26 DIAGNOSIS — Z86.16: ICD-10-CM

## 2021-04-26 DIAGNOSIS — L30.9: ICD-10-CM

## 2021-04-26 DIAGNOSIS — Z83.3: ICD-10-CM

## 2021-04-26 DIAGNOSIS — Z79.899: ICD-10-CM

## 2021-04-26 DIAGNOSIS — T38.0X5A: ICD-10-CM

## 2021-04-26 DIAGNOSIS — E11.9: ICD-10-CM

## 2021-04-26 DIAGNOSIS — Z79.4: ICD-10-CM

## 2021-04-26 DIAGNOSIS — I25.5: ICD-10-CM

## 2021-04-26 DIAGNOSIS — Z79.82: ICD-10-CM

## 2021-04-26 DIAGNOSIS — Z51.5: ICD-10-CM

## 2021-04-26 DIAGNOSIS — N17.9: ICD-10-CM

## 2021-04-26 DIAGNOSIS — I25.10: ICD-10-CM

## 2021-04-26 DIAGNOSIS — J96.21: ICD-10-CM

## 2021-04-26 DIAGNOSIS — R53.81: ICD-10-CM

## 2021-04-26 DIAGNOSIS — Z98.61: ICD-10-CM

## 2021-04-26 DIAGNOSIS — I27.20: ICD-10-CM

## 2021-04-26 DIAGNOSIS — R50.9: ICD-10-CM

## 2021-04-26 DIAGNOSIS — J43.9: ICD-10-CM

## 2021-04-26 DIAGNOSIS — E66.9: ICD-10-CM

## 2021-04-26 DIAGNOSIS — J96.22: ICD-10-CM

## 2021-04-26 DIAGNOSIS — I13.0: Primary | ICD-10-CM

## 2021-04-26 DIAGNOSIS — Z66: ICD-10-CM

## 2021-04-26 DIAGNOSIS — I24.9: ICD-10-CM

## 2021-04-26 DIAGNOSIS — I11.0: ICD-10-CM

## 2021-04-26 DIAGNOSIS — K62.5: ICD-10-CM

## 2021-04-26 DIAGNOSIS — I50.43: ICD-10-CM

## 2021-04-26 DIAGNOSIS — I44.0: ICD-10-CM

## 2021-04-26 DIAGNOSIS — Z95.810: ICD-10-CM

## 2021-04-26 DIAGNOSIS — Z79.02: ICD-10-CM

## 2021-04-26 DIAGNOSIS — D64.9: ICD-10-CM

## 2021-04-26 DIAGNOSIS — Z20.822: ICD-10-CM

## 2021-04-26 DIAGNOSIS — Z87.01: ICD-10-CM

## 2021-04-26 DIAGNOSIS — E11.22: ICD-10-CM

## 2021-04-26 DIAGNOSIS — E78.5: ICD-10-CM

## 2021-04-26 DIAGNOSIS — N18.30: ICD-10-CM

## 2021-04-26 LAB
ALBUMIN SERPL-MCNC: 2.6 G/DL (ref 3.5–5)
ALP SERPL-CCNC: 87 U/L (ref 38–126)
ALT SERPL-CCNC: 55 U/L (ref 4–49)
ANION GAP SERPL CALC-SCNC: 1 MMOL/L
APTT BLD: 21.6 SEC (ref 22–30)
AST SERPL-CCNC: 46 U/L (ref 17–59)
BASOPHILS # BLD AUTO: 0.1 K/UL (ref 0–0.2)
BASOPHILS NFR BLD AUTO: 1 %
BUN SERPL-SCNC: 24 MG/DL (ref 9–20)
CALCIUM SPEC-MCNC: 8.3 MG/DL (ref 8.4–10.2)
CHLORIDE SERPL-SCNC: 102 MMOL/L (ref 98–107)
CO2 SERPL-SCNC: 36 MMOL/L (ref 22–30)
D DIMER PPP FEU-MCNC: 1.83 MG/L FEU (ref ?–0.6)
EOSINOPHIL # BLD AUTO: 0.2 K/UL (ref 0–0.7)
EOSINOPHIL NFR BLD AUTO: 3 %
ERYTHROCYTE [DISTWIDTH] IN BLOOD BY AUTOMATED COUNT: 2.73 M/UL (ref 4.3–5.9)
ERYTHROCYTE [DISTWIDTH] IN BLOOD: 17.5 % (ref 11.5–15.5)
GLUCOSE SERPL-MCNC: 125 MG/DL (ref 74–99)
HCT VFR BLD AUTO: 27.8 % (ref 39–53)
HGB BLD-MCNC: 8.3 GM/DL (ref 13–17.5)
INR PPP: 0.9 (ref ?–1.2)
LDH SPEC-CCNC: 1196 U/L (ref 313–618)
LYMPHOCYTES # SPEC AUTO: 2.4 K/UL (ref 1–4.8)
LYMPHOCYTES NFR SPEC AUTO: 30 %
MAGNESIUM SPEC-SCNC: 1.9 MG/DL (ref 1.6–2.3)
MCH RBC QN AUTO: 30.6 PG (ref 25–35)
MCHC RBC AUTO-ENTMCNC: 29.9 G/DL (ref 31–37)
MCV RBC AUTO: 102.1 FL (ref 80–100)
MONOCYTES # BLD AUTO: 1.1 K/UL (ref 0–1)
MONOCYTES NFR BLD AUTO: 13 %
NEUTROPHILS # BLD AUTO: 3.9 K/UL (ref 1.3–7.7)
NEUTROPHILS NFR BLD AUTO: 49 %
PLATELET # BLD AUTO: 281 K/UL (ref 150–450)
POTASSIUM SERPL-SCNC: 5.4 MMOL/L (ref 3.5–5.1)
PROT SERPL-MCNC: 5.3 G/DL (ref 6.3–8.2)
PT BLD: 9.8 SEC (ref 9–12)
SODIUM SERPL-SCNC: 139 MMOL/L (ref 137–145)
WBC # BLD AUTO: 7.9 K/UL (ref 3.8–10.6)

## 2021-04-26 PROCEDURE — 93005 ELECTROCARDIOGRAM TRACING: CPT

## 2021-04-26 PROCEDURE — 85730 THROMBOPLASTIN TIME PARTIAL: CPT

## 2021-04-26 PROCEDURE — 85027 COMPLETE CBC AUTOMATED: CPT

## 2021-04-26 PROCEDURE — 80053 COMPREHEN METABOLIC PANEL: CPT

## 2021-04-26 PROCEDURE — 83605 ASSAY OF LACTIC ACID: CPT

## 2021-04-26 PROCEDURE — 83735 ASSAY OF MAGNESIUM: CPT

## 2021-04-26 PROCEDURE — 36415 COLL VENOUS BLD VENIPUNCTURE: CPT

## 2021-04-26 PROCEDURE — 85610 PROTHROMBIN TIME: CPT

## 2021-04-26 PROCEDURE — 86140 C-REACTIVE PROTEIN: CPT

## 2021-04-26 PROCEDURE — 94660 CPAP INITIATION&MGMT: CPT

## 2021-04-26 PROCEDURE — 85379 FIBRIN DEGRADATION QUANT: CPT

## 2021-04-26 PROCEDURE — 99285 EMERGENCY DEPT VISIT HI MDM: CPT

## 2021-04-26 PROCEDURE — 84145 PROCALCITONIN (PCT): CPT

## 2021-04-26 PROCEDURE — 94760 N-INVAS EAR/PLS OXIMETRY 1: CPT

## 2021-04-26 PROCEDURE — 71045 X-RAY EXAM CHEST 1 VIEW: CPT

## 2021-04-26 PROCEDURE — 84484 ASSAY OF TROPONIN QUANT: CPT

## 2021-04-26 PROCEDURE — 80048 BASIC METABOLIC PNL TOTAL CA: CPT

## 2021-04-26 PROCEDURE — 85025 COMPLETE CBC W/AUTO DIFF WBC: CPT

## 2021-04-26 PROCEDURE — 87636 SARSCOV2 & INF A&B AMP PRB: CPT

## 2021-04-26 PROCEDURE — 83615 LACTATE (LD) (LDH) ENZYME: CPT

## 2021-04-26 PROCEDURE — 93306 TTE W/DOPPLER COMPLETE: CPT

## 2021-04-26 PROCEDURE — 83880 ASSAY OF NATRIURETIC PEPTIDE: CPT

## 2021-04-26 RX ADMIN — FUROSEMIDE SCH MG: 10 INJECTION, SOLUTION INTRAMUSCULAR; INTRAVENOUS at 23:55

## 2021-04-26 NOTE — ED
SOB HPI





- General


Chief Complaint: Shortness of Breath


Stated Complaint: ANTONETTE


Time Seen by Provider: 04/26/21 21:38


Source: EMS, RN notes reviewed, old records reviewed


Mode of arrival: EMS


Limitations: no limitations





- History of Present Illness


Initial Comments: 





This is an 80-year-old male DF for evaluation of shortness of breath diagnosis 

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  Patient comes in with diabetes

high blood pressure is significant shortness of breath.  Peter worsening 

hypoxia.  Patient denies any current chest pain.  He is awake and alert.  No 

other significant complaints


MD Complaint: shortness of breath, cough


-: days(s)


Severity: moderate


Severity scale (1-10): 7 (Shortness of breath)


Quality: other (No pain)


Consistency: constant


Improves With: nothing


Worsens With: exertion, movement


Known History Of: congestive heart failure, recurrent pneumonia


Context: recent URI, recent illness, other (Covert 1 month ago)


Associated Symptoms: denies other symptoms





- Related Data


                                Home Medications











 Medication  Instructions  Recorded  Confirmed


 


glipiZIDE [Glipizide] 20 mg PO BID@0900,2100 11/25/15 04/26/21


 


Pioglitazone [Actos] 30 mg PO DAILY@0900 05/15/20 04/26/21


 


Clopidogrel Bisulfate [Plavix] 75 mg PO DAILY@0900 03/16/21 04/26/21


 


Metoprolol Succinate (ER) [Toprol 50 mg PO DAILY@0900 03/16/21 04/26/21





XL]   


 


Ascorbic Acid [Vitamin C] 1,000 mg PO DAILY@0900 04/26/21 04/26/21


 


Atorvastatin [Lipitor] 80 mg PO HS@2100 04/26/21 04/26/21


 


Famotidine [Pepcid] 40 mg PO DAILY@0900 04/26/21 04/26/21


 


Ferrous Sulfate [Feosol] 325 mg PO DAILY@0900 04/26/21 04/26/21


 


Furosemide [Lasix] 40 mg PO DAILY@0900 04/26/21 04/26/21


 


Insulin Glargine,Hum.rec.anlog 60 units SQ HS@2100 04/26/21 04/26/21





[Semglee Pen]   


 


Insulin Lispro [humaLOG Kwikpen] 10 unit SQ AC-TID 04/26/21 04/26/21


 


Insulin Lispro [humaLOG Kwikpen] See Protocol SQ ACHS 04/26/21 04/26/21


 


Levofloxacin [Levaquin] 750 mg PO DAILY 04/26/21 04/26/21


 


Potassium Chloride ER [K-Dur 20] 20 meq PO DAILY@0900 04/26/21 04/26/21


 


Zinc 50 mg PO DAILY@0900 04/26/21 04/26/21








                                  Previous Rx's











 Medication  Instructions  Recorded


 


Acetaminophen Tab [Tylenol] 650 mg PO Q6HR PRN  tab 04/08/21











                                    Allergies











Allergy/AdvReac Type Severity Reaction Status Date / Time


 


Mushroom Allergy  Vomiting Verified 04/26/21 22:27


 


Penicillins Allergy  Unknown Verified 04/26/21 22:27














Review of Systems


ROS Statement: 


Those systems with pertinent positive or pertinent negative responses have been 

documented in the HPI.





ROS Other: All systems not noted in ROS Statement are negative.





Past Medical History


Past Medical History: Diabetes Mellitus, Hypertension


Additional Past Medical History / Comment(s): sinusitis, diverticulits


History of Any Multi-Drug Resistant Organisms: None Reported


Past Surgical History: Bowel Resection, Cholecystectomy


Additional Past Surgical History / Comment(s): bowel resection d/t 

diverticulitis, colonoscopy


Past Anesthesia/Blood Transfusion Reactions: No Reported Reaction


Past Psychological History: No Psychological Hx Reported


Smoking Status: Former smoker


Past Alcohol Use History: Occasional


Past Drug Use History: None Reported





- Past Family History


  ** Mother


Additional Family Medical History / Comment(s): reproductive cancer





  ** Father


Family Medical History: Diabetes Mellitus





General Exam


Limitations: no limitations


General appearance: alert, in no apparent distress


Head exam: Present: atraumatic, normocephalic, normal inspection


Eye exam: Present: normal appearance, PERRL, EOMI.  Absent: scleral icterus, 

conjunctival injection, periorbital swelling


ENT exam: Present: normal exam, mucous membranes moist


Neck exam: Present: normal inspection.  Absent: tenderness, meningismus, 

lymphadenopathy


Respiratory exam: Present: respiratory distress, rhonchi, accessory muscle use, 

decreased breath sounds, prolonged expiratory.  Absent: wheezes, rales, stridor


Cardiovascular Exam: Present: normal rhythm, tachycardia, normal heart sounds.  

Absent: systolic murmur, diastolic murmur, rubs, gallop, clicks


GI/Abdominal exam: Present: soft, normal bowel sounds.  Absent: distended, 

tenderness, guarding, rebound, rigid


Extremities exam: Present: normal inspection, full ROM, normal capillary refill.

  Absent: tenderness, pedal edema, joint swelling, calf tenderness


Back exam: Present: normal inspection


Neurological exam: Present: alert, oriented X3, CN II-XII intact


Psychiatric exam: Present: normal affect, normal mood


Skin exam: Present: warm, dry, intact, normal color.  Absent: rash





Course


                                   Vital Signs











  04/26/21 04/26/21 04/26/21





  21:21 21:28 21:41


 


Temperature 100.1 F H  


 


Pulse Rate 102 H  


 


Respiratory 22 22 





Rate   


 


Blood Pressure 118/56  


 


O2 Sat by Pulse 94 L  91 L





Oximetry   














  04/26/21





  22:16


 


Temperature 


 


Pulse Rate 107 H


 


Respiratory 20





Rate 


 


Blood Pressure 120/69


 


O2 Sat by Pulse 90 L





Oximetry 














- Reevaluation(s)


Reevaluation #1: 





04/26/21 22:53


Medical records reviewed


Reevaluation #2: 





04/26/21 22:53


Patient still was is increased a little oxygen, no increased respiratory 

distress or work of breathing currently.





- Consultations


Consultation #1: 





Soap with cardiology aware of this patient


Consultation #2: 





Patient will be admitted to [facility], they're agreeable





Medical Decision Making





- Medical Decision Making





80 male DF for evaluation one month post covert.  She was ARDS on x-ray.  

Patient be admitted for hypoxia





- Lab Data


Result diagrams: 


                                 04/26/21 21:52





                                 04/26/21 21:52


                                   Lab Results











  04/26/21 04/26/21 04/26/21 Range/Units





  21:52 21:52 21:52 


 


WBC  7.9    (3.8-10.6)  k/uL


 


RBC  2.73 L    (4.30-5.90)  m/uL


 


Hgb  8.3 L D    (13.0-17.5)  gm/dL


 


Hct  27.8 L    (39.0-53.0)  %


 


MCV  102.1 H D    (80.0-100.0)  fL


 


MCH  30.6    (25.0-35.0)  pg


 


MCHC  29.9 L    (31.0-37.0)  g/dL


 


RDW  17.5 H    (11.5-15.5)  %


 


Plt Count  281    (150-450)  k/uL


 


MPV  8.2    


 


Neutrophils %  49    %


 


Lymphocytes %  30    %


 


Monocytes %  13    %


 


Eosinophils %  3    %


 


Basophils %  1    %


 


Neutrophils #  3.9    (1.3-7.7)  k/uL


 


Lymphocytes #  2.4    (1.0-4.8)  k/uL


 


Monocytes #  1.1 H    (0-1.0)  k/uL


 


Eosinophils #  0.2    (0-0.7)  k/uL


 


Basophils #  0.1    (0-0.2)  k/uL


 


Hypochromasia  Marked    


 


Anisocytosis  Slight    


 


Macrocytosis  Moderate    


 


PT   9.8   (9.0-12.0)  sec


 


INR   0.9   (<1.2)  


 


APTT   21.6 L   (22.0-30.0)  sec


 


D-Dimer   1.83 H   (<0.60)  mg/L FEU


 


Sodium    139  (137-145)  mmol/L


 


Potassium    5.4 H  (3.5-5.1)  mmol/L


 


Chloride    102  ()  mmol/L


 


Carbon Dioxide    36 H  (22-30)  mmol/L


 


Anion Gap    1  mmol/L


 


BUN    24 H  (9-20)  mg/dL


 


Creatinine    0.98  (0.66-1.25)  mg/dL


 


Est GFR (CKD-EPI)AfAm    84  (>60 ml/min/1.73 sqM)  


 


Est GFR (CKD-EPI)NonAf    73  (>60 ml/min/1.73 sqM)  


 


Glucose    125 H  (74-99)  mg/dL


 


Plasma Lactic Acid Fox     (0.7-2.0)  mmol/L


 


Calcium    8.3 L  (8.4-10.2)  mg/dL


 


Magnesium    1.9  (1.6-2.3)  mg/dL


 


Total Bilirubin    0.4  (0.2-1.3)  mg/dL


 


AST    46  (17-59)  U/L


 


ALT    55 H  (4-49)  U/L


 


Alkaline Phosphatase    87  ()  U/L


 


Lactate Dehydrogenase    1196 H  (313-618)  U/L


 


C-Reactive Protein    14.3 H  (<1.0)  mg/dL


 


Total Protein    5.3 L  (6.3-8.2)  g/dL


 


Albumin    2.6 L  (3.5-5.0)  g/dL














  04/26/21 Range/Units





  21:52 


 


WBC   (3.8-10.6)  k/uL


 


RBC   (4.30-5.90)  m/uL


 


Hgb   (13.0-17.5)  gm/dL


 


Hct   (39.0-53.0)  %


 


MCV   (80.0-100.0)  fL


 


MCH   (25.0-35.0)  pg


 


MCHC   (31.0-37.0)  g/dL


 


RDW   (11.5-15.5)  %


 


Plt Count   (150-450)  k/uL


 


MPV   


 


Neutrophils %   %


 


Lymphocytes %   %


 


Monocytes %   %


 


Eosinophils %   %


 


Basophils %   %


 


Neutrophils #   (1.3-7.7)  k/uL


 


Lymphocytes #   (1.0-4.8)  k/uL


 


Monocytes #   (0-1.0)  k/uL


 


Eosinophils #   (0-0.7)  k/uL


 


Basophils #   (0-0.2)  k/uL


 


Hypochromasia   


 


Anisocytosis   


 


Macrocytosis   


 


PT   (9.0-12.0)  sec


 


INR   (<1.2)  


 


APTT   (22.0-30.0)  sec


 


D-Dimer   (<0.60)  mg/L FEU


 


Sodium   (137-145)  mmol/L


 


Potassium   (3.5-5.1)  mmol/L


 


Chloride   ()  mmol/L


 


Carbon Dioxide   (22-30)  mmol/L


 


Anion Gap   mmol/L


 


BUN   (9-20)  mg/dL


 


Creatinine   (0.66-1.25)  mg/dL


 


Est GFR (CKD-EPI)AfAm   (>60 ml/min/1.73 sqM)  


 


Est GFR (CKD-EPI)NonAf   (>60 ml/min/1.73 sqM)  


 


Glucose   (74-99)  mg/dL


 


Plasma Lactic Acid Fox  1.2  (0.7-2.0)  mmol/L


 


Calcium   (8.4-10.2)  mg/dL


 


Magnesium   (1.6-2.3)  mg/dL


 


Total Bilirubin   (0.2-1.3)  mg/dL


 


AST   (17-59)  U/L


 


ALT   (4-49)  U/L


 


Alkaline Phosphatase   ()  U/L


 


Lactate Dehydrogenase   (313-618)  U/L


 


C-Reactive Protein   (<1.0)  mg/dL


 


Total Protein   (6.3-8.2)  g/dL


 


Albumin   (3.5-5.0)  g/dL














- EKG Data


-: EKG Interpreted by Me (EKG shows sinus rhythm 100   QTc 425 ST 

depression v2v3)





- Radiology Data


Radiology results: report reviewed (Chest x-ray shows CHF versus ARDS), image 

reviewed





Critical Care Time


Critical Care Time: Yes


Total Critical Care Time: 31





Disposition


Clinical Impression: 


 Acute pulmonary edema, Congestive heart failure, Anemia, Hypoxia





Disposition: ADMITTED AS IP TO THIS HOSP


Condition: Serious

## 2021-04-26 NOTE — XR
EXAMINATION TYPE: XR chest 1V portable

 

DATE OF EXAM: 4/26/2021

 

COMPARISON: 4/6/2021

 

HISTORY: Short of breath

 

TECHNIQUE: Single view

 

FINDINGS: There is pulmonary interstitial and airspace edema. There is left axillary pacemaker. Heart
 is enlarged.

 

IMPRESSION: Pulmonary edema is slightly worse than last exam and consistent with congestive heart ramon
lure or RDS.

## 2021-04-27 RX ADMIN — FUROSEMIDE SCH MG: 10 INJECTION, SOLUTION INTRAMUSCULAR; INTRAVENOUS at 11:26

## 2021-04-27 RX ADMIN — ATORVASTATIN CALCIUM SCH MG: 80 TABLET, FILM COATED ORAL at 22:01

## 2021-04-27 RX ADMIN — FUROSEMIDE SCH MG: 10 INJECTION, SOLUTION INTRAMUSCULAR; INTRAVENOUS at 23:11

## 2021-04-27 RX ADMIN — ENOXAPARIN SODIUM SCH MG: 40 INJECTION SUBCUTANEOUS at 17:25

## 2021-04-27 RX ADMIN — ISOSORBIDE MONONITRATE SCH MG: 30 TABLET, EXTENDED RELEASE ORAL at 09:14

## 2021-04-27 NOTE — ECHOF
Referral Reason:Heart Failure



MEASUREMENTS

--------

HEIGHT: 182.9 cm

WEIGHT: 138.3 kg

BP: 

RVIDd:   3.7 cm     (< 3.3)

IVSd:   1.4 cm     (0.6 - 1.1)

LVIDd:   5.1 cm     (3.9 - 5.3)

LVPWd:   1.3 cm     (0.6 - 1.1)

IVSs:   2.0 cm

LVIDs:   3.8 cm

LVPWs:   1.8 cm

Ao Diam:   3.3 cm     (2.0 - 3.7)

AV Cusp:   1.6 cm     (1.5 - 2.6)

LA Diam:   4.6 cm     (2.7 - 3.8)

MV EXCURSION:   23.492 mm     (> 18.000)

MV EF SLOPE:   75 mm/s     (70 - 150)

EPSS:   0.5 cm

MV E Eduardo:   0.78 m/s

MV DecT:   185 ms

MV A Eduardo:   1.08 m/s

MV E/A Ratio:   0.72 

RAP:   5.00 mmHg

RVSP:   43.86 mmHg







FINDINGS

--------

Pacerwire seen in RV and RA.

This was a technically difficult study with suboptimal views.   Morbid Obesity

The left ventricular size is normal.   There is moderate concentric left ventricular hypertrophy.   O
verall left ventricular systolic function is mild-moderately impaired with, an EF between 40 - 45 %. 
  Basal inferior LV wall motion is hypokinetic.    Basal inferoseptal LV wall motion is hypokinetic. 
   Mid inferior LV wall motion is hypokinetic.    Apical inferior LV wall motion is hypokinetic.

The right ventricle is mild to moderately enlarged.

The left atrial size is normal.

The right atrial size is normal.

5.0mg OF Lumason UTLIZED: 2 OR MORE WALL SEGMENTS NOT VISUALIZED.

There is mild aortic valve sclerosis.   There is no evidence of aortic regurgitation.

Mild mitral annular calcification present.   Mild mitral regurgitation is present.

Mild tricuspid regurgitation present.   There is mild to moderate pulmonary hypertension.   The right
 ventricular systolic pressure, as measured by Doppler, is 43.86mmHg.

Trace/mild (physiologic)  pulmonic regurgitation.

The aortic root size is normal.

There is no pericardial effusion.



CONCLUSIONS

--------

1. Pacerwire seen in RV and RA.

2. This was a technically difficult study with suboptimal views.

3. Morbid Obesity

4. There is moderate concentric left ventricular hypertrophy.

5. Overall left ventricular systolic function is mild-moderately impaired with, an EF between 40 - 45
 %.

6. Basal inferior LV wall motion is hypokinetic.

7. Basal inferoseptal LV wall motion is hypokinetic.

8. Mid inferior LV wall motion is hypokinetic.

9. Apical inferior LV wall motion is hypokinetic.

10. The right ventricle is mild to moderately enlarged.

11. The left atrial size is normal.

12. 5.0mg OF Lumason UTLIZED: 2 OR MORE WALL SEGMENTS NOT VISUALIZED.

13. There is mild aortic valve sclerosis.

14. Mild mitral regurgitation is present.

15. Mild tricuspid regurgitation present.

16. There is mild to moderate pulmonary hypertension.

17. There is no pericardial effusion.





SONOGRAPHER: Quita Friedman RDCS

## 2021-04-27 NOTE — P.CNPUL
History of Present Illness


Consult date: 04/27/21


Requesting physician: Zeferino Quinn


Reason for consult: dyspnea, abnormal CXR/CT


Chief complaint: Fever, and shortness of breath


History of present illness: 


 This is a 80-year-old white male patient who was recently hospitalized from 

03/16/2021 through 04/08/2021 for COVID-19 pneumonia, and patient had a 

prolonged hospitalization course, with the severe hypoxemic respiratory failure 

related to COVID-19 pneumonia.  Patient was treated with steroids, he received 2

doses of Tocilizumab while in the hospital, and was discharged to a 

rehabilitation facility the skilled nursing home.  His hospitalization was 

complicated by lower GI bleeding and acute kidney injury.  The GI bleeding reso

lved on its own.  He is currently not on any anticoagulation.  His past medical 

history is positive for hypertension, type 2 diabetes mellitus and his sugars 

have been poorly controlled related to systemic steroids, patient is a former 

smoker.  Patient was brought into the emergency department on 04/26/2021 at 2100

by EMS for evaluation of worsening shortness of breath and fever.  he is very 

hard of hearing, he is a poor historian, but chest x-ray in the emergency 

department showed pulmonary interstitial and airspace edema, left basilar 

pacemaker in place.  Denies any coughing, denies any chest congestion or phlegm 

production.  Her low-grade fever in the emergency department with a temp of 1

00.1F.  On 5 L of oxygen pulse ox is 93%.  His blood work showed normal white 

count of 7.9, hemoglobin is 8.3, his platelet count is 281, d-dimer is 1.83, INR

0.9, sodium is 139, potassium is 5.4, chloride is 102, CO2 is 26, B1 is 24, 

creatinine 0.9, lactic acid was 1.2, LDH was 1196, patient had troponin 

elevation of 0.110, 0.122, and 0.117, his proBNP came back elevated at 8220, and

pro-calcitonin level is pending right now, patient was retested for COVID-19 and

was found to be negative, RSV, influenza A and B PCR were negative. 








Review of Systems


All systems: negative


Constitutional: Denies chills, Denies fever


Eyes: denies blurred vision, denies pain


Ears, nose, mouth and throat: Denies headache, Denies sore throat


Cardiovascular: Reports leg edema, Reports shortness of breath, Denies chest 

pain


Respiratory: Denies cough


Gastrointestinal: Denies abdominal pain, Denies diarrhea, Denies nausea, Denies 

vomiting


Musculoskeletal: Denies myalgias


Integumentary: Denies pruritus, Denies rash


Neurological: Denies numbness, Denies weakness


Psychiatric: Denies anxiety, Denies depression


Endocrine: Denies fatigue, Denies weight change





Past Medical History


Past Medical History: Diabetes Mellitus, Hypertension


Additional Past Medical History / Comment(s): sinusitis, diverticulits


History of Any Multi-Drug Resistant Organisms: None Reported


Past Surgical History: Bowel Resection, Cholecystectomy


Additional Past Surgical History / Comment(s): bowel resection d/t 

diverticulitis, colonoscopy


Past Anesthesia/Blood Transfusion Reactions: No Reported Reaction


Past Psychological History: No Psychological Hx Reported


Additional Psychological History / Comment(s): pt lives with his girlfriend 

antonio, is independant, no assistive devices.no outside services. worked in 

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


Smoking Status: Never smoker


Past Alcohol Use History: Occasional


Past Drug Use History: None Reported





- Past Family History


  ** Mother


Additional Family Medical History / Comment(s): reproductive cancer





  ** Father


Family Medical History: Diabetes Mellitus





Medications and Allergies


                                Home Medications











 Medication  Instructions  Recorded  Confirmed  Type


 


glipiZIDE [Glipizide] 20 mg PO BID@0900,2100 11/25/15 04/26/21 History


 


Pioglitazone [Actos] 30 mg PO DAILY@0900 05/15/20 04/26/21 History


 


Clopidogrel Bisulfate [Plavix] 75 mg PO DAILY@0900 03/16/21 04/26/21 History


 


Metoprolol Succinate (ER) [Toprol 50 mg PO DAILY@0900 03/16/21 04/26/21 History





XL]    


 


Acetaminophen Tab [Tylenol] 650 mg PO Q6HR PRN  tab 04/08/21 04/26/21 Rx


 


Ascorbic Acid [Vitamin C] 1,000 mg PO DAILY@0900 04/26/21 04/26/21 History


 


Atorvastatin [Lipitor] 80 mg PO HS@2100 04/26/21 04/26/21 History


 


Famotidine [Pepcid] 40 mg PO DAILY@0900 04/26/21 04/26/21 History


 


Ferrous Sulfate [Feosol] 325 mg PO DAILY@0900 04/26/21 04/26/21 History


 


Furosemide [Lasix] 40 mg PO DAILY@0900 04/26/21 04/26/21 History


 


Insulin Glargine,Hum.rec.anlog 60 units SQ HS@2100 04/26/21 04/26/21 History





[Semglee Pen]    


 


Insulin Lispro [humaLOG Kwikpen] 10 unit SQ AC-TID 04/26/21 04/26/21 History


 


Insulin Lispro [humaLOG Kwikpen] See Protocol SQ ACHS 04/26/21 04/26/21 History


 


Levofloxacin [Levaquin] 750 mg PO DAILY 04/26/21 04/26/21 History


 


Potassium Chloride ER [K-Dur 20] 20 meq PO DAILY@0900 04/26/21 04/26/21 History


 


Zinc 50 mg PO DAILY@0900 04/26/21 04/26/21 History








                                    Allergies











Allergy/AdvReac Type Severity Reaction Status Date / Time


 


Mushroom Allergy  Vomiting Verified 04/26/21 22:27


 


Penicillins Allergy  Unknown Verified 04/26/21 22:27














Physical Exam


Vitals: 


                                   Vital Signs











  Temp Pulse Resp BP Pulse Ox


 


 04/27/21 08:00  98.0 F  89  18  134/79  93 L


 


 04/27/21 06:02    18  


 


 04/27/21 01:00   84  18  142/82  89 L


 


 04/26/21 22:16   107 H  20  120/69  90 L


 


 04/26/21 21:41      91 L


 


 04/26/21 21:28    22  


 


 04/26/21 21:21  100.1 F H  102 H  22  118/56  94 L








                                Intake and Output











 04/26/21 04/27/21 04/27/21





 22:59 06:59 14:59


 


Output Total   800


 


Balance   -800


 


Output:   


 


  Urine   800


 


Other:   


 


  Voiding Method  Urinal 


 


  # Voids   1


 


  Weight 138.346 kg 138.346 kg 











 GENERAL EXAM: Alert, very pleasant, hard of hearing, 80-year-old white male, on

4 L of oxygen with a pulse ox of 93%, comfortable in no apparent distress.


HEAD: Normocephalic/atraumatic.


EYES: Normal reaction of pupils, equal size.  Conjunctiva pink, sclera white.


NOSE: Clear with pink turbinates.


THROAT: No erythema or exudates.


NECK: No masses, no JVD, no thyroid enlargement, no adenopathy.


CHEST: No chest wall deformity.  Symmetrical expansion. 


LUNGS: Equal air entry with bilateral crackles


CVS: Regular rate and rhythm, normal S1 and S2, no gallops, no murmurs, no rubs


ABDOMEN: Soft, nontender.  No hepatosplenomegaly, normal bowel sounds, no gua

rding or rigidity.


EXTREMITIES: No clubbing, plus lower extremity edema, no cyanosis, 2+ pulses and

upper and lower extremities.


MUSCULOSKELETAL: Muscle strength and tone normal.


SPINE: No scoliosis or deformity


SKIN: No rashes


CENTRAL NERVOUS SYSTEM: Alert and oriented -3.  No focal deficits, tone is 

normal in all 4 extremities.


PSYCHIATRIC: Alert and oriented -3.  Appropriate affect.  Intact judgment and 

insight.











Results





- Laboratory Findings


CBC and BMP: 


                                 04/26/21 21:52





                                 04/26/21 21:52


PT/INR, D-dimer











PT  9.8 sec (9.0-12.0)   04/26/21  21:52    


 


INR  0.9  (<1.2)   04/26/21  21:52    


 


D-Dimer  1.83 mg/L FEU (<0.60)  H  04/26/21  21:52    








Abnormal lab findings: 


                                  Abnormal Labs











  04/26/21 04/26/21 04/26/21





  21:52 21:52 21:52


 


RBC  2.73 L  


 


Hgb  8.3 L D  


 


Hct  27.8 L  


 


MCV  102.1 H D  


 


MCHC  29.9 L  


 


RDW  17.5 H  


 


Monocytes #  1.1 H  


 


APTT   21.6 L 


 


D-Dimer   1.83 H 


 


Potassium    5.4 H


 


Carbon Dioxide    36 H


 


BUN    24 H


 


Glucose    125 H


 


Calcium    8.3 L


 


ALT    55 H


 


Lactate Dehydrogenase    1196 H


 


Troponin I   


 


C-Reactive Protein    14.3 H


 


Total Protein    5.3 L


 


Albumin    2.6 L














  04/26/21 04/27/21 04/27/21





  21:52 00:05 04:41


 


RBC   


 


Hgb   


 


Hct   


 


MCV   


 


MCHC   


 


RDW   


 


Monocytes #   


 


APTT   


 


D-Dimer   


 


Potassium   


 


Carbon Dioxide   


 


BUN   


 


Glucose   


 


Calcium   


 


ALT   


 


Lactate Dehydrogenase   


 


Troponin I  0.110 H*  0.122 H*  0.117 H*


 


C-Reactive Protein   


 


Total Protein   


 


Albumin   














- Diagnostic Findings


Chest x-ray: report reviewed, image reviewed





Assessment and Plan


Plan: 


 Assessment:





#1.  Acute on chronic dyspnea and acute on chronic hypoxic respiratory failure, 

multifactorial related to acute exacerbation of CHF with systolic dysfunction





#2.  Recent hospitalization for COVID-19 pneumonia, patient was discharged to 

ECF 





#3.  Rule out possibility of ACS, patient came in with elevated troponins





#4.  Very artery disease with previous stenting





#5.  Previous history of ischemic cardiomyopathy with placement of AICD





#6.  History of moderate to severe pulmonary hypertension





#7.  Diabetes mellitus type 2





#8.  Anemia, the patient has a recent history of rectal bleeding while in the 

hospital, currently not on any anticoagulation





#9.  Former smoker





#10.  General medical debility





Plan:





Continue with Lasix, we'll send a pro-calcitonin level, continue empiric 

antibiotics in the form of Rocephin, cardiology consultation, echocardiogram, 

restart patient's home medications, cardiology consultation, accurate intake and

output, daily weights, follow up labs in the morning, continue Rocephin for 

empiric antibiotic coverage.  We'll continue to follow and make further 

recommendations





I performed a history & physical examination of the patient and discussed their 

management with my nurse practitioner, Teresa Naik.  I reviewed the nurse 

practitioner's note and agree with the documented findings and plan of care.  

Lung sounds are positive for bibasilar crackles.  The findings and the 

impression was discussed with the patient.  I attest to the documentation by the

nurse practitioner. 





Time with Patient: Greater than 30

## 2021-04-27 NOTE — P.CRDCN
History of Present Illness


History of present illness: 


HISTORY OF PRESENT ILLNESS:  


80 year old male with past medical history, coronary artery disease, myocardial 

infarction s/p PCI mid RCA, proximal RCA in 5/2020, Ischemic cardiomyopathy, 

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 

Diabetes, HTN, Dyslipidemia. Follows in the office with Dr. Darden. We are being

consulted for congestive heart failure and elevated troponin. Patient presents 

to the emergency department with shortness of breath and fever. Patient is a 

poor historian, very hard of hearing. Patient recently hospitalized on 

3/16/21-04/08/21 with COVID-19 pneumonia. Patient denies chest pain, 

palpitations, syncope or near syncope. 





DIAGNOSTICS


EKG: sinus rhythm with first degree AV block, Twave inversions, leads I, aVL, ST

depression in leads V2, V3. 


Echo 02/2021 in the office- EF 35-40%, mild TR, mild MR, grade 1 diastolic 

dysfunction 


Chest x-ray- pulmonary edema is slightly worse than last exam 


Laboratory data reviewed, Troponin 0.11, 0.12, 0.11, BNP 8,220, d-dimer 1.83, 

Sodium 139, K 5.4, sCr 0.98, magnesium 1.9, LDH elevated, CRP elevated, total 

protein 5.3, Albumin 2.6, Viral PCR negative, Covid-19 negative, Hgb 8.3, WBC 

7.9, Plt 281 





REVIEW OF SYSTEMS: 


At the time of my exam:


CONSTITUTIONAL: +fever Denies chills.


HEENT: Denies blurred vision, vision changes, or eye pain. Denies hemoptysis 


CARDIOVASCULAR: Denies chest pain.  Reports orthopnea. Denies PND. Denies 

palpitations


RESPIRATORY: Reports shortness of breath. 


GASTROINTESTINAL: Denies abdominal pain. Denies nausea or vomiting. 


HEMATOLOGIC: Denies bleeding disorders.


GENITOURINARY:  Denies any blood in urine.


SKIN: Denies pruitis. Denies rash.





PHYSICAL EXAM: 


VITAL SIGNS: /79 HR 89, SpO2 93% requiring 5L nasal cannula, afebrile. 


GENERAL: No acute distress. 


HEENT: Head is normocephalic. Pupils are equal, round. Sclerae anicteric. Mucous

membranes of the mouth are moist. Neck supple. No JVD or thyromegaly


LUNGS: Respirations even and unlabored. Lungs bibasilar crackles 


HEART: Regular rate and rhythm.  S1 and S2 heard.


ABDOMEN: Soft. Nondistended. Nontender.


EXTREMITIES: Normal range of motion.  No clubbing or cyanosis.  Peripheral 

pulses intact. 


NEUROLOGIC: Awake and alert. Oriented x 3. 





ASSESSMENT: 


Coronary artery disease s/p PCI mid RCA, proximal RCA in 5/2020, 


Acute on chronic heart failure with reduced ejection fraction


Mildly elevated troponin- not likely acute coronary syndrome, troponin not 

increasing, patient denies chest discomfort 


Ischemic cardiomyopathy EF 35-40% 


Permanent cardiac pacemaker


Type 2 Diabetes


HTN


Dyslipidemia





PLAN: 


Obtain repeat 2D echocardiogram 


Continue dual antiplatelet therapy- plavix and aspirin. Continue statin 


Discontinue metoprolol succinate and start carvedilol 6.25mg BID 


Continue IV Lasix 40mg BID 


Patient was on losartan 50mg daily, however, has been held due to hyperkalemia, 

Will start hydralazine 25mg BID 


Continue to monitor renal function and electrolytes 


I/Os, daily weights


Pulmonary is following - is on empiric IV Rocephin 


Further recommendations based on clinical course. 





Nurse practitioner note has been reviewed by physician. Signing provider agrees 

with the documented findings, assessment, and plan of care. 











Past Medical History


Past Medical History: Diabetes Mellitus, Hypertension


Additional Past Medical History / Comment(s): sinusitis, diverticulits


History of Any Multi-Drug Resistant Organisms: None Reported


Past Surgical History: Bowel Resection, Cholecystectomy


Additional Past Surgical History / Comment(s): bowel resection d/t diver

ticulitis, colonoscopy


Past Anesthesia/Blood Transfusion Reactions: No Reported Reaction


Past Psychological History: No Psychological Hx Reported


Additional Psychological History / Comment(s): pt lives with his girlfriend kira huston, is independant, no assistive devices.no outside services. worked in 

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


Smoking Status: Never smoker


Past Alcohol Use History: Occasional


Past Drug Use History: None Reported





- Past Family History


  ** Mother


Additional Family Medical History / Comment(s): reproductive cancer





  ** Father


Family Medical History: Diabetes Mellitus





Medications and Allergies


                                Home Medications











 Medication  Instructions  Recorded  Confirmed  Type


 


glipiZIDE [Glipizide] 20 mg PO BID@0900,2100 11/25/15 04/26/21 History


 


Pioglitazone [Actos] 30 mg PO DAILY@0900 05/15/20 04/26/21 History


 


Clopidogrel Bisulfate [Plavix] 75 mg PO DAILY@0900 03/16/21 04/26/21 History


 


Metoprolol Succinate (ER) [Toprol 50 mg PO DAILY@0900 03/16/21 04/26/21 History





XL]    


 


Acetaminophen Tab [Tylenol] 650 mg PO Q6HR PRN  tab 04/08/21 04/26/21 Rx


 


Ascorbic Acid [Vitamin C] 1,000 mg PO DAILY@0900 04/26/21 04/26/21 History


 


Atorvastatin [Lipitor] 80 mg PO HS@2100 04/26/21 04/26/21 History


 


Famotidine [Pepcid] 40 mg PO DAILY@0900 04/26/21 04/26/21 History


 


Ferrous Sulfate [Feosol] 325 mg PO DAILY@0900 04/26/21 04/26/21 History


 


Furosemide [Lasix] 40 mg PO DAILY@0900 04/26/21 04/26/21 History


 


Insulin Glargine,Hum.rec.anlog 60 units SQ HS@2100 04/26/21 04/26/21 History





[Semglee Pen]    


 


Insulin Lispro [humaLOG Kwikpen] 10 unit SQ AC-TID 04/26/21 04/26/21 History


 


Insulin Lispro [humaLOG Kwikpen] See Protocol SQ ACHS 04/26/21 04/26/21 History


 


Levofloxacin [Levaquin] 750 mg PO DAILY 04/26/21 04/26/21 History


 


Potassium Chloride ER [K-Dur 20] 20 meq PO DAILY@0900 04/26/21 04/26/21 History


 


Zinc 50 mg PO DAILY@0900 04/26/21 04/26/21 History








                                    Allergies











Allergy/AdvReac Type Severity Reaction Status Date / Time


 


Mushroom Allergy  Vomiting Verified 04/26/21 22:27


 


Penicillins Allergy  Unknown Verified 04/26/21 22:27














Physical Exam


Vitals: 


                                   Vital Signs











  Temp Pulse Resp BP Pulse Ox


 


 04/27/21 06:02    18  


 


 04/27/21 01:00   84  18  142/82  89 L


 


 04/26/21 22:16   107 H  20  120/69  90 L


 


 04/26/21 21:41      91 L


 


 04/26/21 21:28    22  


 


 04/26/21 21:21  100.1 F H  102 H  22  118/56  94 L








                                Intake and Output











 04/26/21 04/27/21 04/27/21





 22:59 06:59 14:59


 


Output Total   800


 


Balance   -800


 


Output:   


 


  Urine   800


 


Other:   


 


  Voiding Method  Urinal 


 


  # Voids   1


 


  Weight 138.346 kg 138.346 kg 














Results





                                 04/26/21 21:52





                                 04/26/21 21:52


                                 Cardiac Enzymes











  04/26/21 04/26/21 04/27/21 Range/Units





  21:52 21:52 00:05 


 


AST  46    (17-59)  U/L


 


Lactate Dehydrogenase  1196 H    (313-618)  U/L


 


Troponin I   0.110 H*  0.122 H*  (0.000-0.034)  ng/mL














  04/27/21 Range/Units





  04:41 


 


AST   (17-59)  U/L


 


Lactate Dehydrogenase   (313-618)  U/L


 


Troponin I  0.117 H*  (0.000-0.034)  ng/mL








                                   Coagulation











  04/26/21 Range/Units





  21:52 


 


PT  9.8  (9.0-12.0)  sec


 


APTT  21.6 L  (22.0-30.0)  sec








                                       CBC











  04/26/21 Range/Units





  21:52 


 


WBC  7.9  (3.8-10.6)  k/uL


 


RBC  2.73 L  (4.30-5.90)  m/uL


 


Hgb  8.3 L D  (13.0-17.5)  gm/dL


 


Hct  27.8 L  (39.0-53.0)  %


 


Plt Count  281  (150-450)  k/uL








                          Comprehensive Metabolic Panel











  04/26/21 Range/Units





  21:52 


 


Sodium  139  (137-145)  mmol/L


 


Potassium  5.4 H  (3.5-5.1)  mmol/L


 


Chloride  102  ()  mmol/L


 


Carbon Dioxide  36 H  (22-30)  mmol/L


 


BUN  24 H  (9-20)  mg/dL


 


Creatinine  0.98  (0.66-1.25)  mg/dL


 


Glucose  125 H  (74-99)  mg/dL


 


Calcium  8.3 L  (8.4-10.2)  mg/dL


 


AST  46  (17-59)  U/L


 


ALT  55 H  (4-49)  U/L


 


Alkaline Phosphatase  87  ()  U/L


 


Total Protein  5.3 L  (6.3-8.2)  g/dL


 


Albumin  2.6 L  (3.5-5.0)  g/dL








                               Current Medications











Generic Name Dose Route Start Last Admin





  Trade Name Garyq  PRN Reason Stop Dose Admin


 


Furosemide  40 mg  04/26/21 23:00  04/26/21 23:55





  Furosemide 10 Mg/Ml 4 Ml Vial  IV   40 mg





  Q12H VICENTE   Administration


 


Ceftriaxone Sodium 1 gm/  50 mls @ 100 mls/hr  04/27/21 09:00 





  Sodium Chloride  IVPB  





  Q12HR VICENTE  








                                Intake and Output











 04/26/21 04/27/21 04/27/21





 22:59 06:59 14:59


 


Output Total   800


 


Balance   -800


 


Output:   


 


  Urine   800


 


Other:   


 


  Voiding Method  Urinal 


 


  # Voids   1


 


  Weight 138.346 kg 138.346 kg 








                                        





                                 04/26/21 21:52 





                                 04/26/21 21:52

## 2021-04-27 NOTE — P.HPIM
History of Present Illness





This is a pleasant 80 years old male who was recently discharged from the 

hospital 3/16-4/8 for bilateral covid Pneumonia.  He was discharge to rehab that

time on 4 L/m via nasal cannula.  His hospital course was complicated by lower 

GI bleed and GI team, thought local humerus contributing, and the recommended 

conservative therapy.Other chronic medical problems including diabetes mellitus,

hypertension


This time he was sent to ER from [facility] for increasing dyspnea 

his saturation were 89-93% on 5 L oxygen via nasal cannula, rest of vital signs 

stable and patient had fever of 100.1


Patient states that he has dyspnea for a few days but he could not specify 

however he denies chest pain.  No coughing.  Fever was documented in the 

emergency room.


No GI or urinary symptoms.





WBC is within the reference range 7.9K, hemoglobin 8.3, compared to 10.0 last 

time.  Platelets are normal.  INR is 0.9 and d-dimer 1.8.


Creatinine is normal 0.9, GFR is 73, potassium is 5.4, liver enzymes are not 

remarkable


Lactate dehydrogenase elevated 1196 as well as C-reactive protein 14.3.


Troponin is elevated 0.11, 0.12, 0.11


ProBNP is 8220


Coronal virus not detected, influenza virus not detected, SV PCR not detected


Chest x-ray: Pulmonary edema slightly worse than last time with evidence of 

congestive heart failure or RVS


Recent echo on 5/17/20: Ejection fraction 45-50% with wall hypokinesia


In the emergency room patient was started on ceftriaxone and Lasix 40 mg twice 

daily.  Ceftriaxone was continued by pulmonologist














Review of Systems





CONSTITUTIONAL: No fever, no malaise, no fatigue. 


HEENT: No recent visual problems or hearing problems. Denied any sore throat. 


CARDIOVASCULAR: No  orthopnea, PND, no palpitations, no syncope. 


PULMONARY: No shortness of breath, no cough, no hemoptysis. 


GASTROINTESTINAL: No diarrhea, no nausea, no vomiting, no abdominal pain. 

Normoactive bowel sounds. 


NEUROLOGICAL: No headaches, no weakness, no numbness. 


HEMATOLOGICAL: Denies any bleeding or petechiae. 


GENITOURINARY: Denies any burning micturition, frequency, or urgency. 


MUSCULOSKELETAL/RHEUMATOLOGICAL: Denies any joint pain, swelling, or any muscle 

pain. 


ENDOCRINE: Denies any polyuria or polydipsia.











Past Medical History


Past Medical History: Diabetes Mellitus, Hypertension


Additional Past Medical History / Comment(s): sinusitis, diverticulits


History of Any Multi-Drug Resistant Organisms: None Reported


Past Surgical History: Bowel Resection, Cholecystectomy


Additional Past Surgical History / Comment(s): bowel resection d/t 

diverticulitis, colonoscopy


Past Anesthesia/Blood Transfusion Reactions: No Reported Reaction


Past Psychological History: No Psychological Hx Reported


Additional Psychological History / Comment(s): pt lives with his girlfriend 

antonio, is independant, no assistive devices.no outside services. worked in 

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


Smoking Status: Never smoker


Past Alcohol Use History: Occasional


Past Drug Use History: None Reported





- Past Family History


  ** Mother


Additional Family Medical History / Comment(s): reproductive cancer





  ** Father


Family Medical History: Diabetes Mellitus





Medications and Allergies


                                Home Medications











 Medication  Instructions  Recorded  Confirmed  Type


 


glipiZIDE [Glipizide] 20 mg PO BID@0900,2100 11/25/15 04/26/21 History


 


Pioglitazone [Actos] 30 mg PO DAILY@0900 05/15/20 04/26/21 History


 


Clopidogrel Bisulfate [Plavix] 75 mg PO DAILY@0900 03/16/21 04/26/21 History


 


Metoprolol Succinate (ER) [Toprol 50 mg PO DAILY@0900 03/16/21 04/26/21 History





XL]    


 


Acetaminophen Tab [Tylenol] 650 mg PO Q6HR PRN  tab 04/08/21 04/26/21 Rx


 


Ascorbic Acid [Vitamin C] 1,000 mg PO DAILY@0900 04/26/21 04/26/21 History


 


Atorvastatin [Lipitor] 80 mg PO HS@2100 04/26/21 04/26/21 History


 


Famotidine [Pepcid] 40 mg PO DAILY@0900 04/26/21 04/26/21 History


 


Ferrous Sulfate [Feosol] 325 mg PO DAILY@0900 04/26/21 04/26/21 History


 


Furosemide [Lasix] 40 mg PO DAILY@0900 04/26/21 04/26/21 History


 


Insulin Glargine,Hum.rec.anlog 60 units SQ HS@2100 04/26/21 04/26/21 History





[Semglee Pen]    


 


Insulin Lispro [humaLOG Kwikpen] 10 unit SQ AC-TID 04/26/21 04/26/21 History


 


Insulin Lispro [humaLOG Kwikpen] See Protocol SQ ACHS 04/26/21 04/26/21 History


 


Levofloxacin [Levaquin] 750 mg PO DAILY 04/26/21 04/26/21 History


 


Potassium Chloride ER [K-Dur 20] 20 meq PO DAILY@0900 04/26/21 04/26/21 History


 


Zinc 50 mg PO DAILY@0900 04/26/21 04/26/21 History








                                    Allergies











Allergy/AdvReac Type Severity Reaction Status Date / Time


 


Mushroom Allergy  Vomiting Verified 04/26/21 22:27


 


Penicillins Allergy  Unknown Verified 04/26/21 22:27














Physical Exam


Vitals: 


                                   Vital Signs











  Temp Pulse Resp BP Pulse Ox


 


 04/27/21 08:00  98.0 F  89  18  134/79  93 L


 


 04/27/21 06:02    18  


 


 04/27/21 01:00   84  18  142/82  89 L


 


 04/26/21 22:16   107 H  20  120/69  90 L


 


 04/26/21 21:41      91 L


 


 04/26/21 21:28    22  


 


 04/26/21 21:21  100.1 F H  102 H  22  118/56  94 L








                                Intake and Output











 04/26/21 04/27/21 04/27/21





 22:59 06:59 14:59


 


Output Total   1450


 


Balance   -1450


 


Output:   


 


  Urine   1450


 


Other:   


 


  Voiding Method  Urinal 


 


  # Voids   1


 


  Weight 138.346 kg 138.346 kg 














GENERAL: The patient is alert and oriented x3, not in any acute distress. Well 

developed, well nourished. 


HEENT: Pupils are round and equally reacting to light. EOMI. No scleral icterus.

No conjunctival pallor. Normocephalic, atraumatic. No pharyngeal erythema. No 

thyromegaly. 


CARDIOVASCULAR: S1 and S2 present. No murmurs, rubs, or gallops. 


PULMONARY: Chest is clear to auscultation, no wheezing or crackles. 


ABDOMEN: Soft, nontender, nondistended, normoactive bowel sounds. No palpable 

organomegaly. 


MUSCULOSKELETAL: No joint swelling or deformity. 


EXTREMITIES: No cyanosis, clubbing, or pedal edema. 


NEUROLOGICAL: Gross neurological examination did not reveal any focal deficits. 


SKIN: No rashes. No petechiae








Results


CBC & Chem 7: 


                                 04/26/21 21:52





                                 04/26/21 21:52


Labs: 


                  Abnormal Lab Results - Last 24 Hours (Table)











  04/26/21 04/26/21 04/26/21 Range/Units





  21:52 21:52 21:52 


 


RBC  2.73 L    (4.30-5.90)  m/uL


 


Hgb  8.3 L D    (13.0-17.5)  gm/dL


 


Hct  27.8 L    (39.0-53.0)  %


 


MCV  102.1 H D    (80.0-100.0)  fL


 


MCHC  29.9 L    (31.0-37.0)  g/dL


 


RDW  17.5 H    (11.5-15.5)  %


 


Monocytes #  1.1 H    (0-1.0)  k/uL


 


APTT   21.6 L   (22.0-30.0)  sec


 


D-Dimer   1.83 H   (<0.60)  mg/L FEU


 


Potassium    5.4 H  (3.5-5.1)  mmol/L


 


Carbon Dioxide    36 H  (22-30)  mmol/L


 


BUN    24 H  (9-20)  mg/dL


 


Glucose    125 H  (74-99)  mg/dL


 


Calcium    8.3 L  (8.4-10.2)  mg/dL


 


ALT    55 H  (4-49)  U/L


 


Lactate Dehydrogenase    1196 H  (313-618)  U/L


 


Troponin I     (0.000-0.034)  ng/mL


 


C-Reactive Protein    14.3 H  (<1.0)  mg/dL


 


Total Protein    5.3 L  (6.3-8.2)  g/dL


 


Albumin    2.6 L  (3.5-5.0)  g/dL














  04/26/21 04/27/21 04/27/21 Range/Units





  21:52 00:05 04:41 


 


RBC     (4.30-5.90)  m/uL


 


Hgb     (13.0-17.5)  gm/dL


 


Hct     (39.0-53.0)  %


 


MCV     (80.0-100.0)  fL


 


MCHC     (31.0-37.0)  g/dL


 


RDW     (11.5-15.5)  %


 


Monocytes #     (0-1.0)  k/uL


 


APTT     (22.0-30.0)  sec


 


D-Dimer     (<0.60)  mg/L FEU


 


Potassium     (3.5-5.1)  mmol/L


 


Carbon Dioxide     (22-30)  mmol/L


 


BUN     (9-20)  mg/dL


 


Glucose     (74-99)  mg/dL


 


Calcium     (8.4-10.2)  mg/dL


 


ALT     (4-49)  U/L


 


Lactate Dehydrogenase     (313-618)  U/L


 


Troponin I  0.110 H*  0.122 H*  0.117 H*  (0.000-0.034)  ng/mL


 


C-Reactive Protein     (<1.0)  mg/dL


 


Total Protein     (6.3-8.2)  g/dL


 


Albumin     (3.5-5.0)  g/dL














Thrombosis Risk Factor Assmnt





- Choose All That Apply


Any of the Below Risk Factors Present?: No


Other Risk Factors: Yes


Each Risk Factor Represents 3 Points: Age 75 years or older


Other congenital or acquired thrombophilia - If yes, enter type in comment: No


Thrombosis Risk Factor Assessment Total Risk Factor Score: 3


Thrombosis Risk Factor Assessment Level: Moderate Risk





Assessment and Plan


Assessment: 





Acute pulmonary edema with acute on chronic CHF with ejection fraction 45-50%


Acute hypoxic respiratory failure


Elevated troponin, could be due to renal disease versus coronary artery disease


Fever on admission, possible due to pneumonia


Recent covid infection (hospitalized  3/16-4/8)


Chronic kidney disease stage III











Plan: 





This is a pleasant 80 years old male who presents with systolic CHF, possible 

pneumonia and elevated troponin.  Continue with Lasix, continue with 

ceftriaxone.  Follow-up sputum culture and broughtcalcitonin.  Cardiology 

consult.  Pulmonary team were already consulted.  Continue with Plavix.


Labs and medication were reviewed..  Continue same treatment.  Continue with 

symptomatic treatment.  Resume home medication.  Monitor lytes and vitals.  DVT 

and GI prophylaxis.  Further recommendations depends on the clinical course of 

the patient


DVT prophylaxis: Subcutaneous Lovenox


GI Prophylaxis: Pepcid


Prognosis is guarded

## 2021-04-28 LAB
ANION GAP SERPL CALC-SCNC: 1 MMOL/L
BASOPHILS # BLD AUTO: 0.1 K/UL (ref 0–0.2)
BASOPHILS NFR BLD AUTO: 1 %
BUN SERPL-SCNC: 29 MG/DL (ref 9–20)
CALCIUM SPEC-MCNC: 8.4 MG/DL (ref 8.4–10.2)
CHLORIDE SERPL-SCNC: 102 MMOL/L (ref 98–107)
CO2 SERPL-SCNC: 37 MMOL/L (ref 22–30)
EOSINOPHIL # BLD AUTO: 0.1 K/UL (ref 0–0.7)
EOSINOPHIL NFR BLD AUTO: 1 %
ERYTHROCYTE [DISTWIDTH] IN BLOOD BY AUTOMATED COUNT: 2.7 M/UL (ref 4.3–5.9)
ERYTHROCYTE [DISTWIDTH] IN BLOOD: 17.3 % (ref 11.5–15.5)
GLUCOSE BLD-MCNC: 231 MG/DL (ref 75–99)
GLUCOSE BLD-MCNC: 288 MG/DL (ref 75–99)
GLUCOSE SERPL-MCNC: 173 MG/DL (ref 74–99)
HCT VFR BLD AUTO: 27.8 % (ref 39–53)
HGB BLD-MCNC: 8.4 GM/DL (ref 13–17.5)
LYMPHOCYTES # SPEC AUTO: 1.6 K/UL (ref 1–4.8)
LYMPHOCYTES NFR SPEC AUTO: 24 %
MAGNESIUM SPEC-SCNC: 2 MG/DL (ref 1.6–2.3)
MCH RBC QN AUTO: 30.9 PG (ref 25–35)
MCHC RBC AUTO-ENTMCNC: 30 G/DL (ref 31–37)
MCV RBC AUTO: 102.9 FL (ref 80–100)
MONOCYTES # BLD AUTO: 1 K/UL (ref 0–1)
MONOCYTES NFR BLD AUTO: 14 %
NEUTROPHILS # BLD AUTO: 3.9 K/UL (ref 1.3–7.7)
NEUTROPHILS NFR BLD AUTO: 56 %
PLATELET # BLD AUTO: 303 K/UL (ref 150–450)
POTASSIUM SERPL-SCNC: 5 MMOL/L (ref 3.5–5.1)
SODIUM SERPL-SCNC: 140 MMOL/L (ref 137–145)
WBC # BLD AUTO: 7 K/UL (ref 3.8–10.6)

## 2021-04-28 RX ADMIN — CLOPIDOGREL BISULFATE SCH MG: 75 TABLET ORAL at 09:15

## 2021-04-28 RX ADMIN — Medication SCH MG: at 09:14

## 2021-04-28 RX ADMIN — ASPIRIN 81 MG CHEWABLE TABLET SCH MG: 81 TABLET CHEWABLE at 09:13

## 2021-04-28 RX ADMIN — FAMOTIDINE SCH MG: 20 TABLET, FILM COATED ORAL at 09:13

## 2021-04-28 RX ADMIN — ENOXAPARIN SODIUM SCH MG: 40 INJECTION SUBCUTANEOUS at 09:14

## 2021-04-28 RX ADMIN — ISOSORBIDE MONONITRATE SCH MG: 30 TABLET, EXTENDED RELEASE ORAL at 09:13

## 2021-04-28 RX ADMIN — FUROSEMIDE SCH MG: 10 INJECTION, SOLUTION INTRAMUSCULAR; INTRAVENOUS at 23:27

## 2021-04-28 RX ADMIN — FUROSEMIDE SCH MG: 10 INJECTION, SOLUTION INTRAMUSCULAR; INTRAVENOUS at 11:10

## 2021-04-28 RX ADMIN — ATORVASTATIN CALCIUM SCH MG: 80 TABLET, FILM COATED ORAL at 20:07

## 2021-04-28 RX ADMIN — OXYCODONE HYDROCHLORIDE AND ACETAMINOPHEN SCH MG: 500 TABLET ORAL at 09:13

## 2021-04-28 NOTE — P.PN
Subjective


Progress Note Date: 04/28/21


Principal diagnosis: 





Shortness of breath.





This is a 80-year-old white male patient who was recently hospitalized from 

03/16/2021 through 04/08/2021 for COVID-19 pneumonia, and patient had a 

prolonged hospitalization course, with the severe hypoxemic respiratory failure 

related to COVID-19 pneumonia.  Patient was treated with steroids, he received 2

doses of Tocilizumab while in the hospital, and was discharged to a 

rehabilitation facility the skilled nursing home.  His hospitalization was 

complicated by lower GI bleeding and acute kidney injury.  The GI bleeding 

resolved on its own.  He is currently not on any anticoagulation.  His past 

medical history is positive for hypertension, type 2 diabetes mellitus and his 

sugars have been poorly controlled related to systemic steroids, patient is a 

former smoker.  Patient was brought into the emergency department on 04/26/2021 

at 2100 by EMS for evaluation of worsening shortness of breath and fever.  he is

very hard of hearing, he is a poor historian, but chest x-ray in the emergency 

department showed pulmonary interstitial and airspace edema, left basilar 

pacemaker in place.  Denies any coughing, denies any chest congestion or phlegm 

production.  Her low-grade fever in the emergency department with a temp of 

100.1F.  On 5 L of oxygen pulse ox is 93%.  His blood work showed normal white 

count of 7.9, hemoglobin is 8.3, his platelet count is 281, d-dimer is 1.83, INR

0.9, sodium is 139, potassium is 5.4, chloride is 102, CO2 is 26, B1 is 24, 

creatinine 0.9, lactic acid was 1.2, LDH was 1196, patient had troponin 

elevation of 0.110, 0.122, and 0.117, his proBNP came back elevated at 8220, and

pro-calcitonin level is pending right now, patient was retested for COVID-19 and

was found to be negative, RSV, influenza A and B PCR were negative. 





Progress note dated 04/28/2021.





80-year-old male, admitted with a diagnosis of acute on chronic shortness of 

breath, and respiratory failure, secondary to CHF exacerbation.  The patient 

also has a history of recent COVID 19 pneumonia, emphysema discharged to a [facility] home.  Other medical problems including acute coronary syndrome, ischemic 

cardiomyopathy, severe pulmonary hypertension, obesity, diabetes, anemia, and 

general medical ability.  The patient's currently on 5 L nasal cannula.  

Saturations are 9092%.  He is not receiving any IV fluids.  He is getting 

Rocephin.  The patient is lying on his right side.  He's extremely deaf.  He 

apparently is telling his nurse all he wants to do his diet.  He seems not very 

motivated to get better.  Labs today include a white count 7, hemoglobin 8.4, 

hematocrit 27.8, and platelet count 303,000.  D-dimer from 2 days ago was 1.83. 

Sodium, potassium, chlorides are all normal.  CO2 is 37, anion gap is 1, BUN is 

29 with a creatinine of 1.04.  Troponins were 0.110, 0.122, and 0.117.  N-

terminal proBNP was 8220.  Pro-calcitonin was 0.13.





Objective





- Vital Signs


Vital signs: 


                                   Vital Signs











Temp  98.7 F   04/28/21 15:01


 


Pulse  85   04/28/21 15:01


 


Resp  16   04/28/21 15:01


 


BP  120/60   04/28/21 15:01


 


Pulse Ox  95   04/28/21 15:01








                                 Intake & Output











 04/27/21 04/28/21 04/28/21





 18:59 06:59 18:59


 


Intake Total   480


 


Output Total 1450 725 825


 


Balance -1450 -725 -345


 


Weight   138.346 kg


 


Intake:   


 


  Oral   480


 


Output:   


 


  Urine 1450 725 825


 


Other:   


 


  Voiding Method  Urinal Urinal


 


  # Voids 1 1 














- Exam





No acute distress, oriented 3.  No acute distress, currently on 5 L, 

saturations 90-92%.





HEENT examination is grossly unremarkable.  Mucous membranes are moist.  No oral

lesions.





Neck supple.  Full range of motion.  No adenopathy thyromegaly or neck vein 

distention.





Cardiovascular examination reveals regular rhythm rate.  S1-S2 normal.  No S3 or

S4.  No discernible murmur noted.  Heart rate is 82 bpm.





Lungs reveal bibasilar crackles.  No rhonchi or wheezes.  Breath sounds equal 

bilaterally.





Abdomen soft bowel sounds are heard.  No masses or tenderness.





Extremities are intact.  No cyanosis or clubbing.  There is peripheral edema.





Skin is without rash or lesion.





Neurologic examination is brief but nonfocal.





- Labs


CBC & Chem 7: 


                                 04/28/21 03:14





                                 04/28/21 03:14


Labs: 


                  Abnormal Lab Results - Last 24 Hours (Table)











  04/27/21 04/28/21 04/28/21 Range/Units





  04:41 03:14 03:14 


 


RBC   2.70 L   (4.30-5.90)  m/uL


 


Hgb   8.4 L   (13.0-17.5)  gm/dL


 


Hct   27.8 L   (39.0-53.0)  %


 


MCV   102.9 H   (80.0-100.0)  fL


 


MCHC   30.0 L   (31.0-37.0)  g/dL


 


RDW   17.3 H   (11.5-15.5)  %


 


Carbon Dioxide    37 H  (22-30)  mmol/L


 


BUN    29 H  (9-20)  mg/dL


 


Glucose    173 H  (74-99)  mg/dL


 


Procalcitonin  0.13 H    (0.02-0.09)  ng/mL














Assessment and Plan


Assessment: 





Acute on chronic shortness breath, with hypoxemic respiratory failure, secondary

to CHF exacerbation, with systolic dysfunction.





Recent hospitalization for COVID 19 pneumonia, status post discharged to a 

nursing facility.





Rule out non-ST segment elevation myocardial infarction.





Coronary artery disease, with previous stenting.





History of ischemic cardiomyopathy, status post AICD.





History of moderate to severe pulmonary hypertension.





Type 2 diabetes mellitus.





History of chronic anemia.





Previous history of tobacco use.





General medical debility.


Plan: 





Plan dated 04/28/2021.





The patient will continue with Lasix therapy.  In addition, I try to motivate 

him to get better.  He is lying on his side, stating that he wishes to die, and 

I told him that he wants to get better, he needs to be more motivated.  Medi

cations, ALLERGIES, problem list, and x-ray reports and radiographic picture are

all reviewed.  Current microbiology is negative are pending.  Currently, the 

patient's on vitamin C, Lovenox, zinc, and usual medications.  No additional 

recommendations are made.  Prognosis is guarded.


Time with Patient: Less than 30

## 2021-04-28 NOTE — P.PN
Subjective





This is a pleasant 80 years old male who was recently discharged from the 

hospital 3/16-4/8 for bilateral covid Pneumonia.  He was discharge to rehab that

time on 4 L/m via nasal cannula.  His hospital course was complicated by lower 

GI bleed and GI team, thought local humerus contributing, and the recommended 

conservative therapy.Other chronic medical problems including diabetes mellitus,

hypertension


This time he was sent to ER from [facility] for increasing dyspnea 

his saturation were 89-93% on 5 L oxygen via nasal cannula, rest of vital signs 

stable and patient had fever of 100.1


Patient states that he has dyspnea for a few days but he could not specify 

however he denies chest pain.  No coughing.  Fever was documented in the 

emergency room.


No GI or urinary symptoms.





WBC is within the reference range 7.9K, hemoglobin 8.3, compared to 10.0 last 

time.  Platelets are normal.  INR is 0.9 and d-dimer 1.8.


Creatinine is normal 0.9, GFR is 73, potassium is 5.4, liver enzymes are not 

remarkable


Lactate dehydrogenase elevated 1196 as well as C-reactive protein 14.3.


Troponin is elevated 0.11, 0.12, 0.11


ProBNP is 8220


Coronal virus not detected, influenza virus not detected, SV PCR not detected


Chest x-ray: Pulmonary edema slightly worse than last time with evidence of 

congestive heart failure or RVS


Recent echo on 5/17/20: Ejection fraction 45-50% with wall hypokinesia


In the emergency room patient was started on ceftriaxone and Lasix 40 mg twice 

daily.  Ceftriaxone was continued by pulmonologist





04/28/2021


Patient dyspnea is improving and today he was sitting at bedside however he 

still gets short of breath with exertion.  He remains on 5 L of oxygen via nasal

cannula.  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


Patient is hemodynamically stable, labs reviewed and hemoglobin is stable.


He remains on ceftriaxone as he has fever on admission and pneumonia is 

suspected


Also he remains on Lasix 40 mg IV twice daily.  Aspirin is admitted today to 

have his Plavix which is home medication per cardiology team recommendation


Triamcinolone 0.1% topical is admitted to both legs for dermatitis with scaling





Objective





- Vital Signs


Vital signs: 


                                   Vital Signs











Temp  98.3 F   04/28/21 19:40


 


Pulse  69   04/28/21 19:40


 


Resp  22   04/28/21 19:40


 


BP  123/58   04/28/21 19:40


 


Pulse Ox  90 L  04/28/21 19:40








                                 Intake & Output











 04/28/21 04/28/21 04/29/21





 06:59 18:59 06:59


 


Intake Total  480 240


 


Output Total 


 


Balance -725 -345 -860


 


Weight  138.346 kg 


 


Intake:   


 


  Oral  480 240


 


Output:   


 


  Urine 


 


Other:   


 


  Voiding Method Urinal Urinal 


 


  # Voids 1  














- Exam





-GENERAL: The patient is alert and oriented x3, not in any acute distress.  Obes

e


HEENT: Pupils are round and equally reacting to light. EOMI. No scleral icterus.

No conjunctival pallor. Normocephalic, atraumatic. No pharyngeal erythema. No 

thyromegaly. 


CARDIOVASCULAR: S1 and S2 present. No murmurs, rubs, or gallops. 


-PULMONARY: Chest is clear to auscultation, no bilateral basal crepitation.  No 

wheezing


ABDOMEN: Soft, nontender, nondistended, normoactive bowel sounds. No palpable 

organomegaly. 


MUSCULOSKELETAL: No joint swelling or deformity. 


-EXTREMITIES: No cyanosis, clubbing, or pedal edema.  Bilateral leg redness and 

scaling from dermatitis


NEUROLOGICAL: Gross neurological examination did not reveal any focal deficits. 


SKIN: No rashes. no petechiae.





- Labs


CBC & Chem 7: 


                                 04/28/21 03:14





                                 04/28/21 03:14


Labs: 


                  Abnormal Lab Results - Last 24 Hours (Table)











  04/28/21 04/28/21 04/28/21 Range/Units





  03:14 03:14 16:52 


 


RBC  2.70 L    (4.30-5.90)  m/uL


 


Hgb  8.4 L    (13.0-17.5)  gm/dL


 


Hct  27.8 L    (39.0-53.0)  %


 


MCV  102.9 H    (80.0-100.0)  fL


 


MCHC  30.0 L    (31.0-37.0)  g/dL


 


RDW  17.3 H    (11.5-15.5)  %


 


Carbon Dioxide   37 H   (22-30)  mmol/L


 


BUN   29 H   (9-20)  mg/dL


 


Glucose   173 H   (74-99)  mg/dL


 


POC Glucose (mg/dL)    231 H  (75-99)  mg/dL














  04/28/21 Range/Units





  20:01 


 


RBC   (4.30-5.90)  m/uL


 


Hgb   (13.0-17.5)  gm/dL


 


Hct   (39.0-53.0)  %


 


MCV   (80.0-100.0)  fL


 


MCHC   (31.0-37.0)  g/dL


 


RDW   (11.5-15.5)  %


 


Carbon Dioxide   (22-30)  mmol/L


 


BUN   (9-20)  mg/dL


 


Glucose   (74-99)  mg/dL


 


POC Glucose (mg/dL)  288 H  (75-99)  mg/dL














Assessment and Plan


Assessment: 





Acute pulmonary edema with acute on chronic CHF with ejection fraction 45-50%


Acute hypoxic respiratory failure


Elevated troponin, could be due to renal disease versus coronary artery disease


Fever on admission, possible due to pneumonia


Bilateral leg dermatitis


Recent covid infection (hospitalized  3/16-4/8)


Chronic kidney disease stage III











Plan: 





This is a pleasant 80 years old male who presents with systolic CHF, possible 

pneumonia and elevated troponin.  Continue with Lasix, continue with 

ceftriaxone.  Follow-up sputum culture , Cardiology consult.  Pulmonary team 

were already consulted.  Continue with Plavix.  Aspirin added.  Start 

triamcinolone ointment


Labs and medication were reviewed..  Continue same treatment.  Continue with 

symptomatic treatment.  Resume home medication.  Monitor lytes and vitals.  DVT 

and GI prophylaxis.  Further recommendations depends on the clinical course of 

the patient


DVT prophylaxis: Subcutaneous Lovenox


GI Prophylaxis: Pepcid


Prognosis is guarded

## 2021-04-28 NOTE — P.PN
Subjective


HISTORY OF PRESENT ILLNESS:  


80 year old male with past medical history, coronary artery disease, myocardial 

infarction s/p PCI mid RCA, proximal RCA in 5/2020, Ischemic cardiomyopathy, 

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 

Diabetes, HTN, Dyslipidemia. Follows in the office with Dr. Darden. We are being

consulted for congestive heart failure and elevated troponin. Patient presents 

to the emergency department with shortness of breath and fever. Patient is a 

poor historian, very hard of hearing. Patient recently hospitalized on 3/16/21-

04/08/21 with COVID-19 pneumonia. Patient denies chest pain, palpitations, 

syncope or near syncope. 





DIAGNOSTICS


EKG: sinus rhythm with first degree AV block, Twave inversions, leads I, aVL, ST

depression in leads V2, V3. 


Echo 02/2021 in the office- EF 35-40%, mild TR, mild MR, grade 1 diastolic 

dysfunction 


Chest x-ray- pulmonary edema is slightly worse than last exam 


Laboratory data reviewed, Troponin 0.11, 0.12, 0.11, BNP 8,220, d-dimer 1.83, 

Sodium 139, K 5.4, sCr 0.98, magnesium 1.9, LDH elevated, CRP elevated, total 

protein 5.3, Albumin 2.6, Viral PCR negative, Covid-19 negative, Hgb 8.3, WBC 

7.9, Plt 281 





4/28/21:


2-D echo obtained revealed EF between 40-45%, with wall motion abnormalities, 

mild MR, mild TR, mild to moderate pulmonary hypertension, no pericardial 

effusion.  Patient with 2 L of urine output yesterday.  No change in weight 

today.  Laboratory data reviewed.  Sodium 140, potassium 5.0, serum creatinine 

1.04, BUN 29, magnesium 2.0, BNP 8220





PHYSICAL EXAM: 


VITAL SIGNS: Reviewed, still requiring 5L nasal cannula 


GENERAL: No acute distress. 


HEENT: Head is normocephalic. No JVD or thyromegaly


LUNGS: Respirations even and unlabored. Lungs bibasilar crackles 


HEART: Regular rate and rhythm.  S1 and S2 heard.


ABDOMEN: Soft. Nondistended. Nontender.


EXTREMITIES: Normal range of motion.  No clubbing or cyanosis.  Peripheral 

pulses intact. 


NEUROLOGIC: Awake and alert. Oriented x 3. 





ASSESSMENT: 


Coronary artery disease s/p PCI mid RCA, proximal RCA in 5/2020, 


Acute on chronic heart failure with reduced ejection fraction


Mildly elevated troponin- not likely acute coronary syndrome, troponin not 

increasing, patient denies chest discomfort 


Ischemic cardiomyopathy 


Permanent cardiac pacemaker


Type 2 Diabetes


HTN


Dyslipidemia


Recent hospitalization for COVID-19, was discharged to a nursing facility.





PLAN: 


Continue IV Lasix 40mg BID for 24 more hours 


Continue dual antiplatelet therapy- plavix and aspirin. Continue statin 


Continue carvedilol 6.25mg BID 


Patient was on losartan 50mg daily, however, has been held due to hyperkalemia, 

Will continue hydralazine 25mg BID, Hyperkalemia is improving. Will check BMP 

tomorrow 


Continue to monitor renal function and electrolytes 


I/Os, daily weights


Pulmonary is following - is on empiric IV Rocephin 


Further recommendations based on clinical course. 





Nurse practitioner note has been reviewed by physician. Signing provider agrees 

with the documented findings, assessment, and plan of care. 








Objective





- Vital Signs


Vital signs: 


                                   Vital Signs











Temp  98.7 F   04/28/21 15:01


 


Pulse  85   04/28/21 15:01


 


Resp  16   04/28/21 15:01


 


BP  120/60   04/28/21 15:01


 


Pulse Ox  95   04/28/21 15:01








                                 Intake & Output











 04/27/21 04/28/21 04/28/21





 18:59 06:59 18:59


 


Intake Total   480


 


Output Total 1450 725 825


 


Balance -1450 -725 -345


 


Weight   138.346 kg


 


Intake:   


 


  Oral   480


 


Output:   


 


  Urine 1450 725 825


 


Other:   


 


  Voiding Method  Urinal Urinal


 


  # Voids 1 1 














- Labs


CBC & Chem 7: 


                                 04/28/21 03:14





                                 04/28/21 03:14


Labs: 


                  Abnormal Lab Results - Last 24 Hours (Table)











  04/27/21 04/28/21 04/28/21 Range/Units





  04:41 03:14 03:14 


 


RBC   2.70 L   (4.30-5.90)  m/uL


 


Hgb   8.4 L   (13.0-17.5)  gm/dL


 


Hct   27.8 L   (39.0-53.0)  %


 


MCV   102.9 H   (80.0-100.0)  fL


 


MCHC   30.0 L   (31.0-37.0)  g/dL


 


RDW   17.3 H   (11.5-15.5)  %


 


Carbon Dioxide    37 H  (22-30)  mmol/L


 


BUN    29 H  (9-20)  mg/dL


 


Glucose    173 H  (74-99)  mg/dL


 


Procalcitonin  0.13 H    (0.02-0.09)  ng/mL

## 2021-04-29 LAB
ANION GAP SERPL CALC-SCNC: 3 MMOL/L
BASOPHILS # BLD MANUAL: 0.07 K/UL (ref 0–0.2)
BUN SERPL-SCNC: 34 MG/DL (ref 9–20)
CALCIUM SPEC-MCNC: 8.4 MG/DL (ref 8.4–10.2)
CELLS COUNTED: 100
CHLORIDE SERPL-SCNC: 99 MMOL/L (ref 98–107)
CO2 SERPL-SCNC: 38 MMOL/L (ref 22–30)
EOSINOPHIL # BLD MANUAL: 0.14 K/UL (ref 0–0.7)
ERYTHROCYTE [DISTWIDTH] IN BLOOD BY AUTOMATED COUNT: 2.57 M/UL (ref 4.3–5.9)
ERYTHROCYTE [DISTWIDTH] IN BLOOD: 17.1 % (ref 11.5–15.5)
GLUCOSE BLD-MCNC: 176 MG/DL (ref 75–99)
GLUCOSE BLD-MCNC: 179 MG/DL (ref 75–99)
GLUCOSE BLD-MCNC: 210 MG/DL (ref 75–99)
GLUCOSE BLD-MCNC: 212 MG/DL (ref 75–99)
GLUCOSE SERPL-MCNC: 180 MG/DL (ref 74–99)
HCT VFR BLD AUTO: 26.2 % (ref 39–53)
HGB BLD-MCNC: 7.8 GM/DL (ref 13–17.5)
LYMPHOCYTES # BLD MANUAL: 1.56 K/UL (ref 1–4.8)
MAGNESIUM SPEC-SCNC: 2.1 MG/DL (ref 1.6–2.3)
MCH RBC QN AUTO: 30.2 PG (ref 25–35)
MCHC RBC AUTO-ENTMCNC: 29.6 G/DL (ref 31–37)
MCV RBC AUTO: 101.9 FL (ref 80–100)
MONOCYTES # BLD MANUAL: 1.16 K/UL (ref 0–1)
NEUTROPHILS NFR BLD MANUAL: 57 %
NEUTS SEG # BLD MANUAL: 3.88 K/UL (ref 1.3–7.7)
PLATELET # BLD AUTO: 378 K/UL (ref 150–450)
POTASSIUM SERPL-SCNC: 4.6 MMOL/L (ref 3.5–5.1)
SODIUM SERPL-SCNC: 140 MMOL/L (ref 137–145)
WBC # BLD AUTO: 6.8 K/UL (ref 3.8–10.6)

## 2021-04-29 PROCEDURE — 5A0935A ASSISTANCE WITH RESPIRATORY VENTILATION, LESS THAN 24 CONSECUTIVE HOURS, HIGH NASAL FLOW/VELOCITY: ICD-10-PCS

## 2021-04-29 RX ADMIN — CLOPIDOGREL BISULFATE SCH MG: 75 TABLET ORAL at 09:28

## 2021-04-29 RX ADMIN — ATORVASTATIN CALCIUM SCH MG: 80 TABLET, FILM COATED ORAL at 19:53

## 2021-04-29 RX ADMIN — TRIAMCINOLONE ACETONIDE SCH: 1 OINTMENT TOPICAL at 17:21

## 2021-04-29 RX ADMIN — Medication SCH MG: at 09:28

## 2021-04-29 RX ADMIN — ISOSORBIDE MONONITRATE SCH MG: 30 TABLET, EXTENDED RELEASE ORAL at 09:28

## 2021-04-29 RX ADMIN — FUROSEMIDE SCH: 10 INJECTION, SOLUTION INTRAMUSCULAR; INTRAVENOUS at 13:07

## 2021-04-29 RX ADMIN — FUROSEMIDE SCH: 10 INJECTION, SOLUTION INTRAMUSCULAR; INTRAVENOUS at 15:15

## 2021-04-29 RX ADMIN — TRIAMCINOLONE ACETONIDE SCH: 1 OINTMENT TOPICAL at 03:17

## 2021-04-29 RX ADMIN — TRIAMCINOLONE ACETONIDE SCH APPLIC: 1 OINTMENT TOPICAL at 09:29

## 2021-04-29 RX ADMIN — OXYCODONE HYDROCHLORIDE AND ACETAMINOPHEN SCH MG: 500 TABLET ORAL at 09:27

## 2021-04-29 RX ADMIN — FAMOTIDINE SCH MG: 20 TABLET, FILM COATED ORAL at 09:28

## 2021-04-29 RX ADMIN — ASPIRIN 81 MG CHEWABLE TABLET SCH MG: 81 TABLET CHEWABLE at 09:28

## 2021-04-29 RX ADMIN — ENOXAPARIN SODIUM SCH MG: 40 INJECTION SUBCUTANEOUS at 09:29

## 2021-04-29 RX ADMIN — TRIAMCINOLONE ACETONIDE SCH APPLIC: 1 OINTMENT TOPICAL at 20:40

## 2021-04-29 RX ADMIN — FUROSEMIDE SCH MG: 40 TABLET ORAL at 16:20

## 2021-04-29 NOTE — P.PN
Subjective


Progress Note Date: 04/29/21


Principal diagnosis: 





Shortness of breath.





This is a 80-year-old white male patient who was recently hospitalized from 

03/16/2021 through 04/08/2021 for COVID-19 pneumonia, and patient had a 

prolonged hospitalization course, with the severe hypoxemic respiratory failure 

related to COVID-19 pneumonia.  Patient was treated with steroids, he received 2

doses of Tocilizumab while in the hospital, and was discharged to a 

rehabilitation facility the skilled nursing home.  His hospitalization was 

complicated by lower GI bleeding and acute kidney injury.  The GI bleeding 

resolved on its own.  He is currently not on any anticoagulation.  His past 

medical history is positive for hypertension, type 2 diabetes mellitus and his 

sugars have been poorly controlled related to systemic steroids, patient is a 

former smoker.  Patient was brought into the emergency department on 04/26/2021 

at 2100 by EMS for evaluation of worsening shortness of breath and fever.  he is

very hard of hearing, he is a poor historian, but chest x-ray in the emergency 

department showed pulmonary interstitial and airspace edema, left basilar 

pacemaker in place.  Denies any coughing, denies any chest congestion or phlegm 

production.  Her low-grade fever in the emergency department with a temp of 

100.1F.  On 5 L of oxygen pulse ox is 93%.  His blood work showed normal white 

count of 7.9, hemoglobin is 8.3, his platelet count is 281, d-dimer is 1.83, INR

0.9, sodium is 139, potassium is 5.4, chloride is 102, CO2 is 26, B1 is 24, 

creatinine 0.9, lactic acid was 1.2, LDH was 1196, patient had troponin 

elevation of 0.110, 0.122, and 0.117, his proBNP came back elevated at 8220, and

pro-calcitonin level is pending right now, patient was retested for COVID-19 and

was found to be negative, RSV, influenza A and B PCR were negative. 





Progress note dated 04/28/2021.





80-year-old male, admitted with a diagnosis of acute on chronic shortness of 

breath, and respiratory failure, secondary to CHF exacerbation.  The patient 

also has a history of recent COVID 19 pneumonia, emphysema discharged to a [facility] home.  Other medical problems including acute coronary syndrome, ischemic 

cardiomyopathy, severe pulmonary hypertension, obesity, diabetes, anemia, and 

general medical ability.  The patient's currently on 5 L nasal cannula.  

Saturations are 9092%.  He is not receiving any IV fluids.  He is getting 

Rocephin.  The patient is lying on his right side.  He's extremely deaf.  He 

apparently is telling his nurse all he wants to do his diet.  He seems not very 

motivated to get better.  Labs today include a white count 7, hemoglobin 8.4, 

hematocrit 27.8, and platelet count 303,000.  D-dimer from 2 days ago was 1.83. 

Sodium, potassium, chlorides are all normal.  CO2 is 37, anion gap is 1, BUN is 

29 with a creatinine of 1.04.  Troponins were 0.110, 0.122, and 0.117.  N-

terminal proBNP was 8220.  Pro-calcitonin was 0.13.





Progress note dated 04/29/2021.





80-year-old male, seen 2 days ago in consultation.  The patient was hospitalized

from March 16 through April 8 for COVID 19 pneumonia.  The patient was 

discharged to a nursing home.  The patient came back into the hospital on the 

27th, with increasing shortness of breath.  The patient has a history of acute 

coronary syndrome, ischemic cardiomyopathy, severe pulmonary hypertension, 

diabetes, anemia, and general medical debility.  Currently, the patient is on 

BiPAP, with settings of IPAP 12, EPAP 6, and 60%.  Saturations are 97%.  The dick lee is not receiving any IV fluids.  He is currently sleeping and appears to 

be in no distress.  He is nearly flat on his back.  Current labs include a white

count 6.8, hemoglobin 7.8, hematocrit 26.2, and platelet count 378,000.  Sodium 

140, potassium 4.6, chloride 99, CO2 38, anion gap 3, BUN 34, creatinine 1.31.  

Chest x-ray from the 29th shows diffuse bilateral airspace disease.





Objective





- Vital Signs


Vital signs: 


                                   Vital Signs











Temp  97.4 F L  04/29/21 12:32


 


Pulse  62   04/29/21 12:32


 


Resp  17   04/29/21 12:32


 


BP  120/67   04/29/21 12:32


 


Pulse Ox  99   04/29/21 12:32








                                 Intake & Output











 04/28/21 04/29/21 04/29/21





 18:59 06:59 18:59


 


Intake Total 480 240 


 


Output Total 825 1880 


 


Balance -345 -1640 


 


Weight 138.346 kg 145 kg 


 


Intake:   


 


  Oral 480 240 


 


Output:   


 


  Urine 825 1880 


 


Other:   


 


  Voiding Method Urinal Urinal Urinal


 


  # Voids  1 














- Exam





No acute distress, sleeping, currently on BiPAP, with saturations of 97%.





HEENT examination is grossly unremarkable.  





Neck supple.  Full range of motion.  No adenopathy thyromegaly or neck vein 

distention.





Cardiovascular examination reveals regular rhythm rate.  S1-S2 normal.  No S3 or

S4.  No discernible murmur noted.  Heart rate is 62 bpm.





Lungs reveal bibasilar crackles.  No rhonchi or wheezes.  Breath sounds equal 

bilaterally.





Abdomen soft bowel sounds are heard.  No masses or tenderness.





Extremities are intact.  No cyanosis or clubbing.  There is peripheral edema.





Skin is without rash or lesion.





Neurologic examination is brief but nonfocal.





- Labs


CBC & Chem 7: 


                                 04/29/21 08:37





                                 04/29/21 08:37


Labs: 


                  Abnormal Lab Results - Last 24 Hours (Table)











  04/28/21 04/28/21 04/29/21 Range/Units





  16:52 20:01 06:15 


 


RBC     (4.30-5.90)  m/uL


 


Hgb     (13.0-17.5)  gm/dL


 


Hct     (39.0-53.0)  %


 


MCV     (80.0-100.0)  fL


 


MCHC     (31.0-37.0)  g/dL


 


RDW     (11.5-15.5)  %


 


Monocytes # (Manual)     (0-1.0)  k/uL


 


Carbon Dioxide     (22-30)  mmol/L


 


BUN     (9-20)  mg/dL


 


Creatinine     (0.66-1.25)  mg/dL


 


Glucose     (74-99)  mg/dL


 


POC Glucose (mg/dL)  231 H  288 H  212 H  (75-99)  mg/dL














  04/29/21 04/29/21 04/29/21 Range/Units





  08:37 08:37 11:44 


 


RBC   2.57 L   (4.30-5.90)  m/uL


 


Hgb   7.8 L   (13.0-17.5)  gm/dL


 


Hct   26.2 L   (39.0-53.0)  %


 


MCV   101.9 H   (80.0-100.0)  fL


 


MCHC   29.6 L   (31.0-37.0)  g/dL


 


RDW   17.1 H   (11.5-15.5)  %


 


Monocytes # (Manual)   1.16 H   (0-1.0)  k/uL


 


Carbon Dioxide  38 H    (22-30)  mmol/L


 


BUN  34 H    (9-20)  mg/dL


 


Creatinine  1.31 H    (0.66-1.25)  mg/dL


 


Glucose  180 H    (74-99)  mg/dL


 


POC Glucose (mg/dL)    210 H  (75-99)  mg/dL














Assessment and Plan


Assessment: 





Acute on chronic shortness breath, with hypoxemic respiratory failure, secondary

to CHF exacerbation, with systolic dysfunction.





Recent hospitalization for COVID 19 pneumonia, status post discharged to a 

nursing facility.





Rule out non-ST segment elevation myocardial infarction.





Coronary artery disease, with previous stenting.





History of ischemic cardiomyopathy, status post AICD.





History of moderate to severe pulmonary hypertension.





Type 2 diabetes mellitus.





History of chronic anemia.





Previous history of tobacco use.





General medical debility.


Plan: 





Plan dated 04/28/2021.





The patient will continue with Lasix therapy.  In addition, I try to motivate 

him to get better.  He is lying on his side, stating that he wishes to die, and 

I told him that he wants to get better, he needs to be more motivated.  

Medications, ALLERGIES, problem list, and x-ray reports and radiographic picture

are all reviewed.  Current microbiology is negative are pending.  Currently, the

patient's on vitamin C, Lovenox, zinc, and usual medications.  No additional 

recommendations are made.  Prognosis is guarded.





Plan dated 04/29/2021.





The patient's N-terminal proBNP was 8220.  Certainly some of his abnormalities 

on chest x-ray could in fact, be fluid overload.  The patient clinically looks 

reasonably well on BiPAP, with settings of IPAP 12, EPAP 6, and 60%.  Satur

ations are 97%.  He's not receiving any IV fluids.  The patient's on vitamin C, 

Lovenox, zinc, and his usual medications.  We'll continue to follow.  He's been 

seen by cardiology.  His pro-calcitonin level is only 0.13.  I will stop the 

antibiotics.  Additional recommendations and suggestions are forthcoming.


Time with Patient: Less than 30

## 2021-04-29 NOTE — P.PN
Subjective


HISTORY OF PRESENT ILLNESS:  


80 year old male with past medical history, coronary artery disease, myocardial 

infarction s/p PCI mid RCA, proximal RCA in 5/2020, Ischemic cardiomyopathy, 

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 

Diabetes, HTN, Dyslipidemia. Follows in the office with Dr. Darden. We are being

consulted for congestive heart failure and elevated troponin. Patient presents 

to the emergency department with shortness of breath and fever. Patient is a 

poor historian, very hard of hearing. Patient recently hospitalized on 3/16/21-

04/08/21 with COVID-19 pneumonia. Patient denies chest pain, palpitations, 

syncope or near syncope. 





DIAGNOSTICS


EKG: sinus rhythm with first degree AV block, Twave inversions, leads I, aVL, ST

depression in leads V2, V3. 


Echo 02/2021 in the office- EF 35-40%, mild TR, mild MR, grade 1 diastolic 

dysfunction 


Chest x-ray- pulmonary edema is slightly worse than last exam 


Laboratory data reviewed, Troponin 0.11, 0.12, 0.11, BNP 8,220, d-dimer 1.83, 

Viral PCR negative, Covid-19 negative





4/28/21: 2-D echo obtained revealed EF between 40-45%, with hypokinesis, mild 

MR, mild TR, mild to moderate pulmonary hypertension, no pericardial effusion.  





4/29/21:


Patient seen and examined at bedside, no acute distress.  He states his 

breathing has improved however continues to be short of breath with ambulation. 

Overnight patient requiring increased oxygen requirements up to 15 L high flow 

nasal cannula.  Patient requiring BiPAP last night. Repeat chest x-ray shows 

diffuse bilateral infiltrates, heart size is stable.  Patient's laboratory data 

reviewed, hemoglobin 7.8 (8.4 yesterday), sodium 140, potassium 4.6, serum 

creatinine increased 1.31 (1.04 yesterday), magnesium 2.1. 





PHYSICAL EXAM: 


VITAL SIGNS: Reviewed 


GENERAL: No acute distress. Appears debilitated. 


HEENT: Head is normocephalic. No JVD or thyromegaly


LUNGS: Respirations even and unlabored. Lungs bibasilar crackles 


HEART: Regular rate and rhythm.  S1 and S2 heard.


ABDOMEN: Soft. Nondistended. Nontender.


EXTREMITIES: 3+ pitting Bilateral pedal edema. 


NEUROLOGIC: Awake and alert. Oriented x 3. 





ASSESSMENT: 


Acute Hypoxic Respiratory failure - Patient was started on rocephin for possible

pneumonia 


Coronary artery disease s/p PCI mid RCA, proximal RCA in 5/2020, 


Acute on chronic heart failure with reduced ejection fraction


Mildly elevated troponin- not likely acute coronary syndrome, troponin not 

increasing, patient denies chest discomfort. most likely realted to patient's 

CKD and hypoxia 


Ischemic cardiomyopathy 


Permanent cardiac pacemaker


Type 2 Diabetes


HTN


Dyslipidemia


Recent hospitalization for COVID-19, was discharged to a nursing facility.


Acute Kidney Injury - sCr increased 1.31 today 





PLAN: 


Pulmonary is following patient is on empiric IV Rocephin 


Patient's respiratory status and repeat chest x-ray today, appears more 

pulmonary then congestive heart failure at this time. With increased creatinine 

Will transition to PO Lasix today


Continue to monitor renal function and electrolytes 


Continue carvedilol 6.25mg BID, aspirin, plavix and statin 


Patient was on losartan 50mg daily, however, has been held due to hyperkalemia 

and renal function, Will continue hydralazine 25mg BID. Will continue to monitor

BMP 


I/Os, daily weights


Further recommendations based on clinical course. 





Nurse practitioner note has been reviewed by physician. Signing provider agrees 

with the documented findings, assessment, and plan of care. 








Objective





- Vital Signs


Vital signs: 


                                   Vital Signs











Temp  97.4 F L  04/29/21 12:32


 


Pulse  62   04/29/21 12:32


 


Resp  17   04/29/21 12:32


 


BP  120/67   04/29/21 12:32


 


Pulse Ox  99   04/29/21 12:32








                                 Intake & Output











 04/28/21 04/29/21 04/29/21





 18:59 06:59 18:59


 


Intake Total 480 240 


 


Output Total 825 1880 


 


Balance -345 -1640 


 


Weight 138.346 kg 145 kg 


 


Intake:   


 


  Oral 480 240 


 


Output:   


 


  Urine 825 1880 


 


Other:   


 


  Voiding Method Urinal Urinal Urinal


 


  # Voids  1 














- Labs


CBC & Chem 7: 


                                 04/29/21 08:37





                                 04/29/21 08:37


Labs: 


                  Abnormal Lab Results - Last 24 Hours (Table)











  04/28/21 04/28/21 04/29/21 Range/Units





  16:52 20:01 06:15 


 


RBC     (4.30-5.90)  m/uL


 


Hgb     (13.0-17.5)  gm/dL


 


Hct     (39.0-53.0)  %


 


MCV     (80.0-100.0)  fL


 


MCHC     (31.0-37.0)  g/dL


 


RDW     (11.5-15.5)  %


 


Monocytes # (Manual)     (0-1.0)  k/uL


 


Carbon Dioxide     (22-30)  mmol/L


 


BUN     (9-20)  mg/dL


 


Creatinine     (0.66-1.25)  mg/dL


 


Glucose     (74-99)  mg/dL


 


POC Glucose (mg/dL)  231 H  288 H  212 H  (75-99)  mg/dL














  04/29/21 04/29/21 04/29/21 Range/Units





  08:37 08:37 11:44 


 


RBC   2.57 L   (4.30-5.90)  m/uL


 


Hgb   7.8 L   (13.0-17.5)  gm/dL


 


Hct   26.2 L   (39.0-53.0)  %


 


MCV   101.9 H   (80.0-100.0)  fL


 


MCHC   29.6 L   (31.0-37.0)  g/dL


 


RDW   17.1 H   (11.5-15.5)  %


 


Monocytes # (Manual)   1.16 H   (0-1.0)  k/uL


 


Carbon Dioxide  38 H    (22-30)  mmol/L


 


BUN  34 H    (9-20)  mg/dL


 


Creatinine  1.31 H    (0.66-1.25)  mg/dL


 


Glucose  180 H    (74-99)  mg/dL


 


POC Glucose (mg/dL)    210 H  (75-99)  mg/dL

## 2021-04-29 NOTE — P.PN
Subjective





This is a pleasant 80 years old male who was recently discharged from the 

hospital 3/16-4/8 for bilateral covid Pneumonia.  He was discharge to rehab that

time on 4 L/m via nasal cannula.  His hospital course was complicated by lower 

GI bleed and GI team, thought local humerus contributing, and the recommended 

conservative therapy.Other chronic medical problems including diabetes mellitus,

hypertension


This time he was sent to ER from [facility] for increasing dyspnea 

his saturation were 89-93% on 5 L oxygen via nasal cannula, rest of vital signs 

stable and patient had fever of 100.1


Patient states that he has dyspnea for a few days but he could not specify 

however he denies chest pain.  No coughing.  Fever was documented in the 

emergency room.


No GI or urinary symptoms.





WBC is within the reference range 7.9K, hemoglobin 8.3, compared to 10.0 last 

time.  Platelets are normal.  INR is 0.9 and d-dimer 1.8.


Creatinine is normal 0.9, GFR is 73, potassium is 5.4, liver enzymes are not 

remarkable


Lactate dehydrogenase elevated 1196 as well as C-reactive protein 14.3.


Troponin is elevated 0.11, 0.12, 0.11


ProBNP is 8220


Coronal virus not detected, influenza virus not detected, SV PCR not detected


Chest x-ray: Pulmonary edema slightly worse than last time with evidence of 

congestive heart failure or RVS


Recent echo on 5/17/20: Ejection fraction 45-50% with wall hypokinesia


In the emergency room patient was started on ceftriaxone and Lasix 40 mg twice 

daily.  Ceftriaxone was continued by pulmonologist





04/28/2021


Patient dyspnea is improving and today he was sitting at bedside however he 

still gets short of breath with exertion.  He remains on 5 L of oxygen via nasal

cannula.  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


Patient is hemodynamically stable, labs reviewed and hemoglobin is stable.


He remains on ceftriaxone as he has fever on admission and pneumonia is 

suspected


Also he remains on Lasix 40 mg IV twice daily.  Aspirin is admitted today to 

have his Plavix which is home medication per cardiology team recommendation


Triamcinolone 0.1% topical is admitted to both legs for dermatitis with scaling





04/29/2021


Patient oxygen requirement increased last night to 15 L/m to keep oxygen 

saturation in the low 90s, he was slightly placed on BiPAP overnight and this 

morning, patient looks tired while as on BiPAP.  With setting of 12/6 and FiO2 

of 60%.


Labs from today are still pending


He had fever on admission and subsided.


Repeat chest x-ray today showing bilateral infiltrates, similar to yesterday.


Pulmonary and cardiology teams on the case


Echocardiogram showed ejection fraction of 40-45% with moderate LVH and 

hypokinesia.


 patient remains on Lasix 40 mg twice daily, and ceftriaxone for fever on 

admission.  Also he is on Plavix at home while aspirin 81 mg is added yesterday.

 


 coronavirus was not detected on admission





Review of systems: N/a, patient currently on BiPAP and tired


                               Active Medications











Generic Name Dose Route Start Last Admin





  Trade Name Freq  PRN Reason Stop Dose Admin


 


Ascorbic Acid  1,000 mg  04/28/21 09:00  04/28/21 09:13





  Ascorbic Acid 500 Mg Tab  PO   1,000 mg





  DAILY@0900 VICENTE   Administration


 


Aspirin  81 mg  04/28/21 09:00  04/28/21 09:13





  Aspirin 81 Mg  PO   81 mg





  DAILY VICENTE   Administration


 


Atorvastatin Calcium  80 mg  04/27/21 21:00  04/28/21 20:07





  Atorvastatin 80 Mg Tab  PO   80 mg





  HS@2100 VICENTE   Administration


 


Carvedilol  6.25 mg  04/27/21 08:45  04/29/21 06:38





  Carvedilol 6.25 Mg Tab  PO   6.25 mg





  BID-W/MEALS VICENTE   Administration


 


Clopidogrel Bisulfate  75 mg  04/28/21 09:00  04/28/21 09:15





  Clopidogrel 75 Mg Tab  PO   75 mg





  DAILY@0900 VICENTE   Administration


 


Enoxaparin Sodium  40 mg  04/27/21 12:00  04/28/21 09:14





  Enoxaparin 40 Mg/0.4 Ml Syringe  SQ   40 mg





  DAILY VICENTE   Administration


 


Famotidine  40 mg  04/28/21 09:00  04/28/21 09:13





  Famotidine 20 Mg Tab  PO   40 mg





  DAILY@0900 VICENTE   Administration


 


Ferrous Sulfate  325 mg  04/28/21 09:00  04/28/21 09:14





  Ferrous Sulfate 325 Mg Tab  PO   325 mg





  DAILY@0900 VICENTE   Administration


 


Furosemide  40 mg  04/26/21 23:00  04/28/21 23:27





  Furosemide 10 Mg/Ml 4 Ml Vial  IV   40 mg





  Q12H VICENTE   Administration


 


Glipizide  20 mg  04/27/21 21:00  04/28/21 20:07





  Glipizide 10 Mg Tab  PO   20 mg





  BID@0900,2100 VICENTE   Administration


 


Hydralazine HCl  25 mg  04/27/21 09:00  04/28/21 20:07





  Hydralazine Hcl 25 Mg Tab  PO   25 mg





  BID VICENTE   Administration


 


Ceftriaxone Sodium 1 gm/  50 mls @ 100 mls/hr  04/28/21 09:00  04/28/21 09:14





  Sodium Chloride  IVPB   100 mls/hr





  Q24HR VICENTE   Administration


 


Isosorbide Mononitrate  30 mg  04/27/21 09:00  04/28/21 09:13





  Isosorbide Mononitrate Er 30 Mg Tab.Er.24h  PO   30 mg





  DAILY VICENTE   Administration


 


Triamcinolone Acetonide  1 applic  04/28/21 22:30  04/29/21 03:17





  Triamcinolone Acet 0.1% Ointment 15 Gm Tube  TOPICAL   Not Given





  TID VICENTE  


 


Zinc Sulfate  220 mg  04/28/21 09:00  04/28/21 09:14





  Zinc Sulfate 220 Mg Cap  PO   220 mg





  DAILY@0900 VICENTE   Administration














Objective





- Vital Signs


Vital signs: 


                                   Vital Signs











Temp  97.8 F   04/29/21 08:12


 


Pulse  62   04/29/21 08:12


 


Resp  17   04/29/21 08:12


 


BP  129/63   04/29/21 08:12


 


Pulse Ox  98   04/29/21 08:12








                                 Intake & Output











 04/28/21 04/29/21 04/29/21





 18:59 06:59 18:59


 


Intake Total 480 240 


 


Output Total 825 1880 


 


Balance -345 -1640 


 


Weight 138.346 kg 145 kg 


 


Intake:   


 


  Oral 480 240 


 


Output:   


 


  Urine 825 1880 


 


Other:   


 


  Voiding Method Urinal Urinal 


 


  # Voids  1 














- Exam





-GENERAL: The patient is alert and oriented x3, not in any acute distress.  

Obese


HEENT: Pupils are round and equally reacting to light. EOMI. No scleral icterus.

No conjunctival pallor. Normocephalic, atraumatic. No pharyngeal erythema. No 

thyromegaly. 


CARDIOVASCULAR: S1 and S2 present. No murmurs, rubs, or gallops. 


-PULMONARY: Chest is clear to auscultation, no bilateral basal crepitation.  No 

wheezing


ABDOMEN: Soft, nontender, nondistended, normoactive bowel sounds. No palpable 

organomegaly. 


MUSCULOSKELETAL: No joint swelling or deformity. 


-EXTREMITIES: No cyanosis, clubbing, or pedal edema.  Bilateral leg redness and 

scaling from dermatitis


NEUROLOGICAL: Gross neurological examination did not reveal any focal deficits. 


SKIN: No rashes. no petechiae.





- Labs


CBC & Chem 7: 


                                 04/28/21 03:14





                                 04/28/21 03:14


Labs: 


                  Abnormal Lab Results - Last 24 Hours (Table)











  04/28/21 04/28/21 04/29/21 Range/Units





  16:52 20:01 06:15 


 


POC Glucose (mg/dL)  231 H  288 H  212 H  (75-99)  mg/dL














Assessment and Plan


Assessment: 





Acute pulmonary edema with acute on chronic CHF with ejection fraction 45-50%


Acute hypoxic respiratory failure


Elevated troponin, could be due to renal disease versus coronary artery disease


Fever on admission, possible due to pneumonia


Bilateral leg dermatitis


Recent covid infection (hospitalized  3/16-4/8)


Chronic kidney disease stage III











Plan: 





This is a pleasant 80 years old male who presents with systolic CHF, possible 

pneumonia and elevated troponin.  Continue with Lasix, continue with 

ceftriaxone.  Follow-up sputum culture , Cardiology consult.  Pulmonary team 

were already consulted.  Continue with Plavix.  Aspirin added.  Start 

triamcinolone ointment.  Continue with oxygen supplementation to keep saturating

above 90%


Labs and medication were reviewed..  Continue same treatment.  Continue with 

symptomatic treatment.  Resume home medication.  Monitor lytes and vitals.  DVT 

and GI prophylaxis.  Further recommendations depends on the clinical course of 

the patient


DVT prophylaxis: Subcutaneous Lovenox


GI Prophylaxis: Pepcid


Prognosis is guarded

## 2021-04-29 NOTE — XR
EXAMINATION TYPE: XR chest 1V

 

DATE OF EXAM: 4/29/2021

 

COMPARISON: 4/26/2021

 

HISTORY: Shortness of breath

 

TECHNIQUE: Single frontal view of the chest is obtained.

 

FINDINGS:  Diffuse bilateral airspace disease stable. Cardiac device stable. Small bilateral pleural 
effusions. No pneumothorax. Heart size stable.

 

IMPRESSION:  Diffuse bilateral airspace disease stable

## 2021-04-30 LAB
ANION GAP SERPL CALC-SCNC: 2 MMOL/L
BUN SERPL-SCNC: 32 MG/DL (ref 9–20)
CALCIUM SPEC-MCNC: 8.4 MG/DL (ref 8.4–10.2)
CHLORIDE SERPL-SCNC: 97 MMOL/L (ref 98–107)
CO2 SERPL-SCNC: 40 MMOL/L (ref 22–30)
GLUCOSE BLD-MCNC: 143 MG/DL (ref 75–99)
GLUCOSE BLD-MCNC: 240 MG/DL (ref 75–99)
GLUCOSE BLD-MCNC: 300 MG/DL (ref 75–99)
GLUCOSE BLD-MCNC: 303 MG/DL (ref 75–99)
GLUCOSE SERPL-MCNC: 143 MG/DL (ref 74–99)
MAGNESIUM SPEC-SCNC: 2 MG/DL (ref 1.6–2.3)
POTASSIUM SERPL-SCNC: 4.9 MMOL/L (ref 3.5–5.1)
SODIUM SERPL-SCNC: 139 MMOL/L (ref 137–145)

## 2021-04-30 RX ADMIN — Medication SCH MG: at 09:09

## 2021-04-30 RX ADMIN — FUROSEMIDE SCH MG: 40 TABLET ORAL at 09:10

## 2021-04-30 RX ADMIN — INSULIN ASPART SCH UNIT: 100 INJECTION, SOLUTION INTRAVENOUS; SUBCUTANEOUS at 17:46

## 2021-04-30 RX ADMIN — TRIAMCINOLONE ACETONIDE SCH APPLIC: 1 OINTMENT TOPICAL at 16:04

## 2021-04-30 RX ADMIN — ASPIRIN 81 MG CHEWABLE TABLET SCH MG: 81 TABLET CHEWABLE at 09:09

## 2021-04-30 RX ADMIN — TRIAMCINOLONE ACETONIDE SCH APPLIC: 1 OINTMENT TOPICAL at 20:41

## 2021-04-30 RX ADMIN — FUROSEMIDE SCH MG: 40 TABLET ORAL at 16:03

## 2021-04-30 RX ADMIN — FAMOTIDINE SCH MG: 20 TABLET, FILM COATED ORAL at 09:10

## 2021-04-30 RX ADMIN — ISOSORBIDE MONONITRATE SCH MG: 30 TABLET, EXTENDED RELEASE ORAL at 09:10

## 2021-04-30 RX ADMIN — INSULIN ASPART SCH UNIT: 100 INJECTION, SOLUTION INTRAVENOUS; SUBCUTANEOUS at 20:41

## 2021-04-30 RX ADMIN — CLOPIDOGREL BISULFATE SCH MG: 75 TABLET ORAL at 09:09

## 2021-04-30 RX ADMIN — ATORVASTATIN CALCIUM SCH MG: 80 TABLET, FILM COATED ORAL at 20:41

## 2021-04-30 RX ADMIN — ENOXAPARIN SODIUM SCH MG: 40 INJECTION SUBCUTANEOUS at 09:09

## 2021-04-30 RX ADMIN — INSULIN ASPART SCH UNIT: 100 INJECTION, SOLUTION INTRAVENOUS; SUBCUTANEOUS at 12:16

## 2021-04-30 RX ADMIN — TRIAMCINOLONE ACETONIDE SCH APPLIC: 1 OINTMENT TOPICAL at 09:27

## 2021-04-30 RX ADMIN — OXYCODONE HYDROCHLORIDE AND ACETAMINOPHEN SCH MG: 500 TABLET ORAL at 09:10

## 2021-04-30 NOTE — P.PN
Subjective


Progress Note Date: 04/30/21


Principal diagnosis: 





Shortness of breath.





This is a 80-year-old white male patient who was recently hospitalized from 

03/16/2021 through 04/08/2021 for COVID-19 pneumonia, and patient had a 

prolonged hospitalization course, with the severe hypoxemic respiratory failure 

related to COVID-19 pneumonia.  Patient was treated with steroids, he received 2

doses of Tocilizumab while in the hospital, and was discharged to a 

rehabilitation facility the skilled nursing home.  His hospitalization was 

complicated by lower GI bleeding and acute kidney injury.  The GI bleeding 

resolved on its own.  He is currently not on any anticoagulation.  His past 

medical history is positive for hypertension, type 2 diabetes mellitus and his 

sugars have been poorly controlled related to systemic steroids, patient is a 

former smoker.  Patient was brought into the emergency department on 04/26/2021 

at 2100 by EMS for evaluation of worsening shortness of breath and fever.  he is

very hard of hearing, he is a poor historian, but chest x-ray in the emergency 

department showed pulmonary interstitial and airspace edema, left basilar 

pacemaker in place.  Denies any coughing, denies any chest congestion or phlegm 

production.  Her low-grade fever in the emergency department with a temp of 

100.1F.  On 5 L of oxygen pulse ox is 93%.  His blood work showed normal white 

count of 7.9, hemoglobin is 8.3, his platelet count is 281, d-dimer is 1.83, INR

0.9, sodium is 139, potassium is 5.4, chloride is 102, CO2 is 26, B1 is 24, 

creatinine 0.9, lactic acid was 1.2, LDH was 1196, patient had troponin 

elevation of 0.110, 0.122, and 0.117, his proBNP came back elevated at 8220, and

pro-calcitonin level is pending right now, patient was retested for COVID-19 and

was found to be negative, RSV, influenza A and B PCR were negative. 





Progress note dated 04/28/2021.





80-year-old male, admitted with a diagnosis of acute on chronic shortness of 

breath, and respiratory failure, secondary to CHF exacerbation.  The patient 

also has a history of recent COVID 19 pneumonia, emphysema discharged to a [facility] home.  Other medical problems including acute coronary syndrome, ischemic 

cardiomyopathy, severe pulmonary hypertension, obesity, diabetes, anemia, and 

general medical ability.  The patient's currently on 5 L nasal cannula.  

Saturations are 9092%.  He is not receiving any IV fluids.  He is getting 

Rocephin.  The patient is lying on his right side.  He's extremely deaf.  He 

apparently is telling his nurse all he wants to do his diet.  He seems not very 

motivated to get better.  Labs today include a white count 7, hemoglobin 8.4, 

hematocrit 27.8, and platelet count 303,000.  D-dimer from 2 days ago was 1.83. 

Sodium, potassium, chlorides are all normal.  CO2 is 37, anion gap is 1, BUN is 

29 with a creatinine of 1.04.  Troponins were 0.110, 0.122, and 0.117.  N-

terminal proBNP was 8220.  Pro-calcitonin was 0.13.





Progress note dated 04/29/2021.





80-year-old male, seen 2 days ago in consultation.  The patient was hospitalized

from March 16 through April 8 for COVID 19 pneumonia.  The patient was 

discharged to a nursing home.  The patient came back into the hospital on the 

27th, with increasing shortness of breath.  The patient has a history of acute 

coronary syndrome, ischemic cardiomyopathy, severe pulmonary hypertension, 

diabetes, anemia, and general medical debility.  Currently, the patient is on 

BiPAP, with settings of IPAP 12, EPAP 6, and 60%.  Saturations are 97%.  The pa

rosa is not receiving any IV fluids.  He is currently sleeping and appears to 

be in no distress.  He is nearly flat on his back.  Current labs include a white

count 6.8, hemoglobin 7.8, hematocrit 26.2, and platelet count 378,000.  Sodium 

140, potassium 4.6, chloride 99, CO2 38, anion gap 3, BUN 34, creatinine 1.31.  

Chest x-ray from the 29th shows diffuse bilateral airspace disease.





Progress note dated 04/30/2021.





80-year-old male, seen a couple days ago in consultation.  The patient remains 

on 15 L high flow O2.  The patient is not receiving any IV fluids.  The patient 

is a DO NOT RESUSCITATE.  The patient openly states that he wants to die.  He is

going to speak to his son about that later today.  He has a history of acute 

coronary syndrome, ischemic cardiomyopathy, pulmonary hypertension, diabetes, 

anemia, and general medical debility.  The patient was hospitalized from March 16 through April 8 for COVID 19 pneumonia.  Labs today include a sodium 139, 

potassium 4.9, chlorides 97, CO2 40, anion gap 2, BUN 32, creatinine 1.08.





Objective





- Vital Signs


Vital signs: 


                                   Vital Signs











Temp  97.5 F L  04/30/21 07:39


 


Pulse  71   04/30/21 10:13


 


Resp  20   04/30/21 10:13


 


BP  128/74   04/30/21 07:39


 


Pulse Ox  96   04/30/21 07:39








                                 Intake & Output











 04/29/21 04/30/21 04/30/21





 18:59 06:59 18:59


 


Intake Total 50 195 


 


Output Total 275 1325 


 


Balance -225 -1130 


 


Weight  144 kg 


 


Intake:   


 


  Intake, IV Titration 50  





  Amount   


 


    cefTRIAXone 1 gm In 50  





    Sodium Chloride 0.9% 50   





    ml @ 100 mls/hr IVPB   





    Q24HR VICENTE Rx#:[id number]   


 


  Oral  195 


 


Output:   


 


  Urine 275 1325 


 


Other:   


 


  Voiding Method Urinal Urinal Urinal


 


  # Voids 1 1 














- Exam





No acute distress, sleeping, currently on 15 L high flow oxygen, with s

aturations of 96 %.





HEENT examination is grossly unremarkable.  Extremely hard of hearing.





Neck supple.  Full range of motion.  No adenopathy thyromegaly or neck vein 

distention.





Cardiovascular examination reveals regular rhythm rate.  S1-S2 normal.  No S3 or

S4.  No discernible murmur noted.  Heart rate is 71 bpm.





Lungs reveal bibasilar crackles.  No rhonchi or wheezes.  Breath sounds equal b

ilaterally.





Abdomen soft bowel sounds are heard.  No masses or tenderness.





Extremities are intact.  No cyanosis or clubbing.  There is peripheral edema.





Skin is without rash or lesion.





Neurologic examination is brief but nonfocal.





- Labs


CBC & Chem 7: 


                                 04/29/21 08:37





                                 04/30/21 07:46


Labs: 


                  Abnormal Lab Results - Last 24 Hours (Table)











  04/29/21 04/29/21 04/30/21 Range/Units





  16:47 19:52 06:23 


 


Chloride     ()  mmol/L


 


Carbon Dioxide     (22-30)  mmol/L


 


BUN     (9-20)  mg/dL


 


Glucose     (74-99)  mg/dL


 


POC Glucose (mg/dL)  176 H  179 H  143 H  (75-99)  mg/dL














  04/30/21 04/30/21 Range/Units





  07:46 12:00 


 


Chloride  97 L   ()  mmol/L


 


Carbon Dioxide  40 H   (22-30)  mmol/L


 


BUN  32 H   (9-20)  mg/dL


 


Glucose  143 H   (74-99)  mg/dL


 


POC Glucose (mg/dL)   300 H  (75-99)  mg/dL














Assessment and Plan


Assessment: 





Acute on chronic shortness breath, with hypoxemic respiratory failure, secondary

to CHF exacerbation, with systolic dysfunction.





Recent hospitalization for COVID 19 pneumonia, status post discharged to a [facility] facility.





Rule out non-ST segment elevation myocardial infarction.





Coronary artery disease, with previous stenting.





History of ischemic cardiomyopathy, status post AICD.





History of moderate to severe pulmonary hypertension.





Type 2 diabetes mellitus.





History of chronic anemia.





Previous history of tobacco use.





General medical debility.


Plan: 





Plan dated 04/28/2021.





The patient will continue with Lasix therapy.  In addition, I try to motivate 

him to get better.  He is lying on his side, stating that he wishes to die, and 

I told him that he wants to get better, he needs to be more motivated.  

Medications, ALLERGIES, problem list, and x-ray reports and radiographic picture

are all reviewed.  Current microbiology is negative are pending.  Currently, the

patient's on vitamin C, Lovenox, zinc, and usual medications.  No additional 

recommendations are made.  Prognosis is guarded.





Plan dated 04/29/2021.





The patient's N-terminal proBNP was 8220.  Certainly some of his abnormalities 

on chest x-ray could in fact, be fluid overload.  The patient clinically looks 

reasonably well on BiPAP, with settings of IPAP 12, EPAP 6, and 60%.  

Saturations are 97%.  He's not receiving any IV fluids.  The patient's on 

vitamin C, Lovenox, zinc, and his usual medications.  We'll continue to follow. 

He's been seen by cardiology.  His pro-calcitonin level is only 0.13.  I will st

op the antibiotics.  Additional recommendations and suggestions are forthcoming.





Plan dated 04/30/2021.





The patient continues to verbalize that he wants to die.  The patient is a DO 

NOT RESUSCITATE.  He is going to speak to his son today.  I think would be very 

appropriate to get a palliative care consult hospice consult on this patient.  

The patient's on high flow nasal cannula at 15 L.  He is not receiving any IV 

fluids.  His prognosis is very poor.  Additional recommendations and suggestions

are forthcoming.  The patient remains on vitamin C, Lovenox, zinc, and other 

usual medications.


Time with Patient: Less than 30

## 2021-04-30 NOTE — P.PN
Subjective





This is a pleasant 80 years old male who was recently discharged from the 

hospital 3/16-4/8 for bilateral covid Pneumonia.  He was discharge to rehab that

time on 4 L/m via nasal cannula.  His hospital course was complicated by lower 

GI bleed and GI team, thought local humerus contributing, and the recommended 

conservative therapy.Other chronic medical problems including diabetes mellitus,

hypertension


This time he was sent to ER from [facility] for increasing dyspnea 

his saturation were 89-93% on 5 L oxygen via nasal cannula, rest of vital signs 

stable and patient had fever of 100.1


Patient states that he has dyspnea for a few days but he could not specify 

however he denies chest pain.  No coughing.  Fever was documented in the 

emergency room.


No GI or urinary symptoms.





WBC is within the reference range 7.9K, hemoglobin 8.3, compared to 10.0 last 

time.  Platelets are normal.  INR is 0.9 and d-dimer 1.8.


Creatinine is normal 0.9, GFR is 73, potassium is 5.4, liver enzymes are not 

remarkable


Lactate dehydrogenase elevated 1196 as well as C-reactive protein 14.3.


Troponin is elevated 0.11, 0.12, 0.11


ProBNP is 8220


Coronal virus not detected, influenza virus not detected, SV PCR not detected


Chest x-ray: Pulmonary edema slightly worse than last time with evidence of 

congestive heart failure or RVS


Recent echo on 5/17/20: Ejection fraction 45-50% with wall hypokinesia


In the emergency room patient was started on ceftriaxone and Lasix 40 mg twice 

daily.  Ceftriaxone was continued by pulmonologist





04/28/2021


Patient dyspnea is improving and today he was sitting at bedside however he 

still gets short of breath with exertion.  He remains on 5 L of oxygen via nasal

cannula.  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


Patient is hemodynamically stable, labs reviewed and hemoglobin is stable.


He remains on ceftriaxone as he has fever on admission and pneumonia is 

suspected


Also he remains on Lasix 40 mg IV twice daily.  Aspirin is admitted today to 

have his Plavix which is home medication per cardiology team recommendation


Triamcinolone 0.1% topical is admitted to both legs for dermatitis with scaling





04/29/2021


Patient oxygen requirement increased last night to 15 L/m to keep oxygen 

saturation in the low 90s, he was slightly placed on BiPAP overnight and this 

morning, patient looks tired while as on BiPAP.  With setting of 12/6 and FiO2 

of 60%.


Labs from today are still pending


He had fever on admission and subsided.


Repeat chest x-ray today showing bilateral infiltrates, similar to yesterday.


Pulmonary and cardiology teams on the case


Echocardiogram showed ejection fraction of 40-45% with moderate LVH and 

hypokinesia.


 patient remains on Lasix 40 mg twice daily, and ceftriaxone for fever on 

admission.  Also he is on Plavix at home while aspirin 81 mg is added yesterday.

 


 coronavirus was not detected on admission





04/30/2021


Patient still significantly hypoxic, this morning was still on BiPAP and later 

on for the day he was on 15 L of oxygen per minute.  However he was able to sit 

upright in chair.


And that he is hemodynamically stable.  His creatinine is better today at 1.0, 

because of that his Lasix was increased to twice daily.  Continue with fluid 

restriction about 1000 per day.


He remains on hydralazine, as well as aspirin and Plavix.  Triamcinolone.


Antibiotics were discontinued as per gastroscopy and was low by pulmonary team


Pulmonary team also recommending palliative care consults, we will discuss with 

family and patient.


                                        





Objective





- Vital Signs


Vital signs: 


                                   Vital Signs











Temp  98.0 F   04/30/21 12:58


 


Pulse  74   04/30/21 13:52


 


Resp  20   04/30/21 13:52


 


BP  138/67   04/30/21 12:58


 


Pulse Ox  97   04/30/21 12:58








                                 Intake & Output











 04/29/21 04/30/21 04/30/21





 18:59 06:59 18:59


 


Intake Total 50 195 230


 


Output Total 275 1325 550


 


Balance -225 -1130 -320


 


Weight  144 kg 


 


Intake:   


 


  Intake, IV Titration 50  





  Amount   


 


    cefTRIAXone 1 gm In 50  





    Sodium Chloride 0.9% 50   





    ml @ 100 mls/hr IVPB   





    Q24HR [facility] Rx#:[id number]   


 


  Oral  195 230


 


Output:   


 


  Urine 275 1325 550


 


Other:   


 


  Voiding Method Urinal Urinal Urinal


 


  # Voids 1 1 1














- Exam





-GENERAL: The patient is alert and oriented x3, not in any acute distress.  

Obese


HEENT: Pupils are round and equally reacting to light. EOMI. No scleral icterus.

No conjunctival pallor. Normocephalic, atraumatic. No pharyngeal erythema. No 

thyromegaly. 


CARDIOVASCULAR: S1 and S2 present. No murmurs, rubs, or gallops. 


-PULMONARY: Chest is clear to auscultation, no bilateral basal crepitation.  No 

wheezing


ABDOMEN: Soft, nontender, nondistended, normoactive bowel sounds. No palpable 

organomegaly. 


MUSCULOSKELETAL: No joint swelling or deformity. 


-EXTREMITIES: No cyanosis, clubbing, or pedal edema.  Bilateral leg redness and 

scaling from dermatitis


NEUROLOGICAL: Gross neurological examination did not reveal any focal deficits. 


SKIN: No rashes. no petechiae.





- Labs


CBC & Chem 7: 


                                 04/29/21 08:37





                                 04/30/21 07:46


Labs: 


                  Abnormal Lab Results - Last 24 Hours (Table)











  04/29/21 04/29/21 04/30/21 Range/Units





  16:47 19:52 06:23 


 


Chloride     ()  mmol/L


 


Carbon Dioxide     (22-30)  mmol/L


 


BUN     (9-20)  mg/dL


 


Glucose     (74-99)  mg/dL


 


POC Glucose (mg/dL)  176 H  179 H  143 H  (75-99)  mg/dL














  04/30/21 04/30/21 Range/Units





  07:46 12:00 


 


Chloride  97 L   ()  mmol/L


 


Carbon Dioxide  40 H   (22-30)  mmol/L


 


BUN  32 H   (9-20)  mg/dL


 


Glucose  143 H   (74-99)  mg/dL


 


POC Glucose (mg/dL)   300 H  (75-99)  mg/dL














Assessment and Plan


Assessment: 





Acute pulmonary edema with acute on chronic CHF, combined diastolic and systolic

dysfunction is suspected with ejection fraction 45-50%


Severe Acute hypoxic respiratory failure


Elevated troponin, could be due to renal disease versus coronary artery disease


Fever on admission, possible due to pneumonia


Bilateral leg dermatitis


Recent covid infection (hospitalized  3/16-4/8)


Chronic kidney disease stage III











Plan: 





This is a pleasant 80 years old male who presents with systolic and diastolic 

CHF, .  Continue with Lasix, BiPAP as needed, otherwise high flow 

oxygen.Cardiology consult.  Pulmonary team were already consulted.  Continue 

with Plavix.  Aspirin added.  Start triamcinolone ointment.  Pulmonary team r

ecommended palliative consults, we will discuss with familyontinue with 

symptomatic treatment.  Resume home medication.  Monitor lytes and vitals.  DVT 

and GI prophylaxis.  Further recommendations depends on the clinical course of 

the patient


DVT prophylaxis: Subcutaneous Lovenox


GI Prophylaxis: Pepcid


Prognosis is guarded

## 2021-04-30 NOTE — P.PN
Subjective


HISTORY OF PRESENT ILLNESS:  


80 year old male with past medical history, coronary artery disease, myocardial 

infarction s/p PCI mid RCA, proximal RCA in 5/2020, Ischemic cardiomyopathy, 

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 

Diabetes, HTN, Dyslipidemia. Follows in the office with Dr. Darden. We are being

consulted for congestive heart failure and elevated troponin. Patient presents 

to the emergency department with shortness of breath and fever. Patient is a 

poor historian, very hard of hearing. Patient recently hospitalized on 3/16/21-

04/08/21 with COVID-19 pneumonia. Patient denies chest pain, palpitations, 

syncope or near syncope. 





DIAGNOSTICS


EKG: sinus rhythm with first degree AV block, Twave inversions, leads I, aVL, ST

depression in leads V2, V3. 


Echo 02/2021 in the office- EF 35-40%, mild TR, mild MR, grade 1 diastolic 

dysfunction 


Chest x-ray- pulmonary edema is slightly worse than last exam 


Laboratory data reviewed, Troponin 0.11, 0.12, 0.11, BNP 8,220, d-dimer 1.83, 

Viral PCR negative, Covid-19 negative





4/28/21: 2-D echo obtained revealed EF between 40-45%, with hypokinesis, mild 

MR, mild TR, mild to moderate pulmonary hypertension, no pericardial effusion.  





4/30/21:


Patient seen and examined at bedside, no acute distress.  He states his 

breathing has improved however continues to be short of breath with ambulation. 

Overnight patient requiring increased oxygen requirements up to 15 L high flow 

nasal cannula.  Patient requiring BiPAP last night. Repeat chest x-ray shows 

diffuse bilateral infiltrates, heart size is stable.  Patient's laboratory data 

reviewed, hemoglobin 7.8 (8.4 yesterday), sodium 140, potassium 4.6, serum 

creatinine increased 1.31 (1.04 yesterday), magnesium 2.1. 





4/30/21:


Patient is maintained on 15L high flow nasal cannula and still requiring BiPAP. 

Patient does seem depressed about his current health state.  Blood pressure 

120/74, heart rate 71, 96% on BiPAP sodium 139, potassium 4.9, acute kidney 

injury improving with serum creatinine 1.08, magnesium 2.0.  Patient is 

currently being maintained on aspirin 81 mg daily, Coreg 6.25 mg twice a day, 

Plavix 75 mg daily, Lasix 40 mg twice a day, Imdur 30 mg daily. Patient 

continues to have good urine output 1.6L out in the past 24 hours. Also with 1Kg

weight loss today. 





PHYSICAL EXAM: 


VITAL SIGNS: Reviewed 


GENERAL: No acute distress. Appears debilitated. 


HEENT: Head is normocephalic. No JVD or thyromegaly


LUNGS: Respirations even and unlabored. Lungs bibasilar crackles 


HEART: Regular rate and rhythm.  S1 and S2 heard.


ABDOMEN: Soft. Nondistended. Nontender.


EXTREMITIES: 3+ pitting Bilateral pedal edema. 


NEUROLOGIC: Awake and alert. Oriented x 3. 





ASSESSMENT: 


Acute Hypoxic Respiratory failure - Patient was started on rocephin for possible

pneumonia 


Coronary artery disease s/p PCI mid RCA, proximal RCA in 5/2020, 


Acute on chronic heart failure with reduced ejection fraction


Mildly elevated troponin- not likely acute coronary syndrome, troponin not 

increasing, patient denies chest discomfort. most likely realted to patient's 

CKD and hypoxia 


Ischemic cardiomyopathy 


Permanent cardiac pacemaker


Type 2 Diabetes


HTN


Dyslipidemia


Recent hospitalization for COVID-19, was discharged to a nursing facility.


Acute Kidney Injury - improving 1.08 today 





PLAN: 


No changes today 


Switched to PO Lasix- will adjust as needed 


Pulmonary is following patient is on empiric IV Rocephin 


Patient's respiratory status appears more pulmonary then congestive heart 

failure at this time. Pulmonary is following 


Continue to monitor renal function and electrolytes 


Continue carvedilol 6.25mg BID, aspirin, plavix and statin 


Patient was on losartan 50mg daily, however, has been held due to hyperkalemia 

and renal function, Will continue hydralazine 25mg BID. Will continue to monitor

BMP 


I/Os, daily weights


Further recommendations based on clinical course. 


Prognosis is guarded 





Nurse practitioner note has been reviewed by physician. Signing provider agrees 

with the documented findings, assessment, and plan of care. 








Objective





- Vital Signs


Vital signs: 


                                   Vital Signs











Temp  98.0 F   04/30/21 12:58


 


Pulse  74   04/30/21 13:52


 


Resp  20   04/30/21 13:52


 


BP  138/67   04/30/21 12:58


 


Pulse Ox  97   04/30/21 12:58








                                 Intake & Output











 04/29/21 04/30/21 04/30/21





 18:59 06:59 18:59


 


Intake Total 50 195 230


 


Output Total 275 1325 550


 


Balance -957 -6249 -359


 


Weight  144 kg 


 


Intake:   


 


  Intake, IV Titration 50  





  Amount   


 


    cefTRIAXone 1 gm In 50  





    Sodium Chloride 0.9% 50   





    ml @ 100 mls/hr IVPB   





    Q24HR [facility] Rx#:[id number]   


 


  Oral  195 230


 


Output:   


 


  Urine 275 1325 550


 


Other:   


 


  Voiding Method Urinal Urinal Urinal


 


  # Voids 1 1 1














- Labs


CBC & Chem 7: 


                                 04/29/21 08:37





                                 04/30/21 07:46


Labs: 


                  Abnormal Lab Results - Last 24 Hours (Table)











  04/29/21 04/29/21 04/30/21 Range/Units





  16:47 19:52 06:23 


 


Chloride     ()  mmol/L


 


Carbon Dioxide     (22-30)  mmol/L


 


BUN     (9-20)  mg/dL


 


Glucose     (74-99)  mg/dL


 


POC Glucose (mg/dL)  176 H  179 H  143 H  (75-99)  mg/dL














  04/30/21 04/30/21 Range/Units





  07:46 12:00 


 


Chloride  97 L   ()  mmol/L


 


Carbon Dioxide  40 H   (22-30)  mmol/L


 


BUN  32 H   (9-20)  mg/dL


 


Glucose  143 H   (74-99)  mg/dL


 


POC Glucose (mg/dL)   300 H  (75-99)  mg/dL

## 2021-05-01 LAB
ANION GAP SERPL CALC-SCNC: 2 MMOL/L
BASOPHILS # BLD AUTO: 0.1 K/UL (ref 0–0.2)
BASOPHILS NFR BLD AUTO: 1 %
BUN SERPL-SCNC: 27 MG/DL (ref 9–20)
CALCIUM SPEC-MCNC: 8.3 MG/DL (ref 8.4–10.2)
CHLORIDE SERPL-SCNC: 96 MMOL/L (ref 98–107)
CO2 SERPL-SCNC: 44 MMOL/L (ref 22–30)
EOSINOPHIL # BLD AUTO: 0.2 K/UL (ref 0–0.7)
EOSINOPHIL NFR BLD AUTO: 2 %
ERYTHROCYTE [DISTWIDTH] IN BLOOD BY AUTOMATED COUNT: 2.74 M/UL (ref 4.3–5.9)
ERYTHROCYTE [DISTWIDTH] IN BLOOD: 17.3 % (ref 11.5–15.5)
GLUCOSE BLD-MCNC: 173 MG/DL (ref 75–99)
GLUCOSE BLD-MCNC: 195 MG/DL (ref 75–99)
GLUCOSE BLD-MCNC: 249 MG/DL (ref 75–99)
GLUCOSE BLD-MCNC: 352 MG/DL (ref 75–99)
GLUCOSE SERPL-MCNC: 179 MG/DL (ref 74–99)
HCT VFR BLD AUTO: 27.9 % (ref 39–53)
HGB BLD-MCNC: 8.4 GM/DL (ref 13–17.5)
LYMPHOCYTES # SPEC AUTO: 1.5 K/UL (ref 1–4.8)
LYMPHOCYTES NFR SPEC AUTO: 22 %
MCH RBC QN AUTO: 30.6 PG (ref 25–35)
MCHC RBC AUTO-ENTMCNC: 30.1 G/DL (ref 31–37)
MCV RBC AUTO: 101.5 FL (ref 80–100)
MONOCYTES # BLD AUTO: 0.9 K/UL (ref 0–1)
MONOCYTES NFR BLD AUTO: 13 %
NEUTROPHILS # BLD AUTO: 4.1 K/UL (ref 1.3–7.7)
NEUTROPHILS NFR BLD AUTO: 58 %
PLATELET # BLD AUTO: 420 K/UL (ref 150–450)
POTASSIUM SERPL-SCNC: 4.2 MMOL/L (ref 3.5–5.1)
SODIUM SERPL-SCNC: 142 MMOL/L (ref 137–145)
WBC # BLD AUTO: 7 K/UL (ref 3.8–10.6)

## 2021-05-01 RX ADMIN — CLOPIDOGREL BISULFATE SCH MG: 75 TABLET ORAL at 10:02

## 2021-05-01 RX ADMIN — ENOXAPARIN SODIUM SCH MG: 40 INJECTION SUBCUTANEOUS at 10:01

## 2021-05-01 RX ADMIN — ASPIRIN 81 MG CHEWABLE TABLET SCH MG: 81 TABLET CHEWABLE at 10:02

## 2021-05-01 RX ADMIN — Medication SCH MG: at 10:02

## 2021-05-01 RX ADMIN — ATORVASTATIN CALCIUM SCH MG: 80 TABLET, FILM COATED ORAL at 20:33

## 2021-05-01 RX ADMIN — FUROSEMIDE SCH MG: 40 TABLET ORAL at 16:40

## 2021-05-01 RX ADMIN — ISOSORBIDE MONONITRATE SCH MG: 30 TABLET, EXTENDED RELEASE ORAL at 10:02

## 2021-05-01 RX ADMIN — INSULIN ASPART SCH UNIT: 100 INJECTION, SOLUTION INTRAVENOUS; SUBCUTANEOUS at 17:53

## 2021-05-01 RX ADMIN — INSULIN ASPART SCH UNIT: 100 INJECTION, SOLUTION INTRAVENOUS; SUBCUTANEOUS at 06:32

## 2021-05-01 RX ADMIN — FUROSEMIDE SCH MG: 40 TABLET ORAL at 10:02

## 2021-05-01 RX ADMIN — TRIAMCINOLONE ACETONIDE SCH APPLIC: 1 OINTMENT TOPICAL at 20:33

## 2021-05-01 RX ADMIN — INSULIN ASPART SCH UNIT: 100 INJECTION, SOLUTION INTRAVENOUS; SUBCUTANEOUS at 12:28

## 2021-05-01 RX ADMIN — TRIAMCINOLONE ACETONIDE SCH APPLIC: 1 OINTMENT TOPICAL at 10:03

## 2021-05-01 RX ADMIN — FAMOTIDINE SCH MG: 20 TABLET, FILM COATED ORAL at 10:02

## 2021-05-01 RX ADMIN — OXYCODONE HYDROCHLORIDE AND ACETAMINOPHEN SCH MG: 500 TABLET ORAL at 10:02

## 2021-05-01 RX ADMIN — Medication SCH MG: at 10:01

## 2021-05-01 RX ADMIN — TRIAMCINOLONE ACETONIDE SCH APPLIC: 1 OINTMENT TOPICAL at 16:41

## 2021-05-01 RX ADMIN — INSULIN ASPART SCH UNIT: 100 INJECTION, SOLUTION INTRAVENOUS; SUBCUTANEOUS at 20:33

## 2021-05-01 NOTE — P.PN
Subjective


Progress Note Date: 05/01/21


Principal diagnosis: 





Shortness of breath.





This is a 80-year-old white male patient who was recently hospitalized from 

03/16/2021 through 04/08/2021 for COVID-19 pneumonia, and patient had a 

prolonged hospitalization course, with the severe hypoxemic respiratory failure 

related to COVID-19 pneumonia.  Patient was treated with steroids, he received 2

doses of Tocilizumab while in the hospital, and was discharged to a 

rehabilitation facility the skilled nursing home.  His hospitalization was 

complicated by lower GI bleeding and acute kidney injury.  The GI bleeding 

resolved on its own.  He is currently not on any anticoagulation.  His past 

medical history is positive for hypertension, type 2 diabetes mellitus and his 

sugars have been poorly controlled related to systemic steroids, patient is a 

former smoker.  Patient was brought into the emergency department on 04/26/2021 

at 2100 by EMS for evaluation of worsening shortness of breath and fever.  he is

very hard of hearing, he is a poor historian, but chest x-ray in the emergency 

department showed pulmonary interstitial and airspace edema, left basilar 

pacemaker in place.  Denies any coughing, denies any chest congestion or phlegm 

production.  Her low-grade fever in the emergency department with a temp of 

100.1F.  On 5 L of oxygen pulse ox is 93%.  His blood work showed normal white 

count of 7.9, hemoglobin is 8.3, his platelet count is 281, d-dimer is 1.83, INR

0.9, sodium is 139, potassium is 5.4, chloride is 102, CO2 is 26, B1 is 24, 

creatinine 0.9, lactic acid was 1.2, LDH was 1196, patient had troponin 

elevation of 0.110, 0.122, and 0.117, his proBNP came back elevated at 8220, and

pro-calcitonin level is pending right now, patient was retested for COVID-19 and

was found to be negative, RSV, influenza A and B PCR were negative. 





Progress note dated 04/28/2021.





80-year-old male, admitted with a diagnosis of acute on chronic shortness of 

breath, and respiratory failure, secondary to CHF exacerbation.  The patient 

also has a history of recent COVID 19 pneumonia, emphysema discharged to a [facility] home.  Other medical problems including acute coronary syndrome, ischemic 

cardiomyopathy, severe pulmonary hypertension, obesity, diabetes, anemia, and 

general medical ability.  The patient's currently on 5 L nasal cannula.  

Saturations are 9092%.  He is not receiving any IV fluids.  He is getting 

Rocephin.  The patient is lying on his right side.  He's extremely deaf.  He 

apparently is telling his nurse all he wants to do his diet.  He seems not very 

motivated to get better.  Labs today include a white count 7, hemoglobin 8.4, 

hematocrit 27.8, and platelet count 303,000.  D-dimer from 2 days ago was 1.83. 

Sodium, potassium, chlorides are all normal.  CO2 is 37, anion gap is 1, BUN is 

29 with a creatinine of 1.04.  Troponins were 0.110, 0.122, and 0.117.  N-

terminal proBNP was 8220.  Pro-calcitonin was 0.13.





Progress note dated 04/29/2021.





80-year-old male, seen 2 days ago in consultation.  The patient was hospitalized

from March 16 through April 8 for COVID 19 pneumonia.  The patient was 

discharged to a nursing home.  The patient came back into the hospital on the 

27th, with increasing shortness of breath.  The patient has a history of acute 

coronary syndrome, ischemic cardiomyopathy, severe pulmonary hypertension, 

diabetes, anemia, and general medical debility.  Currently, the patient is on 

BiPAP, with settings of IPAP 12, EPAP 6, and 60%.  Saturations are 97%.  The pa

rosa is not receiving any IV fluids.  He is currently sleeping and appears to 

be in no distress.  He is nearly flat on his back.  Current labs include a white

count 6.8, hemoglobin 7.8, hematocrit 26.2, and platelet count 378,000.  Sodium 

140, potassium 4.6, chloride 99, CO2 38, anion gap 3, BUN 34, creatinine 1.31.  

Chest x-ray from the 29th shows diffuse bilateral airspace disease.





Progress note dated 04/30/2021.





80-year-old male, seen a couple days ago in consultation.  The patient remains 

on 15 L high flow O2.  The patient is not receiving any IV fluids.  The patient 

is a DO NOT RESUSCITATE.  The patient openly states that he wants to die.  He is

going to speak to his son about that later today.  He has a history of acute 

coronary syndrome, ischemic cardiomyopathy, pulmonary hypertension, diabetes, 

anemia, and general medical debility.  The patient was hospitalized from March 16 through April 8 for COVID 19 pneumonia.  Labs today include a sodium 139, 

potassium 4.9, chlorides 97, CO2 40, anion gap 2, BUN 32, creatinine 1.08.





Progress note dated 05/01/2021.





80-year-old male, currently on 15 L high flow O2.  The patient is a DO NOT 

RESUSCITATE.  He's not receiving any IV fluids.  He's been saying all along, 

that he just wants to die.  He apparently had a conversation with his son 

yesterday about it according to the nurse.  The patient is extremely deaf.  Very

hard to communicate with.  His legs are quite swollen.  His bicarbonate 

concentration today on the electrolyte profile is 44.  He does have history of 

chronic hypercapnic respiratory failure.  White count 7, hemoglobin 8.4, 

hematocrit 27.9, and platelet count 420,000.  Sodium 142, potassium 4.2, 

chlorides 96, CO2 44, anion gap 2, BUN 27, and creatinine 1.  No recent chest x-

ray to report.  The patient was hospitalized between March 16 of April 8 for 

COVID 19 pneumonia.





Objective





- Vital Signs


Vital signs: 


                                   Vital Signs











Temp  97.7 F   05/01/21 11:41


 


Pulse  59 L  05/01/21 11:41


 


Resp  18   05/01/21 11:41


 


BP  131/88   05/01/21 11:41


 


Pulse Ox  95   05/01/21 11:41








                                 Intake & Output











 04/30/21 05/01/21 05/01/21





 18:59 06:59 18:59


 


Intake Total 470 145 0


 


Output Total 550 1025 


 


Balance -80 -880 0


 


Weight  134.5 kg 


 


Intake:   


 


  Oral 470 145 0


 


Output:   


 


  Urine 550 1025 


 


Other:   


 


  Voiding Method Urinal Urinal Urinal


 


  # Voids 1 1 1


 


  # Bowel Movements 1  














- Exam





No acute distress, sleeping, currently on 15 L high flow oxygen, with 

saturations of 95 %.





HEENT examination is grossly unremarkable.  Extremely hard of hearing.





Neck supple.  Full range of motion.  No adenopathy thyromegaly or neck vein 

distention.





Cardiovascular examination reveals regular rhythm rate.  S1-S2 normal.  No S3 or

S4.  No discernible murmur noted.  Heart rate is 59 bpm.





Lungs reveal bibasilar crackles.  No rhonchi or wheezes.  Breath sounds equal 

bilaterally.





Abdomen soft bowel sounds are heard.  No masses or tenderness.





Extremities are intact.  No cyanosis or clubbing.  There is peripheral edema.





Skin reveals chronic venous stasis changes.





Neurologic examination is brief but nonfocal.





- Labs


CBC & Chem 7: 


                                 05/01/21 07:41





                                 05/01/21 07:41


Labs: 


                  Abnormal Lab Results - Last 24 Hours (Table)











  04/30/21 04/30/21 05/01/21 Range/Units





  17:04 20:06 06:05 


 


RBC     (4.30-5.90)  m/uL


 


Hgb     (13.0-17.5)  gm/dL


 


Hct     (39.0-53.0)  %


 


MCV     (80.0-100.0)  fL


 


MCHC     (31.0-37.0)  g/dL


 


RDW     (11.5-15.5)  %


 


Chloride     ()  mmol/L


 


Carbon Dioxide     (22-30)  mmol/L


 


BUN     (9-20)  mg/dL


 


Glucose     (74-99)  mg/dL


 


POC Glucose (mg/dL)  303 H  240 H  195 H  (75-99)  mg/dL


 


Calcium     (8.4-10.2)  mg/dL














  05/01/21 05/01/21 05/01/21 Range/Units





  07:41 07:41 11:53 


 


RBC  2.74 L    (4.30-5.90)  m/uL


 


Hgb  8.4 L    (13.0-17.5)  gm/dL


 


Hct  27.9 L    (39.0-53.0)  %


 


MCV  101.5 H    (80.0-100.0)  fL


 


MCHC  30.1 L    (31.0-37.0)  g/dL


 


RDW  17.3 H    (11.5-15.5)  %


 


Chloride   96 L   ()  mmol/L


 


Carbon Dioxide   44 H*   (22-30)  mmol/L


 


BUN   27 H   (9-20)  mg/dL


 


Glucose   179 H   (74-99)  mg/dL


 


POC Glucose (mg/dL)    173 H  (75-99)  mg/dL


 


Calcium   8.3 L   (8.4-10.2)  mg/dL














Assessment and Plan


Assessment: 





Acute on chronic shortness breath, with hypoxemic respiratory failure, secondary

to CHF exacerbation, with systolic dysfunction.





Recent hospitalization for COVID 19 pneumonia, status post discharged to a 

nursing facility.





Rule out non-ST segment elevation myocardial infarction.





Coronary artery disease, with previous stenting.





History of ischemic cardiomyopathy, status post AICD.





History of moderate to severe pulmonary hypertension.





Type 2 diabetes mellitus.





History of chronic anemia.





Previous history of tobacco use.





General medical debility.





Extremely hard of hearing.


Plan: 





Plan dated 04/28/2021.





The patient will continue with Lasix therapy.  In addition, I try to motivate 

him to get better.  He is lying on his side, stating that he wishes to die, and 

I told him that he wants to get better, he needs to be more motivated.  

Medications, ALLERGIES, problem list, and x-ray reports and radiographic picture

are all reviewed.  Current microbiology is negative are pending.  Currently, the

patient's on vitamin C, Lovenox, zinc, and usual medications.  No additional 

recommendations are made.  Prognosis is guarded.





Plan dated 04/29/2021.





The patient's N-terminal proBNP was 8220.  Certainly some of his abnormalities 

on chest x-ray could in fact, be fluid overload.  The patient clinically looks 

reasonably well on BiPAP, with settings of IPAP 12, EPAP 6, and 60%.  

Saturations are 97%.  He's not receiving any IV fluids.  The patient's on 

vitamin C, Lovenox, zinc, and his usual medications.  We'll continue to follow. 

He's been seen by cardiology.  His pro-calcitonin level is only 0.13.  I will 

stop the antibiotics.  Additional recommendations and suggestions are 

forthcoming.





Plan dated 04/30/2021.





The patient continues to verbalize that he wants to die.  The patient is a DO 

NOT RESUSCITATE.  He is going to speak to his son today.  I think would be very 

appropriate to get a palliative care consult hospice consult on this patient.  

The patient's on high flow nasal cannula at 15 L.  He is not receiving any IV 

fluids.  His prognosis is very poor.  Additional recommendations and suggestions

are forthcoming.  The patient remains on vitamin C, Lovenox, zinc, and other 

usual medications.





Plan dated 05/01/2021.





The patient's currently on 15 L high flow oxygen.  He was actually sleeping when

I first came into the room.  He was in no distress.  The patient is a DO NOT 

RESUSCITATE/DO NOT INTUBATE.  He apparently had a discussion with the son about 

possible palliative care, or hospice.  The patient is not receiving any IV 

fluids.  The patient's overall prognosis is extremely poor.  The patient's 

medications are reviewed.  Labs and x-rays are reviewed.


Time with Patient: Less than 30

## 2021-05-01 NOTE — P.PN
Subjective


Progress Note Date: 05/01/21





HISTORY OF PRESENT ILLNESS:  


80 year old male with past medical history, coronary artery disease, myocardial 

infarction s/p PCI mid RCA, proximal RCA in 5/2020, Ischemic cardiomyopathy, 

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 

Diabetes, HTN, Dyslipidemia. Follows in the office with Dr. Darden. We are being

consulted for congestive heart failure and elevated troponin. Patient presents 

to the emergency department with shortness of breath and fever. Patient is a 

poor historian, very hard of hearing. Patient recently hospitalized on 

3/16/21-04/08/21 with COVID-19 pneumonia. Patient denies chest pain, 

palpitations, syncope or near syncope. 





DIAGNOSTICS


EKG: sinus rhythm with first degree AV block, Twave inversions, leads I, aVL, ST

depression in leads V2, V3. 


Echo 02/2021 in the office- EF 35-40%, mild TR, mild MR, grade 1 diastolic 

dysfunction 


Chest x-ray- pulmonary edema is slightly worse than last exam 


Laboratory data reviewed, Troponin 0.11, 0.12, 0.11, BNP 8,220, d-dimer 1.83, 

Viral PCR negative, Covid-19 negative





4/28/21: 2-D echo obtained revealed EF between 40-45%, with hypokinesis, mild 

MR, mild TR, mild to moderate pulmonary hypertension, no pericardial effusion.  





4/30/21:


Patient seen and examined at bedside, no acute distress.  He states his 

breathing has improved however continues to be short of breath with ambulation. 

Overnight patient requiring increased oxygen requirements up to 15 L high flow 

nasal cannula.  Patient requiring BiPAP last night. Repeat chest x-ray shows 

diffuse bilateral infiltrates, heart size is stable.  Patient's laboratory data 

reviewed, hemoglobin 7.8 (8.4 yesterday), sodium 140, potassium 4.6, serum 

creatinine increased 1.31 (1.04 yesterday), magnesium 2.1. 





4/30/21:


Patient is maintained on 15L high flow nasal cannula and still requiring BiPAP. 

Patient does seem depressed about his current health state.  Blood pressure 

120/74, heart rate 71, 96% on BiPAP sodium 139, potassium 4.9, acute kidney inju

ry improving with serum creatinine 1.08, magnesium 2.0.  Patient is currently 

being maintained on aspirin 81 mg daily, Coreg 6.25 mg twice a day, Plavix 75 mg

daily, Lasix 40 mg twice a day, Imdur 30 mg daily. Patient continues to have 

good urine output 1.6L out in the past 24 hours. Also with 1Kg weight loss 

today. 





5/1: Patient is currently on Lasix 40 mg po twice daily. He has been afebrile, H

R 73, /59, PO 97% on 15L HF nasal cannula. Patient is stable from 

yesterday. Repeat blood work reveals WBC 7.0, HGB 8.4, .  He 142, 

potassium 4.2, chloride 96, CO2 44, BUN 27 and creatinine 1.





PHYSICAL EXAM: 


VITAL SIGNS: Reviewed 


GENERAL: No acute distress. Appears debilitated. 


HEENT: Head is normocephalic. No JVD or thyromegaly


LUNGS: Respirations even and unlabored. Lungs bibasilar crackles 


HEART: Regular rate and rhythm.  S1 and S2 heard.


ABDOMEN: Soft. Nondistended. Nontender.


EXTREMITIES: 3+ pitting Bilateral pedal edema. 


NEUROLOGIC: Awake and alert. Oriented x 3. 





ASSESSMENT: 


Acute Hypoxic Respiratory failure - Patient was started on rocephin for possible

pneumonia 


Coronary artery disease s/p PCI mid RCA, proximal RCA in 5/2020, 


Acute on chronic heart failure with reduced ejection fraction


Mildly elevated troponin- not likely acute coronary syndrome, troponin not 

increasing, patient denies chest discomfort. most likely realted to patient's 

CKD and hypoxia 


Ischemic cardiomyopathy 


Permanent cardiac pacemaker


Type 2 Diabetes


HTN


Dyslipidemia


Recent hospitalization for COVID-19, was discharged to a nursing facility.


Acute Kidney Injury - improving 1.08 today 





PLAN: 


No changes today 


Continue Lasix 40 mg oral twice daily


Pulmonary is following patient is on empiric IV Rocephin 


Patient's respiratory status appears more pulmonary then congestive heart 

failure at this time. Pulmonary is following 


Continue to monitor renal function and electrolytes 


Continue carvedilol 6.25mg BID, aspirin, plavix and statin 


Patient was on losartan 50mg daily, however, has been held due to hyperkalemia 

and renal function, Will continue hydralazine 25mg BID. Will continue to monitor

BMP 


I/Os, daily weights


Further recommendations based on clinical course. 


Prognosis is guarded 





Nurse practitioner note has been reviewed by physician. Signing provider agrees 

with the documented findings, assessment, and plan of care. 





Objective





- Vital Signs


Vital signs: 


                                   Vital Signs











Temp  98.3 F   05/01/21 10:00


 


Pulse  73   05/01/21 10:00


 


Resp  20   05/01/21 10:00


 


BP  131/59   05/01/21 10:00


 


Pulse Ox  97   05/01/21 10:00








                                 Intake & Output











 04/30/21 05/01/21 05/01/21





 18:59 06:59 18:59


 


Intake Total 470 145 0


 


Output Total 550 1025 


 


Balance -80 -880 0


 


Weight  134.5 kg 


 


Intake:   


 


  Oral 470 145 0


 


Output:   


 


  Urine 550 1025 


 


Other:   


 


  Voiding Method Urinal Urinal Urinal


 


  # Voids 1 1 


 


  # Bowel Movements 1  














- Labs


CBC & Chem 7: 


                                 05/01/21 07:41





                                 05/01/21 07:41


Labs: 


                  Abnormal Lab Results - Last 24 Hours (Table)











  04/30/21 04/30/21 04/30/21 Range/Units





  12:00 17:04 20:06 


 


RBC     (4.30-5.90)  m/uL


 


Hgb     (13.0-17.5)  gm/dL


 


Hct     (39.0-53.0)  %


 


MCV     (80.0-100.0)  fL


 


MCHC     (31.0-37.0)  g/dL


 


RDW     (11.5-15.5)  %


 


Chloride     ()  mmol/L


 


Carbon Dioxide     (22-30)  mmol/L


 


BUN     (9-20)  mg/dL


 


Glucose     (74-99)  mg/dL


 


POC Glucose (mg/dL)  300 H  303 H  240 H  (75-99)  mg/dL


 


Calcium     (8.4-10.2)  mg/dL














  05/01/21 05/01/21 05/01/21 Range/Units





  06:05 07:41 07:41 


 


RBC   2.74 L   (4.30-5.90)  m/uL


 


Hgb   8.4 L   (13.0-17.5)  gm/dL


 


Hct   27.9 L   (39.0-53.0)  %


 


MCV   101.5 H   (80.0-100.0)  fL


 


MCHC   30.1 L   (31.0-37.0)  g/dL


 


RDW   17.3 H   (11.5-15.5)  %


 


Chloride    96 L  ()  mmol/L


 


Carbon Dioxide    44 H*  (22-30)  mmol/L


 


BUN    27 H  (9-20)  mg/dL


 


Glucose    179 H  (74-99)  mg/dL


 


POC Glucose (mg/dL)  195 H    (75-99)  mg/dL


 


Calcium    8.3 L  (8.4-10.2)  mg/dL

## 2021-05-01 NOTE — P.PN
Subjective





This is a pleasant 80 years old male who was recently discharged from the 

hospital 3/16-4/8 for bilateral covid Pneumonia.  He was discharge to rehab that

time on 4 L/m via nasal cannula.  His hospital course was complicated by lower 

GI bleed and GI team, thought local humerus contributing, and the recommended 

conservative therapy.Other chronic medical problems including diabetes mellitus,

hypertension


This time he was sent to ER from [facility] for increasing dyspnea 

his saturation were 89-93% on 5 L oxygen via nasal cannula, rest of vital signs 

stable and patient had fever of 100.1


Patient states that he has dyspnea for a few days but he could not specify 

however he denies chest pain.  No coughing.  Fever was documented in the 

emergency room.


No GI or urinary symptoms.





WBC is within the reference range 7.9K, hemoglobin 8.3, compared to 10.0 last 

time.  Platelets are normal.  INR is 0.9 and d-dimer 1.8.


Creatinine is normal 0.9, GFR is 73, potassium is 5.4, liver enzymes are not 

remarkable


Lactate dehydrogenase elevated 1196 as well as C-reactive protein 14.3.


Troponin is elevated 0.11, 0.12, 0.11


ProBNP is 8220


Coronal virus not detected, influenza virus not detected, SV PCR not detected


Chest x-ray: Pulmonary edema slightly worse than last time with evidence of 

congestive heart failure or RVS


Recent echo on 5/17/20: Ejection fraction 45-50% with wall hypokinesia


In the emergency room patient was started on ceftriaxone and Lasix 40 mg twice 

daily.  Ceftriaxone was continued by pulmonologist





04/28/2021


Patient dyspnea is improving and today he was sitting at bedside however he 

still gets short of breath with exertion.  He remains on 5 L of oxygen via nasal

cannula.  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


Patient is hemodynamically stable, labs reviewed and hemoglobin is stable.


He remains on ceftriaxone as he has fever on admission and pneumonia is 

suspected


Also he remains on Lasix 40 mg IV twice daily.  Aspirin is admitted today to 

have his Plavix which is home medication per cardiology team recommendation


Triamcinolone 0.1% topical is admitted to both legs for dermatitis with scaling





04/29/2021


Patient oxygen requirement increased last night to 15 L/m to keep oxygen 

saturation in the low 90s, he was slightly placed on BiPAP overnight and this 

morning, patient looks tired while as on BiPAP.  With setting of 12/6 and FiO2 

of 60%.


Labs from today are still pending


He had fever on admission and subsided.


Repeat chest x-ray today showing bilateral infiltrates, similar to yesterday.


Pulmonary and cardiology teams on the case


Echocardiogram showed ejection fraction of 40-45% with moderate LVH and 

hypokinesia.


 patient remains on Lasix 40 mg twice daily, and ceftriaxone for fever on 

admission.  Also he is on Plavix at home while aspirin 81 mg is added yesterday.

 


 coronavirus was not detected on admission





04/30/2021


Patient still significantly hypoxic, this morning was still on BiPAP and later 

on for the day he was on 15 L of oxygen per minute.  However he was able to sit 

upright in chair.


And that he is hemodynamically stable.  His creatinine is better today at 1.0, 

because of that his Lasix was increased to twice daily.  Continue with fluid 

restriction about 1000 per day.


He remains on hydralazine, as well as aspirin and Plavix.  Triamcinolone.


Antibiotics were discontinued as per gastroscopy and was low by pulmonary team


Pulmonary team also recommending palliative care consults, we will discuss with 

family and patient.





05/01/2021


Patient remains tired and lethargic and remains on 15 L oxygen via nasal 

cannula.


No much improvement while he is on oral Lasix.


Patient remains in respiratory distress, he told tenderness of his son that he 

is tired and he cannot keep doing this intake and he wants to go.


Today I had discussion with his son navarro, he did not have final decision about 

pursuing palliative consult or hospice care, he wanted to discuss it with his 

father again tomorrow and review his illness with me.  I explained for the 

patient's poor prognosis of his diagnoses.


Patient is currently DO NOT RESUSCITATE/DO NOT INTUBATE


                                        





Objective





- Vital Signs


Vital signs: 


                                   Vital Signs











Temp  97.7 F   05/01/21 11:41


 


Pulse  69   05/01/21 16:40


 


Resp  20   05/01/21 16:40


 


BP  128/57   05/01/21 16:40


 


Pulse Ox  90 L  05/01/21 16:40








                                 Intake & Output











 05/01/21 05/01/21 05/02/21





 06:59 18:59 06:59


 


Intake Total 145 118 


 


Output Total 1025 400 


 


Balance -880 -282 


 


Weight 134.5 kg  


 


Intake:   


 


  Oral 145 118 


 


Output:   


 


  Urine 1025 400 


 


Other:   


 


  Voiding Method Urinal Urinal 


 


  # Voids 1 1 














- Exam





-GENERAL: The patient is alert and oriented x3, not in any acute distress.  

Obese


HEENT: Pupils are round and equally reacting to light. EOMI. No scleral icterus.

No conjunctival pallor. Normocephalic, atraumatic. No pharyngeal erythema. No 

thyromegaly. 


CARDIOVASCULAR: S1 and S2 present. No murmurs, rubs, or gallops. 


-PULMONARY: Chest is clear to auscultation, no bilateral basal crepitation.  No 

wheezing


ABDOMEN: Soft, nontender, nondistended, normoactive bowel sounds. No palpable 

organomegaly. 


MUSCULOSKELETAL: No joint swelling or deformity. 


-EXTREMITIES: No cyanosis, clubbing, or pedal edema.  Bilateral leg redness and 

scaling from dermatitis


NEUROLOGICAL: Gross neurological examination did not reveal any focal deficits. 


SKIN: No rashes. no petechiae.





- Labs


CBC & Chem 7: 


                                 05/01/21 07:41





                                 05/01/21 07:41


Labs: 


                  Abnormal Lab Results - Last 24 Hours (Table)











  05/01/21 05/01/21 05/01/21 Range/Units





  06:05 07:41 07:41 


 


RBC   2.74 L   (4.30-5.90)  m/uL


 


Hgb   8.4 L   (13.0-17.5)  gm/dL


 


Hct   27.9 L   (39.0-53.0)  %


 


MCV   101.5 H   (80.0-100.0)  fL


 


MCHC   30.1 L   (31.0-37.0)  g/dL


 


RDW   17.3 H   (11.5-15.5)  %


 


Chloride    96 L  ()  mmol/L


 


Carbon Dioxide    44 H*  (22-30)  mmol/L


 


BUN    27 H  (9-20)  mg/dL


 


Glucose    179 H  (74-99)  mg/dL


 


POC Glucose (mg/dL)  195 H    (75-99)  mg/dL


 


Calcium    8.3 L  (8.4-10.2)  mg/dL














  05/01/21 05/01/21 05/01/21 Range/Units





  11:53 16:41 19:52 


 


RBC     (4.30-5.90)  m/uL


 


Hgb     (13.0-17.5)  gm/dL


 


Hct     (39.0-53.0)  %


 


MCV     (80.0-100.0)  fL


 


MCHC     (31.0-37.0)  g/dL


 


RDW     (11.5-15.5)  %


 


Chloride     ()  mmol/L


 


Carbon Dioxide     (22-30)  mmol/L


 


BUN     (9-20)  mg/dL


 


Glucose     (74-99)  mg/dL


 


POC Glucose (mg/dL)  173 H  249 H  352 H  (75-99)  mg/dL


 


Calcium     (8.4-10.2)  mg/dL














Assessment and Plan


Assessment: 





Acute pulmonary edema with acute on chronic CHF, combined diastolic and systolic

dysfunction is suspected with ejection fraction 35-40%


Severe Acute hypoxic respiratory failure


Elevated troponin, could be due to renal disease versus coronary artery disease


Fever on admission, possible due to pneumonia


Bilateral leg dermatitis


Recent covid infection (hospitalized  3/16-4/8)


Chronic kidney disease stage III











Plan: 





This is a pleasant 80 years old male who presents with systolic and diastolic 

CHF, .  Continue with Lasix, BiPAP as needed, otherwise high flow 

oxygen.Cardiology consult.  Pulmonary team were already consulted.  Continue 

with Plavix.  Aspirin added.  Start triamcinolone ointment.  Pulmonary team 

recommended palliative consults,Case discussed with the son, he has faint 

decision if he wants and his father to pursue with palliative consults or not.  

Resume home medication.  Monitor lytes and vitals.  DVT and GI prophylaxis.  

Further recommendations depends on the clinical course of the patient


DVT prophylaxis: Subcutaneous Lovenox


GI Prophylaxis: Pepcid


Prognosis is guarded

## 2021-05-02 LAB
ANION GAP SERPL CALC-SCNC: 1 MMOL/L
BUN SERPL-SCNC: 25 MG/DL (ref 9–20)
CALCIUM SPEC-MCNC: 8.3 MG/DL (ref 8.4–10.2)
CHLORIDE SERPL-SCNC: 94 MMOL/L (ref 98–107)
CO2 SERPL-SCNC: 47 MMOL/L (ref 22–30)
ERYTHROCYTE [DISTWIDTH] IN BLOOD BY AUTOMATED COUNT: 2.65 M/UL (ref 4.3–5.9)
ERYTHROCYTE [DISTWIDTH] IN BLOOD: 17 % (ref 11.5–15.5)
GLUCOSE BLD-MCNC: 122 MG/DL (ref 75–99)
GLUCOSE BLD-MCNC: 195 MG/DL (ref 75–99)
GLUCOSE BLD-MCNC: 198 MG/DL (ref 75–99)
GLUCOSE BLD-MCNC: 277 MG/DL (ref 75–99)
GLUCOSE SERPL-MCNC: 167 MG/DL (ref 74–99)
HCT VFR BLD AUTO: 26.5 % (ref 39–53)
HGB BLD-MCNC: 8.4 GM/DL (ref 13–17.5)
MCH RBC QN AUTO: 31.5 PG (ref 25–35)
MCHC RBC AUTO-ENTMCNC: 31.5 G/DL (ref 31–37)
MCV RBC AUTO: 100 FL (ref 80–100)
PLATELET # BLD AUTO: 429 K/UL (ref 150–450)
POTASSIUM SERPL-SCNC: 3.9 MMOL/L (ref 3.5–5.1)
SODIUM SERPL-SCNC: 142 MMOL/L (ref 137–145)
WBC # BLD AUTO: 7.1 K/UL (ref 3.8–10.6)

## 2021-05-02 PROCEDURE — 5A09457 ASSISTANCE WITH RESPIRATORY VENTILATION, 24-96 CONSECUTIVE HOURS, CONTINUOUS POSITIVE AIRWAY PRESSURE: ICD-10-PCS

## 2021-05-02 RX ADMIN — INSULIN ASPART SCH UNIT: 100 INJECTION, SOLUTION INTRAVENOUS; SUBCUTANEOUS at 06:26

## 2021-05-02 RX ADMIN — ISOSORBIDE MONONITRATE SCH MG: 30 TABLET, EXTENDED RELEASE ORAL at 10:12

## 2021-05-02 RX ADMIN — ATORVASTATIN CALCIUM SCH MG: 80 TABLET, FILM COATED ORAL at 21:18

## 2021-05-02 RX ADMIN — Medication SCH MG: at 10:12

## 2021-05-02 RX ADMIN — CLOPIDOGREL BISULFATE SCH MG: 75 TABLET ORAL at 10:12

## 2021-05-02 RX ADMIN — FUROSEMIDE SCH MG: 40 TABLET ORAL at 10:12

## 2021-05-02 RX ADMIN — INSULIN ASPART SCH: 100 INJECTION, SOLUTION INTRAVENOUS; SUBCUTANEOUS at 16:54

## 2021-05-02 RX ADMIN — ENOXAPARIN SODIUM SCH MG: 40 INJECTION SUBCUTANEOUS at 10:11

## 2021-05-02 RX ADMIN — INSULIN ASPART SCH UNIT: 100 INJECTION, SOLUTION INTRAVENOUS; SUBCUTANEOUS at 12:23

## 2021-05-02 RX ADMIN — TRIAMCINOLONE ACETONIDE SCH APPLIC: 1 OINTMENT TOPICAL at 21:17

## 2021-05-02 RX ADMIN — FUROSEMIDE SCH MG: 40 TABLET ORAL at 16:44

## 2021-05-02 RX ADMIN — ASPIRIN 81 MG CHEWABLE TABLET SCH MG: 81 TABLET CHEWABLE at 10:11

## 2021-05-02 RX ADMIN — TRIAMCINOLONE ACETONIDE SCH APPLIC: 1 OINTMENT TOPICAL at 16:44

## 2021-05-02 RX ADMIN — FAMOTIDINE SCH MG: 20 TABLET, FILM COATED ORAL at 10:12

## 2021-05-02 RX ADMIN — INSULIN ASPART SCH UNIT: 100 INJECTION, SOLUTION INTRAVENOUS; SUBCUTANEOUS at 21:17

## 2021-05-02 RX ADMIN — TRIAMCINOLONE ACETONIDE SCH APPLIC: 1 OINTMENT TOPICAL at 10:12

## 2021-05-02 NOTE — P.PN
Subjective





This is a pleasant 80 years old male who was recently discharged from the 

hospital 3/16-4/8 for bilateral covid Pneumonia.  He was discharge to rehab that

time on 4 L/m via nasal cannula.  His hospital course was complicated by lower 

GI bleed and GI team, thought local humerus contributing, and the recommended 

conservative therapy.Other chronic medical problems including diabetes mellitus,

hypertension


This time he was sent to ER from [facility] for increasing dyspnea 

his saturation were 89-93% on 5 L oxygen via nasal cannula, rest of vital signs 

stable and patient had fever of 100.1


Patient states that he has dyspnea for a few days but he could not specify 

however he denies chest pain.  No coughing.  Fever was documented in the 

emergency room.


No GI or urinary symptoms.





WBC is within the reference range 7.9K, hemoglobin 8.3, compared to 10.0 last 

time.  Platelets are normal.  INR is 0.9 and d-dimer 1.8.


Creatinine is normal 0.9, GFR is 73, potassium is 5.4, liver enzymes are not 

remarkable


Lactate dehydrogenase elevated 1196 as well as C-reactive protein 14.3.


Troponin is elevated 0.11, 0.12, 0.11


ProBNP is 8220


Coronal virus not detected, influenza virus not detected, SV PCR not detected


Chest x-ray: Pulmonary edema slightly worse than last time with evidence of 

congestive heart failure or RVS


Recent echo on 5/17/20: Ejection fraction 45-50% with wall hypokinesia


In the emergency room patient was started on ceftriaxone and Lasix 40 mg twice 

daily.  Ceftriaxone was continued by pulmonologist





04/28/2021


Patient dyspnea is improving and today he was sitting at bedside however he 

still gets short of breath with exertion.  He remains on 5 L of oxygen via nasal

cannula.  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


Patient is hemodynamically stable, labs reviewed and hemoglobin is stable.


He remains on ceftriaxone as he has fever on admission and pneumonia is 

suspected


Also he remains on Lasix 40 mg IV twice daily.  Aspirin is admitted today to 

have his Plavix which is home medication per cardiology team recommendation


Triamcinolone 0.1% topical is admitted to both legs for dermatitis with scaling





04/29/2021


Patient oxygen requirement increased last night to 15 L/m to keep oxygen 

saturation in the low 90s, he was slightly placed on BiPAP overnight and this 

morning, patient looks tired while as on BiPAP.  With setting of 12/6 and FiO2 

of 60%.


Labs from today are still pending


He had fever on admission and subsided.


Repeat chest x-ray today showing bilateral infiltrates, similar to yesterday.


Pulmonary and cardiology teams on the case


Echocardiogram showed ejection fraction of 40-45% with moderate LVH and 

hypokinesia.


 patient remains on Lasix 40 mg twice daily, and ceftriaxone for fever on 

admission.  Also he is on Plavix at home while aspirin 81 mg is added yesterday.

 


 coronavirus was not detected on admission





04/30/2021


Patient still significantly hypoxic, this morning was still on BiPAP and later 

on for the day he was on 15 L of oxygen per minute.  However he was able to sit 

upright in chair.


And that he is hemodynamically stable.  His creatinine is better today at 1.0, 

because of that his Lasix was increased to twice daily.  Continue with fluid 

restriction about 1000 per day.


He remains on hydralazine, as well as aspirin and Plavix.  Triamcinolone.


Antibiotics were discontinued as per gastroscopy and was low by pulmonary team


Pulmonary team also recommending palliative care consults, we will discuss with 

family and patient.





05/01/2021


Patient remains tired and lethargic and remains on 15 L oxygen via nasal 

cannula.


No much improvement while he is on oral Lasix.


Patient remains in respiratory distress, he told tenderness of his son that he 

is tired and he cannot keep doing this intake and he wants to go.


Today I had discussion with his son navarro, he did not have final decision about 

pursuing palliative consult or hospice care, he wanted to discuss it with his 

father again tomorrow and review his illness with me.  I explained for the 

patient's poor prognosis of his diagnoses.


Patient is currently DO NOT RESUSCITATE/DO NOT INTUBATE





5/2/21


Patient remains on 15 L oxygen and with some dyspnea.


Pulmonary team on the case and they recommend palliative care consult, his 

prognosis is poor.


Today I have a lengthy discussion with the patient and nurse at bedside and all 

his questions were answered to his satisfaction.  I talked to him and explained 

to his illness, and his poor prognosis.  He still wants to talk to his son's to 

decide whether to call palliative consult. 


That is still tachypneic at 25, needing BiPAP at bedtime


Hemoglobin is 8.4 today.  And after last normal.  Creatinine 1.0.


He is on oral Lasix 40 mg twice daily.  We'll give him another IV dose of Lasix 

today.  Monitor creatinine and electrolytes





                                        





Objective





- Vital Signs


Vital signs: 


                                   Vital Signs











Temp  98.1 F   05/02/21 12:25


 


Pulse  79   05/02/21 12:25


 


Resp  20   05/02/21 12:25


 


BP  151/67   05/02/21 12:25


 


Pulse Ox  95   05/02/21 12:25








                                 Intake & Output











 05/01/21 05/02/21 05/02/21





 18:59 06:59 18:59


 


Intake Total 118 120 0


 


Output Total 


 


Balance -418 -873 -1000


 


Weight  133 kg 


 


Intake:   


 


  Oral 118 120 0


 


Output:   


 


  Urine 


 


Other:   


 


  Voiding Method Urinal Urinal Urinal


 


  # Voids 1 2 














- Exam





-GENERAL: The patient is alert and oriented x3, not in any acute distress.  

Obese


HEENT: Pupils are round and equally reacting to light. EOMI. No scleral icterus.

No conjunctival pallor. Normocephalic, atraumatic. No pharyngeal erythema. No 

thyromegaly. 


CARDIOVASCULAR: S1 and S2 present. No murmurs, rubs, or gallops. 


-PULMONARY: Chest is clear to auscultation, no bilateral basal crepitation.  No 

wheezing


ABDOMEN: Soft, nontender, nondistended, normoactive bowel sounds. No palpable 

organomegaly. 


MUSCULOSKELETAL: No joint swelling or deformity. 


-EXTREMITIES: No cyanosis, clubbing, or pedal edema.  Bilateral leg redness and 

scaling from dermatitis


NEUROLOGICAL: Gross neurological examination did not reveal any focal deficits. 


SKIN: No rashes. no petechiae.





- Labs


CBC & Chem 7: 


                                 05/02/21 07:39





                                 05/02/21 07:39


Labs: 


                  Abnormal Lab Results - Last 24 Hours (Table)











  05/01/21 05/01/21 05/02/21 Range/Units





  16:41 19:52 05:54 


 


RBC     (4.30-5.90)  m/uL


 


Hgb     (13.0-17.5)  gm/dL


 


Hct     (39.0-53.0)  %


 


RDW     (11.5-15.5)  %


 


Chloride     ()  mmol/L


 


Carbon Dioxide     (22-30)  mmol/L


 


BUN     (9-20)  mg/dL


 


Glucose     (74-99)  mg/dL


 


POC Glucose (mg/dL)  249 H  352 H  198 H  (75-99)  mg/dL


 


Calcium     (8.4-10.2)  mg/dL














  05/02/21 05/02/21 05/02/21 Range/Units





  07:39 07:39 11:54 


 


RBC  2.65 L    (4.30-5.90)  m/uL


 


Hgb  8.4 L    (13.0-17.5)  gm/dL


 


Hct  26.5 L    (39.0-53.0)  %


 


RDW  17.0 H    (11.5-15.5)  %


 


Chloride   94 L   ()  mmol/L


 


Carbon Dioxide   47 H*   (22-30)  mmol/L


 


BUN   25 H   (9-20)  mg/dL


 


Glucose   167 H   (74-99)  mg/dL


 


POC Glucose (mg/dL)    195 H  (75-99)  mg/dL


 


Calcium   8.3 L   (8.4-10.2)  mg/dL














Assessment and Plan


Assessment: 





Acute pulmonary edema with acute on chronic CHF, combined diastolic and systolic

dysfunction is suspected with ejection fraction 35-40%


Severe Acute hypoxic respiratory failure


Elevated troponin, could be due to renal disease versus coronary artery disease


Fever on admission, possible due to pneumonia


Bilateral leg dermatitis


Recent covid infection (hospitalized  3/16-4/8)


Chronic kidney disease stage III











Plan: 





This is a pleasant 80 years old male who presents with systolic and diastolic 

CHF, .  Continue with Lasix, BiPAP as needed, otherwise high flow 

oxygen.Cardiology consult.  Pulmonary team were already consulted.  Continue 

with Plavix.  Aspirin added.  Start triamcinolone ointment.  Pulmonary team 

recommended palliative consults,Case discussed with the patient and his son, 

they haven't decided.  if he wants and his father to pursue with palliative 

consults or not.  Resume home medication.  Monitor lytes and vitals.  DVT and GI

prophylaxis.  Further recommendations depends on the clinical course of the 

patient


DVT prophylaxis: Subcutaneous Lovenox


GI Prophylaxis: Pepcid


Prognosis is guarded

## 2021-05-03 LAB
ANION GAP SERPL CALC-SCNC: 2 MMOL/L
BUN SERPL-SCNC: 24 MG/DL (ref 9–20)
CALCIUM SPEC-MCNC: 8.2 MG/DL (ref 8.4–10.2)
CHLORIDE SERPL-SCNC: 90 MMOL/L (ref 98–107)
CO2 SERPL-SCNC: 48 MMOL/L (ref 22–30)
GLUCOSE BLD-MCNC: 191 MG/DL (ref 75–99)
GLUCOSE BLD-MCNC: 217 MG/DL (ref 75–99)
GLUCOSE BLD-MCNC: 267 MG/DL (ref 75–99)
GLUCOSE BLD-MCNC: 277 MG/DL (ref 75–99)
GLUCOSE BLD-MCNC: 333 MG/DL (ref 75–99)
GLUCOSE SERPL-MCNC: 165 MG/DL (ref 74–99)
POTASSIUM SERPL-SCNC: 3.5 MMOL/L (ref 3.5–5.1)
SODIUM SERPL-SCNC: 140 MMOL/L (ref 137–145)

## 2021-05-03 RX ADMIN — FAMOTIDINE SCH MG: 20 TABLET, FILM COATED ORAL at 09:07

## 2021-05-03 RX ADMIN — INSULIN ASPART SCH UNIT: 100 INJECTION, SOLUTION INTRAVENOUS; SUBCUTANEOUS at 15:32

## 2021-05-03 RX ADMIN — INSULIN ASPART SCH UNIT: 100 INJECTION, SOLUTION INTRAVENOUS; SUBCUTANEOUS at 06:28

## 2021-05-03 RX ADMIN — CLOPIDOGREL BISULFATE SCH MG: 75 TABLET ORAL at 09:08

## 2021-05-03 RX ADMIN — TRIAMCINOLONE ACETONIDE SCH APPLIC: 1 OINTMENT TOPICAL at 21:07

## 2021-05-03 RX ADMIN — Medication SCH MG: at 09:08

## 2021-05-03 RX ADMIN — ASPIRIN 81 MG CHEWABLE TABLET SCH MG: 81 TABLET CHEWABLE at 09:08

## 2021-05-03 RX ADMIN — TRIAMCINOLONE ACETONIDE SCH APPLIC: 1 OINTMENT TOPICAL at 18:00

## 2021-05-03 RX ADMIN — FUROSEMIDE SCH MG: 40 TABLET ORAL at 09:08

## 2021-05-03 RX ADMIN — ATORVASTATIN CALCIUM SCH MG: 80 TABLET, FILM COATED ORAL at 21:06

## 2021-05-03 RX ADMIN — TRIAMCINOLONE ACETONIDE SCH: 1 OINTMENT TOPICAL at 09:44

## 2021-05-03 RX ADMIN — INSULIN ASPART SCH UNIT: 100 INJECTION, SOLUTION INTRAVENOUS; SUBCUTANEOUS at 21:07

## 2021-05-03 RX ADMIN — ISOSORBIDE MONONITRATE SCH MG: 30 TABLET, EXTENDED RELEASE ORAL at 09:08

## 2021-05-03 RX ADMIN — FUROSEMIDE SCH MG: 10 INJECTION, SOLUTION INTRAMUSCULAR; INTRAVENOUS at 18:00

## 2021-05-03 RX ADMIN — ENOXAPARIN SODIUM SCH MG: 40 INJECTION SUBCUTANEOUS at 09:08

## 2021-05-03 RX ADMIN — INSULIN ASPART SCH UNIT: 100 INJECTION, SOLUTION INTRAVENOUS; SUBCUTANEOUS at 18:00

## 2021-05-04 ENCOUNTER — HOSPITAL ENCOUNTER (INPATIENT)
Dept: HOSPITAL 47 - 3SCARD | Age: 81
LOS: 2 days | Discharge: HOSPICE | DRG: 951 | End: 2021-05-06
Attending: HOSPITALIST | Admitting: HOSPITALIST
Payer: MEDICAID

## 2021-05-04 VITALS — RESPIRATION RATE: 24 BRPM

## 2021-05-04 VITALS — SYSTOLIC BLOOD PRESSURE: 111 MMHG | HEART RATE: 72 BPM | TEMPERATURE: 99.1 F | DIASTOLIC BLOOD PRESSURE: 69 MMHG

## 2021-05-04 DIAGNOSIS — J96.21: ICD-10-CM

## 2021-05-04 DIAGNOSIS — Z79.4: ICD-10-CM

## 2021-05-04 DIAGNOSIS — Z66: ICD-10-CM

## 2021-05-04 DIAGNOSIS — Z95.810: ICD-10-CM

## 2021-05-04 DIAGNOSIS — Z83.3: ICD-10-CM

## 2021-05-04 DIAGNOSIS — E11.22: ICD-10-CM

## 2021-05-04 DIAGNOSIS — J44.0: ICD-10-CM

## 2021-05-04 DIAGNOSIS — H91.90: ICD-10-CM

## 2021-05-04 DIAGNOSIS — Z79.02: ICD-10-CM

## 2021-05-04 DIAGNOSIS — Z86.16: ICD-10-CM

## 2021-05-04 DIAGNOSIS — I25.5: ICD-10-CM

## 2021-05-04 DIAGNOSIS — D63.8: ICD-10-CM

## 2021-05-04 DIAGNOSIS — Z87.01: ICD-10-CM

## 2021-05-04 DIAGNOSIS — Z79.899: ICD-10-CM

## 2021-05-04 DIAGNOSIS — Z51.5: Primary | ICD-10-CM

## 2021-05-04 DIAGNOSIS — N18.30: ICD-10-CM

## 2021-05-04 DIAGNOSIS — Z74.01: ICD-10-CM

## 2021-05-04 DIAGNOSIS — I13.0: ICD-10-CM

## 2021-05-04 DIAGNOSIS — I50.43: ICD-10-CM

## 2021-05-04 DIAGNOSIS — L30.9: ICD-10-CM

## 2021-05-04 DIAGNOSIS — J44.1: ICD-10-CM

## 2021-05-04 LAB
GLUCOSE BLD-MCNC: 162 MG/DL (ref 75–99)
GLUCOSE BLD-MCNC: 241 MG/DL (ref 75–99)
GLUCOSE BLD-MCNC: 410 MG/DL (ref 75–99)
GLUCOSE BLD-MCNC: 429 MG/DL (ref 75–99)

## 2021-05-04 PROCEDURE — 94760 N-INVAS EAR/PLS OXIMETRY 1: CPT

## 2021-05-04 RX ADMIN — METHYLPREDNISOLONE SODIUM SUCCINATE SCH MG: 125 INJECTION, POWDER, FOR SOLUTION INTRAMUSCULAR; INTRAVENOUS at 12:44

## 2021-05-04 RX ADMIN — Medication SCH MG: at 10:29

## 2021-05-04 RX ADMIN — INSULIN ASPART SCH UNIT: 100 INJECTION, SOLUTION INTRAVENOUS; SUBCUTANEOUS at 12:43

## 2021-05-04 RX ADMIN — INSULIN ASPART SCH UNIT: 100 INJECTION, SOLUTION INTRAVENOUS; SUBCUTANEOUS at 07:12

## 2021-05-04 RX ADMIN — FUROSEMIDE SCH MG: 10 INJECTION, SOLUTION INTRAMUSCULAR; INTRAVENOUS at 04:00

## 2021-05-04 RX ADMIN — METHYLPREDNISOLONE SODIUM SUCCINATE SCH MG: 125 INJECTION, POWDER, FOR SOLUTION INTRAMUSCULAR; INTRAVENOUS at 10:32

## 2021-05-04 RX ADMIN — MORPHINE SULFATE SCH MG: 10 SOLUTION ORAL at 16:28

## 2021-05-04 RX ADMIN — METHYLPREDNISOLONE SODIUM SUCCINATE SCH MG: 125 INJECTION, POWDER, FOR SOLUTION INTRAMUSCULAR; INTRAVENOUS at 17:50

## 2021-05-04 RX ADMIN — ENOXAPARIN SODIUM SCH MG: 40 INJECTION SUBCUTANEOUS at 10:28

## 2021-05-04 RX ADMIN — CLOPIDOGREL BISULFATE SCH MG: 75 TABLET ORAL at 10:29

## 2021-05-04 RX ADMIN — TRIAMCINOLONE ACETONIDE SCH APPLIC: 1 OINTMENT TOPICAL at 10:29

## 2021-05-04 RX ADMIN — MORPHINE SULFATE SCH: 10 SOLUTION ORAL at 23:24

## 2021-05-04 RX ADMIN — ATORVASTATIN CALCIUM SCH MG: 80 TABLET, FILM COATED ORAL at 21:18

## 2021-05-04 RX ADMIN — INSULIN ASPART SCH UNIT: 100 INJECTION, SOLUTION INTRAVENOUS; SUBCUTANEOUS at 21:19

## 2021-05-04 RX ADMIN — TRIAMCINOLONE ACETONIDE SCH: 1 CREAM TOPICAL at 21:20

## 2021-05-04 RX ADMIN — ISOSORBIDE MONONITRATE SCH MG: 30 TABLET, EXTENDED RELEASE ORAL at 10:29

## 2021-05-04 RX ADMIN — FAMOTIDINE SCH MG: 20 TABLET, FILM COATED ORAL at 10:28

## 2021-05-04 RX ADMIN — METHYLPREDNISOLONE SODIUM SUCCINATE SCH MG: 125 INJECTION, POWDER, FOR SOLUTION INTRAMUSCULAR; INTRAVENOUS at 23:21

## 2021-05-04 RX ADMIN — INSULIN ASPART SCH UNIT: 100 INJECTION, SOLUTION INTRAVENOUS; SUBCUTANEOUS at 17:50

## 2021-05-04 RX ADMIN — ASPIRIN 81 MG CHEWABLE TABLET SCH MG: 81 TABLET CHEWABLE at 10:28

## 2021-05-04 RX ADMIN — TRIAMCINOLONE ACETONIDE SCH APPLIC: 1 CREAM TOPICAL at 16:31

## 2021-05-04 RX ADMIN — MORPHINE SULFATE SCH MG: 10 SOLUTION ORAL at 21:18

## 2021-05-04 NOTE — P.PN
Subjective





This is a pleasant 80 years old male who was recently discharged from the 

hospital 3/16-4/8 for bilateral covid Pneumonia.  He was discharge to rehab that

time on 4 L/m via nasal cannula.  His hospital course was complicated by lower 

GI bleed and GI team, thought local humerus contributing, and the recommended 

conservative therapy.Other chronic medical problems including diabetes mellitus,

hypertension


This time he was sent to ER from [facility] for increasing dyspnea 

his saturation were 89-93% on 5 L oxygen via nasal cannula, rest of vital signs 

stable and patient had fever of 100.1


Patient states that he has dyspnea for a few days but he could not specify 

however he denies chest pain.  No coughing.  Fever was documented in the 

emergency room.


No GI or urinary symptoms.





WBC is within the reference range 7.9K, hemoglobin 8.3, compared to 10.0 last 

time.  Platelets are normal.  INR is 0.9 and d-dimer 1.8.


Creatinine is normal 0.9, GFR is 73, potassium is 5.4, liver enzymes are not 

remarkable


Lactate dehydrogenase elevated 1196 as well as C-reactive protein 14.3.


Troponin is elevated 0.11, 0.12, 0.11


ProBNP is 8220


Coronal virus not detected, influenza virus not detected, SV PCR not detected


Chest x-ray: Pulmonary edema slightly worse than last time with evidence of 

congestive heart failure or RVS


Recent echo on 5/17/20: Ejection fraction 45-50% with wall hypokinesia


In the emergency room patient was started on ceftriaxone and Lasix 40 mg twice 

daily.  Ceftriaxone was continued by pulmonologist





04/28/2021


Patient dyspnea is improving and today he was sitting at bedside however he 

still gets short of breath with exertion.  He remains on 5 L of oxygen via nasal

cannula.  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


Patient is hemodynamically stable, labs reviewed and hemoglobin is stable.


He remains on ceftriaxone as he has fever on admission and pneumonia is 

suspected


Also he remains on Lasix 40 mg IV twice daily.  Aspirin is admitted today to 

have his Plavix which is home medication per cardiology team recommendation


Triamcinolone 0.1% topical is admitted to both legs for dermatitis with scaling





04/29/2021


Patient oxygen requirement increased last night to 15 L/m to keep oxygen 

saturation in the low 90s, he was slightly placed on BiPAP overnight and this 

morning, patient looks tired while as on BiPAP.  With setting of 12/6 and FiO2 

of 60%.


Labs from today are still pending


He had fever on admission and subsided.


Repeat chest x-ray today showing bilateral infiltrates, similar to yesterday.


Pulmonary and cardiology teams on the case


Echocardiogram showed ejection fraction of 40-45% with moderate LVH and 

hypokinesia.


 patient remains on Lasix 40 mg twice daily, and ceftriaxone for fever on 

admission.  Also he is on Plavix at home while aspirin 81 mg is added .  


 coronavirus was not detected on admission





04/30/2021


Patient still significantly hypoxic, this morning was still on BiPAP and later 

on for the day he was on 15 L of oxygen per minute.  However he was able to sit 

upright in chair.


And that he is hemodynamically stable.  His creatinine is better today at 1.0, 

because of that his Lasix was increased to twice daily.  Continue with fluid 

restriction about 1000 per day.


He remains on hydralazine, as well as aspirin and Plavix.  Triamcinolone.


Antibiotics were discontinued as per gastroscopy and was low by pulmonary team


Pulmonary team also recommending palliative care consults, we will discuss with 

family and patient.





05/01/2021


Patient remains tired and lethargic and remains on 15 L oxygen via nasal 

cannula.


No much improvement while he is on oral Lasix.


Patient remains in respiratory distress, he told tenderness of his son that he 

is tired and he cannot keep doing this intake and he wants to go.


Today I had discussion with his son navarro, he did not have final decision about 

pursuing palliative consult or hospice care, he wanted to discuss it with his 

father again tomorrow and review his illness with me.  I explained for the 

patient's poor prognosis of his diagnoses.


Patient is currently DO NOT RESUSCITATE/DO NOT INTUBATE





5/2/21


Patient remains on 15 L oxygen and with some dyspnea.


Pulmonary team on the case and they recommend palliative care consult, his 

prognosis is poor.


Today I have a lengthy discussion with the patient and nurse at bedside and all 

his questions were answered to his satisfaction.  I talked to him and explained 

to his illness, and his poor prognosis.  He still wants to talk to his son's to 

decide whether to call palliative consult. 


That is still tachypneic at 25, needing BiPAP at bedtime


Hemoglobin is 8.4 today.  And after last normal.  Creatinine 1.0.


He is on oral Lasix 40 mg twice daily.  We'll give him another IV dose of Lasix 

today.  Monitor creatinine and electrolytes





05/03/2021


This is a pleasant 80 years old male presents with CHF exacerbation with 

ejection fraction 40-45%.  On the top of his chronic kidney disease.  Stage III.

 Echocardiogram showing 40-45% ejection fraction with moderate LVH and 

hypokinesia.  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  Patient was 

placed on IV Lasix earlier on admission and his creatinine went up so dose was 

lowered to 40 mg daily and twice a day.  However he is BiPAP dependent most of 

the time and sometimes goes to nasal cannula with 50 L of oxygen.  Chest x-ray 

showing bilateral infiltrate most likely fluid.


Patient keeps saying he wants to die and he does not want to continue like this.

 Dr. Llanos of the pulmonologist thought that this is appropriate and he has 

very poor prognosis for his evaluation.


Today I had a conference with the patient and son kishore, I explained his illness 

and recommendation of the pulmonologist, and they wanted to talk to hospice care

team for more information but he hasn't chose to go for hospice yet.


Today oral Lasix discharged to IV Lasix 40 mg twice daily with monitoring 

creatinine and electrolytes again.  Cardiologist saw patient as well


Also we will add Solu-Medrol 60 mg





Review of systems


CONSTITUTIONAL: No fever, no malaise, no fatigue. 


HEENT: No recent visual problems or hearing problems. Denied any sore throat. 


CARDIOVASCULAR: No  orthopnea, PND, no palpitations, no syncope. 


PULMONARY: No chest wall tenderness, no hemoptysis. 


GASTROINTESTINAL: No diarrhea, no nausea, no vomiting, no abdominal pain. 

Normoactive bowel sounds. 


                               Active Medications











Generic Name Dose Route Start Last Admin





  Trade Name Freq  PRN Reason Stop Dose Admin


 


Aspirin  81 mg  04/28/21 09:00  05/03/21 09:08





  Aspirin 81 Mg  PO   81 mg





  DAILY VICENTE   Administration


 


Atorvastatin Calcium  80 mg  04/27/21 21:00  05/03/21 21:06





  Atorvastatin 80 Mg Tab  PO   80 mg





  HS@2100 VICENTE   Administration


 


Carvedilol  6.25 mg  04/27/21 08:45  05/04/21 07:12





  Carvedilol 6.25 Mg Tab  PO   6.25 mg





  BID-W/MEALS VICENTE   Administration


 


Clopidogrel Bisulfate  75 mg  04/28/21 09:00  05/03/21 09:08





  Clopidogrel 75 Mg Tab  PO   75 mg





  DAILY@0900 VICENTE   Administration


 


Enoxaparin Sodium  40 mg  04/27/21 12:00  05/03/21 09:08





  Enoxaparin 40 Mg/0.4 Ml Syringe  SQ   40 mg





  DAILY VICENTE   Administration


 


Famotidine  40 mg  04/28/21 09:00  05/03/21 09:07





  Famotidine 20 Mg Tab  PO   40 mg





  DAILY@0900 VICENTE   Administration


 


Ferrous Sulfate  325 mg  04/28/21 09:00  05/03/21 09:08





  Ferrous Sulfate 325 Mg Tab  PO   325 mg





  DAILY@0900 VICENTE   Administration


 


Furosemide  40 mg  05/03/21 15:52  05/04/21 04:00





  Furosemide 10 Mg/Ml 4 Ml Vial  IV   40 mg





  Q12H VICENTE   Administration


 


Glipizide  20 mg  04/27/21 21:00  05/03/21 21:07





  Glipizide 10 Mg Tab  PO   20 mg





  BID@0900,2100 VICENTE   Administration


 


Hydralazine HCl  25 mg  04/27/21 09:00  05/03/21 21:06





  Hydralazine Hcl 25 Mg Tab  PO   25 mg





  BID VICENTE   Administration


 


Insulin Aspart  0 unit  04/30/21 12:30  05/04/21 07:12





  Insulin Aspart (Novolog) 100 Unit/Ml Vial  SQ   2 unit





  ACHS VICENTE   Administration





  Protocol  


 


Isosorbide Mononitrate  30 mg  04/27/21 09:00  05/03/21 09:08





  Isosorbide Mononitrate Er 30 Mg Tab.Er.24h  PO   30 mg





  DAILY VICENTE   Administration


 


Triamcinolone Acetonide  1 applic  04/28/21 22:30  05/03/21 21:07





  Triamcinolone Acet 0.1% Ointment 15 Gm Tube  TOPICAL   1 applic





  TID VICENTE   Administration











                                        


                                        





Objective





- Vital Signs


Vital signs: 


                                   Vital Signs











Temp  97.6 F   05/03/21 11:00


 


Pulse  77   05/03/21 11:00


 


Resp  18   05/03/21 11:00


 


BP  135/49   05/03/21 11:00


 


Pulse Ox  95   05/03/21 11:00








                                 Intake & Output











 05/02/21 05/03/21 05/03/21





 18:59 06:59 18:59


 


Intake Total 230  180


 


Output Total 1000 1900 


 


Balance -770 -1900 180


 


Weight  132.5 kg 


 


Intake:   


 


  Oral 230  180


 


Output:   


 


  Urine 1000 1900 


 


Other:   


 


  Voiding Method Urinal  Urinal


 


  # Voids  1 














- Exam





-GENERAL: The patient is alert and oriented x3, not in any acute distress.  

Obese.  He is on BiPAP most of the time


HEENT: Pupils are round and equally reacting to light. EOMI. No scleral icterus.

No conjunctival pallor. Normocephalic, atraumatic. No pharyngeal erythema. No 

thyromegaly. 


CARDIOVASCULAR: S1 and S2 present. No murmurs, rubs, or gallops. 


-PULMONARY: Chest is clear to auscultation, no bilateral basal crepitation.  No 

wheezing


ABDOMEN: Soft, nontender, nondistended, normoactive bowel sounds. No palpable 

organomegaly. 


MUSCULOSKELETAL: No joint swelling or deformity. 


-EXTREMITIES: No cyanosis, clubbing, or pedal edema.  Bilateral leg redness and 

scaling from dermatitis


NEUROLOGICAL: Gross neurological examination did not reveal any focal deficits. 


SKIN: No rashes. no petechiae.





- Labs


CBC & Chem 7: 


                                 05/02/21 07:39





                                 05/03/21 07:57


Labs: 


                  Abnormal Lab Results - Last 24 Hours (Table)











  05/02/21 05/02/21 05/03/21 Range/Units





  16:52 19:52 06:20 


 


Chloride     ()  mmol/L


 


Carbon Dioxide     (22-30)  mmol/L


 


BUN     (9-20)  mg/dL


 


Glucose     (74-99)  mg/dL


 


POC Glucose (mg/dL)  122 H  277 H  217 H  (75-99)  mg/dL


 


Calcium     (8.4-10.2)  mg/dL














  05/03/21 05/03/21 Range/Units





  07:57 12:01 


 


Chloride  90 L   ()  mmol/L


 


Carbon Dioxide  48 H*   (22-30)  mmol/L


 


BUN  24 H   (9-20)  mg/dL


 


Glucose  165 H   (74-99)  mg/dL


 


POC Glucose (mg/dL)   333 H  (75-99)  mg/dL


 


Calcium  8.2 L   (8.4-10.2)  mg/dL














Assessment and Plan


Assessment: 





Acute pulmonary edema with acute on chronic CHF, combined diastolic and systolic

dysfunction is suspected with ejection fraction 40-45%


Possible COPD exacerbation


Severe Acute hypoxic respiratory failure


Elevated troponin, could be due to renal disease versus coronary artery disease


Fever on admission, possible due to pneumonia


Bilateral leg dermatitis


Recent covid infection (hospitalized  3/16-4/8)


Chronic kidney disease stage III











Plan: 





This is a pleasant 80 years old male who presents with systolic and diastolic 

CHF, .  Continue with Lasix, BiPAP as needed, otherwise high flow 

oxygen.Cardiology consult.  Pulmonary team were already consulted.  Continue 

with Plavix.  Aspirin added.  Start triamcinolone ointment.  Pulmonary team 

recommended palliative consults,Case discussed with the patient and his son, 

they haven't decided.  But they want to talk to hospice people


Resume home medication.  Monitor lytes and vitals.  DVT and GI prophylaxis.  

Further recommendations depends on the clinical course of the patient


DVT prophylaxis: Subcutaneous Lovenox


GI Prophylaxis: Pepcid


Prognosis is guarded

## 2021-05-05 LAB
GLUCOSE BLD-MCNC: 388 MG/DL (ref 75–99)
GLUCOSE BLD-MCNC: 393 MG/DL (ref 75–99)
GLUCOSE BLD-MCNC: 440 MG/DL (ref 75–99)
GLUCOSE BLD-MCNC: 488 MG/DL (ref 75–99)

## 2021-05-05 RX ADMIN — MORPHINE SULFATE SCH MG: 10 SOLUTION ORAL at 08:45

## 2021-05-05 RX ADMIN — MORPHINE SULFATE SCH MG: 10 SOLUTION ORAL at 12:10

## 2021-05-05 RX ADMIN — INSULIN ASPART SCH UNIT: 100 INJECTION, SOLUTION INTRAVENOUS; SUBCUTANEOUS at 20:39

## 2021-05-05 RX ADMIN — ASPIRIN 81 MG CHEWABLE TABLET SCH MG: 81 TABLET CHEWABLE at 08:46

## 2021-05-05 RX ADMIN — INSULIN ASPART SCH UNIT: 100 INJECTION, SOLUTION INTRAVENOUS; SUBCUTANEOUS at 12:08

## 2021-05-05 RX ADMIN — TRIAMCINOLONE ACETONIDE SCH: 1 CREAM TOPICAL at 16:58

## 2021-05-05 RX ADMIN — MORPHINE SULFATE SCH: 10 SOLUTION ORAL at 04:43

## 2021-05-05 RX ADMIN — ATORVASTATIN CALCIUM SCH MG: 80 TABLET, FILM COATED ORAL at 20:39

## 2021-05-05 RX ADMIN — MORPHINE SULFATE SCH MG: 10 SOLUTION ORAL at 16:57

## 2021-05-05 RX ADMIN — METHYLPREDNISOLONE SODIUM SUCCINATE SCH MG: 125 INJECTION, POWDER, FOR SOLUTION INTRAMUSCULAR; INTRAVENOUS at 12:08

## 2021-05-05 RX ADMIN — ENOXAPARIN SODIUM SCH MG: 40 INJECTION SUBCUTANEOUS at 08:46

## 2021-05-05 RX ADMIN — METHYLPREDNISOLONE SODIUM SUCCINATE SCH MG: 125 INJECTION, POWDER, FOR SOLUTION INTRAMUSCULAR; INTRAVENOUS at 16:57

## 2021-05-05 RX ADMIN — CLOPIDOGREL BISULFATE SCH MG: 75 TABLET ORAL at 08:46

## 2021-05-05 RX ADMIN — FAMOTIDINE SCH MG: 20 TABLET, FILM COATED ORAL at 08:46

## 2021-05-05 RX ADMIN — METHYLPREDNISOLONE SODIUM SUCCINATE SCH MG: 125 INJECTION, POWDER, FOR SOLUTION INTRAMUSCULAR; INTRAVENOUS at 06:36

## 2021-05-05 RX ADMIN — Medication SCH MG: at 08:46

## 2021-05-05 RX ADMIN — ISOSORBIDE MONONITRATE SCH MG: 30 TABLET, EXTENDED RELEASE ORAL at 08:46

## 2021-05-05 RX ADMIN — MORPHINE SULFATE SCH MG: 10 SOLUTION ORAL at 20:40

## 2021-05-05 RX ADMIN — TRIAMCINOLONE ACETONIDE SCH: 1 CREAM TOPICAL at 09:21

## 2021-05-05 RX ADMIN — TRIAMCINOLONE ACETONIDE SCH: 1 CREAM TOPICAL at 21:55

## 2021-05-05 RX ADMIN — INSULIN ASPART SCH UNIT: 100 INJECTION, SOLUTION INTRAVENOUS; SUBCUTANEOUS at 06:37

## 2021-05-05 RX ADMIN — INSULIN ASPART SCH UNIT: 100 INJECTION, SOLUTION INTRAVENOUS; SUBCUTANEOUS at 16:57

## 2021-05-05 RX ADMIN — FUROSEMIDE SCH MG: 40 TABLET ORAL at 08:46

## 2021-05-06 VITALS — TEMPERATURE: 97.7 F

## 2021-05-06 VITALS — DIASTOLIC BLOOD PRESSURE: 49 MMHG | HEART RATE: 67 BPM | SYSTOLIC BLOOD PRESSURE: 141 MMHG

## 2021-05-06 VITALS — RESPIRATION RATE: 18 BRPM

## 2021-05-06 LAB
GLUCOSE BLD-MCNC: 370 MG/DL (ref 75–99)
GLUCOSE BLD-MCNC: 399 MG/DL (ref 75–99)

## 2021-05-06 RX ADMIN — ENOXAPARIN SODIUM SCH MG: 40 INJECTION SUBCUTANEOUS at 08:59

## 2021-05-06 RX ADMIN — MORPHINE SULFATE SCH MG: 10 SOLUTION ORAL at 00:03

## 2021-05-06 RX ADMIN — FAMOTIDINE SCH MG: 20 TABLET, FILM COATED ORAL at 08:59

## 2021-05-06 RX ADMIN — INSULIN ASPART SCH UNIT: 100 INJECTION, SOLUTION INTRAVENOUS; SUBCUTANEOUS at 12:49

## 2021-05-06 RX ADMIN — METHYLPREDNISOLONE SODIUM SUCCINATE SCH MG: 125 INJECTION, POWDER, FOR SOLUTION INTRAMUSCULAR; INTRAVENOUS at 00:03

## 2021-05-06 RX ADMIN — INSULIN ASPART SCH UNIT: 100 INJECTION, SOLUTION INTRAVENOUS; SUBCUTANEOUS at 06:44

## 2021-05-06 RX ADMIN — FUROSEMIDE SCH MG: 40 TABLET ORAL at 08:58

## 2021-05-06 RX ADMIN — TRIAMCINOLONE ACETONIDE SCH: 1 CREAM TOPICAL at 09:01

## 2021-05-06 RX ADMIN — METHYLPREDNISOLONE SODIUM SUCCINATE SCH MG: 125 INJECTION, POWDER, FOR SOLUTION INTRAMUSCULAR; INTRAVENOUS at 06:44

## 2021-05-06 RX ADMIN — METHYLPREDNISOLONE SODIUM SUCCINATE SCH: 125 INJECTION, POWDER, FOR SOLUTION INTRAMUSCULAR; INTRAVENOUS at 12:46

## 2021-05-06 RX ADMIN — Medication SCH MG: at 08:59

## 2021-05-06 RX ADMIN — ASPIRIN 81 MG CHEWABLE TABLET SCH MG: 81 TABLET CHEWABLE at 08:59

## 2021-05-06 RX ADMIN — MORPHINE SULFATE SCH: 10 SOLUTION ORAL at 03:44

## 2021-05-06 RX ADMIN — ISOSORBIDE MONONITRATE SCH MG: 30 TABLET, EXTENDED RELEASE ORAL at 08:59

## 2021-05-06 RX ADMIN — MORPHINE SULFATE SCH MG: 10 SOLUTION ORAL at 12:49

## 2021-05-06 RX ADMIN — CLOPIDOGREL BISULFATE SCH MG: 75 TABLET ORAL at 08:58

## 2021-05-06 RX ADMIN — MORPHINE SULFATE SCH: 10 SOLUTION ORAL at 10:16

## 2021-05-06 NOTE — P.HPIM
History of Present Illness


H&P Date: 05/05/21


Chief Complaint: Shortness of breath





This is a pleasant 80 years old male who was recently discharged from the 

hospital 3/16-4/8 for bilateral covid Pneumonia.  He was discharge to rehab that

time on 4 L/m via nasal cannula.  His hospital course was complicated by lower 

GI bleed and GI team, thought local humerus contributing, and the recommended 

conservative therapy.Other chronic medical problems including diabetes mellitus,

hypertension


This time he was sent to ER from [facility] for increasing dyspnea 

his saturation were 89-93% on 5 L oxygen via nasal cannula, rest of vital signs 

stable and patient had fever of 100.1


Patient states that he has dyspnea for a few days but he could not specify 

however he denies chest pain.  No coughing.  Fever was documented in the 

emergency room.


No GI or urinary symptoms.





WBC is within the reference range 7.9K, hemoglobin 8.3, compared to 10.0 last 

time.  Platelets are normal.  INR is 0.9 and d-dimer 1.8.


Creatinine is normal 0.9, GFR is 73, potassium is 5.4, liver enzymes are not 

remarkable


Lactate dehydrogenase elevated 1196 as well as C-reactive protein 14.3.


Troponin is elevated 0.11, 0.12, 0.11


ProBNP is 8220


Coronal virus not detected, influenza virus not detected, SV PCR not detected


Chest x-ray: Pulmonary edema slightly worse than last time with evidence of 

congestive heart failure or RVS


Recent echo on 5/17/20: Ejection fraction 45-50% with wall hypokinesia


In the emergency room patient was started on ceftriaxone and Lasix 40 mg twice 

daily.  Ceftriaxone was continued by pulmonologist





04/28/2021


Patient dyspnea is improving and today he was sitting at bedside however he 

still gets short of breath with exertion.  He remains on 5 L of oxygen via nasal

cannula.  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


Patient is hemodynamically stable, labs reviewed and hemoglobin is stable.


He remains on ceftriaxone as he has fever on admission and pneumonia is 

suspected


Also he remains on Lasix 40 mg IV twice daily.  Aspirin is admitted today to 

have his Plavix which is home medication per cardiology team recommendation


Triamcinolone 0.1% topical is admitted to both legs for dermatitis with scaling





04/29/2021


Patient oxygen requirement increased last night to 15 L/m to keep oxygen 

saturation in the low 90s, he was slightly placed on BiPAP overnight and this 

morning, patient looks tired while as on BiPAP.  With setting of 12/6 and FiO2 

of 60%.


Labs from today are still pending


He had fever on admission and subsided.


Repeat chest x-ray today showing bilateral infiltrates, similar to yesterday.


Pulmonary and cardiology teams on the case


Echocardiogram showed ejection fraction of 40-45% with moderate LVH and 

hypokinesia.


 patient remains on Lasix 40 mg twice daily, and ceftriaxone for fever on 

admission.  Also he is on Plavix at home while aspirin 81 mg is added .  


 coronavirus was not detected on admission





04/30/2021


Patient still significantly hypoxic, this morning was still on BiPAP and later 

on for the day he was on 15 L of oxygen per minute.  However he was able to sit 

upright in chair.


And that he is hemodynamically stable.  His creatinine is better today at 1.0, 

because of that his Lasix was increased to twice daily.  Continue with fluid 

restriction about 1000 per day.


He remains on hydralazine, as well as aspirin and Plavix.  Triamcinolone.


Antibiotics were discontinued as per gastroscopy and was low by pulmonary team


Pulmonary team also recommending palliative care consults, we will discuss with 

family and patient.





05/01/2021


Patient remains tired and lethargic and remains on 15 L oxygen via nasal 

cannula.


No much improvement while he is on oral Lasix.


Patient remains in respiratory distress, he told tenderness of his son that he 

is tired and he cannot keep doing this intake and he wants to go.


Today I had discussion with his son navarro, he did not have final decision about 

pursuing palliative consult or hospice care, he wanted to discuss it with his 

father again tomorrow and review his illness with me.  I explained for the 

patient's poor prognosis of his diagnoses.


Patient is currently DO NOT RESUSCITATE/DO NOT INTUBATE





5/2/21


Patient remains on 15 L oxygen and with some dyspnea.


Pulmonary team on the case and they recommend palliative care consult, his 

prognosis is poor.


Today I have a lengthy discussion with the patient and nurse at bedside and all 

his questions were answered to his satisfaction.  I talked to him and explained 

to his illness, and his poor prognosis.  He still wants to talk to his son's to 

decide whether to call palliative consult. 


That is still tachypneic at 25, needing BiPAP at bedtime


Hemoglobin is 8.4 today.  And after last normal.  Creatinine 1.0.


He is on oral Lasix 40 mg twice daily.  We'll give him another IV dose of Lasix 

today.  Monitor creatinine and electrolytes





05/03/2021


This is a pleasant 80 years old male presents with CHF exacerbation with 

ejection fraction 40-45%.  On the top of his chronic kidney disease.  Stage III.

 Echocardiogram showing 40-45% ejection fraction with moderate LVH and 

hypokinesia.  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  Patient was 

placed on IV Lasix earlier on admission and his creatinine went up so dose was 

lowered to 40 mg daily and twice a day.  However he is BiPAP dependent most of 

the time and sometimes goes to nasal cannula with 50 L of oxygen.  Chest x-ray 

showing bilateral infiltrate most likely fluid.


Patient keeps saying he wants to die and he does not want to continue like this.

 Dr. Llanos of the pulmonologist thought that this is appropriate and he has 

very poor prognosis for his evaluation.


Today I had a conference with the patient and son kishore, I explained his illness 

and recommendation of the pulmonologist, and they wanted to talk to hospice care

team for more information but he hasn't chose to go for hospice yet.


Today oral Lasix discharged to IV Lasix 40 mg twice daily with monitoring 

creatinine and electrolytes again.  Cardiologist saw patient as well


Also we will add Solu-Medrol 60 mg








05/05/2021


Patient is currently lying in the bed awake alert still requiring oxygen at 10 L

 via nasal cannula.  Patient has been afebrile.  Continue with palliative care 

and comfort measures.  Hospice service is following.  Possible transfer to 

hospice facility once bed is available.  Continue the current management.  IV 

steroids will be changed to prednisone by mouth.


No nausea vomiting.  Patient is not tolerating oral diet very well.  No 

diarrhea.





Current medications reviewed.





Review of Systems





As per HPI





Past Medical History


Past Medical History: Diabetes Mellitus, Hypertension


Additional Past Medical History / Comment(s): sinusitis, diverticulits


History of Any Multi-Drug Resistant Organisms: None Reported


Past Surgical History: Bowel Resection, Cholecystectomy


Additional Past Surgical History / Comment(s): bowel resection d/t d

iverticulitis, colonoscopy


Past Anesthesia/Blood Transfusion Reactions: No Reported Reaction


Past Psychological History: No Psychological Hx Reported


Additional Psychological History / Comment(s): pt lives with his girlfriend 

antonio, is independant, no assistive devices.no outside services. worked in 

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


Smoking Status: Never smoker


Past Alcohol Use History: Occasional


Past Drug Use History: None Reported





- Past Family History


  ** Mother


Additional Family Medical History / Comment(s): reproductive cancer





  ** Father


Family Medical History: Diabetes Mellitus





Medications and Allergies


                                Home Medications











 Medication  Instructions  Recorded  Confirmed  Type


 


glipiZIDE [Glipizide] 20 mg PO BID@0900,2100 11/25/15 05/04/21 History


 


Pioglitazone [Actos] 30 mg PO DAILY@0900 05/15/20 05/04/21 History


 


Clopidogrel Bisulfate [Plavix] 75 mg PO DAILY@0900 03/16/21 05/04/21 History


 


Metoprolol Succinate (ER) [Toprol 50 mg PO DAILY@0900 03/16/21 05/04/21 History





XL]    


 


Acetaminophen Tab [Tylenol] 650 mg PO Q6HR PRN  tab 04/08/21 05/04/21 Rx


 


Ascorbic Acid [Vitamin C] 1,000 mg PO DAILY@0900 04/26/21 05/04/21 History


 


Atorvastatin [Lipitor] 80 mg PO HS@2100 04/26/21 05/04/21 History


 


Famotidine [Pepcid] 40 mg PO DAILY@0900 04/26/21 05/04/21 History


 


Ferrous Sulfate [Feosol] 325 mg PO DAILY@0900 04/26/21 05/04/21 History


 


Furosemide [Lasix] 40 mg PO DAILY@0900 04/26/21 05/04/21 History


 


Insulin Glargine,Hum.rec.anlog 60 units SQ HS@2100 04/26/21 05/04/21 History





[Semglee Pen]    


 


Insulin Lispro [humaLOG Kwikpen] 10 unit SQ AC-TID 04/26/21 05/04/21 History


 


Insulin Lispro [humaLOG Kwikpen] See Protocol SQ ACHS 04/26/21 05/04/21 History


 


Levofloxacin [Levaquin] 750 mg PO DAILY 04/26/21 05/04/21 History


 


Potassium Chloride ER [K-Dur 20] 20 meq PO DAILY@0900 04/26/21 05/04/21 History


 


Zinc 50 mg PO DAILY@0900 04/26/21 05/04/21 History








                                    Allergies











Allergy/AdvReac Type Severity Reaction Status Date / Time


 


Mushroom Allergy  Vomiting Verified 04/26/21 22:27


 


Penicillins Allergy  Unknown Verified 04/26/21 22:27














Physical Exam


Vitals: 


                                   Vital Signs











  Temp Pulse Resp BP Pulse Ox


 


 05/05/21 13:21   85  20  


 


 05/05/21 12:00  98 F  85  20  145/54  87 L


 


 05/05/21 08:00  97.7 F  87  18  134/63  85 L


 


 05/05/21 04:00   71  18   90 L


 


 05/05/21 00:00  98.1 F  80  18  147/59  89 L


 


 05/04/21 20:00  98.3 F  85  19  155/63  89 L


 


 05/04/21 18:39  99.1 F  68  19  147/62  94 L








                                Intake and Output











 05/04/21 05/05/21 05/05/21





 22:59 06:59 14:59


 


Intake Total 220  240


 


Output Total 225 500 150


 


Balance -5 -500 90


 


Intake:   


 


  Oral 220  240


 


Output:   


 


  Urine 225 500 150


 


Other:   


 


  Voiding Method Indwelling Catheter Indwelling Catheter Indwelling Catheter


 


  Weight 127 kg 128.3 kg 

















-GENERAL: The patient is alert and oriented x3, not in any acute distress.  

Obese


HEENT: Pupils are round and equally reacting to light. EOMI. No scleral icterus.

No conjunctival pallor. Normocephalic, atraumatic. No pharyngeal erythema. No 

thyromegaly. 


CARDIOVASCULAR: S1 and S2 present. No murmurs, rubs, or gallops. 


-PULMONARY: Chest is clear to auscultation, no bilateral basal crepitation.  No 

wheezing


ABDOMEN: Soft, nontender, nondistended, normoactive bowel sounds. No palpable 

organomegaly. 


MUSCULOSKELETAL: No joint swelling or deformity. 


-EXTREMITIES: No cyanosis, clubbing, or pedal edema.  Bilateral leg redness and 

scaling from dermatitis


NEUROLOGICAL: Gross neurological examination did not reveal any focal deficits. 


SKIN: No rashes. no petechiae.





Results


Labs: 


                  Abnormal Lab Results - Last 24 Hours (Table)











  05/04/21 05/04/21 05/05/21 Range/Units





  16:44 20:19 06:25 


 


POC Glucose (mg/dL)  410 H  429 H  388 H  (75-99)  mg/dL














  05/05/21 Range/Units





  11:54 


 


POC Glucose (mg/dL)  488 H  (75-99)  mg/dL














Assessment and Plan


Assessment: 





Acute pulmonary edema with acute on chronic CHF, combined diastolic and systolic

dysfunction with ejection fraction 40-45%


COPD exacerbation


Severe Acute on chronic hypoxic respiratory failure requiring oxygen at 10 L via

nasal cannula.


Elevated troponin, could be due to renal disease versus coronary artery disease.


Ischemic cardiomyopathy status post AICD placement


Fever on admission, possible due to pneumonia


Bilateral leg dermatitis


Recent covid infection (hospitalized  3/16-4/8)


Chronic kidney disease stage III


Diabetes type 2


Anemia of chronic disease


Medical debility and bedridden


Extremely hard of hearing








Plan: 





This is a pleasant 80 years old male who presents with systolic and diastolic 

CHF, Lasix changed to by mouth..  Continue with Lasix, BiPAP as needed and high 

flow oxygen.


 Continue with Plavix.  Aspirin added.  Start triamcinolone ointment.  Patient 

was seen by cardiology and pulmonary.


Case discussed with the patient and his son.  Patient's son requested hospice 

care consult.  Currently under hospice care.





DVT prophylaxis: Subcutaneous Lovenox


GI Prophylaxis: Pepcid


Prognosis is poor at this time


Time with Patient: Greater than 30

## 2021-06-02 NOTE — P.DS
Providers


Date of admission: 


05/04/21 15:58





Expected date of discharge: 05/06/21


Attending physician: 


Surya Paz





Primary care physician: 


Surya Paz





Hospital Course: 





Discharge Diagnosis.


Acute pulmonary edema with acute on chronic CHF, combined diastolic and systolic

dysfunction with ejection fraction 40-45%


COPD exacerbation


Severe Acute on chronic hypoxic respiratory failure requiring oxygen at 10 L via

nasal cannula.





Elevated troponin, could be due to renal disease versus coronary artery disease.


Ischemic cardiomyopathy status post AICD placement


Fever on admission, possible due to pneumonia


Bilateral leg dermatitis


Recent covid infection (hospitalized  3/16-4/8)


Chronic kidney disease stage III


Diabetes type 2


Anemia of chronic disease


Medical debility and bedridden


Extremely hard of hearing





Hospital course


This is a pleasant 80 years old male who presents with systolic and diastolic 

CHF, was continued with Lasix, BiPAP as needed and high flow oxygen.


 Continue with Plavix.  Aspirin added.  Start triamcinolone ointment.  Patient 

was seen by cardiology and pulmonary.


Case discussed with the patient and his son.  Patient's son requested hospice 

care consult. Patient was continued on comfort measures. Patient was discharged 

home with home hospice care..





Patient Condition at Discharge: Critical





Plan - Discharge Summary


New Discharge Prescriptions: 


New


   Aspirin 81 mg PO DAILY  chew


   carvediloL [Coreg] 6.25 mg PO BID-W/MEALS #0 tab


   Isosorbide Mononitrate ER [Imdur] 30 mg PO DAILY  tab.er.24h


   Triamcinolone 0.1% Cream [Kenalog 0.1% Cream] 1 applic TOPICAL TID  applic


   INSULIN ASPART (NovoLOG) [NovoLOG (formulary)] 0 unit SQ ACHS  vial


   predniSONE 0 mg PO AS DIRECTED #30 tab


   hydrALAZINE HCL [Apresoline] 25 mg PO BID #0 tab


   LORazepam [Ativan] 0.5 mg PO Q4HR PRN #12 tab


     PRN Reason: Anxiety





Continue


   glipiZIDE [Glipizide] 20 mg PO BID@0900,2100


   Clopidogrel Bisulfate [Plavix] 75 mg PO DAILY@0900


   Insulin Glargine,Hum.rec.anlog [Semglee Pen] 60 units SQ HS@2100


   Potassium Chloride ER [K-Dur 20] 20 meq PO DAILY@0900


   Furosemide [Lasix] 40 mg PO DAILY@0900


   Acetaminophen Tab [Tylenol] 650 mg PO Q6HR PRN  tab


     PRN Reason: Fever And/ Or Pain


   Atorvastatin [Lipitor] 80 mg PO HS@2100


   Ferrous Sulfate [Iron (65 MG Elemental)] 325 mg PO DAILY@0900


   Famotidine [Pepcid] 40 mg PO DAILY@0900


   Ascorbic Acid [Vitamin C] 1,000 mg PO DAILY@0900





Discontinued


   Pioglitazone [Actos] 30 mg PO DAILY@0900


   Metoprolol Succinate (ER) [Toprol XL] 50 mg PO DAILY@0900


   Insulin Lispro [humaLOG Kwikpen] See Protocol SQ ACHS


   Insulin Lispro [humaLOG Kwikpen] 10 unit SQ AC-TID


   Zinc 50 mg PO DAILY@0900


   Levofloxacin [Levaquin] 750 mg PO DAILY


Discharge Medication List





glipiZIDE [Glipizide] 20 mg PO BID@0900,2100 11/25/15 [History]


Clopidogrel Bisulfate [Plavix] 75 mg PO DAILY@0900 03/16/21 [History]


Acetaminophen Tab [Tylenol] 650 mg PO Q6HR PRN  tab 04/08/21 [Rx]


Ascorbic Acid [Vitamin C] 1,000 mg PO DAILY@0900 04/26/21 [History]


Atorvastatin [Lipitor] 80 mg PO HS@2100 04/26/21 [History]


Famotidine [Pepcid] 40 mg PO DAILY@0900 04/26/21 [History]


Ferrous Sulfate [Iron (65 MG Elemental)] 325 mg PO DAILY@0900 04/26/21 [History]


Furosemide [Lasix] 40 mg PO DAILY@0900 04/26/21 [History]


Insulin Glargine,Hum.rec.anlog [Semglee Pen] 60 units SQ HS@2100 04/26/21 

[History]


Potassium Chloride ER [K-Dur 20] 20 meq PO DAILY@0900 04/26/21 [History]


Aspirin 81 mg PO DAILY  chew 05/06/21 [Rx]


INSULIN ASPART (NovoLOG) [NovoLOG (formulary)] 0 unit SQ ACHS  vial 05/06/21 

[Rx]


Isosorbide Mononitrate ER [Imdur] 30 mg PO DAILY  tab.er.24h 05/06/21 [Rx]


LORazepam [Ativan] 0.5 mg PO Q4HR PRN #12 tab 05/06/21 [Rx]


Triamcinolone 0.1% Cream [Kenalog 0.1% Cream] 1 applic TOPICAL TID  applic 

05/06/21 [Rx]


carvediloL [Coreg] 6.25 mg PO BID-W/MEALS #0 tab 05/06/21 [Rx]


hydrALAZINE HCL [Apresoline] 25 mg PO BID #0 tab 05/06/21 [Rx]


predniSONE 0 mg PO AS DIRECTED #30 tab 05/06/21 [Rx]








Discharge Disposition: HOME WITH HOSPICE

## 2022-02-24 NOTE — P.CNPUL
History of Present Illness


Consult date: 03/17/21


Requesting physician: Musa Stein


Reason for consult: dyspnea, cough, hypoxemia, pneumonia, abnormal CXR/CT


Chief complaint: Shortness of breath, cough, chest congestion.


History of present illness: 





80-year-old male, who was brought to the emergency department by EMS.  The 

patient apparently has had 2 weeks' worth of increasing shortness of breath, and

significant fatigue.  The patient also had chest congestion.  He had a fever.  

The patient states that he is just not been feeling well and getting 

progressively worse.  He also apparently had an episode or 2 of diarrhea.  The 

patient denied any chest pain or chest pressure or palpitations.  The patient 

also denied nausea, vomiting, and abdominal pain.  Apparently when EMS arrived, 

the patient's saturations were in the low 80s on room air.  For that reason, he 

was transported in, evaluated, and admitted with a diagnosis of COVID 19 19 

pneumonia.  The patient does have a history of diabetes, hypertension, and a 

previous pacemaker insertion.  White count was 4.6, hemoglobin 12.5, hematocrit 

37.1, and platelet count 153,000.  PT, INR, and PTT were all normal.  D-dimer 

was 0.77.  Sodium 136, potassium 4.5, chlorides 102, CO2 26, anion gap 8, BUN 

47, and creatinine 1.63.  Ferritin was 985, , C-reactive protein 58, and 

pro-calcitonin level was 0.12.  Chest x-ray did show some interstitial 

infiltrates.





Review of Systems





REVIEW OF SYSTEMS:  


CONSTITUTIONAL:  Weakness, fatigue, and fever.


NEUROLOGIC: [ Negative.]


HEENT:  [ Negative.]


CARDIAC:  [Negative.]


PULMONARY:  Shortness of breath, chest congestion, pain in the chest while 

coughing, and minimal phlegm production.


GI:  Diarrhea.


:  [Negative.]


RHEUMATOLOGIC: [ Negative.]


IMMUNOLOGIC: [ Negative.]


ENDOCRINE:  [Negative.  ] 


DERMATOLOGIC: [Negative.]








Past Medical History


Past Medical History: Diabetes Mellitus, Hypertension


Additional Past Medical History / Comment(s): sinusitis, diverticulits


History of Any Multi-Drug Resistant Organisms: None Reported


Past Surgical History: Bowel Resection, Cholecystectomy


Additional Past Surgical History / Comment(s): bowel resection d/t diverticulit

is, colonoscopy


Past Anesthesia/Blood Transfusion Reactions: No Reported Reaction


Past Psychological History: No Psychological Hx Reported


Additional Psychological History / Comment(s): pt lives with his girlfriend 

antonio, is independant, no assistive devices.no outside services. worked in 

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


Smoking Status: Former smoker


Past Alcohol Use History: Occasional


Past Drug Use History: None Reported





- Past Family History


  ** Mother


Additional Family Medical History / Comment(s): reproductive cancer





  ** Father


Family Medical History: Diabetes Mellitus





Medications and Allergies


                                Home Medications











 Medication  Instructions  Recorded  Confirmed  Type


 


glipiZIDE [Glipizide] 20 mg PO BID 11/25/15 03/16/21 History


 


metFORMIN HCL 1,000 mg PO BID 11/25/15 03/16/21 History


 


Pioglitazone [Actos] 30 mg PO DAILY 05/15/20 03/16/21 History


 


Aspirin [Adult Low Dose Aspirin EC] 81 mg PO DAILY #30 tab 05/22/20 03/16/21 Rx


 


Atorvastatin [Lipitor] 80 mg PO DAILY #30 tab 05/22/20 03/16/21 Rx


 


Furosemide [Lasix] 20 mg PO DAILY #30 tab 05/22/20 03/16/21 Rx


 


Clopidogrel Bisulfate [Plavix] 75 mg PO DAILY 03/16/21 03/16/21 History


 


Losartan [Cozaar] 50 mg PO DAILY 03/16/21 03/16/21 History


 


Metoprolol Succinate (ER) [Toprol 50 mg PO DAILY 03/16/21 03/16/21 History





Xl]    








                                    Allergies











Allergy/AdvReac Type Severity Reaction Status Date / Time


 


Mushroom Allergy  Vomiting Verified 03/16/21 20:46


 


Penicillins Allergy  Unknown Verified 03/16/21 20:46














Physical Exam


Osteopathic Statement: *.  No significant issues noted on an osteopathic 

structural exam other than those noted in the History and Physical/Consult.


Vitals: 


                                   Vital Signs











  Temp Pulse Pulse Resp BP BP Pulse Ox


 


 03/17/21 10:36  97.8 F   65  16   104/53  95


 


 03/17/21 08:58        98


 


 03/17/21 05:28  97.6 F   64    124/62  98


 


 03/17/21 00:10  98.3 F   63  19   104/60  93 L


 


 03/16/21 23:30  98.9 F      


 


 03/16/21 18:45   67      97


 


 03/16/21 18:41  101.5 F H  62   26 H  119/62   84 L








                                Intake and Output











 03/17/21 03/17/21 03/17/21





 06:59 14:59 22:59


 


Other:   


 


  Voiding Method Toilet Toilet 


 


  # Voids 1  


 


  # Bowel Movements 0  


 


  Weight 136.078 kg  














No acute distress, oriented 3.  Currently on 2 L nasal cannula.  No 

conversational dyspnea, or use of accessory muscles.





HEENT examination is grossly unremarkable.  Mucous membranes are moist.  No oral

lesions.





Neck supple.  Full range of motion.  No adenopathy thyromegaly or neck vein 

distention.





Cardiovascular examination reveals regular rhythm rate.  S1-S2 normal.  No S3 or

S4.  No discernible murmur noted.  Heart rate 65 bpm.





Lungs reveal bilateral coarse rhonchi and bibasilar crackles.  No wheezes.  

Breath sounds equal bilaterally.





Abdomen soft bowel sounds are heard.  No masses or tenderness.





Extremities are intact.  No cyanosis clubbing or edema.





Skin is without rash or lesion.





Neurologic examination is brief but nonfocal.





Results





- Laboratory Findings


CBC and BMP: 


                                 03/16/21 19:34





                                 03/16/21 19:34


PT/INR, D-dimer











PT  10.1 sec (9.0-12.0)   03/16/21  19:34    


 


INR  0.9  (<1.2)   03/16/21  19:34    


 


D-Dimer  0.77 mg/L FEU (<0.60)  H  03/16/21  19:34    








Abnormal lab findings: 


                                  Abnormal Labs











  03/16/21 03/16/21 03/16/21





  19:34 19:34 19:34


 


RBC   3.92 L 


 


Hgb   12.5 L 


 


Hct   37.1 L 


 


D-Dimer    0.77 H


 


Sodium   


 


BUN   


 


Creatinine   


 


Glucose   


 


POC Glucose (mg/dL)   


 


Calcium   


 


Ferritin   


 


Lactate Dehydrogenase   


 


C-Reactive Protein   


 


Albumin   


 


Procalcitonin   


 


Coronavirus (PCR)  Detected A  














  03/16/21 03/16/21 03/17/21





  19:34 19:34 06:58


 


RBC   


 


Hgb   


 


Hct   


 


D-Dimer   


 


Sodium  136 L  


 


BUN  47 H  


 


Creatinine  1.63 H  


 


Glucose  203 H  


 


POC Glucose (mg/dL)    328 H


 


Calcium  8.3 L  


 


Ferritin  984.6 H  


 


Lactate Dehydrogenase  818 H  


 


C-Reactive Protein  57.7 H  


 


Albumin  3.4 L  


 


Procalcitonin   0.12 H 


 


Coronavirus (PCR)   














  03/17/21





  12:21


 


RBC 


 


Hgb 


 


Hct 


 


D-Dimer 


 


Sodium 


 


BUN 


 


Creatinine 


 


Glucose 


 


POC Glucose (mg/dL)  395 H


 


Calcium 


 


Ferritin 


 


Lactate Dehydrogenase 


 


C-Reactive Protein 


 


Albumin 


 


Procalcitonin 


 


Coronavirus (PCR) 














- Diagnostic Findings


Chest x-ray: image reviewed





Assessment and Plan


Assessment: 





Acute hypoxemic respiratory failure, secondary to COVID 19 

pneumonitis/pneumonia.





History of diabetes mellitus.





History of hypertension.





History of hyperlipidemia.





History of chronic sinus disease.





History of diverticulitis, status post bowel resection.


Plan: 





Plan dated 03/17/2021.





The patient was only a candidate for Decadron, and the typical vitamin cocktail.

 He was not a candidate for REM, TOCI, or CP.  We will continue to follow.  

Prognosis is guarded.  We'll make additional recommendations were appropriate.  

The patient should have a follow-up chest x-ray next day or two.  Also, 

inflammatory markers should be checked periodically.  Prognosis is guarded.


Time with Patient: Greater than 30 [Negative] : Heme/Lymph